# Patient Record
Sex: FEMALE | Race: WHITE | NOT HISPANIC OR LATINO | Employment: OTHER | URBAN - NONMETROPOLITAN AREA
[De-identification: names, ages, dates, MRNs, and addresses within clinical notes are randomized per-mention and may not be internally consistent; named-entity substitution may affect disease eponyms.]

---

## 2017-01-25 ENCOUNTER — APPOINTMENT (OUTPATIENT)
Dept: LAB | Facility: HOSPITAL | Age: 77
End: 2017-01-25

## 2017-01-25 ENCOUNTER — LAB (OUTPATIENT)
Dept: LAB | Facility: HOSPITAL | Age: 77
End: 2017-01-25

## 2017-01-25 ENCOUNTER — OFFICE VISIT (OUTPATIENT)
Dept: GASTROENTEROLOGY | Facility: CLINIC | Age: 77
End: 2017-01-25

## 2017-01-25 ENCOUNTER — PREP FOR SURGERY (OUTPATIENT)
Dept: GASTROENTEROLOGY | Facility: CLINIC | Age: 77
End: 2017-01-25

## 2017-01-25 VITALS
HEART RATE: 64 BPM | WEIGHT: 253 LBS | SYSTOLIC BLOOD PRESSURE: 158 MMHG | TEMPERATURE: 97.5 F | RESPIRATION RATE: 16 BRPM | DIASTOLIC BLOOD PRESSURE: 59 MMHG | HEIGHT: 63 IN | BODY MASS INDEX: 44.83 KG/M2

## 2017-01-25 DIAGNOSIS — K59.00 CONSTIPATION, UNSPECIFIED CONSTIPATION TYPE: Primary | ICD-10-CM

## 2017-01-25 DIAGNOSIS — Z12.11 COLON CANCER SCREENING: ICD-10-CM

## 2017-01-25 DIAGNOSIS — K59.00 CONSTIPATION, UNSPECIFIED CONSTIPATION TYPE: ICD-10-CM

## 2017-01-25 DIAGNOSIS — R10.32 LEFT LOWER QUADRANT PAIN: Primary | ICD-10-CM

## 2017-01-25 DIAGNOSIS — E11.8 DIABETIC COMPLICATION (HCC): ICD-10-CM

## 2017-01-25 DIAGNOSIS — R10.32 LEFT LOWER QUADRANT PAIN: ICD-10-CM

## 2017-01-25 DIAGNOSIS — Z00.00 ROUTINE CHECK-UP: Primary | ICD-10-CM

## 2017-01-25 LAB
ALBUMIN SERPL-MCNC: 3.8 G/DL (ref 3.5–5)
ALBUMIN/GLOB SERPL: 1 G/DL (ref 1–2)
ALP SERPL-CCNC: 140 U/L (ref 38–126)
ALT SERPL W P-5'-P-CCNC: 34 U/L (ref 13–69)
ANION GAP SERPL CALCULATED.3IONS-SCNC: 12.3 MMOL/L
AST SERPL-CCNC: 27 U/L (ref 15–46)
BASOPHILS # BLD AUTO: 0.06 10*3/MM3 (ref 0–0.2)
BASOPHILS NFR BLD AUTO: 0.8 % (ref 0–2.5)
BILIRUB SERPL-MCNC: 0.4 MG/DL (ref 0.2–1.3)
BUN BLD-MCNC: 27 MG/DL (ref 7–20)
BUN/CREAT SERPL: 24.5 (ref 7.1–23.5)
CALCIUM SPEC-SCNC: 9.4 MG/DL (ref 8.4–10.2)
CHLORIDE SERPL-SCNC: 104 MMOL/L (ref 98–107)
CHOLEST SERPL-MCNC: 199 MG/DL (ref 0–199)
CO2 SERPL-SCNC: 31 MMOL/L (ref 26–30)
CREAT BLD-MCNC: 1.1 MG/DL (ref 0.6–1.3)
DEPRECATED RDW RBC AUTO: 43.7 FL (ref 37–54)
EOSINOPHIL # BLD AUTO: 0.15 10*3/MM3 (ref 0–0.7)
EOSINOPHIL NFR BLD AUTO: 2.1 % (ref 0–7)
ERYTHROCYTE [DISTWIDTH] IN BLOOD BY AUTOMATED COUNT: 13.8 % (ref 11.5–14.5)
GFR SERPL CREATININE-BSD FRML MDRD: 48 ML/MIN/1.73
GLOBULIN UR ELPH-MCNC: 3.7 GM/DL
GLUCOSE BLD-MCNC: 161 MG/DL (ref 74–98)
HCT VFR BLD AUTO: 37.6 % (ref 37–47)
HGB BLD-MCNC: 11.4 G/DL (ref 12–16)
IMM GRANULOCYTES # BLD: 0.02 10*3/MM3 (ref 0–0.06)
IMM GRANULOCYTES NFR BLD: 0.3 % (ref 0–0.6)
LIPASE SERPL-CCNC: 130 U/L (ref 23–300)
LYMPHOCYTES # BLD AUTO: 1.57 10*3/MM3 (ref 0.6–3.4)
LYMPHOCYTES NFR BLD AUTO: 22 % (ref 10–50)
MCH RBC QN AUTO: 26 PG (ref 27–31)
MCHC RBC AUTO-ENTMCNC: 30.3 G/DL (ref 30–37)
MCV RBC AUTO: 85.8 FL (ref 81–99)
MONOCYTES # BLD AUTO: 0.47 10*3/MM3 (ref 0–0.9)
MONOCYTES NFR BLD AUTO: 6.6 % (ref 0–12)
NEUTROPHILS # BLD AUTO: 4.88 10*3/MM3 (ref 2–6.9)
NEUTROPHILS NFR BLD AUTO: 68.2 % (ref 37–80)
NRBC BLD MANUAL-RTO: 0 /100 WBC (ref 0–0)
PLATELET # BLD AUTO: 292 10*3/MM3 (ref 130–400)
PMV BLD AUTO: 10.9 FL (ref 6–12)
POTASSIUM BLD-SCNC: 4.3 MMOL/L (ref 3.5–5.1)
PROT SERPL-MCNC: 7.5 G/DL (ref 6.3–8.2)
RBC # BLD AUTO: 4.38 10*6/MM3 (ref 4.2–5.4)
SODIUM BLD-SCNC: 143 MMOL/L (ref 137–145)
TSH SERPL DL<=0.05 MIU/L-ACNC: 3.83 MIU/ML (ref 0.47–4.68)
WBC NRBC COR # BLD: 7.15 10*3/MM3 (ref 4.8–10.8)

## 2017-01-25 PROCEDURE — 36415 COLL VENOUS BLD VENIPUNCTURE: CPT

## 2017-01-25 PROCEDURE — 84443 ASSAY THYROID STIM HORMONE: CPT | Performed by: NURSE PRACTITIONER

## 2017-01-25 PROCEDURE — 99214 OFFICE O/P EST MOD 30 MIN: CPT | Performed by: NURSE PRACTITIONER

## 2017-01-25 PROCEDURE — 83690 ASSAY OF LIPASE: CPT | Performed by: NURSE PRACTITIONER

## 2017-01-25 PROCEDURE — 80053 COMPREHEN METABOLIC PANEL: CPT | Performed by: NURSE PRACTITIONER

## 2017-01-25 PROCEDURE — 85025 COMPLETE CBC W/AUTO DIFF WBC: CPT | Performed by: NURSE PRACTITIONER

## 2017-01-25 PROCEDURE — 82465 ASSAY BLD/SERUM CHOLESTEROL: CPT | Performed by: NURSE PRACTITIONER

## 2017-01-25 RX ORDER — SODIUM CHLORIDE 0.9 % (FLUSH) 0.9 %
1-10 SYRINGE (ML) INJECTION AS NEEDED
Status: CANCELLED | OUTPATIENT
Start: 2017-01-25

## 2017-01-25 RX ORDER — METRONIDAZOLE 250 MG/1
250 TABLET ORAL 4 TIMES DAILY
Qty: 28 TABLET | Refills: 0 | Status: SHIPPED | OUTPATIENT
Start: 2017-01-25 | End: 2017-02-01

## 2017-01-25 RX ORDER — CIPROFLOXACIN 500 MG/1
500 TABLET, FILM COATED ORAL 2 TIMES DAILY
Qty: 14 TABLET | Refills: 0 | Status: SHIPPED | OUTPATIENT
Start: 2017-01-25 | End: 2017-02-01

## 2017-01-25 RX ORDER — SODIUM CHLORIDE 9 MG/ML
70 INJECTION, SOLUTION INTRAVENOUS CONTINUOUS PRN
Status: CANCELLED | OUTPATIENT
Start: 2017-01-25

## 2017-01-25 NOTE — PATIENT INSTRUCTIONS
1.Treatment for diverticulitis:  A. Low-fat low fiber diet for 5 days thereafter low-fat high-fiber diet.   B. Cipro (ciprofloxacin) tablets 500 mg. Take 1 tablet by mouth twice a day for 7 days. Side effects were discussed.   C. Flagyl (metronidazole) tablets 250 mg. Take 1 tablet one by mouth 4 times a day for 7 days. Side effects were discussed.  D. Avoid laxatives, enemas for next 5 days. However, for constipation the patient may use stool softeners.  E. Colonoscopy: Description of the procedure, risks, benefits, alternatives and options, including nonoperative options, were discussed with the patient in detail. The patient understands and wishes to proceed, although it may be advisable to wait 3-4 weeks to schedule procedure.  2. Check CBC, CMP, Lipase, TSH  3. High fiber diet with liberal water intake.

## 2017-01-25 NOTE — LETTER
January 25, 2017     SANFORD Gill  [address]  [facility] [address]    Patient: Kathryn Davison   YOB: 1940   Date of Visit: 1/25/2017       Dear SANFORD Donnelly:    Kathryn Davison was in my office today. Below is a copy of my note.    If you have questions, please do not hesitate to call me. I look forward to following Kathryn along with you.         Sincerely,        NANNETTE Sarkar        CC: No Recipients    [address] [id number]    (H) [phone]  (W)     Chief Complaint   Patient presents with   • Follow-up   • Abdominal Pain     The patient is here for follow up. She states her left lower quadrant pain has improved, but she still has mild pain in that area. Occasionally it radiates up into the left upper quadrant that is worse when she bends over or leans on something that presses in to the left upper quadrant.. She finished the treatment for diverticulitis with Cipro and Flagyl and her symptoms started improving after that. She states her constipation is improving as well. She denies bright red blood per rectum or melena. There is no history of heartburn. The patient denies nausea or vomiting. Her last colonoscopy was in 2012 with polyps. There is no family history of colon cancer.    Abdominal Pain   This is a chronic problem. The current episode started more than 1 month ago (August 2016). The onset quality is sudden. The problem occurs intermittently. The problem has been gradually improving. The pain is located in the LUQ and LLQ. The pain is at a severity of 3/10. The pain is mild. The quality of the pain is a sensation of fullness and burning. The abdominal pain does not radiate. Associated symptoms include arthralgias and constipation. Pertinent negatives include no diarrhea, dysuria, fever, headaches, hematochezia, hematuria, melena, myalgias, nausea or vomiting. The pain is aggravated by certain positions (bending over). The pain is  relieved by nothing. She has tried antibiotics for the symptoms. The treatment provided moderate relief.   Constipation   This is a chronic problem. The current episode started more than 1 year ago (over 4 years). The problem has been gradually improving since onset. Her stool frequency is 4 to 5 times per week. The stool is described as loose, firm and formed. The patient is not on a high fiber diet. She does not exercise regularly. There has been adequate water intake. Associated symptoms include abdominal pain and back pain. Pertinent negatives include no diarrhea, fever, hematochezia, melena, nausea or vomiting. She has tried stool softeners (Miralax) for the symptoms. The treatment provided moderate relief.     Review of Systems   Constitutional: Negative for appetite change, chills, fatigue, fever and unexpected weight change.   HENT: Negative for mouth sores, nosebleeds and trouble swallowing.    Eyes: Negative for discharge and redness.   Respiratory: Positive for apnea and shortness of breath. Negative for cough.    Cardiovascular: Positive for leg swelling. Negative for chest pain and palpitations.   Gastrointestinal: Positive for abdominal pain and constipation. Negative for abdominal distention, anal bleeding, blood in stool, diarrhea, hematochezia, melena, nausea and vomiting.   Endocrine: Negative for cold intolerance, heat intolerance and polydipsia.   Genitourinary: Negative for dysuria, hematuria and urgency.   Musculoskeletal: Positive for arthralgias and back pain. Negative for joint swelling and myalgias.   Skin: Negative for rash.   Allergic/Immunologic: Negative for food allergies and immunocompromised state.   Neurological: Negative for dizziness, seizures, syncope and headaches.   Hematological: Negative for adenopathy. Does not bruise/bleed easily.   Psychiatric/Behavioral: Negative for dysphoric mood. The patient is not nervous/anxious and is not hyperactive.      Patient Active Problem  List   Diagnosis   • Constipation   • Left lower quadrant pain   • Colon cancer screening     Past Medical History   Diagnosis Date   • Back pain    • Colon polyp 12/10/2012   • Diabetes    • High cholesterol    • Hypertension    • Osteoarthritis    • Sleep apnea    • Vision problems      Past Surgical History   Procedure Laterality Date   • Cataract extraction       2014 and 2015   • Tubal abdominal ligation  1980?   • Colonoscopy  12/10/2012     Family History   Problem Relation Age of Onset   • Colon cancer Neg Hx    • Liver cancer Neg Hx    • Liver disease Neg Hx    • Stomach cancer Neg Hx    • Esophageal cancer Neg Hx      Social History   Substance Use Topics   • Smoking status: Never Smoker   • Smokeless tobacco: Not on file   • Alcohol use No       Current Outpatient Prescriptions:   •  alendronate (FOSAMAX) 10 MG tablet, Take 20 mg by mouth Every Morning Before Breakfast., Disp: , Rfl:   •  Calcium Citrate-Vitamin D (CALCIUM + D PO), Take  by mouth., Disp: , Rfl:   •  CloNIDine (CATAPRES) 0.1 MG tablet, Take 0.1 mg by mouth 2 (Two) Times a Day., Disp: , Rfl:   •  clopidogrel (PLAVIX) 75 MG tablet, Take 75 mg by mouth Daily., Disp: , Rfl:   •  diltiaZEM CD (DILTIAZEM CD) 240 MG 24 hr capsule, Take 240 mg by mouth Daily., Disp: , Rfl:   •  insulin glargine (LANTUS) 100 UNIT/ML injection, Inject  under the skin Daily., Disp: , Rfl:   •  insulin lispro (humaLOG) 100 UNIT/ML injection, Inject 10 Units under the skin 3 (Three) Times a Day Before Meals., Disp: , Rfl:   •  levothyroxine (SYNTHROID, LEVOTHROID) 100 MCG tablet, Take 100 mcg by mouth Daily., Disp: , Rfl:   •  montelukast (SINGULAIR) 10 MG tablet, Take 10 mg by mouth Every Night., Disp: , Rfl:   •  olmesartan-hydrochlorothiazide (BENICAR HCT) 20-12.5 MG per tablet, Take 1 tablet by mouth Daily., Disp: , Rfl:   •  Potassium Chloride (KLOR-CON 10 PO), Take  by mouth., Disp: , Rfl:   •  pravastatin (PRAVACHOL) 80 MG tablet, Take 80 mg by mouth Daily.,  "Disp: , Rfl:   •  SITagliptin (JANUVIA) 100 MG tablet, Take 100 mg by mouth Daily., Disp: , Rfl:   •  ciprofloxacin (CIPRO) 500 MG tablet, Take 1 tablet by mouth 2 (Two) Times a Day for 7 days. Take 1 tablet twice a day x 7 days, Disp: 14 tablet, Rfl: 0  •  metroNIDAZOLE (FLAGYL) 250 MG tablet, Take 1 tablet by mouth 4 (Four) Times a Day for 7 days. Take 1 tablet four times daily x 7 days, Disp: 28 tablet, Rfl: 0     No Known Allergies     Visit Vitals   • /59   • Pulse 64   • Temp 97.5 °F (36.4 °C)   • Resp 16   • Ht 63\" (160 cm)   • Wt 253 lb (115 kg)   • BMI 44.82 kg/m2     Physical Exam   Constitutional: She is oriented to person, place, and time. She appears well-developed and well-nourished. No distress.   HENT:   Head: Normocephalic and atraumatic.   Right Ear: Hearing and external ear normal.   Left Ear: Hearing and external ear normal.   Nose: Nose normal.   Mouth/Throat: Oropharynx is clear and moist and mucous membranes are normal. Mucous membranes are not pale, not dry and not cyanotic. No oral lesions. No oropharyngeal exudate.   Eyes: Conjunctivae and EOM are normal. Right eye exhibits no discharge. Left eye exhibits no discharge.   Neck: Trachea normal. Neck supple. No JVD present. No edema present. No thyroid mass and no thyromegaly present.   Cardiovascular: Normal rate, regular rhythm, S2 normal and normal heart sounds.  Exam reveals no gallop, no S3 and no friction rub.    No murmur heard.  Pulmonary/Chest: Effort normal and breath sounds normal. No respiratory distress. She exhibits no tenderness.   Abdominal: Normal appearance and bowel sounds are normal. She exhibits no distension, no ascites and no mass. There is no splenomegaly or hepatomegaly. There is tenderness (mild to moderate) in the left lower quadrant. There is no rigidity, no rebound and no guarding. No hernia.       Vascular Status -  Her exam exhibits no right foot edema. Her exam exhibits no left foot " edema.  Lymphadenopathy:     She has no cervical adenopathy.        Left: No supraclavicular adenopathy present.   Neurological: She is alert and oriented to person, place, and time. She has normal strength. No cranial nerve deficit or sensory deficit.   Skin: No rash noted. She is not diaphoretic. No cyanosis. No pallor. Nails show no clubbing.   Psychiatric: She has a normal mood and affect.   Nursing note and vitals reviewed.    Laboratory Tests:   Upon review of records:     Dated 8/24/2016 glucose 64 BUN 23 creatinine 1.1 sodium 142 potassium 4.1 chloride 105 CO2 33 calcium 9.4 albumin 3.9 ALT 24 AST 21 alkaline phosphatase 120 TSH 2.774 T3 uptake 39.0 free T4 1.19 T4 total 9.3 amylase 70 lipase 36     Abdominal Imaging:   Upon review of records:     Dated 9/19/2016 CT of abdomen with contrast reveals lung bases clear. No obvious cardiac abnormality. Liver, gallbladder, spleen, pancreas, adrenals and both kidneys are normal. Stomach, small bowel and colon are normal. No mesenteric lymphadenopathy or peritoneal free fluid. Aorta shows atherosclerotic calcifications with normal aortic caliber. IVC and branches are patent and normal. No retroperitoneal lymphadenopathy. Body while a musculoskeletal structure showed degenerative changes of the lumbar spine noted. Anterior abdominal wall is normal.     [facility]IDA scan dated 10/6/2016 reveals ejection fraction of 85.9%     Procedures:    Colonoscopy dated 12/10/2012 per Dr. Marcos Arredondo reveals colon polyps.  Pathology reveals tubular adenoma.    Kathryn was seen today for follow-up and abdominal pain.    Diagnoses and all orders for this visit:    Left lower quadrant pain  Comments:  Likely secondary to diverticulitis. Improving.  Orders:  -     ciprofloxacin (CIPRO) 500 MG tablet; Take 1 tablet by mouth 2 (Two) Times a Day for 7 days. Take 1 tablet twice a day x 7 days  -     metroNIDAZOLE (FLAGYL) 250 MG tablet; Take 1 tablet by mouth 4 (Four) Times a Day for 7  days. Take 1 tablet four times daily x 7 days  -     CBC & Differential  -     Comprehensive Metabolic Panel  -     Lipase  -     TSH    Constipation, unspecified constipation type  Comments:  Improving.  Orders:  -     CBC & Differential  -     Comprehensive Metabolic Panel  -     Lipase  -     TSH    Colon cancer screening  Comments:  Last colonoscopy was in 2012 with tubular adenoma. No family history of colon cancer.        Plan   Patient Instructions   1.Treatment for diverticulitis:  A. Low-fat low fiber diet for 5 days thereafter low-fat high-fiber diet.   B. Cipro (ciprofloxacin) tablets 500 mg. Take 1 tablet by mouth twice a day for 7 days. Side effects were discussed.   C. Flagyl (metronidazole) tablets 250 mg. Take 1 tablet one by mouth 4 times a day for 7 days. Side effects were discussed.  D. Avoid laxatives, enemas for next 5 days. However, for constipation the patient may use stool softeners.  E. Colonoscopy: Description of the procedure, risks, benefits, alternatives and options, including nonoperative options, were discussed with the patient in detail. The patient understands and wishes to proceed, although it may be advisable to wait 3-4 weeks to schedule procedure.  2. Check CBC, CMP, Lipase, TSH  3. High fiber diet with liberal water intake.    Patient Care Team:  SANFORD Gill as PCP - General    NANNETTE Sarkar

## 2017-01-25 NOTE — PROGRESS NOTES
[address] [facility] [address]    (H) [phone]  (W)     Chief Complaint   Patient presents with   • Follow-up   • Abdominal Pain     The patient is here for follow up. She states her left lower quadrant pain has improved, but she still has mild pain in that area. Occasionally it radiates up into the left upper quadrant that is worse when she bends over or leans on something that presses in to the left upper quadrant.. She finished the treatment for diverticulitis with Cipro and Flagyl and her symptoms started improving after that. She states her constipation is improving as well. She denies bright red blood per rectum or melena. There is no history of heartburn. The patient denies nausea or vomiting. Her last colonoscopy was in 2012 with polyps. There is no family history of colon cancer.    Abdominal Pain   This is a chronic problem. The current episode started more than 1 month ago (August 2016). The onset quality is sudden. The problem occurs intermittently. The problem has been gradually improving. The pain is located in the LUQ and LLQ. The pain is at a severity of 3/10. The pain is mild. The quality of the pain is a sensation of fullness and burning. The abdominal pain does not radiate. Associated symptoms include arthralgias and constipation. Pertinent negatives include no diarrhea, dysuria, fever, headaches, hematochezia, hematuria, melena, myalgias, nausea or vomiting. The pain is aggravated by certain positions (bending over). The pain is relieved by nothing. She has tried antibiotics for the symptoms. The treatment provided moderate relief.   Constipation   This is a chronic problem. The current episode started more than 1 year ago (over 4 years). The problem has been gradually improving since onset. Her stool frequency is 4 to 5 times per week. The stool is described as loose, firm and formed. The patient is not on a high fiber diet. She does not exercise regularly. There has been adequate  water intake. Associated symptoms include abdominal pain and back pain. Pertinent negatives include no diarrhea, fever, hematochezia, melena, nausea or vomiting. She has tried stool softeners (Miralax) for the symptoms. The treatment provided moderate relief.     Review of Systems   Constitutional: Negative for appetite change, chills, fatigue, fever and unexpected weight change.   HENT: Negative for mouth sores, nosebleeds and trouble swallowing.    Eyes: Negative for discharge and redness.   Respiratory: Positive for apnea and shortness of breath. Negative for cough.    Cardiovascular: Positive for leg swelling. Negative for chest pain and palpitations.   Gastrointestinal: Positive for abdominal pain and constipation. Negative for abdominal distention, anal bleeding, blood in stool, diarrhea, hematochezia, melena, nausea and vomiting.   Endocrine: Negative for cold intolerance, heat intolerance and polydipsia.   Genitourinary: Negative for dysuria, hematuria and urgency.   Musculoskeletal: Positive for arthralgias and back pain. Negative for joint swelling and myalgias.   Skin: Negative for rash.   Allergic/Immunologic: Negative for food allergies and immunocompromised state.   Neurological: Negative for dizziness, seizures, syncope and headaches.   Hematological: Negative for adenopathy. Does not bruise/bleed easily.   Psychiatric/Behavioral: Negative for dysphoric mood. The patient is not nervous/anxious and is not hyperactive.      Patient Active Problem List   Diagnosis   • Constipation   • Left lower quadrant pain   • Colon cancer screening     Past Medical History   Diagnosis Date   • Back pain    • Colon polyp 12/10/2012   • Diabetes    • High cholesterol    • Hypertension    • Osteoarthritis    • Sleep apnea    • Vision problems      Past Surgical History   Procedure Laterality Date   • Cataract extraction       2014 and 2015   • Tubal abdominal ligation  1980?   • Colonoscopy  12/10/2012     Family  History   Problem Relation Age of Onset   • Colon cancer Neg Hx    • Liver cancer Neg Hx    • Liver disease Neg Hx    • Stomach cancer Neg Hx    • Esophageal cancer Neg Hx      Social History   Substance Use Topics   • Smoking status: Never Smoker   • Smokeless tobacco: Not on file   • Alcohol use No       Current Outpatient Prescriptions:   •  alendronate (FOSAMAX) 10 MG tablet, Take 20 mg by mouth Every Morning Before Breakfast., Disp: , Rfl:   •  Calcium Citrate-Vitamin D (CALCIUM + D PO), Take  by mouth., Disp: , Rfl:   •  CloNIDine (CATAPRES) 0.1 MG tablet, Take 0.1 mg by mouth 2 (Two) Times a Day., Disp: , Rfl:   •  clopidogrel (PLAVIX) 75 MG tablet, Take 75 mg by mouth Daily., Disp: , Rfl:   •  diltiaZEM CD (DILTIAZEM CD) 240 MG 24 hr capsule, Take 240 mg by mouth Daily., Disp: , Rfl:   •  insulin glargine (LANTUS) 100 UNIT/ML injection, Inject  under the skin Daily., Disp: , Rfl:   •  insulin lispro (humaLOG) 100 UNIT/ML injection, Inject 10 Units under the skin 3 (Three) Times a Day Before Meals., Disp: , Rfl:   •  levothyroxine (SYNTHROID, LEVOTHROID) 100 MCG tablet, Take 100 mcg by mouth Daily., Disp: , Rfl:   •  montelukast (SINGULAIR) 10 MG tablet, Take 10 mg by mouth Every Night., Disp: , Rfl:   •  olmesartan-hydrochlorothiazide (BENICAR HCT) 20-12.5 MG per tablet, Take 1 tablet by mouth Daily., Disp: , Rfl:   •  Potassium Chloride (KLOR-CON 10 PO), Take  by mouth., Disp: , Rfl:   •  pravastatin (PRAVACHOL) 80 MG tablet, Take 80 mg by mouth Daily., Disp: , Rfl:   •  SITagliptin (JANUVIA) 100 MG tablet, Take 100 mg by mouth Daily., Disp: , Rfl:   •  ciprofloxacin (CIPRO) 500 MG tablet, Take 1 tablet by mouth 2 (Two) Times a Day for 7 days. Take 1 tablet twice a day x 7 days, Disp: 14 tablet, Rfl: 0  •  metroNIDAZOLE (FLAGYL) 250 MG tablet, Take 1 tablet by mouth 4 (Four) Times a Day for 7 days. Take 1 tablet four times daily x 7 days, Disp: 28 tablet, Rfl: 0     No Known Allergies     Visit Vitals   •  "/59   • Pulse 64   • Temp 97.5 °F (36.4 °C)   • Resp 16   • Ht 63\" (160 cm)   • Wt 253 lb (115 kg)   • BMI 44.82 kg/m2     Physical Exam   Constitutional: She is oriented to person, place, and time. She appears well-developed and well-nourished. No distress.   HENT:   Head: Normocephalic and atraumatic.   Right Ear: Hearing and external ear normal.   Left Ear: Hearing and external ear normal.   Nose: Nose normal.   Mouth/Throat: Oropharynx is clear and moist and mucous membranes are normal. Mucous membranes are not pale, not dry and not cyanotic. No oral lesions. No oropharyngeal exudate.   Eyes: Conjunctivae and EOM are normal. Right eye exhibits no discharge. Left eye exhibits no discharge.   Neck: Trachea normal. Neck supple. No JVD present. No edema present. No thyroid mass and no thyromegaly present.   Cardiovascular: Normal rate, regular rhythm, S2 normal and normal heart sounds.  Exam reveals no gallop, no S3 and no friction rub.    No murmur heard.  Pulmonary/Chest: Effort normal and breath sounds normal. No respiratory distress. She exhibits no tenderness.   Abdominal: Normal appearance and bowel sounds are normal. She exhibits no distension, no ascites and no mass. There is no splenomegaly or hepatomegaly. There is tenderness (mild to moderate) in the left lower quadrant. There is no rigidity, no rebound and no guarding. No hernia.       Vascular Status -  Her exam exhibits no right foot edema. Her exam exhibits no left foot edema.  Lymphadenopathy:     She has no cervical adenopathy.        Left: No supraclavicular adenopathy present.   Neurological: She is alert and oriented to person, place, and time. She has normal strength. No cranial nerve deficit or sensory deficit.   Skin: No rash noted. She is not diaphoretic. No cyanosis. No pallor. Nails show no clubbing.   Psychiatric: She has a normal mood and affect.   Nursing note and vitals reviewed.    Laboratory Tests:   Upon review of " records:     Dated 8/24/2016 glucose 64 BUN 23 creatinine 1.1 sodium 142 potassium 4.1 chloride 105 CO2 33 calcium 9.4 albumin 3.9 ALT 24 AST 21 alkaline phosphatase 120 TSH 2.774 T3 uptake 39.0 free T4 1.19 T4 total 9.3 amylase 70 lipase 36     Abdominal Imaging:   Upon review of records:     Dated 9/19/2016 CT of abdomen with contrast reveals lung bases clear. No obvious cardiac abnormality. Liver, gallbladder, spleen, pancreas, adrenals and both kidneys are normal. Stomach, small bowel and colon are normal. No mesenteric lymphadenopathy or peritoneal free fluid. Aorta shows atherosclerotic calcifications with normal aortic caliber. IVC and branches are patent and normal. No retroperitoneal lymphadenopathy. Body while a musculoskeletal structure showed degenerative changes of the lumbar spine noted. Anterior abdominal wall is normal.     [facility] scan dated 10/6/2016 reveals ejection fraction of 85.9%     Procedures:    Colonoscopy dated 12/10/2012 per Dr. Marcos Arredondo reveals colon polyps.  Pathology reveals tubular adenoma.    Kathryn was seen today for follow-up and abdominal pain.    Diagnoses and all orders for this visit:    Left lower quadrant pain  Comments:  Likely secondary to diverticulitis. Improving.  Orders:  -     ciprofloxacin (CIPRO) 500 MG tablet; Take 1 tablet by mouth 2 (Two) Times a Day for 7 days. Take 1 tablet twice a day x 7 days  -     metroNIDAZOLE (FLAGYL) 250 MG tablet; Take 1 tablet by mouth 4 (Four) Times a Day for 7 days. Take 1 tablet four times daily x 7 days  -     CBC & Differential  -     Comprehensive Metabolic Panel  -     Lipase  -     TSH    Constipation, unspecified constipation type  Comments:  Improving.  Orders:  -     CBC & Differential  -     Comprehensive Metabolic Panel  -     Lipase  -     TSH    Colon cancer screening  Comments:  Last colonoscopy was in 2012 with tubular adenoma. No family history of colon cancer.        Plan   Patient Instructions   1.Treatment  for diverticulitis:  A. Low-fat low fiber diet for 5 days thereafter low-fat high-fiber diet.   B. Cipro (ciprofloxacin) tablets 500 mg. Take 1 tablet by mouth twice a day for 7 days. Side effects were discussed.   C. Flagyl (metronidazole) tablets 250 mg. Take 1 tablet one by mouth 4 times a day for 7 days. Side effects were discussed.  D. Avoid laxatives, enemas for next 5 days. However, for constipation the patient may use stool softeners.  E. Colonoscopy: Description of the procedure, risks, benefits, alternatives and options, including nonoperative options, were discussed with the patient in detail. The patient understands and wishes to proceed, although it may be advisable to wait 3-4 weeks to schedule procedure.  2. Check CBC, CMP, Lipase, TSH  3. High fiber diet with liberal water intake.    Patient Care Team:  SANFORD Gill as PCP - General    NANNETTE Sarkar

## 2017-01-25 NOTE — MR AVS SNAPSHOT
Kathryn AVIS Davison   1/25/2017 10:00 AM   Office Visit    Dept Phone:  [phone]   Encounter #:  [id number]    Provider:  NANNETTE Torres   Department:  [facility] GASTROENTEROLOGY                Your Full Care Plan              Today's Medication Changes          These changes are accurate as of: 1/25/17 10:50 AM.  If you have any questions, ask your nurse or doctor.               New Medication(s)Ordered:     ciprofloxacin 500 MG tablet   Commonly known as:  CIPRO   Take 1 tablet by mouth 2 (Two) Times a Day for 7 days. Take 1 tablet twice a day x 7 days   Started by:  NANNETTE Torres       metroNIDAZOLE 250 MG tablet   Commonly known as:  FLAGYL   Take 1 tablet by mouth 4 (Four) Times a Day for 7 days. Take 1 tablet four times daily x 7 days   Started by:  NANNETTE Torres            Where to Get Your Medications      These medications were sent to [place] [facility] [id number] [place] [id number] [address] [address]     Phone:  [phone]     ciprofloxacin 500 MG tablet    metroNIDAZOLE 250 MG tablet                  Your Updated Medication List          This list is accurate as of: 1/25/17 10:50 AM.  Always use your most recent med list.                alendronate 10 MG tablet   Commonly known as:  FOSAMAX       CALCIUM + D PO       ciprofloxacin 500 MG tablet   Commonly known as:  CIPRO   Take 1 tablet by mouth 2 (Two) Times a Day for 7 days. Take 1 tablet twice a day x 7 days       CloNIDine 0.1 MG tablet   Commonly known as:  CATAPRES       clopidogrel 75 MG tablet   Commonly known as:  PLAVIX       DILTIAZEM  MG 24 hr capsule   Generic drug:  diltiaZEM CD       insulin glargine 100 UNIT/ML injection   Commonly known as:  LANTUS       insulin lispro 100 UNIT/ML injection   Commonly known as:  humaLOG       KLOR-CON 10 PO       levothyroxine 100 MCG tablet   Commonly known  as:  SYNTHROID, LEVOTHROID       metroNIDAZOLE 250 MG tablet   Commonly known as:  FLAGYL   Take 1 tablet by mouth 4 (Four) Times a Day for 7 days. Take 1 tablet four times daily x 7 days       montelukast 10 MG tablet   Commonly known as:  SINGULAIR       olmesartan-hydrochlorothiazide 20-12.5 MG per tablet   Commonly known as:  BENICAR HCT       pravastatin 80 MG tablet   Commonly known as:  PRAVACHOL       SITagliptin 100 MG tablet   Commonly known as:  JANUVIA               We Performed the Following     CBC & Differential     Comprehensive Metabolic Panel     Lipase     TSH       You Were Diagnosed With        Codes Comments    Left lower quadrant pain    -  Primary ICD-10-CM: R10.32  ICD-9-CM: 789.04 Likely secondary to diverticulitis. Improving.    Constipation, unspecified constipation type     ICD-10-CM: K59.00  ICD-9-CM: 564.00 Improving.      Instructions    1.Treatment for diverticulitis:  A. Low-fat low fiber diet for 5 days thereafter low-fat high-fiber diet.   B. Cipro (ciprofloxacin) tablets 500 mg. Take 1 tablet by mouth twice a day for 7 days. Side effects were discussed.   C. Flagyl (metronidazole) tablets 250 mg. Take 1 tablet one by mouth 4 times a day for 7 days. Side effects were discussed.  D. Avoid laxatives, enemas for next 5 days. However, for constipation the patient may use stool softeners.  E. Colonoscopy: Description of the procedure, risks, benefits, alternatives and options, including nonoperative options, were discussed with the patient in detail. The patient understands and wishes to proceed, although it may be advisable to wait 3-4 weeks to schedule procedure.  2. Check CBC, CMP, Lipase, TSH     Patient Instructions History      Upcoming Appointments     Visit Type Date Time Department    FOLLOW UP 1/25/2017 10:00 AM [facility] Signup     [facility] allows you to send messages to your doctor, view your test results, renew your prescriptions,  "schedule appointments, and more. To sign up, go to [email or web address] and click on the Sign Up Now link in the New User? box. Enter your [employer] Activation Code exactly as it appears below along with the last four digits of your Social Security Number and your Date of Birth () to complete the sign-up process. If you do not sign up before the expiration date, you must request a new code.    [employer] Activation Code: [email or web address]  Expires: 2017 10:50 AM    If you have questions, you can email [email or web address] or call [phone] to talk to our [employer] staff. Remember, [employer] is NOT to be used for urgent needs. For medical emergencies, dial 911.               Other Info from Your Visit           Allergies     No Known Allergies      Reason for Visit     Follow-up     Abdominal Pain           Vital Signs     Blood Pressure Pulse Temperature Respirations Height Weight    158/59 64 97.5 °F (36.4 °C) 16 63\" (160 cm) 253 lb (115 kg)    Body Mass Index Smoking Status                44.82 kg/m2 Never Smoker          Problems and Diagnoses Noted     Constipation    Abdominal pain, left lower quadrant        "

## 2017-02-22 ENCOUNTER — HOSPITAL ENCOUNTER (OUTPATIENT)
Facility: HOSPITAL | Age: 77
Setting detail: HOSPITAL OUTPATIENT SURGERY
Discharge: HOME | End: 2017-02-22
Attending: INTERNAL MEDICINE | Admitting: INTERNAL MEDICINE

## 2017-02-22 ENCOUNTER — ANESTHESIA EVENT (OUTPATIENT)
Dept: GASTROENTEROLOGY | Facility: HOSPITAL | Age: 77
End: 2017-02-22

## 2017-02-22 ENCOUNTER — ANESTHESIA (OUTPATIENT)
Dept: GASTROENTEROLOGY | Facility: HOSPITAL | Age: 77
End: 2017-02-22

## 2017-02-22 VITALS
TEMPERATURE: 97.5 F | HEIGHT: 63 IN | OXYGEN SATURATION: 97 % | HEART RATE: 67 BPM | RESPIRATION RATE: 18 BRPM | BODY MASS INDEX: 43.41 KG/M2 | SYSTOLIC BLOOD PRESSURE: 121 MMHG | DIASTOLIC BLOOD PRESSURE: 46 MMHG | WEIGHT: 245 LBS

## 2017-02-22 DIAGNOSIS — R10.32 LEFT LOWER QUADRANT PAIN: ICD-10-CM

## 2017-02-22 DIAGNOSIS — Z12.11 COLON CANCER SCREENING: ICD-10-CM

## 2017-02-22 DIAGNOSIS — K59.00 CONSTIPATION, UNSPECIFIED CONSTIPATION TYPE: ICD-10-CM

## 2017-02-22 LAB — GLUCOSE BLDC GLUCOMTR-MCNC: 114 MG/DL (ref 70–130)

## 2017-02-22 PROCEDURE — 45380 COLONOSCOPY AND BIOPSY: CPT | Performed by: INTERNAL MEDICINE

## 2017-02-22 PROCEDURE — 45385 COLONOSCOPY W/LESION REMOVAL: CPT | Performed by: INTERNAL MEDICINE

## 2017-02-22 PROCEDURE — 82962 GLUCOSE BLOOD TEST: CPT

## 2017-02-22 PROCEDURE — 25010000002 PROPOFOL 1000 MG/100ML EMULSION: Performed by: NURSE ANESTHETIST, CERTIFIED REGISTERED

## 2017-02-22 PROCEDURE — 25010000002 FENTANYL CITRATE (PF) 100 MCG/2ML SOLUTION: Performed by: NURSE ANESTHETIST, CERTIFIED REGISTERED

## 2017-02-22 DEVICE — DEV CLIP ENDO RESOLUTION360 CONTRL ROT 235CM: Type: IMPLANTABLE DEVICE | Status: FUNCTIONAL

## 2017-02-22 RX ORDER — FENTANYL CITRATE 50 UG/ML
INJECTION, SOLUTION INTRAMUSCULAR; INTRAVENOUS AS NEEDED
Status: DISCONTINUED | OUTPATIENT
Start: 2017-02-22 | End: 2017-02-22 | Stop reason: SURG

## 2017-02-22 RX ORDER — PROPOFOL 10 MG/ML
INJECTION, EMULSION INTRAVENOUS CONTINUOUS PRN
Status: DISCONTINUED | OUTPATIENT
Start: 2017-02-22 | End: 2017-02-22 | Stop reason: SURG

## 2017-02-22 RX ORDER — SODIUM CHLORIDE 9 MG/ML
70 INJECTION, SOLUTION INTRAVENOUS CONTINUOUS PRN
Status: DISCONTINUED | OUTPATIENT
Start: 2017-02-22 | End: 2017-02-22 | Stop reason: HOSPADM

## 2017-02-22 RX ORDER — PROPOFOL 10 MG/ML
INJECTION, EMULSION INTRAVENOUS AS NEEDED
Status: DISCONTINUED | OUTPATIENT
Start: 2017-02-22 | End: 2017-02-22 | Stop reason: SURG

## 2017-02-22 RX ORDER — SODIUM CHLORIDE 0.9 % (FLUSH) 0.9 %
1-10 SYRINGE (ML) INJECTION AS NEEDED
Status: DISCONTINUED | OUTPATIENT
Start: 2017-02-22 | End: 2017-02-22 | Stop reason: HOSPADM

## 2017-02-22 RX ADMIN — PROPOFOL 50 MG: 10 INJECTION, EMULSION INTRAVENOUS at 09:04

## 2017-02-22 RX ADMIN — FENTANYL CITRATE 100 MCG: 50 INJECTION, SOLUTION INTRAMUSCULAR; INTRAVENOUS at 08:30

## 2017-02-22 RX ADMIN — PROPOFOL 40 MG: 10 INJECTION, EMULSION INTRAVENOUS at 09:10

## 2017-02-22 RX ADMIN — PROPOFOL 60 MG: 10 INJECTION, EMULSION INTRAVENOUS at 08:35

## 2017-02-22 RX ADMIN — SODIUM CHLORIDE: 9 INJECTION, SOLUTION INTRAVENOUS at 08:28

## 2017-02-22 RX ADMIN — PROPOFOL 50 MCG/KG/MIN: 10 INJECTION, EMULSION INTRAVENOUS at 08:33

## 2017-02-22 RX ADMIN — SODIUM CHLORIDE 70 ML/HR: 9 INJECTION, SOLUTION INTRAVENOUS at 08:16

## 2017-02-22 NOTE — H&P (VIEW-ONLY)
[address] [facility] [address]    (H) [phone]  (W)     Chief Complaint   Patient presents with   • Follow-up   • Abdominal Pain     The patient is here for follow up. She states her left lower quadrant pain has improved, but she still has mild pain in that area. Occasionally it radiates up into the left upper quadrant that is worse when she bends over or leans on something that presses in to the left upper quadrant.. She finished the treatment for diverticulitis with Cipro and Flagyl and her symptoms started improving after that. She states her constipation is improving as well. She denies bright red blood per rectum or melena. There is no history of heartburn. The patient denies nausea or vomiting. Her last colonoscopy was in 2012 with polyps. There is no family history of colon cancer.    Abdominal Pain   This is a chronic problem. The current episode started more than 1 month ago (August 2016). The onset quality is sudden. The problem occurs intermittently. The problem has been gradually improving. The pain is located in the LUQ and LLQ. The pain is at a severity of 3/10. The pain is mild. The quality of the pain is a sensation of fullness and burning. The abdominal pain does not radiate. Associated symptoms include arthralgias and constipation. Pertinent negatives include no diarrhea, dysuria, fever, headaches, hematochezia, hematuria, melena, myalgias, nausea or vomiting. The pain is aggravated by certain positions (bending over). The pain is relieved by nothing. She has tried antibiotics for the symptoms. The treatment provided moderate relief.   Constipation   This is a chronic problem. The current episode started more than 1 year ago (over 4 years). The problem has been gradually improving since onset. Her stool frequency is 4 to 5 times per week. The stool is described as loose, firm and formed. The patient is not on a high fiber diet. She does not exercise regularly. There has been adequate  water intake. Associated symptoms include abdominal pain and back pain. Pertinent negatives include no diarrhea, fever, hematochezia, melena, nausea or vomiting. She has tried stool softeners (Miralax) for the symptoms. The treatment provided moderate relief.     Review of Systems   Constitutional: Negative for appetite change, chills, fatigue, fever and unexpected weight change.   HENT: Negative for mouth sores, nosebleeds and trouble swallowing.    Eyes: Negative for discharge and redness.   Respiratory: Positive for apnea and shortness of breath. Negative for cough.    Cardiovascular: Positive for leg swelling. Negative for chest pain and palpitations.   Gastrointestinal: Positive for abdominal pain and constipation. Negative for abdominal distention, anal bleeding, blood in stool, diarrhea, hematochezia, melena, nausea and vomiting.   Endocrine: Negative for cold intolerance, heat intolerance and polydipsia.   Genitourinary: Negative for dysuria, hematuria and urgency.   Musculoskeletal: Positive for arthralgias and back pain. Negative for joint swelling and myalgias.   Skin: Negative for rash.   Allergic/Immunologic: Negative for food allergies and immunocompromised state.   Neurological: Negative for dizziness, seizures, syncope and headaches.   Hematological: Negative for adenopathy. Does not bruise/bleed easily.   Psychiatric/Behavioral: Negative for dysphoric mood. The patient is not nervous/anxious and is not hyperactive.      Patient Active Problem List   Diagnosis   • Constipation   • Left lower quadrant pain   • Colon cancer screening     Past Medical History   Diagnosis Date   • Back pain    • Colon polyp 12/10/2012   • Diabetes    • High cholesterol    • Hypertension    • Osteoarthritis    • Sleep apnea    • Vision problems      Past Surgical History   Procedure Laterality Date   • Cataract extraction       2014 and 2015   • Tubal abdominal ligation  1980?   • Colonoscopy  12/10/2012     Family  History   Problem Relation Age of Onset   • Colon cancer Neg Hx    • Liver cancer Neg Hx    • Liver disease Neg Hx    • Stomach cancer Neg Hx    • Esophageal cancer Neg Hx      Social History   Substance Use Topics   • Smoking status: Never Smoker   • Smokeless tobacco: Not on file   • Alcohol use No       Current Outpatient Prescriptions:   •  alendronate (FOSAMAX) 10 MG tablet, Take 20 mg by mouth Every Morning Before Breakfast., Disp: , Rfl:   •  Calcium Citrate-Vitamin D (CALCIUM + D PO), Take  by mouth., Disp: , Rfl:   •  CloNIDine (CATAPRES) 0.1 MG tablet, Take 0.1 mg by mouth 2 (Two) Times a Day., Disp: , Rfl:   •  clopidogrel (PLAVIX) 75 MG tablet, Take 75 mg by mouth Daily., Disp: , Rfl:   •  diltiaZEM CD (DILTIAZEM CD) 240 MG 24 hr capsule, Take 240 mg by mouth Daily., Disp: , Rfl:   •  insulin glargine (LANTUS) 100 UNIT/ML injection, Inject  under the skin Daily., Disp: , Rfl:   •  insulin lispro (humaLOG) 100 UNIT/ML injection, Inject 10 Units under the skin 3 (Three) Times a Day Before Meals., Disp: , Rfl:   •  levothyroxine (SYNTHROID, LEVOTHROID) 100 MCG tablet, Take 100 mcg by mouth Daily., Disp: , Rfl:   •  montelukast (SINGULAIR) 10 MG tablet, Take 10 mg by mouth Every Night., Disp: , Rfl:   •  olmesartan-hydrochlorothiazide (BENICAR HCT) 20-12.5 MG per tablet, Take 1 tablet by mouth Daily., Disp: , Rfl:   •  Potassium Chloride (KLOR-CON 10 PO), Take  by mouth., Disp: , Rfl:   •  pravastatin (PRAVACHOL) 80 MG tablet, Take 80 mg by mouth Daily., Disp: , Rfl:   •  SITagliptin (JANUVIA) 100 MG tablet, Take 100 mg by mouth Daily., Disp: , Rfl:   •  ciprofloxacin (CIPRO) 500 MG tablet, Take 1 tablet by mouth 2 (Two) Times a Day for 7 days. Take 1 tablet twice a day x 7 days, Disp: 14 tablet, Rfl: 0  •  metroNIDAZOLE (FLAGYL) 250 MG tablet, Take 1 tablet by mouth 4 (Four) Times a Day for 7 days. Take 1 tablet four times daily x 7 days, Disp: 28 tablet, Rfl: 0     No Known Allergies     Visit Vitals   •  "/59   • Pulse 64   • Temp 97.5 °F (36.4 °C)   • Resp 16   • Ht 63\" (160 cm)   • Wt 253 lb (115 kg)   • BMI 44.82 kg/m2     Physical Exam   Constitutional: She is oriented to person, place, and time. She appears well-developed and well-nourished. No distress.   HENT:   Head: Normocephalic and atraumatic.   Right Ear: Hearing and external ear normal.   Left Ear: Hearing and external ear normal.   Nose: Nose normal.   Mouth/Throat: Oropharynx is clear and moist and mucous membranes are normal. Mucous membranes are not pale, not dry and not cyanotic. No oral lesions. No oropharyngeal exudate.   Eyes: Conjunctivae and EOM are normal. Right eye exhibits no discharge. Left eye exhibits no discharge.   Neck: Trachea normal. Neck supple. No JVD present. No edema present. No thyroid mass and no thyromegaly present.   Cardiovascular: Normal rate, regular rhythm, S2 normal and normal heart sounds.  Exam reveals no gallop, no S3 and no friction rub.    No murmur heard.  Pulmonary/Chest: Effort normal and breath sounds normal. No respiratory distress. She exhibits no tenderness.   Abdominal: Normal appearance and bowel sounds are normal. She exhibits no distension, no ascites and no mass. There is no splenomegaly or hepatomegaly. There is tenderness (mild to moderate) in the left lower quadrant. There is no rigidity, no rebound and no guarding. No hernia.       Vascular Status -  Her exam exhibits no right foot edema. Her exam exhibits no left foot edema.  Lymphadenopathy:     She has no cervical adenopathy.        Left: No supraclavicular adenopathy present.   Neurological: She is alert and oriented to person, place, and time. She has normal strength. No cranial nerve deficit or sensory deficit.   Skin: No rash noted. She is not diaphoretic. No cyanosis. No pallor. Nails show no clubbing.   Psychiatric: She has a normal mood and affect.   Nursing note and vitals reviewed.    Laboratory Tests:   Upon review of " records:     Dated 8/24/2016 glucose 64 BUN 23 creatinine 1.1 sodium 142 potassium 4.1 chloride 105 CO2 33 calcium 9.4 albumin 3.9 ALT 24 AST 21 alkaline phosphatase 120 TSH 2.774 T3 uptake 39.0 free T4 1.19 T4 total 9.3 amylase 70 lipase 36     Abdominal Imaging:   Upon review of records:     Dated 9/19/2016 CT of abdomen with contrast reveals lung bases clear. No obvious cardiac abnormality. Liver, gallbladder, spleen, pancreas, adrenals and both kidneys are normal. Stomach, small bowel and colon are normal. No mesenteric lymphadenopathy or peritoneal free fluid. Aorta shows atherosclerotic calcifications with normal aortic caliber. IVC and branches are patent and normal. No retroperitoneal lymphadenopathy. Body while a musculoskeletal structure showed degenerative changes of the lumbar spine noted. Anterior abdominal wall is normal.     [facility] HIDA scan dated 10/6/2016 reveals ejection fraction of 85.9%     Procedures:    Colonoscopy dated 12/10/2012 per Dr. Marcos Arredondo reveals colon polyps.  Pathology reveals tubular adenoma.    Kathryn was seen today for follow-up and abdominal pain.    Diagnoses and all orders for this visit:    Left lower quadrant pain  Comments:  Likely secondary to diverticulitis. Improving.  Orders:  -     ciprofloxacin (CIPRO) 500 MG tablet; Take 1 tablet by mouth 2 (Two) Times a Day for 7 days. Take 1 tablet twice a day x 7 days  -     metroNIDAZOLE (FLAGYL) 250 MG tablet; Take 1 tablet by mouth 4 (Four) Times a Day for 7 days. Take 1 tablet four times daily x 7 days  -     CBC & Differential  -     Comprehensive Metabolic Panel  -     Lipase  -     TSH    Constipation, unspecified constipation type  Comments:  Improving.  Orders:  -     CBC & Differential  -     Comprehensive Metabolic Panel  -     Lipase  -     TSH    Colon cancer screening  Comments:  Last colonoscopy was in 2012 with tubular adenoma. No family history of colon cancer.        Plan   Patient Instructions   1.Treatment  for diverticulitis:  A. Low-fat low fiber diet for 5 days thereafter low-fat high-fiber diet.   B. Cipro (ciprofloxacin) tablets 500 mg. Take 1 tablet by mouth twice a day for 7 days. Side effects were discussed.   C. Flagyl (metronidazole) tablets 250 mg. Take 1 tablet one by mouth 4 times a day for 7 days. Side effects were discussed.  D. Avoid laxatives, enemas for next 5 days. However, for constipation the patient may use stool softeners.  E. Colonoscopy: Description of the procedure, risks, benefits, alternatives and options, including nonoperative options, were discussed with the patient in detail. The patient understands and wishes to proceed, although it may be advisable to wait 3-4 weeks to schedule procedure.  2. Check CBC, CMP, Lipase, TSH  3. High fiber diet with liberal water intake.    Patient Care Team:  SANFORD Gill as PCP - General    NANNETTE Sarkar

## 2017-02-22 NOTE — OP NOTE
PROCEDURE:  Colonoscopy to the terminal ileum with  2 hot snare, 2 cold snare and one cold biopsy polypectomies as well as placement of 2 resolution clips to coapt the margins.    DATE OF PROCEDURE:  February 22, 2017.    REFERRING PROVIDER:  Jordan Headley     INSTRUMENT USED:  Olympus PCF H 190 videocolonoscope.     INDICATIONS OF THE PROCEDURE:   This is a 77-year-old white female for colon cancer screening.  There is history of constipation.  The patient has been treated for diverticulitis as well.     BIOPSIES:   Cecum: 1 cold snare polypectomy.  Ascending colon: 2 hot snare polypectomies, one cold snare and one cold biopsy polypectomies.        PREPARATION:   [place] prep score: 7 (2+3+2).     PHOTOGRAPHS:  Photographs were included in the medical records.     MEDICATIONS:  MAC.       CONSENT/PREPROCEDURE EVALUATION:  Risks, benefits, alternatives and options of the procedure including risks of sedation/anesthesia were discussed with the patient and informed consent was obtained prior to the procedure.  History and physical examination were performed and nothing precluded the test.      REPORT:  The patient was placed in left lateral decubitus position and a digital examination was performed.  Once under the influence of IV sedation, the instrument was inserted into the rectum and advanced under direct vision to cecum which was identified by the ileocecal valve, triradiate folds and appendiceal orifice. The scope was then maneuvered into the terminal ileum.        FINDINGS:      Digital rectal examination:  Good anal tone.  No perianal pathology.  No mass.  Small scarred external hemorrhoidal tissue.      Terminal ileum:  4-5 cm. Normal.        Cecum and ascending colon:  A 5 mm sessile polyp was noted in the cecum behind the ileocecal valve.  This was removed with cold snare.  2, ex-10 mm sessile polyps were noted in the ascending colon.  These were removed with hot snare and recovered.  Both site margins  were coapted using 1 resolution clip on each site.  A 5 mm sessile polyp in the ascending colon was removed with cold snare.  A 3 mm sessile polyp was removed with cold biopsy forceps and ascending colon.  Scant early diverticular change was noted in the ascending colon.  Vascular ectasias were seen within the cecum.       Hepatic flexure, transverse colon, splenic flexurescant diverticulosis was noted.    Descending colon, sigmoid colon and rectum:  scant diverticulosis was noted.   A retroflex examination within the rectum revealed internal hemorrhoids.        The scope was then straightened, the lower GI tract was decompressed, and the scope was pulled out of the patient.  The patient tolerated the procedure well.  There were no immediate complications and the patient was transferred in stable condition for post procedure observation.       TECHNICAL DATA:  [place] prep score: 7 (2+3+2).   Difficulty of examination:  Average.   Withdrawal time: 10 min.  Retroflex examination in right colon: No.  Second look Rectum to cecum with decompression: Yes.    DIAGNOSES:    1. Pandiverticulosis.  2. Colon polyps.  3. Vascular ectasia-nonbleeding.  4. Internal hemorrhoids and small scarred external hemorrhoidal tissue.        RECOMMENDATIONS:     1.  Follow biopsies.    2.  Follow-up:  3-4 weeks.    3.  Followup colonoscopy: Pending on the biopsy results.    4.  Dietary instructions.     Thank you very much for letting me participate in the care of this patient. Please do not hesitate to call me if you have any questions.

## 2017-02-22 NOTE — PLAN OF CARE
Problem: GI Endoscopy (Adult)  Goal: Signs and Symptoms of Listed Potential Problems Will be Absent or Manageable (GI Endoscopy)    02/22/17 0810   GI Endoscopy   Problems Present (GI Endoscopy) none

## 2017-02-22 NOTE — DISCHARGE INSTRUCTIONS
Diet:     Low Fat Diet.     Take fiber supplement.  · Fiber One Cereal-40% Nutty Clusters 1/4 cup daily   · Veronika's All Bran-Bran Buds 1/4 cup daily.  · Use skim, 1, 2 % or soy milk.     Drink 5-6 glasses of water daily.    Blood Thinner Directions:    ·  Avoid Aspirin & other NSAIDS for _7__ days.  Tylenol is okay.    · Hold Plavix for 7 days.  If no bleeding may resume Plavix on 3/1/2017.    Treatments:    · Marte magnesium caplet (Over-the-counter).  Take one orally at night.    Follow-up:  Dr. Awan in 4 weeks.  Follow-up colonoscopy: Depending on the biopsy results.    Call [facility] at [phone] or come to the Emergency   Department if you experience the following: Chest pain, abdominal pain, bleeding  (vomiting of blood or coffee colored material, black stools or paola blood in stools),   fever/chills, nausea and vomiting or dizziness.

## 2017-02-22 NOTE — ANESTHESIA PREPROCEDURE EVALUATION
Anesthesia Evaluation     Patient summary reviewed and Nursing notes reviewed   no history of anesthetic complications:  NPO Status: > 8 hours   Airway   Mallampati: II  TM distance: >3 FB  Neck ROM: full  no difficulty expected  Dental - normal exam         Pulmonary - normal exam   (+) sleep apnea,   Cardiovascular - normal exam    (+) hypertension, PVD,     ROS comment: Cardiac clearance    Neuro/Psych- negative ROS    ROS Comment: Carotid artery stenosis  GI/Hepatic/Renal/Endo    (+) morbid obesity, diabetes mellitus,     Musculoskeletal     (+) back pain,   Abdominal   (+) obese,    Substance History - negative use     OB/GYN negative ob/gyn ROS         Other   (+) arthritis                               Anesthesia Plan    ASA 3     MAC     intravenous induction   Anesthetic plan and risks discussed with patient.

## 2017-02-22 NOTE — ANESTHESIA POSTPROCEDURE EVALUATION
Patient: Kathryn Davison    Procedure Summary     Date Anesthesia Start Anesthesia Stop Room / Location    02/22/17 0828 0913 [facility] ENDOSCOPY 2 / [facility] ENDOSCOPY       Procedure Diagnosis Surgeon Provider    COLONOSCOPY with hot and cold snare polypectomies, resolution clip placement, cold biopsy polypectomy (N/A Anus) Left lower quadrant pain; Colon cancer screening; Constipation, unspecified constipation type  (Left lower quadrant pain [R10.32]; Colon cancer screening [Z12.11]; Constipation, unspecified constipation type [K59.00]) MD Tera Thurston CRNA          Anesthesia Type: MAC  Last vitals  BP      Temp      Pulse     Resp      SpO2        Post Anesthesia Care and Evaluation    Patient location during evaluation: bedside  Patient participation: complete - patient participated  Level of consciousness: awake and alert  Pain score: 0  Pain management: satisfactory to patient  Airway patency: patent  Anesthetic complications: No anesthetic complications  PONV Status: none  Cardiovascular status: acceptable and hemodynamically stable  Respiratory status: acceptable  Hydration status: acceptable

## 2017-02-28 LAB
LAB AP CASE REPORT: NORMAL
Lab: NORMAL
PATH REPORT.FINAL DX SPEC: NORMAL

## 2017-03-09 ENCOUNTER — LAB (OUTPATIENT)
Dept: LAB | Facility: HOSPITAL | Age: 77
End: 2017-03-09

## 2017-03-09 ENCOUNTER — TRANSCRIBE ORDERS (OUTPATIENT)
Dept: LAB | Facility: HOSPITAL | Age: 77
End: 2017-03-09

## 2017-03-09 DIAGNOSIS — E78.2 MIXED HYPERLIPIDEMIA: ICD-10-CM

## 2017-03-09 DIAGNOSIS — E11.9 DIABETES MELLITUS WITHOUT COMPLICATION (HCC): ICD-10-CM

## 2017-03-09 DIAGNOSIS — E11.9 DIABETES MELLITUS WITHOUT COMPLICATION (HCC): Primary | ICD-10-CM

## 2017-03-09 LAB
ALBUMIN SERPL-MCNC: 3.9 G/DL (ref 3.2–4.8)
ALBUMIN/GLOB SERPL: 1.3 G/DL (ref 1.5–2.5)
ALP SERPL-CCNC: 127 U/L (ref 25–100)
ALT SERPL W P-5'-P-CCNC: 28 U/L (ref 7–40)
ANION GAP SERPL CALCULATED.3IONS-SCNC: 8 MMOL/L (ref 3–11)
AST SERPL-CCNC: 24 U/L (ref 0–33)
BILIRUB SERPL-MCNC: 0.3 MG/DL (ref 0.3–1.2)
BUN BLD-MCNC: 27 MG/DL (ref 9–23)
BUN/CREAT SERPL: 24.5 (ref 7–25)
CALCIUM SPEC-SCNC: 9.7 MG/DL (ref 8.7–10.4)
CHLORIDE SERPL-SCNC: 104 MMOL/L (ref 99–109)
CO2 SERPL-SCNC: 28 MMOL/L (ref 20–31)
CREAT BLD-MCNC: 1.1 MG/DL (ref 0.6–1.3)
GFR SERPL CREATININE-BSD FRML MDRD: 48 ML/MIN/1.73
GLOBULIN UR ELPH-MCNC: 3 GM/DL
GLUCOSE BLD-MCNC: 131 MG/DL (ref 70–100)
HBA1C MFR BLD: 8.9 % (ref 4.8–5.6)
POTASSIUM BLD-SCNC: 4.2 MMOL/L (ref 3.5–5.5)
PROT SERPL-MCNC: 6.9 G/DL (ref 5.7–8.2)
SODIUM BLD-SCNC: 140 MMOL/L (ref 132–146)

## 2017-03-09 PROCEDURE — 36415 COLL VENOUS BLD VENIPUNCTURE: CPT

## 2017-03-09 PROCEDURE — 80053 COMPREHEN METABOLIC PANEL: CPT | Performed by: PHYSICIAN ASSISTANT

## 2017-03-09 PROCEDURE — 83036 HEMOGLOBIN GLYCOSYLATED A1C: CPT | Performed by: PHYSICIAN ASSISTANT

## 2017-03-23 ENCOUNTER — OFFICE VISIT (OUTPATIENT)
Dept: GASTROENTEROLOGY | Facility: CLINIC | Age: 77
End: 2017-03-23

## 2017-03-23 VITALS
DIASTOLIC BLOOD PRESSURE: 58 MMHG | HEART RATE: 70 BPM | RESPIRATION RATE: 16 BRPM | BODY MASS INDEX: 44.3 KG/M2 | WEIGHT: 250 LBS | HEIGHT: 63 IN | SYSTOLIC BLOOD PRESSURE: 158 MMHG | TEMPERATURE: 97.8 F

## 2017-03-23 DIAGNOSIS — K59.00 CONSTIPATION, UNSPECIFIED CONSTIPATION TYPE: Chronic | ICD-10-CM

## 2017-03-23 DIAGNOSIS — K55.20 VASCULAR ECTASIA OF COLON: Chronic | ICD-10-CM

## 2017-03-23 DIAGNOSIS — K64.4 EXTERNAL HEMORRHOID: Chronic | ICD-10-CM

## 2017-03-23 DIAGNOSIS — K57.30 DIVERTICULOSIS OF LARGE INTESTINE WITHOUT HEMORRHAGE: Chronic | ICD-10-CM

## 2017-03-23 DIAGNOSIS — K64.8 INTERNAL HEMORRHOIDS: Chronic | ICD-10-CM

## 2017-03-23 DIAGNOSIS — K63.5 COLON POLYPS: ICD-10-CM

## 2017-03-23 DIAGNOSIS — R10.12 LEFT UPPER QUADRANT PAIN: Primary | Chronic | ICD-10-CM

## 2017-03-23 PROCEDURE — 99214 OFFICE O/P EST MOD 30 MIN: CPT | Performed by: NURSE PRACTITIONER

## 2017-03-23 NOTE — PATIENT INSTRUCTIONS
1. High fiber diet with liberal water intake.  2. Patient may need further work up of left upper quadrant tenderness as this may have an element of musculoskeletal tenderness.  3. Follow up colonoscopy in 5 years.  4. Follow up: 3 months

## 2017-03-23 NOTE — PROGRESS NOTES
[address] [facility] [address]    (H) [phone]  (W)     Chief Complaint   Patient presents with   • Follow-up   • Abdominal Pain     The patient is here for follow up. She states she has continued to have pain in the left upper quadrant. She has the pain on a daily basis. Sometimes the pain occurs after meals, other times she has been sitting on the couch and the pain will occur.  The pain can worsen with movement.  She has not been taking anything for pain. The patient denies nausea or vomiting. There is no history of heartburn.     There is a long-standing history of constipation. The patient states this is improving. She has a bowel movement every other day or so. She denies bright red blood per rectum or melena.  There is no history of diarrhea.      Abdominal Pain   This is a chronic problem. The current episode started more than 1 month ago (August 2016). The onset quality is sudden. The problem occurs intermittently. The problem has been unchanged. The pain is located in the LUQ. The pain is at a severity of 3/10. The pain is mild. The quality of the pain is a sensation of fullness and burning. The abdominal pain does not radiate. Associated symptoms include arthralgias. Pertinent negatives include no constipation, diarrhea, dysuria, fever, headaches, hematochezia, hematuria, melena, myalgias, nausea or vomiting. The pain is aggravated by certain positions (bending over). The pain is relieved by nothing. She has tried nothing for the symptoms. Prior diagnostic workup includes lower endoscopy.   Constipation   This is a chronic problem. The current episode started more than 1 year ago (over 4 years). The problem has been gradually improving since onset. Her stool frequency is 4 to 5 times per week. The stool is described as loose, firm and formed. The patient is not on a high fiber diet. She does not exercise regularly. There has been adequate water intake. Associated symptoms include abdominal pain  and back pain. Pertinent negatives include no diarrhea, fever, hematochezia, melena, nausea or vomiting. She has tried stool softeners (Miralax) for the symptoms. The treatment provided significant relief.     Review of Systems   Constitutional: Negative for appetite change, chills, fatigue, fever and unexpected weight change.   HENT: Negative for mouth sores, nosebleeds and trouble swallowing.    Eyes: Negative for discharge and redness.   Respiratory: Positive for apnea. Negative for cough and shortness of breath.    Cardiovascular: Positive for leg swelling. Negative for chest pain and palpitations.   Gastrointestinal: Positive for abdominal pain. Negative for abdominal distention, anal bleeding, blood in stool, constipation, diarrhea, hematochezia, melena, nausea and vomiting.   Endocrine: Negative for cold intolerance, heat intolerance and polydipsia.   Genitourinary: Negative for dysuria, hematuria and urgency.   Musculoskeletal: Positive for arthralgias and back pain. Negative for joint swelling and myalgias.   Skin: Negative for rash.   Allergic/Immunologic: Negative for food allergies and immunocompromised state.   Neurological: Negative for dizziness, seizures, syncope and headaches.   Hematological: Negative for adenopathy. Does not bruise/bleed easily.   Psychiatric/Behavioral: Negative for dysphoric mood. The patient is not nervous/anxious and is not hyperactive.      Patient Active Problem List   Diagnosis   • Constipation   • Left lower quadrant pain   • Colon cancer screening   • Left upper quadrant pain   • Diverticulosis of large intestine without hemorrhage   • Colon polyps   • Vascular ectasia of colon   • Internal hemorrhoids   • External hemorrhoid     Past Medical History:   Diagnosis Date   • Back pain    • Carotid artery stenosis     BILATERAL   • Colon polyp 12/10/2012   • Diabetes    • High cholesterol    • Hypertension    • Osteoarthritis    • Sleep apnea    • Vision problems      Past  Surgical History:   Procedure Laterality Date   • ANKLE ARTHROSCOPY Right    • BREAST SURGERY      CYST REMOVED   • CATARACT EXTRACTION      2014 and 2015   • COLONOSCOPY  12/10/2012   • COLONOSCOPY N/A 2/22/2017    Procedure: COLONOSCOPY with hot and cold snare polypectomies, resolution clip placement, cold biopsy polypectomy;  Surgeon: Mg Awan MD;  Location: [facility] ENDOSCOPY;  Service:    • TUBAL ABDOMINAL LIGATION  1980?     Family History   Problem Relation Age of Onset   • Colon cancer Neg Hx    • Liver cancer Neg Hx    • Liver disease Neg Hx    • Stomach cancer Neg Hx    • Esophageal cancer Neg Hx      Social History   Substance Use Topics   • Smoking status: Never Smoker   • Smokeless tobacco: Never Used   • Alcohol use No       Current Outpatient Prescriptions:   •  alendronate (FOSAMAX) 10 MG tablet, Take 20 mg by mouth Every Morning Before Breakfast., Disp: , Rfl:   •  Calcium Citrate-Vitamin D (CALCIUM + D PO), Take  by mouth., Disp: , Rfl:   •  CloNIDine (CATAPRES) 0.1 MG tablet, Take 0.1 mg by mouth 2 (Two) Times a Day., Disp: , Rfl:   •  diltiaZEM CD (DILTIAZEM CD) 240 MG 24 hr capsule, Take 240 mg by mouth Daily., Disp: , Rfl:   •  insulin glargine (LANTUS) 100 UNIT/ML injection, Inject  under the skin Daily., Disp: , Rfl:   •  insulin lispro (humaLOG) 100 UNIT/ML injection, Inject 10 Units under the skin 3 (Three) Times a Day Before Meals., Disp: , Rfl:   •  levothyroxine (SYNTHROID, LEVOTHROID) 100 MCG tablet, Take 100 mcg by mouth Daily., Disp: , Rfl:   •  montelukast (SINGULAIR) 10 MG tablet, Take 10 mg by mouth Every Night., Disp: , Rfl:   •  olmesartan-hydrochlorothiazide (BENICAR HCT) 20-12.5 MG per tablet, Take 1 tablet by mouth Daily., Disp: , Rfl:   •  Potassium Chloride (KLOR-CON 10 PO), Take  by mouth., Disp: , Rfl:   •  pravastatin (PRAVACHOL) 80 MG tablet, Take 80 mg by mouth Daily., Disp: , Rfl:   •  SITagliptin (JANUVIA) 100 MG tablet, Take 100 mg by mouth Daily., Disp: , Rfl:   "    No Known Allergies     /58  Pulse 70  Temp 97.8 °F (36.6 °C)  Resp 16  Ht 63\" (160 cm)  Wt 250 lb (113 kg)  BMI 44.29 kg/m2     Physical Exam   Constitutional: She is oriented to person, place, and time. She appears well-developed and well-nourished. No distress.   HENT:   Head: Normocephalic and atraumatic.   Right Ear: Hearing and external ear normal.   Left Ear: Hearing and external ear normal.   Nose: Nose normal.   Mouth/Throat: Oropharynx is clear and moist and mucous membranes are normal. Mucous membranes are not pale, not dry and not cyanotic. No oral lesions. No oropharyngeal exudate.   Eyes: Conjunctivae and EOM are normal. Right eye exhibits no discharge. Left eye exhibits no discharge.   Neck: Trachea normal. Neck supple. No JVD present. No edema present. No thyroid mass and no thyromegaly present.   Cardiovascular: Normal rate, regular rhythm, S2 normal and normal heart sounds.  Exam reveals no gallop, no S3 and no friction rub.    No murmur heard.  Pulmonary/Chest: Effort normal and breath sounds normal. No respiratory distress. She exhibits no tenderness.   Abdominal: Normal appearance and bowel sounds are normal. She exhibits no distension, no ascites and no mass. There is no splenomegaly or hepatomegaly. There is tenderness (Area of tenderness is located over the bottom rib on the left.) in the left upper quadrant. There is no rigidity, no rebound and no guarding. No hernia.           Vascular Status -  Her exam exhibits no right foot edema. Her exam exhibits no left foot edema.  Lymphadenopathy:     She has no cervical adenopathy.        Left: No supraclavicular adenopathy present.   Neurological: She is alert and oriented to person, place, and time. She has normal strength. No cranial nerve deficit or sensory deficit.   Skin: No rash noted. She is not diaphoretic. No cyanosis. No pallor. Nails show no clubbing.   Psychiatric: She has a normal mood and affect.   Nursing note and " vitals reviewed.    Laboratory Tests:    Upon review of records:      Dated 8/24/2016 glucose 64 BUN 23 creatinine 1.1 sodium 142 potassium 4.1 chloride 105 CO2 33 calcium 9.4 albumin 3.9 ALT 24 AST 21 alkaline phosphatase 120 TSH 2.774 T3 uptake 39.0 free T4 1.19 T4 total 9.3 amylase 70 lipase 36    1/25/2017 glucose 161 BUN 27 creatinine 1.1 sodium 143 potassium 4.3 chloride 104 CO2 31 calcium 9.4 albumin 3.8 ALT 34 AST 27 alkaline phosphatase 140 total bilirubin 0.4 WBC 7.15 hemoglobin 11.4 hematocrit 37.6 platelet count 292 MCV 85.8    Dated 3/9/2017 glucose 131 BUN 27 creatinine 1.1 sodium 140 potassium 4.2 chloride 104 CO2 28 calcium 9.7 albumin 3.9 ALT 28 AST 24 alkaline phosphatase 127 total bilirubin 0.3 hemoglobin A1c 8.9    Abdominal Imaging:    Upon review of records:      Dated 9/19/2016 CT of abdomen with contrast reveals lung bases clear. No obvious cardiac abnormality. Liver, gallbladder, spleen, pancreas, adrenals and both kidneys are normal. Stomach, small bowel and colon are normal. No mesenteric lymphadenopathy or peritoneal free fluid. Aorta shows atherosclerotic calcifications with normal aortic caliber. IVC and branches are patent and normal. No retroperitoneal lymphadenopathy. Body while a musculoskeletal structure showed degenerative changes of the lumbar spine noted. Anterior abdominal wall is normal.      [facility] scan dated 10/6/2016 reveals ejection fraction of 85.9%     Procedures:    Colonoscopy dated 2/22/2017 reveals pandiverticulosis.  Colon polyps.  Vascular ectasia, nonbleeding.  Internal hemorrhoids and small scar external hemorrhoidal tissue.  A descending colon polyp biopsy reveals adenomatous polyps.  Negative for high-grade dysplasia.  Cecum polyp biopsy reveals adenomatous polyp.  Negative for high-grade dysplasia.    Kathryn was seen today for follow-up and abdominal pain.    Diagnoses and all orders for this visit:    Left upper quadrant pain  Comments:  Palpation reveals  tenderness over lower ribs on left side.  Source of pain pain may be musculoskeletal.    Constipation, unspecified constipation type  Comments:  Improving.    Diverticulosis of large intestine without hemorrhage  Comments:  Pandiverticulosis.  Stable.    Colon polyps  Comments:  Tubular adenoma.    Vascular ectasia of colon  Comments:  Vascular ectasia of colon, nonbleeding.    Internal hemorrhoids  Comments:  Stable.  Uncomplicated.    External hemorrhoid  Comments:  Stable.  Uncomplicated.        Plan   Patient Instructions   1. High fiber diet with liberal water intake.  2. Patient may need further work up of left upper quadrant tenderness as this may have an element of musculoskeletal tenderness.  3. Follow up colonoscopy in 5 years.  4. Follow up: 3 months     Patient Care Team:  SANFORD Gill as PCP - General    NANNETTE Sarkar

## 2017-06-13 ENCOUNTER — LAB (OUTPATIENT)
Dept: LAB | Facility: HOSPITAL | Age: 77
End: 2017-06-13
Attending: INTERNAL MEDICINE

## 2017-06-13 DIAGNOSIS — E11.8 DIABETIC COMPLICATION (HCC): Primary | ICD-10-CM

## 2017-06-13 LAB
ALBUMIN SERPL-MCNC: 4 G/DL (ref 3.2–4.8)
ALBUMIN/GLOB SERPL: 1.4 G/DL (ref 1.5–2.5)
ALP SERPL-CCNC: 120 U/L (ref 25–100)
ALT SERPL W P-5'-P-CCNC: 20 U/L (ref 7–40)
ANION GAP SERPL CALCULATED.3IONS-SCNC: 4 MMOL/L (ref 3–11)
AST SERPL-CCNC: 18 U/L (ref 0–33)
BILIRUB SERPL-MCNC: 0.3 MG/DL (ref 0.3–1.2)
BUN BLD-MCNC: 26 MG/DL (ref 9–23)
BUN/CREAT SERPL: 23.6 (ref 7–25)
CALCIUM SPEC-SCNC: 9.7 MG/DL (ref 8.7–10.4)
CHLORIDE SERPL-SCNC: 106 MMOL/L (ref 99–109)
CO2 SERPL-SCNC: 32 MMOL/L (ref 20–31)
CREAT BLD-MCNC: 1.1 MG/DL (ref 0.6–1.3)
GFR SERPL CREATININE-BSD FRML MDRD: 48 ML/MIN/1.73
GLOBULIN UR ELPH-MCNC: 2.8 GM/DL
GLUCOSE BLD-MCNC: 172 MG/DL (ref 70–100)
HBA1C MFR BLD: 9.5 % (ref 4.8–5.6)
POTASSIUM BLD-SCNC: 4.6 MMOL/L (ref 3.5–5.5)
PROT SERPL-MCNC: 6.8 G/DL (ref 5.7–8.2)
SODIUM BLD-SCNC: 142 MMOL/L (ref 132–146)

## 2017-06-13 PROCEDURE — 80053 COMPREHEN METABOLIC PANEL: CPT | Performed by: INTERNAL MEDICINE

## 2017-06-13 PROCEDURE — 83036 HEMOGLOBIN GLYCOSYLATED A1C: CPT | Performed by: INTERNAL MEDICINE

## 2017-06-13 PROCEDURE — 36415 COLL VENOUS BLD VENIPUNCTURE: CPT

## 2017-06-15 ENCOUNTER — TRANSCRIBE ORDERS (OUTPATIENT)
Dept: ULTRASOUND IMAGING | Facility: HOSPITAL | Age: 77
End: 2017-06-15

## 2017-06-15 DIAGNOSIS — R16.0 HEPATOMEGALY: Primary | ICD-10-CM

## 2017-06-19 ENCOUNTER — TRANSCRIBE ORDERS (OUTPATIENT)
Dept: ULTRASOUND IMAGING | Facility: HOSPITAL | Age: 77
End: 2017-06-19

## 2017-06-26 ENCOUNTER — HOSPITAL ENCOUNTER (OUTPATIENT)
Dept: ULTRASOUND IMAGING | Facility: HOSPITAL | Age: 77
Discharge: HOME | End: 2017-06-26
Admitting: PHYSICIAN ASSISTANT

## 2017-06-26 ENCOUNTER — PREP FOR SURGERY (OUTPATIENT)
Dept: OTHER | Facility: HOSPITAL | Age: 77
End: 2017-06-26

## 2017-06-26 ENCOUNTER — OFFICE VISIT (OUTPATIENT)
Dept: GASTROENTEROLOGY | Facility: CLINIC | Age: 77
End: 2017-06-26

## 2017-06-26 VITALS
RESPIRATION RATE: 16 BRPM | DIASTOLIC BLOOD PRESSURE: 64 MMHG | SYSTOLIC BLOOD PRESSURE: 158 MMHG | WEIGHT: 254 LBS | TEMPERATURE: 97.9 F | HEIGHT: 63 IN | HEART RATE: 70 BPM | BODY MASS INDEX: 45 KG/M2

## 2017-06-26 DIAGNOSIS — R16.0 HEPATOMEGALY: ICD-10-CM

## 2017-06-26 DIAGNOSIS — R10.12 LEFT UPPER QUADRANT PAIN: Primary | ICD-10-CM

## 2017-06-26 DIAGNOSIS — R10.12 LEFT UPPER QUADRANT PAIN: Primary | Chronic | ICD-10-CM

## 2017-06-26 DIAGNOSIS — K59.00 CONSTIPATION, UNSPECIFIED CONSTIPATION TYPE: Chronic | ICD-10-CM

## 2017-06-26 DIAGNOSIS — R79.89 ELEVATED LIVER FUNCTION TESTS: Chronic | ICD-10-CM

## 2017-06-26 PROCEDURE — 99214 OFFICE O/P EST MOD 30 MIN: CPT | Performed by: NURSE PRACTITIONER

## 2017-06-26 PROCEDURE — 76705 ECHO EXAM OF ABDOMEN: CPT

## 2017-06-26 RX ORDER — CLOPIDOGREL BISULFATE 75 MG/1
75 TABLET ORAL DAILY
COMMUNITY

## 2017-06-26 RX ORDER — SODIUM CHLORIDE 0.9 % (FLUSH) 0.9 %
1-10 SYRINGE (ML) INJECTION AS NEEDED
Status: CANCELLED | OUTPATIENT
Start: 2017-06-26

## 2017-06-26 RX ORDER — SODIUM CHLORIDE 9 MG/ML
70 INJECTION, SOLUTION INTRAVENOUS CONTINUOUS PRN
Status: CANCELLED | OUTPATIENT
Start: 2017-06-26

## 2017-06-26 NOTE — PROGRESS NOTES
[address] [facility] [address]    Chief Complaint   Patient presents with   • Abdominal Pain   • Follow-up     The patient is here for follow up. She states she has been continuing to have pain in her left upper quadrant. She states the pain is worse with eating on occasion. She states the pain is also caused when she bends over. She states it is tender to touch. She has not taken anything for the pain.    The patient has a long-standing history of constipation. This is improving. The patient is taking Miralax and stool softeners daily. She has 1 bowel movement every other day or so. She has not tried to take the Magnesium Caplets yet. The patient denies bright red blood per rectum or melena.    There is no history of nausea or vomiting.  The patient denies heartburn.  There is no difficulty swallowing.  The patient denies diarrhea.  There is no history of bright red blood per rectum or melena.  The patient's last colonoscopy was February 2017 with polyps.  There is no family history of colon cancer.    Abdominal Pain   This is a chronic problem. The current episode started more than 1 month ago (August 2016). The onset quality is sudden. The problem occurs intermittently. The problem has been unchanged. The pain is located in the LUQ. The pain is at a severity of 3/10. The pain is mild. The quality of the pain is a sensation of fullness and burning. The abdominal pain does not radiate. Associated symptoms include arthralgias. Pertinent negatives include no constipation, diarrhea, dysuria, fever, headaches, hematochezia, hematuria, melena, myalgias, nausea or vomiting. The pain is aggravated by certain positions and eating (bending over). The pain is relieved by nothing. She has tried nothing for the symptoms. Prior diagnostic workup includes lower endoscopy.   Constipation   This is a chronic problem. The current episode started more than 1 year ago (over 4 years). The problem has been gradually improving  since onset. Her stool frequency is 4 to 5 times per week. The stool is described as loose, firm and formed. The patient is not on a high fiber diet. She does not exercise regularly. There has been adequate water intake. Associated symptoms include abdominal pain and back pain. Pertinent negatives include no diarrhea, fever, hematochezia, melena, nausea or vomiting. She has tried stool softeners (Miralax) for the symptoms. The treatment provided significant relief.     Review of Systems   Constitutional: Positive for fatigue. Negative for appetite change, chills, fever and unexpected weight change.   HENT: Negative for mouth sores, nosebleeds and trouble swallowing.    Eyes: Negative for discharge and redness.   Respiratory: Positive for apnea. Negative for cough and shortness of breath.    Cardiovascular: Positive for leg swelling. Negative for chest pain and palpitations.   Gastrointestinal: Positive for abdominal pain. Negative for abdominal distention, anal bleeding, blood in stool, constipation, diarrhea, hematochezia, melena, nausea and vomiting.   Endocrine: Negative for cold intolerance, heat intolerance and polydipsia.   Genitourinary: Negative for dysuria, hematuria and urgency.   Musculoskeletal: Positive for arthralgias and back pain. Negative for joint swelling and myalgias.   Skin: Negative for rash.   Allergic/Immunologic: Negative for food allergies and immunocompromised state.   Neurological: Negative for dizziness, seizures, syncope and headaches.   Hematological: Negative for adenopathy. Does not bruise/bleed easily.   Psychiatric/Behavioral: Negative for dysphoric mood. The patient is not nervous/anxious and is not hyperactive.      Patient Active Problem List   Diagnosis   • Constipation   • Left lower quadrant pain   • Colon cancer screening   • Left upper quadrant pain   • Diverticulosis of large intestine without hemorrhage   • Colon polyps   • Vascular ectasia of colon   • Internal  hemorrhoids   • External hemorrhoid   • Elevated liver function tests     Past Medical History:   Diagnosis Date   • Back pain    • Carotid artery stenosis     BILATERAL   • Colon polyp 12/10/2012   • Diabetes    • High cholesterol    • Hypertension    • Osteoarthritis    • Sleep apnea    • Vision problems      Past Surgical History:   Procedure Laterality Date   • ANKLE ARTHROSCOPY Right    • BREAST SURGERY      CYST REMOVED   • CATARACT EXTRACTION      2014 and 2015   • COLONOSCOPY  12/10/2012   • COLONOSCOPY N/A 2/22/2017    Procedure: COLONOSCOPY with hot and cold snare polypectomies, resolution clip placement, cold biopsy polypectomy;  Surgeon: Mg Awan MD;  Location: [facility] ENDOSCOPY;  Service:    • TUBAL ABDOMINAL LIGATION  1980?     Family History   Problem Relation Age of Onset   • Colon cancer Neg Hx    • Liver cancer Neg Hx    • Liver disease Neg Hx    • Stomach cancer Neg Hx    • Esophageal cancer Neg Hx      Social History   Substance Use Topics   • Smoking status: Never Smoker   • Smokeless tobacco: Never Used   • Alcohol use No       Current Outpatient Prescriptions:   •  alendronate (FOSAMAX) 10 MG tablet, Take 20 mg by mouth Every Morning Before Breakfast., Disp: , Rfl:   •  Calcium Citrate-Vitamin D (CALCIUM + D PO), Take  by mouth., Disp: , Rfl:   •  CloNIDine (CATAPRES) 0.1 MG tablet, Take 0.1 mg by mouth 2 (Two) Times a Day., Disp: , Rfl:   •  clopidogrel (PLAVIX) 75 MG tablet, Take 75 mg by mouth Daily., Disp: , Rfl:   •  diltiaZEM CD (DILTIAZEM CD) 240 MG 24 hr capsule, Take 120 mg by mouth Daily., Disp: , Rfl:   •  Furosemide (LASIX PO), Take  by mouth Daily., Disp: , Rfl:   •  insulin glargine (LANTUS) 100 UNIT/ML injection, Inject  under the skin Daily., Disp: , Rfl:   •  insulin lispro (humaLOG) 100 UNIT/ML injection, Inject 10 Units under the skin 3 (Three) Times a Day Before Meals., Disp: , Rfl:   •  levothyroxine (SYNTHROID, LEVOTHROID) 100 MCG tablet, Take 100 mcg by mouth  "Daily., Disp: , Rfl:   •  montelukast (SINGULAIR) 10 MG tablet, Take 10 mg by mouth Every Night., Disp: , Rfl:   •  olmesartan-hydrochlorothiazide (BENICAR HCT) 20-12.5 MG per tablet, Take 1 tablet by mouth Daily., Disp: , Rfl:   •  Potassium Chloride (KLOR-CON 10 PO), Take  by mouth., Disp: , Rfl:   •  pravastatin (PRAVACHOL) 80 MG tablet, Take 80 mg by mouth Daily., Disp: , Rfl:   •  SITagliptin (JANUVIA) 100 MG tablet, Take 100 mg by mouth Daily., Disp: , Rfl:     No Known Allergies    /64  Pulse 70  Temp 97.9 °F (36.6 °C)  Resp 16  Ht 63\" (160 cm)  Wt 254 lb (115 kg)  BMI 44.99 kg/m2    Physical Exam   Constitutional: She is oriented to person, place, and time. She appears well-developed and well-nourished. No distress.   HENT:   Head: Normocephalic and atraumatic.   Right Ear: Hearing and external ear normal.   Left Ear: Hearing and external ear normal.   Nose: Nose normal.   Mouth/Throat: Oropharynx is clear and moist and mucous membranes are normal. Mucous membranes are not pale, not dry and not cyanotic. No oral lesions. No oropharyngeal exudate.   Eyes: Conjunctivae and EOM are normal. Right eye exhibits no discharge. Left eye exhibits no discharge.   Neck: Trachea normal. Neck supple. No JVD present. No edema present. No thyroid mass and no thyromegaly present.   Cardiovascular: Normal rate, regular rhythm, S2 normal and normal heart sounds.  Exam reveals no gallop, no S3 and no friction rub.    No murmur heard.  Pulmonary/Chest: Effort normal and breath sounds normal. No respiratory distress. She exhibits no tenderness.   Abdominal: Normal appearance and bowel sounds are normal. She exhibits no distension, no ascites and no mass. There is no splenomegaly or hepatomegaly. There is tenderness (moderate) in the left upper quadrant. There is no rigidity, no rebound and no guarding. No hernia.           Vascular Status -  Her exam exhibits right foot edema. Her exam exhibits left foot " edema.  Lymphadenopathy:     She has no cervical adenopathy.        Left: No supraclavicular adenopathy present.   Neurological: She is alert and oriented to person, place, and time. She has normal strength. No cranial nerve deficit or sensory deficit.   Skin: No rash noted. She is not diaphoretic. No cyanosis. No pallor. Nails show no clubbing.   Psychiatric: She has a normal mood and affect.   Nursing note and vitals reviewed.  Stigmata of chronic liver disease:  None.  Asterixis:  None.    Laboratory Tests:   Upon review of records:      Dated 8/24/2016 glucose 64 BUN 23 creatinine 1.1 sodium 142 potassium 4.1 chloride 105 CO2 33 calcium 9.4 albumin 3.9 ALT 24 AST 21 alkaline phosphatase 120 TSH 2.774 T3 uptake 39.0 free T4 1.19 T4 total 9.3 amylase 70 lipase 36     1/25/2017 glucose 161 BUN 27 creatinine 1.1 sodium 143 potassium 4.3 chloride 104 CO2 31 calcium 9.4 albumin 3.8 ALT 34 AST 27 alkaline phosphatase 140 total bilirubin 0.4 WBC 7.15 hemoglobin 11.4 hematocrit 37.6 platelet count 292 MCV 85.8     Dated 3/9/2017 glucose 131 BUN 27 creatinine 1.1 sodium 140 potassium 4.2 chloride 104 CO2 28 calcium 9.7 albumin 3.9 ALT 28 AST 24 alkaline phosphatase 127 total bilirubin 0.3 hemoglobin A1c 8.9    Dated 6/13/2017 glucose 172 BUN 26 creatinine 1.1 sodium 142 potassium 4.6 chloride 106 CO2 32 calcium 9.7 albumin 4.0 ALT 20 AST 18 alkaline phosphatase 120 total bilirubin 0.3 hemoglobin A1c 9.50     Abdominal Imaging:   Upon review of records:      Dated 9/19/2016 CT of abdomen with contrast reveals lung bases clear. No obvious cardiac abnormality. Liver, gallbladder, spleen, pancreas, adrenals and both kidneys are normal. Stomach, small bowel and colon are normal. No mesenteric lymphadenopathy or peritoneal free fluid. Aorta shows atherosclerotic calcifications with normal aortic caliber. IVC and branches are patent and normal. No retroperitoneal lymphadenopathy. Body while a musculoskeletal structure showed  degenerative changes of the lumbar spine noted. Anterior abdominal wall is normal.      [facility] scan dated 10/6/2016 reveals ejection fraction of 85.9%    Abdominal ultrasound dated 6/26/2017 reveals Limited images of the pancreas are unremarkable.  The liver parenchyma is echogenic consistent with fatty infiltration.  The gallbladder is normal with no shadowing stones or wall thickening.  There is no pericholecystic fluid.  The common duct measures 3.6 mm.  Limited images the right kidney are unremarkable.     Procedures:    Colonoscopy dated 2/22/2017 reveals pandiverticulosis. Colon polyps. Vascular ectasia, nonbleeding. Internal hemorrhoids and small scar external hemorrhoidal tissue. A descending colon polyp biopsy reveals adenomatous polyps. Negative for high-grade dysplasia. Cecum polyp biopsy reveals adenomatous polyp. Negative for high-grade dysplasia.    Assessment and Plan:      Kathryn was seen today for abdominal pain and follow-up.    Diagnoses and all orders for this visit:    Left upper quadrant pain  Comments:  Etiology unclear. Differentials include peptic ulcer disease, pancreatobiliary disease.    Constipation, unspecified constipation type  Comments:  Improved.    Elevated liver function tests  Comments:  History of elevated alkaline phosphotase. Differentials include MANCINI, although this is a diagnosis of exclusion. Of interest, ultrasound shows fatty liver.        Plan  and Patient Instructions:  Patient Instructions   1. Low fat, high fiber diet with liberal water intake. Discussed in detail.  2. Exercise with weight loss.  3. Possible non-invasive liver work up in the future.  4. CMP, GGT (Patient states she just had labs drawn and will have them sent over from [facility])  5. Upper endoscopy-EGD: Description of the procedure, risks, benefits, alternatives and options, including nonoperative options, were discussed with the patient in detail. The patient understands and wishes to  proceed.    Patient Care Team:  SANFORD Gill as PCP - General    NANNETTE Sarkar

## 2017-06-26 NOTE — PATIENT INSTRUCTIONS
1. Low fat, high fiber diet with liberal water intake. Discussed in detail.  2. Exercise with weight loss.  3. Possible non-invasive liver work up in the future.  4. CMP, GGT (Patient states she just had labs drawn and will have them sent over from [facility])  5. Upper endoscopy-EGD: Description of the procedure, risks, benefits, alternatives and options, including nonoperative options, were discussed with the patient in detail. The patient understands and wishes to proceed.

## 2017-07-20 ENCOUNTER — ANESTHESIA EVENT (OUTPATIENT)
Dept: GASTROENTEROLOGY | Facility: HOSPITAL | Age: 77
End: 2017-07-20

## 2017-07-20 ENCOUNTER — HOSPITAL ENCOUNTER (OUTPATIENT)
Facility: HOSPITAL | Age: 77
Setting detail: HOSPITAL OUTPATIENT SURGERY
Discharge: HOME | End: 2017-07-20
Attending: INTERNAL MEDICINE | Admitting: INTERNAL MEDICINE

## 2017-07-20 ENCOUNTER — ANESTHESIA (OUTPATIENT)
Dept: GASTROENTEROLOGY | Facility: HOSPITAL | Age: 77
End: 2017-07-20

## 2017-07-20 VITALS
DIASTOLIC BLOOD PRESSURE: 70 MMHG | SYSTOLIC BLOOD PRESSURE: 153 MMHG | HEIGHT: 63 IN | HEART RATE: 66 BPM | RESPIRATION RATE: 16 BRPM | TEMPERATURE: 97 F | WEIGHT: 250 LBS | BODY MASS INDEX: 44.3 KG/M2 | OXYGEN SATURATION: 98 %

## 2017-07-20 DIAGNOSIS — R10.12 LEFT UPPER QUADRANT PAIN: ICD-10-CM

## 2017-07-20 LAB — GLUCOSE BLDC GLUCOMTR-MCNC: 113 MG/DL (ref 70–130)

## 2017-07-20 PROCEDURE — 82962 GLUCOSE BLOOD TEST: CPT

## 2017-07-20 PROCEDURE — 25010000002 PROPOFOL 1000 MG/100ML EMULSION: Performed by: NURSE ANESTHETIST, CERTIFIED REGISTERED

## 2017-07-20 PROCEDURE — 25010000002 MIDAZOLAM PER 1 MG: Performed by: NURSE ANESTHETIST, CERTIFIED REGISTERED

## 2017-07-20 PROCEDURE — 43239 EGD BIOPSY SINGLE/MULTIPLE: CPT | Performed by: INTERNAL MEDICINE

## 2017-07-20 RX ORDER — SODIUM CHLORIDE 9 MG/ML
70 INJECTION, SOLUTION INTRAVENOUS CONTINUOUS PRN
Status: DISCONTINUED | OUTPATIENT
Start: 2017-07-20 | End: 2017-07-20 | Stop reason: HOSPADM

## 2017-07-20 RX ORDER — RANITIDINE 150 MG/1
150 TABLET ORAL 2 TIMES DAILY
Qty: 60 TABLET | Refills: 1 | Status: SHIPPED | OUTPATIENT
Start: 2017-07-20 | End: 2017-10-26 | Stop reason: SDUPTHER

## 2017-07-20 RX ORDER — SODIUM CHLORIDE 0.9 % (FLUSH) 0.9 %
1-10 SYRINGE (ML) INJECTION AS NEEDED
Status: DISCONTINUED | OUTPATIENT
Start: 2017-07-20 | End: 2017-07-20 | Stop reason: HOSPADM

## 2017-07-20 RX ORDER — PROPOFOL 10 MG/ML
INJECTION, EMULSION INTRAVENOUS AS NEEDED
Status: DISCONTINUED | OUTPATIENT
Start: 2017-07-20 | End: 2017-07-20 | Stop reason: SURG

## 2017-07-20 RX ORDER — MIDAZOLAM HYDROCHLORIDE 1 MG/ML
INJECTION INTRAMUSCULAR; INTRAVENOUS AS NEEDED
Status: DISCONTINUED | OUTPATIENT
Start: 2017-07-20 | End: 2017-07-20 | Stop reason: SURG

## 2017-07-20 RX ADMIN — Medication 20 MG: at 08:44

## 2017-07-20 RX ADMIN — LIDOCAINE HYDROCHLORIDE 40 MG: 20 INJECTION, SOLUTION INTRAVENOUS at 08:44

## 2017-07-20 RX ADMIN — PROPOFOL 10 MG: 10 INJECTION, EMULSION INTRAVENOUS at 08:44

## 2017-07-20 RX ADMIN — MIDAZOLAM HYDROCHLORIDE 2 MG: 1 INJECTION, SOLUTION INTRAMUSCULAR; INTRAVENOUS at 08:40

## 2017-07-20 RX ADMIN — SODIUM CHLORIDE 70 ML/HR: 9 INJECTION, SOLUTION INTRAVENOUS at 07:52

## 2017-07-20 NOTE — PLAN OF CARE
Problem: GI Endoscopy (Adult)  Goal: Signs and Symptoms of Listed Potential Problems Will be Absent or Manageable (GI Endoscopy)  Outcome: Ongoing (interventions implemented as appropriate)    07/20/17 9958   GI Endoscopy   Problems Assessed (GI Endoscopy) all   Problems Present (GI Endoscopy) none

## 2017-07-20 NOTE — ANESTHESIA POSTPROCEDURE EVALUATION
Patient: Kathryn Davison    Procedure Summary     Date Anesthesia Start Anesthesia Stop Room / Location    07/20/17 0837  [facility] ENDOSCOPY [facility]       Procedure Diagnosis Surgeon Provider    ESOPHAGOGASTRODUODENOSCOPY with biopsies and cold biopsy polypectomy (N/A Esophagus) Hiatal hernia; Gastritis; Gastric polyp  (Left upper quadrant pain [R10.12]) MD Norris Thurston CRNA          Anesthesia Type: MAC  Last vitals  BP        Temp        Pulse       Resp        SpO2          Post Anesthesia Care and Evaluation    Patient location during evaluation: PHASE II  Patient participation: complete - patient participated  Level of consciousness: awake  Pain score: 0  Pain management: adequate  Airway patency: patent  Anesthetic complications: No anesthetic complications  PONV Status: none  Cardiovascular status: acceptable  Respiratory status: acceptable and nasal cannula  Hydration status: acceptable    Comments: vsss resp spont, reflexes intact, responsive, report given to pacu nurse

## 2017-07-20 NOTE — OP NOTE
PROCEDURE:  Upper Endoscopy with biopsies and a cold biopsy polypectomy.    DATE OF PROCEDURE: July 20, 2017.    REFERRING PROVIDER:  SANFORD Gill.     INSTRUMENT:  Olympus GIF H 190 video endoscope     INDICATIONS OF THE PROCEDURE:   This is a 77-year-old white female with history of recurrent left upper quadrant and adjacent upper abdominal-epigastric area abdominal pain.  Currently undergoing upper endoscopy for further evaluation.      BIOPSIES: Second portion of duodenum.  Gastric antrum, angularis and body of the stomach.       MEDICATIONS:  MAC.     PHOTOGRAPHS:  Photographs were included in the medical records.     CONSENT/PREPROCEDURE EVALUATION:  Risks, benefits, alternatives and options of the procedure including risks of anesthesia/sedation were discussed and informed consent was obtained prior to the procedure. History and physical examination were performed and nothing precluded the test.     REPORT:  The patient was placed in left lateral decubitus position. Once under the influence of IV sedation, the instrument was inserted into the mouth and esophagus was intubated under direct vision without difficulty.     Esophagus:  Z line was noted to be around 33  cm.  A small sliding hiatal hernia less than 3 cm was noted.  No Diaz's esophagus was seen.  A 2-3 mm sessile polyp was noted at cardia.  This was removed with cold biopsy forceps.      Stomach:  Antrum:  Erythematous gastritis.  Angulus, lesser and greater curves: Normal.  Retroflex examination: Sliding hiatal hernia.  Cardia and fundus:  Normal.     Body of the stomach: Erythematous-erosive  gastritis.  Good distensibility of the stomach was achieved no giant folds were noted.   Biopsies were obtained from the gastric antrum, angularis and body of the stomach.    Pylorus and pyloric channel:  normal.     Duodenum:  Bulb: Normal.  Second portion: normal.  No scalloping was seen in the second portion of duodenum.  Biopsies were  obtained from the second portion of duodenum.    Intervention:  None.       The upper GI tract was decompressed and the scope was pulled out of the patient. The patient tolerated the procedure well.     DIAGNOSES:     1. Small sliding hiatal hernia less than 3 cm.  2. Small cardia polyp.  3. Erythematous gastritis with occasional erosions.  This test does not explain the cause of left upper quadrant and adjacent upper abdominal pain.    RECOMMENDATIONS:  1.  Dietary instructions.  2.  Zantac 150 mg 1 by mouth twice a day first 4-6 weeks.  3.  Follow biopsies.  4.  Follow up in office.     Thank you very much for letting me participate in the care of this patient. Please do not hesitate to call me if you have any questions.

## 2017-07-20 NOTE — ANESTHESIA PREPROCEDURE EVALUATION
Anesthesia Evaluation     Patient summary reviewed and Nursing notes reviewed   history of anesthetic complications:  NPO Solid Status: > 8 hours  NPO Liquid Status: > 8 hours     Airway   Mallampati: III  TM distance: >3 FB  Neck ROM: full  possible difficult intubation  Dental    (+) poor dentation    Pulmonary - normal exam    breath sounds clear to auscultation  (+) asthma, sleep apnea,   (-) not a smoker  Cardiovascular - normal exam    Rhythm: regular  Rate: normal    (+) hypertension, CAD ( inc risk), PVD, hyperlipidemia      Neuro/Psych  GI/Hepatic/Renal/Endo    (+) obesity, morbid obesity, liver disease ( inc liver enzymes), diabetes mellitus type 2 using insulin, hypothyroidism,     Musculoskeletal     (+) back pain,   Abdominal    Substance History      OB/GYN          Other   (+) arthritis     ROS/Med Hx Other: fbs 113                              Anesthesia Plan    ASA 3     MAC   (Risks and benefits discussed including risk of aspiration, recall and dental damage. All patient questions answered. Will continue with POC.Discussed risk with pt., pt extremely high intraop and postop risk for cv, resp, and neuro events,)  intravenous induction   Anesthetic plan and risks discussed with patient.

## 2017-07-20 NOTE — H&P (VIEW-ONLY)
[address] [facility] [address]    Chief Complaint   Patient presents with   • Abdominal Pain   • Follow-up     The patient is here for follow up. She states she has been continuing to have pain in her left upper quadrant. She states the pain is worse with eating on occasion. She states the pain is also caused when she bends over. She states it is tender to touch. She has not taken anything for the pain.    The patient has a long-standing history of constipation. This is improving. The patient is taking Miralax and stool softeners daily. She has 1 bowel movement every other day or so. She has not tried to take the Magnesium Caplets yet. The patient denies bright red blood per rectum or melena.    There is no history of nausea or vomiting.  The patient denies heartburn.  There is no difficulty swallowing.  The patient denies diarrhea.  There is no history of bright red blood per rectum or melena.  The patient's last colonoscopy was February 2017 with polyps.  There is no family history of colon cancer.    Abdominal Pain   This is a chronic problem. The current episode started more than 1 month ago (August 2016). The onset quality is sudden. The problem occurs intermittently. The problem has been unchanged. The pain is located in the LUQ. The pain is at a severity of 3/10. The pain is mild. The quality of the pain is a sensation of fullness and burning. The abdominal pain does not radiate. Associated symptoms include arthralgias. Pertinent negatives include no constipation, diarrhea, dysuria, fever, headaches, hematochezia, hematuria, melena, myalgias, nausea or vomiting. The pain is aggravated by certain positions and eating (bending over). The pain is relieved by nothing. She has tried nothing for the symptoms. Prior diagnostic workup includes lower endoscopy.   Constipation   This is a chronic problem. The current episode started more than 1 year ago (over 4 years). The problem has been gradually improving  since onset. Her stool frequency is 4 to 5 times per week. The stool is described as loose, firm and formed. The patient is not on a high fiber diet. She does not exercise regularly. There has been adequate water intake. Associated symptoms include abdominal pain and back pain. Pertinent negatives include no diarrhea, fever, hematochezia, melena, nausea or vomiting. She has tried stool softeners (Miralax) for the symptoms. The treatment provided significant relief.     Review of Systems   Constitutional: Positive for fatigue. Negative for appetite change, chills, fever and unexpected weight change.   HENT: Negative for mouth sores, nosebleeds and trouble swallowing.    Eyes: Negative for discharge and redness.   Respiratory: Positive for apnea. Negative for cough and shortness of breath.    Cardiovascular: Positive for leg swelling. Negative for chest pain and palpitations.   Gastrointestinal: Positive for abdominal pain. Negative for abdominal distention, anal bleeding, blood in stool, constipation, diarrhea, hematochezia, melena, nausea and vomiting.   Endocrine: Negative for cold intolerance, heat intolerance and polydipsia.   Genitourinary: Negative for dysuria, hematuria and urgency.   Musculoskeletal: Positive for arthralgias and back pain. Negative for joint swelling and myalgias.   Skin: Negative for rash.   Allergic/Immunologic: Negative for food allergies and immunocompromised state.   Neurological: Negative for dizziness, seizures, syncope and headaches.   Hematological: Negative for adenopathy. Does not bruise/bleed easily.   Psychiatric/Behavioral: Negative for dysphoric mood. The patient is not nervous/anxious and is not hyperactive.      Patient Active Problem List   Diagnosis   • Constipation   • Left lower quadrant pain   • Colon cancer screening   • Left upper quadrant pain   • Diverticulosis of large intestine without hemorrhage   • Colon polyps   • Vascular ectasia of colon   • Internal  hemorrhoids   • External hemorrhoid   • Elevated liver function tests     Past Medical History:   Diagnosis Date   • Back pain    • Carotid artery stenosis     BILATERAL   • Colon polyp 12/10/2012   • Diabetes    • High cholesterol    • Hypertension    • Osteoarthritis    • Sleep apnea    • Vision problems      Past Surgical History:   Procedure Laterality Date   • ANKLE ARTHROSCOPY Right    • BREAST SURGERY      CYST REMOVED   • CATARACT EXTRACTION      2014 and 2015   • COLONOSCOPY  12/10/2012   • COLONOSCOPY N/A 2/22/2017    Procedure: COLONOSCOPY with hot and cold snare polypectomies, resolution clip placement, cold biopsy polypectomy;  Surgeon: Mg Awan MD;  Location: [facility] ENDOSCOPY;  Service:    • TUBAL ABDOMINAL LIGATION  1980?     Family History   Problem Relation Age of Onset   • Colon cancer Neg Hx    • Liver cancer Neg Hx    • Liver disease Neg Hx    • Stomach cancer Neg Hx    • Esophageal cancer Neg Hx      Social History   Substance Use Topics   • Smoking status: Never Smoker   • Smokeless tobacco: Never Used   • Alcohol use No       Current Outpatient Prescriptions:   •  alendronate (FOSAMAX) 10 MG tablet, Take 20 mg by mouth Every Morning Before Breakfast., Disp: , Rfl:   •  Calcium Citrate-Vitamin D (CALCIUM + D PO), Take  by mouth., Disp: , Rfl:   •  CloNIDine (CATAPRES) 0.1 MG tablet, Take 0.1 mg by mouth 2 (Two) Times a Day., Disp: , Rfl:   •  clopidogrel (PLAVIX) 75 MG tablet, Take 75 mg by mouth Daily., Disp: , Rfl:   •  diltiaZEM CD (DILTIAZEM CD) 240 MG 24 hr capsule, Take 120 mg by mouth Daily., Disp: , Rfl:   •  Furosemide (LASIX PO), Take  by mouth Daily., Disp: , Rfl:   •  insulin glargine (LANTUS) 100 UNIT/ML injection, Inject  under the skin Daily., Disp: , Rfl:   •  insulin lispro (humaLOG) 100 UNIT/ML injection, Inject 10 Units under the skin 3 (Three) Times a Day Before Meals., Disp: , Rfl:   •  levothyroxine (SYNTHROID, LEVOTHROID) 100 MCG tablet, Take 100 mcg by mouth  "Daily., Disp: , Rfl:   •  montelukast (SINGULAIR) 10 MG tablet, Take 10 mg by mouth Every Night., Disp: , Rfl:   •  olmesartan-hydrochlorothiazide (BENICAR HCT) 20-12.5 MG per tablet, Take 1 tablet by mouth Daily., Disp: , Rfl:   •  Potassium Chloride (KLOR-CON 10 PO), Take  by mouth., Disp: , Rfl:   •  pravastatin (PRAVACHOL) 80 MG tablet, Take 80 mg by mouth Daily., Disp: , Rfl:   •  SITagliptin (JANUVIA) 100 MG tablet, Take 100 mg by mouth Daily., Disp: , Rfl:     No Known Allergies    /64  Pulse 70  Temp 97.9 °F (36.6 °C)  Resp 16  Ht 63\" (160 cm)  Wt 254 lb (115 kg)  BMI 44.99 kg/m2    Physical Exam   Constitutional: She is oriented to person, place, and time. She appears well-developed and well-nourished. No distress.   HENT:   Head: Normocephalic and atraumatic.   Right Ear: Hearing and external ear normal.   Left Ear: Hearing and external ear normal.   Nose: Nose normal.   Mouth/Throat: Oropharynx is clear and moist and mucous membranes are normal. Mucous membranes are not pale, not dry and not cyanotic. No oral lesions. No oropharyngeal exudate.   Eyes: Conjunctivae and EOM are normal. Right eye exhibits no discharge. Left eye exhibits no discharge.   Neck: Trachea normal. Neck supple. No JVD present. No edema present. No thyroid mass and no thyromegaly present.   Cardiovascular: Normal rate, regular rhythm, S2 normal and normal heart sounds.  Exam reveals no gallop, no S3 and no friction rub.    No murmur heard.  Pulmonary/Chest: Effort normal and breath sounds normal. No respiratory distress. She exhibits no tenderness.   Abdominal: Normal appearance and bowel sounds are normal. She exhibits no distension, no ascites and no mass. There is no splenomegaly or hepatomegaly. There is tenderness (moderate) in the left upper quadrant. There is no rigidity, no rebound and no guarding. No hernia.           Vascular Status -  Her exam exhibits right foot edema. Her exam exhibits left foot " edema.  Lymphadenopathy:     She has no cervical adenopathy.        Left: No supraclavicular adenopathy present.   Neurological: She is alert and oriented to person, place, and time. She has normal strength. No cranial nerve deficit or sensory deficit.   Skin: No rash noted. She is not diaphoretic. No cyanosis. No pallor. Nails show no clubbing.   Psychiatric: She has a normal mood and affect.   Nursing note and vitals reviewed.  Stigmata of chronic liver disease:  None.  Asterixis:  None.    Laboratory Tests:   Upon review of records:      Dated 8/24/2016 glucose 64 BUN 23 creatinine 1.1 sodium 142 potassium 4.1 chloride 105 CO2 33 calcium 9.4 albumin 3.9 ALT 24 AST 21 alkaline phosphatase 120 TSH 2.774 T3 uptake 39.0 free T4 1.19 T4 total 9.3 amylase 70 lipase 36     1/25/2017 glucose 161 BUN 27 creatinine 1.1 sodium 143 potassium 4.3 chloride 104 CO2 31 calcium 9.4 albumin 3.8 ALT 34 AST 27 alkaline phosphatase 140 total bilirubin 0.4 WBC 7.15 hemoglobin 11.4 hematocrit 37.6 platelet count 292 MCV 85.8     Dated 3/9/2017 glucose 131 BUN 27 creatinine 1.1 sodium 140 potassium 4.2 chloride 104 CO2 28 calcium 9.7 albumin 3.9 ALT 28 AST 24 alkaline phosphatase 127 total bilirubin 0.3 hemoglobin A1c 8.9    Dated 6/13/2017 glucose 172 BUN 26 creatinine 1.1 sodium 142 potassium 4.6 chloride 106 CO2 32 calcium 9.7 albumin 4.0 ALT 20 AST 18 alkaline phosphatase 120 total bilirubin 0.3 hemoglobin A1c 9.50     Abdominal Imaging:   Upon review of records:      Dated 9/19/2016 CT of abdomen with contrast reveals lung bases clear. No obvious cardiac abnormality. Liver, gallbladder, spleen, pancreas, adrenals and both kidneys are normal. Stomach, small bowel and colon are normal. No mesenteric lymphadenopathy or peritoneal free fluid. Aorta shows atherosclerotic calcifications with normal aortic caliber. IVC and branches are patent and normal. No retroperitoneal lymphadenopathy. Body while a musculoskeletal structure showed  degenerative changes of the lumbar spine noted. Anterior abdominal wall is normal.      [facility] H[facility] scan dated 10/6/2016 reveals ejection fraction of 85.9%    Abdominal ultrasound dated 6/26/2017 reveals Limited images of the pancreas are unremarkable.  The liver parenchyma is echogenic consistent with fatty infiltration.  The gallbladder is normal with no shadowing stones or wall thickening.  There is no pericholecystic fluid.  The common duct measures 3.6 mm.  Limited images the right kidney are unremarkable.     Procedures:    Colonoscopy dated 2/22/2017 reveals pandiverticulosis. Colon polyps. Vascular ectasia, nonbleeding. Internal hemorrhoids and small scar external hemorrhoidal tissue. A descending colon polyp biopsy reveals adenomatous polyps. Negative for high-grade dysplasia. Cecum polyp biopsy reveals adenomatous polyp. Negative for high-grade dysplasia.    Assessment and Plan:      Kathryn was seen today for abdominal pain and follow-up.    Diagnoses and all orders for this visit:    Left upper quadrant pain  Comments:  Etiology unclear. Differentials include peptic ulcer disease, pancreatobiliary disease.    Constipation, unspecified constipation type  Comments:  Improved.    Elevated liver function tests  Comments:  History of elevated alkaline phosphotase. Differentials include MANCINI, although this is a diagnosis of exclusion. Of interest, ultrasound shows fatty liver.        Plan  and Patient Instructions:  Patient Instructions   1. Low fat, high fiber diet with liberal water intake. Discussed in detail.  2. Exercise with weight loss.  3. Possible non-invasive liver work up in the future.  4. CMP, GGT (Patient states she just had labs drawn and will have them sent over from [facility])  5. Upper endoscopy-EGD: Description of the procedure, risks, benefits, alternatives and options, including nonoperative options, were discussed with the patient in detail. The patient understands and wishes to  proceed.    Patient Care Team:  SANFORD Gill as PCP - General    NANNETTE Sarkar

## 2017-07-20 NOTE — DISCHARGE INSTRUCTIONS
To assist you in voiding:  Drink plenty of fluids  Listen to running water while attempting to void.    If you are unable to urinate and you have an uncomfortable urge to void or it has been   6 hours since you were discharged, return to the Emergency Room    Diet:   Nothing by mouth until fully alert.  Then if no nausea vomiting or abdominal pain may have  Clear liquids diet (No Sodas) for 30 minutes.  May advance to soft diet in 30 minutes if no chest pains, Fever or chills, nausea vomiting or bleeding.      Blood Thinner Directions:    Avoid Plavix (clopidogrel) for 4 days if no bleeding may resume Plavix on 7/24/2017.  Follow-up instructions regarding Plavix for colon exam.    Treatments:    Other Instructions:    Call [facility] at [phone] or come to the Emergency Department if you experience the following: Chest pain, abdominal pain, bleeding (vomiting of blood or coffee colored material, black stools or paola blood in stools), fever/chills, nausea and vomiting or dizziness.    Follow-up:     Call Office phone # [phone]-QHGVIE KHAN, MD in 4 weeks.

## 2017-07-24 LAB
LAB AP CASE REPORT: NORMAL
Lab: NORMAL
PATH REPORT.FINAL DX SPEC: NORMAL

## 2017-08-17 ENCOUNTER — APPOINTMENT (OUTPATIENT)
Dept: LAB | Facility: HOSPITAL | Age: 77
End: 2017-08-17

## 2017-08-17 ENCOUNTER — OFFICE VISIT (OUTPATIENT)
Dept: GASTROENTEROLOGY | Facility: CLINIC | Age: 77
End: 2017-08-17

## 2017-08-17 VITALS
HEART RATE: 56 BPM | TEMPERATURE: 98.7 F | BODY MASS INDEX: 42.88 KG/M2 | WEIGHT: 242 LBS | DIASTOLIC BLOOD PRESSURE: 67 MMHG | HEIGHT: 63 IN | RESPIRATION RATE: 18 BRPM | SYSTOLIC BLOOD PRESSURE: 156 MMHG

## 2017-08-17 DIAGNOSIS — K29.70 GASTRITIS WITHOUT BLEEDING, UNSPECIFIED CHRONICITY, UNSPECIFIED GASTRITIS TYPE: ICD-10-CM

## 2017-08-17 DIAGNOSIS — A04.8 HELICOBACTER PYLORI INFECTION: ICD-10-CM

## 2017-08-17 DIAGNOSIS — R10.12 LEFT UPPER QUADRANT PAIN: Primary | Chronic | ICD-10-CM

## 2017-08-17 DIAGNOSIS — R79.89 ELEVATED LIVER FUNCTION TESTS: ICD-10-CM

## 2017-08-17 DIAGNOSIS — K31.7 GASTRIC POLYP: ICD-10-CM

## 2017-08-17 DIAGNOSIS — R74.8 ELEVATED ALKALINE PHOSPHATASE LEVEL: Chronic | ICD-10-CM

## 2017-08-17 DIAGNOSIS — K59.00 CONSTIPATION, UNSPECIFIED CONSTIPATION TYPE: Chronic | ICD-10-CM

## 2017-08-17 DIAGNOSIS — R93.2 ABNORMAL ULTRASOUND OF LIVER: Chronic | ICD-10-CM

## 2017-08-17 LAB
ALBUMIN SERPL-MCNC: 4 G/DL (ref 3.5–5)
ALBUMIN/GLOB SERPL: 1.2 G/DL (ref 1–2)
ALP SERPL-CCNC: 119 U/L (ref 38–126)
ALT SERPL W P-5'-P-CCNC: 39 U/L (ref 13–69)
ANION GAP SERPL CALCULATED.3IONS-SCNC: 13.1 MMOL/L
AST SERPL-CCNC: 20 U/L (ref 15–46)
BILIRUB SERPL-MCNC: 0.5 MG/DL (ref 0.2–1.3)
BUN BLD-MCNC: 29 MG/DL (ref 7–20)
BUN/CREAT SERPL: 26.4 (ref 7.1–23.5)
CALCIUM SPEC-SCNC: 9.3 MG/DL (ref 8.4–10.2)
CHLORIDE SERPL-SCNC: 103 MMOL/L (ref 98–107)
CO2 SERPL-SCNC: 28 MMOL/L (ref 26–30)
CREAT BLD-MCNC: 1.1 MG/DL (ref 0.6–1.3)
GFR SERPL CREATININE-BSD FRML MDRD: 48 ML/MIN/1.73
GGT SERPL-CCNC: 25 U/L (ref 12–58)
GLOBULIN UR ELPH-MCNC: 3.3 GM/DL
GLUCOSE BLD-MCNC: 143 MG/DL (ref 74–98)
LIPASE SERPL-CCNC: 109 U/L (ref 23–300)
POTASSIUM BLD-SCNC: 4.1 MMOL/L (ref 3.5–5.1)
PROT SERPL-MCNC: 7.3 G/DL (ref 6.3–8.2)
SODIUM BLD-SCNC: 140 MMOL/L (ref 137–145)
TSH SERPL DL<=0.05 MIU/L-ACNC: 0.77 MIU/ML (ref 0.47–4.68)

## 2017-08-17 PROCEDURE — 82977 ASSAY OF GGT: CPT | Performed by: NURSE PRACTITIONER

## 2017-08-17 PROCEDURE — 99214 OFFICE O/P EST MOD 30 MIN: CPT | Performed by: NURSE PRACTITIONER

## 2017-08-17 PROCEDURE — 36415 COLL VENOUS BLD VENIPUNCTURE: CPT | Performed by: NURSE PRACTITIONER

## 2017-08-17 PROCEDURE — 84443 ASSAY THYROID STIM HORMONE: CPT | Performed by: NURSE PRACTITIONER

## 2017-08-17 PROCEDURE — 83690 ASSAY OF LIPASE: CPT | Performed by: NURSE PRACTITIONER

## 2017-08-17 PROCEDURE — 80053 COMPREHEN METABOLIC PANEL: CPT | Performed by: NURSE PRACTITIONER

## 2017-08-17 NOTE — PATIENT INSTRUCTIONS
1. Treatment for H. Pylori:       A. Amoxicillin 500 mg 2 tablets by mouth twice a day x 14 days.       B. Clarithromycin 500 mg 1 tablet by mouth twice a day x 14 days.       C. Pantoprazole 40 mg 1 tablet by mouth twice a day x 14 days.  2. Patient may take probiotics while taking antibiotics  3. Antireflux measures: Avoid fried, fatty foods, alcohol, chocolate, coffee, tea,  soft drinks, peppermint and spearmint, spicy foods, tomatoes and tomato based foods, onion based foods, and smoking. Other antireflux measures include weight reduction if overweight, avoiding tight clothing around the abdomen, elevating the head of the bed 6 inches with blocks under the head board, and don't drink or eat before going to bed and avoid lying down immediately after meals.  4. Hold Zantac while taking treatment for H. Pylori with Pantoprazole. Restart Zantac 150 mg 1 tablet by mouth twice a day after completing treatment for H. Pylori.  5. Check CMP, TSH, GGT  6. Low fat diet with exercise and weight loss.  7. Patient may call in 1 week for results.  8. Patient may need non-invasive liver work up in the future.  9. Follow up: 6 weeks

## 2017-08-18 ENCOUNTER — TELEPHONE (OUTPATIENT)
Dept: GASTROENTEROLOGY | Facility: CLINIC | Age: 77
End: 2017-08-18

## 2017-08-23 ENCOUNTER — TELEPHONE (OUTPATIENT)
Dept: GASTROENTEROLOGY | Facility: CLINIC | Age: 77
End: 2017-08-23

## 2017-10-18 ENCOUNTER — TRANSCRIBE ORDERS (OUTPATIENT)
Dept: LAB | Facility: HOSPITAL | Age: 77
End: 2017-10-18

## 2017-10-18 ENCOUNTER — LAB (OUTPATIENT)
Dept: LAB | Facility: HOSPITAL | Age: 77
End: 2017-10-18

## 2017-10-18 DIAGNOSIS — E03.9 MYXEDEMA HEART DISEASE: ICD-10-CM

## 2017-10-18 DIAGNOSIS — E11.8 DIABETIC COMPLICATION (HCC): Primary | ICD-10-CM

## 2017-10-18 DIAGNOSIS — I51.9 MYXEDEMA HEART DISEASE: ICD-10-CM

## 2017-10-18 DIAGNOSIS — E78.2 MIXED HYPERLIPIDEMIA: ICD-10-CM

## 2017-10-18 DIAGNOSIS — E11.8 DIABETIC COMPLICATION (HCC): ICD-10-CM

## 2017-10-18 LAB
ALBUMIN SERPL-MCNC: 3.9 G/DL (ref 3.2–4.8)
ALBUMIN/GLOB SERPL: 1.4 G/DL (ref 1.5–2.5)
ALP SERPL-CCNC: 124 U/L (ref 25–100)
ALT SERPL W P-5'-P-CCNC: 23 U/L (ref 7–40)
ANION GAP SERPL CALCULATED.3IONS-SCNC: 8 MMOL/L (ref 3–11)
ARTICHOKE IGE QN: 116 MG/DL (ref 0–130)
AST SERPL-CCNC: 16 U/L (ref 0–33)
BILIRUB SERPL-MCNC: 0.4 MG/DL (ref 0.3–1.2)
BUN BLD-MCNC: 28 MG/DL (ref 9–23)
BUN/CREAT SERPL: 25.5 (ref 7–25)
CALCIUM SPEC-SCNC: 9.4 MG/DL (ref 8.7–10.4)
CHLORIDE SERPL-SCNC: 98 MMOL/L (ref 99–109)
CHOLEST SERPL-MCNC: 212 MG/DL (ref 0–200)
CO2 SERPL-SCNC: 33 MMOL/L (ref 20–31)
CREAT BLD-MCNC: 1.1 MG/DL (ref 0.6–1.3)
GFR SERPL CREATININE-BSD FRML MDRD: 48 ML/MIN/1.73
GLOBULIN UR ELPH-MCNC: 2.7 GM/DL
GLUCOSE BLD-MCNC: 183 MG/DL (ref 70–100)
HBA1C MFR BLD: 9.3 % (ref 4.8–5.6)
HDLC SERPL-MCNC: 67 MG/DL (ref 40–60)
POTASSIUM BLD-SCNC: 4.7 MMOL/L (ref 3.5–5.5)
PROT SERPL-MCNC: 6.6 G/DL (ref 5.7–8.2)
SODIUM BLD-SCNC: 139 MMOL/L (ref 132–146)
T3RU NFR SERPL: 34.1 % (ref 23–37)
T4 FREE SERPL-MCNC: 1.63 NG/DL (ref 0.89–1.76)
T4 SERPL-MCNC: 9.3 MCG/DL (ref 4.7–11.4)
TRIGL SERPL-MCNC: 140 MG/DL (ref 0–150)
TSH SERPL DL<=0.05 MIU/L-ACNC: 1.18 MIU/ML (ref 0.35–5.35)

## 2017-10-18 PROCEDURE — 84439 ASSAY OF FREE THYROXINE: CPT | Performed by: INTERNAL MEDICINE

## 2017-10-18 PROCEDURE — 80061 LIPID PANEL: CPT | Performed by: INTERNAL MEDICINE

## 2017-10-18 PROCEDURE — 80053 COMPREHEN METABOLIC PANEL: CPT | Performed by: INTERNAL MEDICINE

## 2017-10-18 PROCEDURE — 36415 COLL VENOUS BLD VENIPUNCTURE: CPT | Performed by: INTERNAL MEDICINE

## 2017-10-18 PROCEDURE — 84443 ASSAY THYROID STIM HORMONE: CPT | Performed by: INTERNAL MEDICINE

## 2017-10-18 PROCEDURE — 84479 ASSAY OF THYROID (T3 OR T4): CPT | Performed by: INTERNAL MEDICINE

## 2017-10-18 PROCEDURE — 83036 HEMOGLOBIN GLYCOSYLATED A1C: CPT | Performed by: INTERNAL MEDICINE

## 2017-10-26 DIAGNOSIS — R12 HEARTBURN: Primary | ICD-10-CM

## 2017-10-26 RX ORDER — RANITIDINE 150 MG/1
TABLET ORAL
Qty: 60 TABLET | Refills: 5 | Status: SHIPPED | OUTPATIENT
Start: 2017-10-26 | End: 2018-07-14 | Stop reason: SDUPTHER

## 2017-10-30 ENCOUNTER — OFFICE VISIT (OUTPATIENT)
Dept: GASTROENTEROLOGY | Facility: CLINIC | Age: 77
End: 2017-10-30

## 2017-10-30 VITALS
WEIGHT: 245 LBS | RESPIRATION RATE: 16 BRPM | SYSTOLIC BLOOD PRESSURE: 141 MMHG | TEMPERATURE: 98.5 F | BODY MASS INDEX: 43.41 KG/M2 | DIASTOLIC BLOOD PRESSURE: 48 MMHG | HEART RATE: 64 BPM | HEIGHT: 63 IN

## 2017-10-30 DIAGNOSIS — K59.00 CONSTIPATION, UNSPECIFIED CONSTIPATION TYPE: Chronic | ICD-10-CM

## 2017-10-30 DIAGNOSIS — R74.8 ELEVATED ALKALINE PHOSPHATASE LEVEL: Chronic | ICD-10-CM

## 2017-10-30 DIAGNOSIS — R10.12 LEFT UPPER QUADRANT PAIN: Primary | Chronic | ICD-10-CM

## 2017-10-30 DIAGNOSIS — R93.2 ABNORMAL ULTRASOUND OF LIVER: Chronic | ICD-10-CM

## 2017-10-30 PROCEDURE — 99214 OFFICE O/P EST MOD 30 MIN: CPT | Performed by: NURSE PRACTITIONER

## 2017-10-30 NOTE — PATIENT INSTRUCTIONS
1. Antireflux measures: Avoid fried, fatty foods, alcohol, chocolate, coffee, tea,  soft drinks, peppermint and spearmint, spicy foods, tomatoes and tomato based foods, onion based foods, and smoking. Other antireflux measures include weight reduction if overweight, avoiding tight clothing around the abdomen, elevating the head of the bed 6 inches with blocks under the head board, and don't drink or eat before going to bed and avoid lying down immediately after meals.  2. Zantac 150 mg 1 po twice a day.  3. Check stool for H. Pylori.  4. High fiber, low fat diet with liberal water intake.   5. Exercise, weight loss.  6. Possible CT of abdomen/pelvis in the future.  7. Follow up: 3 months

## 2017-10-30 NOTE — PROGRESS NOTES
Chief Complaint   Patient presents with   • Follow-up   • Abdominal Pain     HPI     The patient has a history of  pain in her left upper quadrant. The pain has been improving. She is not having pain today. She had slowly started improving after her last office visit, then she had a little pain in the left upper quadrant a few days ago. She states the pain is also caused when she bends over. She states it is tender to touch at times, but it is not bothering her today. She has not taken anything for the pain. The pain is described as burning. It does not radiate.      The patient has a long-standing history of constipation. This is improving. The patient is taking Miralax and stool softeners daily. She has 1 bowel movement almost every day. The patient denies bright red blood per rectum or melena. The patient is taking Miralax with improvement.    The patient has completed treatment for H. Pylori. She has not had testing to ensure resolution of H. Pylori. She would like to make sure this is resolved.      There is no history of nausea or vomiting.  The patient denies heartburn. There is no difficulty swallowing.  The patient denies diarrhea.  There is no history of bright red blood per rectum or melena.  The patient's last colonoscopy was February 2017 with polyps.  There is no family history of colon cancer.     Abdominal Pain   This is a chronic problem. The current episode started more than 1 year ago (August 2016). The onset quality is sudden. The problem occurs intermittently. The problem has been improving. The pain is located in the LUQ. The pain is at a severity of 3/10. The pain is mild. The quality of the pain is burning. The abdominal pain does not radiate. Associated symptoms include arthralgias. Pertinent negatives include no constipation, diarrhea, dysuria, fever, headaches, hematochezia, hematuria, melena, myalgias, nausea or vomiting. The pain is aggravated by certain positions and eating (bending  over). The pain is relieved by nothing. She has tried H2 blockers for the symptoms. The treatment provided no relief. Prior diagnostic workup includes lower endoscopy, upper endoscopy, ultrasound and CT scan.   Constipation   This is a chronic problem. The current episode started more than 1 year ago (over 4 years). The problem has been rapidly improving since onset. Her stool frequency is 1 time per day. The stool is described as formed. The patient is not on a high fiber diet. She does not exercise regularly. There has been adequate water intake. Associated symptoms include abdominal pain and back pain. Pertinent negatives include no diarrhea, fever, hematochezia, melena, nausea or vomiting. She has tried stool softeners and fiber (Miralax) for the symptoms. The treatment provided significant relief.     Review of Systems   Constitutional: Negative for appetite change, chills, fatigue, fever and unexpected weight change.   HENT: Negative for mouth sores, nosebleeds and trouble swallowing.    Eyes: Negative for discharge and redness.   Respiratory: Positive for apnea. Negative for cough and shortness of breath.    Cardiovascular: Negative for chest pain, palpitations and leg swelling.   Gastrointestinal: Positive for abdominal pain. Negative for abdominal distention, anal bleeding, blood in stool, constipation, diarrhea, hematochezia, melena, nausea and vomiting.   Endocrine: Negative for cold intolerance, heat intolerance and polydipsia.   Genitourinary: Negative for dysuria, hematuria and urgency.   Musculoskeletal: Positive for arthralgias and back pain. Negative for joint swelling and myalgias.   Skin: Negative for rash.   Allergic/Immunologic: Negative for food allergies and immunocompromised state.   Neurological: Negative for dizziness, seizures, syncope and headaches.   Hematological: Negative for adenopathy. Does not bruise/bleed easily.   Psychiatric/Behavioral: Negative for dysphoric mood. The patient is  not nervous/anxious and is not hyperactive.      Patient Active Problem List   Diagnosis   • Constipation   • Left lower quadrant pain   • Colon cancer screening   • Left upper quadrant pain   • Diverticulosis of large intestine without hemorrhage   • Colon polyps   • Vascular ectasia of colon   • Internal hemorrhoids   • External hemorrhoid   • Elevated liver function tests   • Elevated alkaline phosphatase level   • Gastritis without bleeding   • Helicobacter pylori infection   • Abnormal ultrasound of liver   • Gastric polyp     Past Medical History:   Diagnosis Date   • Back pain    • Carotid artery stenosis     BILATERAL   • Colon polyp 12/10/2012   • Diabetes    • Fatty liver 06/26/2017   • Fracture     RIGHT ANKLE AND LEFT ARM    • High cholesterol    • Hypertension    • Osteoarthritis    • Sleep apnea     CPAP HS - TO BRING IN DOS   • Vision problems    • Wears glasses     FOR DISTANCE     Past Surgical History:   Procedure Laterality Date   • ANKLE ARTHROSCOPY Right    • BREAST SURGERY      CYST REMOVED   • CATARACT EXTRACTION      2014 and 2015   • COLONOSCOPY  12/10/2012   • COLONOSCOPY N/A 2/22/2017    Procedure: COLONOSCOPY with hot and cold snare polypectomies, resolution clip placement, cold biopsy polypectomy;  Surgeon: Mg Awan MD;  Location: [facility] ENDOSCOPY;  Service:    • ENDOSCOPY N/A 7/20/2017    Procedure: ESOPHAGOGASTRODUODENOSCOPY with biopsies and cold biopsy polypectomy;  Surgeon: Mg Awan MD;  Location: [facility] ENDOSCOPY;  Service:    • FRACTURE SURGERY Right     ANKLE   • TUBAL ABDOMINAL LIGATION  1980?   • UPPER GASTROINTESTINAL ENDOSCOPY  07/20/2017     Family History   Problem Relation Age of Onset   • Colon cancer Neg Hx    • Liver cancer Neg Hx    • Liver disease Neg Hx    • Stomach cancer Neg Hx    • Esophageal cancer Neg Hx      Social History   Substance Use Topics   • Smoking status: Never Smoker   • Smokeless tobacco: Never Used   • Alcohol use No       Current  "Outpatient Prescriptions:   •  Calcium Citrate-Vitamin D (CALCIUM + D PO), Take 1 tablet by mouth Daily., Disp: , Rfl:   •  CloNIDine (CATAPRES) 0.1 MG tablet, Take 0.1 mg by mouth 2 (Two) Times a Day As Needed for High Blood Pressure., Disp: , Rfl:   •  clopidogrel (PLAVIX) 75 MG tablet, Take 75 mg by mouth Daily., Disp: , Rfl:   •  diltiaZEM CD (DILTIAZEM CD) 240 MG 24 hr capsule, Take 120 mg by mouth Daily., Disp: , Rfl:   •  Furosemide (LASIX PO), Take 20 mg by mouth Daily As Needed., Disp: , Rfl:   •  insulin glargine (LANTUS) 100 UNIT/ML injection, Inject 27-30 Units under the skin Daily., Disp: , Rfl:   •  insulin lispro (humaLOG) 100 UNIT/ML injection, Inject 10 Units under the skin 3 (Three) Times a Day Before Meals., Disp: , Rfl:   •  levothyroxine (SYNTHROID, LEVOTHROID) 100 MCG tablet, Take 100 mcg by mouth Daily., Disp: , Rfl:   •  montelukast (SINGULAIR) 10 MG tablet, Take 10 mg by mouth Every Night., Disp: , Rfl:   •  olmesartan-hydrochlorothiazide (BENICAR HCT) 20-12.5 MG per tablet, Take 1 tablet by mouth Daily., Disp: , Rfl:   •  Potassium Chloride (KLOR-CON 10 PO), Take 10 mEq by mouth 2 (Two) Times a Day As Needed (TAKES WHEN LASIX IS TAKEN)., Disp: , Rfl:   •  pravastatin (PRAVACHOL) 80 MG tablet, Take 80 mg by mouth Daily., Disp: , Rfl:   •  raNITIdine (ZANTAC) 150 MG tablet, TAKE ONE TABLET BY MOUTH TWICE DAILY, Disp: 60 tablet, Rfl: 5  •  SITagliptin (JANUVIA) 100 MG tablet, Take 100 mg by mouth Daily., Disp: , Rfl:     No Known Allergies    /48  Pulse 64  Temp 98.5 °F (36.9 °C)  Resp 16  Ht 63\" (160 cm)  Wt 245 lb (111 kg)  BMI 43.4 kg/m2    Physical Exam   Constitutional: She is oriented to person, place, and time. She appears well-developed and well-nourished. No distress.   HENT:   Head: Normocephalic and atraumatic.   Right Ear: Hearing and external ear normal.   Left Ear: Hearing and external ear normal.   Nose: Nose normal.   Mouth/Throat: Oropharynx is clear and moist and " mucous membranes are normal. Mucous membranes are not pale, not dry and not cyanotic. No oral lesions. No oropharyngeal exudate.   Eyes: Conjunctivae and EOM are normal. Right eye exhibits no discharge. Left eye exhibits no discharge.   Neck: Trachea normal. Neck supple. No JVD present. No edema present. No thyroid mass and no thyromegaly present.   Cardiovascular: Normal rate, regular rhythm, S2 normal and normal heart sounds.  Exam reveals no gallop, no S3 and no friction rub.    No murmur heard.  Pulmonary/Chest: Effort normal and breath sounds normal. No respiratory distress. She exhibits no tenderness.   Abdominal: Normal appearance and bowel sounds are normal. She exhibits no distension, no ascites and no mass. There is no splenomegaly or hepatomegaly. There is no tenderness. There is no rigidity, no rebound and no guarding. No hernia.       Vascular Status -  Her exam exhibits no right foot edema. Her exam exhibits no left foot edema.  Lymphadenopathy:     She has no cervical adenopathy.        Left: No supraclavicular adenopathy present.   Neurological: She is alert and oriented to person, place, and time. She has normal strength. No cranial nerve deficit or sensory deficit.   Skin: No rash noted. She is not diaphoretic. No cyanosis. No pallor. Nails show no clubbing.   Psychiatric: She has a normal mood and affect.   Vitals reviewed.  Stigmata of chronic liver disease:  None.  Asterixis:  None.    Laboratory Tests:   Upon review of records:     Dated 8/24/2016 glucose 64 BUN 23 creatinine 1.1 sodium 142 potassium 4.1 chloride 105 CO2 33 calcium 9.4 albumin 3.9 ALT 24 AST 21 alkaline phosphatase 120 TSH 2.774 T3 uptake 39.0 free T4 1.19 T4 total 9.3 amylase 70 lipase 36     1/25/2017 glucose 161 BUN 27 creatinine 1.1 sodium 143 potassium 4.3 chloride 104 CO2 31 calcium 9.4 albumin 3.8 ALT 34 AST 27 alkaline phosphatase 140 total bilirubin 0.4 WBC 7.15 hemoglobin 11.4 hematocrit 37.6 platelet count 292 MCV  85.8     Dated 3/9/2017 glucose 131 BUN 27 creatinine 1.1 sodium 140 potassium 4.2 chloride 104 CO2 28 calcium 9.7 albumin 3.9 ALT 28 AST 24 alkaline phosphatase 127 total bilirubin 0.3 hemoglobin A1c 8.9     Dated 6/13/2017 glucose 172 BUN 26 creatinine 1.1 sodium 142 potassium 4.6 chloride 106 CO2 32 calcium 9.7 albumin 4.0 ALT 20 AST 18 alkaline phosphatase 120 total bilirubin 0.3 hemoglobin A1c 9.50    Dated 8/17/2017 glucose 143 BUN 29 creatinine 1.1 sodium 140 potassium 4.1 chloride 103 CO2 28 calcium 9.3 albumin 4.0 ALT 39 AST 20 upon phosphatase 119 total bilirubin 0.5 GGT 25 lipase 109 TSH 0.769    Dated 10/18/2017 glucose 183 BUN 28 creatinine 1.1 sodium 139 potassium 4.7 chloride 98 CO2 33 calcium 9.4 albumin 3.9 ALT 23 AST 16 alkaline phosphatase 124 total bilirubin 0.4 TSH 1.176   total T4 9.3  T3 uptake 34.1  free T4 1.63     Abdominal Imaging:   Upon review of records:     Dated 9/19/2016 CT of abdomen with contrast reveals lung bases clear. No obvious cardiac abnormality. Liver, gallbladder, spleen, pancreas, adrenals and both kidneys are normal. Stomach, small bowel and colon are normal. No mesenteric lymphadenopathy or peritoneal free fluid. Aorta shows atherosclerotic calcifications with normal aortic caliber. IVC and branches are patent and normal. No retroperitoneal lymphadenopathy. Body while a musculoskeletal structure showed degenerative changes of the lumbar spine noted. Anterior abdominal wall is normal.     [facility] HIDA scan dated 10/6/2016 reveals ejection fraction of 85.9%     Abdominal ultrasound dated 6/26/2017 reveals Limited images of the pancreas are unremarkable.  The liver parenchyma is echogenic consistent with fatty infiltration.  The gallbladder is normal with no shadowing stones or wall thickening.  There is no pericholecystic fluid.  The common duct measures 3.6 mm.  Limited images the right kidney are unremarkable.     Procedures:    Upon review of medical  records:    Colonoscopy dated 2/22/2017 reveals pandiverticulosis. Colon polyps. Vascular ectasia, nonbleeding. Internal hemorrhoids and small scar external hemorrhoidal tissue. A descending colon polyp biopsy reveals adenomatous polyps. Negative for high-grade dysplasia. Cecum polyp biopsy reveals adenomatous polyp. Negative for high-grade dysplasia.     EGD dated 7/20/2017 reveals small sliding hiatal hernia less than 3 cm.  Small cardia polyp.  Erythematous gastritis with occasional erosions.  This test does not explain the cause of left upper quadrant and adjacent upper abdominal pain.  Second portion of duodenum biopsies reveal no pathologic abnormalities.  Stomach cardia biopsy polyp reveals chronic active gastritis.  Positive for H. pylori.  Antrum body angulus biopsy reveals chronic active gastritis with focal atrophy.  Positive for H. pylori.    Assessment and Plan:    Kathryn was seen today for follow-up and abdominal pain.    Diagnoses and all orders for this visit:    Left upper quadrant pain  Comments:  Improving. Etiology unclear.  Orders:  -     H. Pylori Antigen, Stool - Stool, Per Rectum; Future    Constipation, unspecified constipation type  Comments:  Improving.    Elevated alkaline phosphatase level  Comments:  GGT normal. Of interest, the patient has history of osteoporosis and is not on treatment at this time.    Abnormal ultrasound of liver  Comments:  Fatty infiltration of liver per ultrasound.        Plan  and Patient Instructions:  Patient Instructions   1. Antireflux measures: Avoid fried, fatty foods, alcohol, chocolate, coffee, tea,  soft drinks, peppermint and spearmint, spicy foods, tomatoes and tomato based foods, onion based foods, and smoking. Other antireflux measures include weight reduction if overweight, avoiding tight clothing around the abdomen, elevating the head of the bed 6 inches with blocks under the head board, and don't drink or eat before going to bed and avoid lying  down immediately after meals.  2. Zantac 150 mg 1 po twice a day.  3. Check stool for H. Pylori.  4. High fiber, low fat diet with liberal water intake.   5. Exercise, weight loss.  6. Possible CT of abdomen/pelvis in the future.  7. Follow up: 3 months    Gavin Vázquez, APRN       negative

## 2017-10-31 ENCOUNTER — LAB REQUISITION (OUTPATIENT)
Dept: LAB | Facility: HOSPITAL | Age: 77
End: 2017-10-31

## 2017-10-31 DIAGNOSIS — R19.5 OTHER FECAL ABNORMALITIES: ICD-10-CM

## 2017-10-31 PROCEDURE — 87338 HPYLORI STOOL AG IA: CPT | Performed by: PHYSICIAN ASSISTANT

## 2017-11-02 LAB — H PYLORI AG STL QL IA: NEGATIVE

## 2018-01-31 ENCOUNTER — HOSPITAL ENCOUNTER (EMERGENCY)
Facility: HOSPITAL | Age: 78
Discharge: HOME | End: 2018-01-31
Attending: EMERGENCY MEDICINE | Admitting: EMERGENCY MEDICINE

## 2018-01-31 ENCOUNTER — APPOINTMENT (OUTPATIENT)
Dept: CT IMAGING | Facility: HOSPITAL | Age: 78
End: 2018-01-31

## 2018-01-31 ENCOUNTER — APPOINTMENT (OUTPATIENT)
Dept: GENERAL RADIOLOGY | Facility: HOSPITAL | Age: 78
End: 2018-01-31

## 2018-01-31 VITALS
WEIGHT: 245 LBS | BODY MASS INDEX: 41.83 KG/M2 | DIASTOLIC BLOOD PRESSURE: 84 MMHG | HEIGHT: 64 IN | RESPIRATION RATE: 16 BRPM | HEART RATE: 89 BPM | OXYGEN SATURATION: 94 % | SYSTOLIC BLOOD PRESSURE: 172 MMHG | TEMPERATURE: 98 F

## 2018-01-31 DIAGNOSIS — S42.92XA SHOULDER FRACTURE, LEFT, CLOSED, INITIAL ENCOUNTER: ICD-10-CM

## 2018-01-31 DIAGNOSIS — S62.101A CLOSED FRACTURE OF RIGHT WRIST, INITIAL ENCOUNTER: Primary | ICD-10-CM

## 2018-01-31 LAB — GLUCOSE BLDC GLUCOMTR-MCNC: 392 MG/DL (ref 70–130)

## 2018-01-31 PROCEDURE — 73562 X-RAY EXAM OF KNEE 3: CPT

## 2018-01-31 PROCEDURE — 73030 X-RAY EXAM OF SHOULDER: CPT

## 2018-01-31 PROCEDURE — 73110 X-RAY EXAM OF WRIST: CPT

## 2018-01-31 PROCEDURE — 82962 GLUCOSE BLOOD TEST: CPT

## 2018-01-31 PROCEDURE — 99284 EMERGENCY DEPT VISIT MOD MDM: CPT

## 2018-01-31 PROCEDURE — 73200 CT UPPER EXTREMITY W/O DYE: CPT

## 2018-01-31 PROCEDURE — 73060 X-RAY EXAM OF HUMERUS: CPT

## 2018-01-31 RX ORDER — OXYCODONE HYDROCHLORIDE AND ACETAMINOPHEN 5; 325 MG/1; MG/1
1 TABLET ORAL EVERY 6 HOURS PRN
Qty: 12 TABLET | Refills: 0 | Status: SHIPPED | OUTPATIENT
Start: 2018-01-31 | End: 2018-02-06 | Stop reason: HOSPADM

## 2018-01-31 RX ORDER — OXYCODONE HYDROCHLORIDE AND ACETAMINOPHEN 5; 325 MG/1; MG/1
1 TABLET ORAL ONCE
Status: COMPLETED | OUTPATIENT
Start: 2018-01-31 | End: 2018-01-31

## 2018-01-31 RX ADMIN — OXYCODONE AND ACETAMINOPHEN 1 TABLET: 5; 325 TABLET ORAL at 20:20

## 2018-01-31 RX ADMIN — OXYCODONE AND ACETAMINOPHEN 1 TABLET: 5; 325 TABLET ORAL at 23:54

## 2018-02-01 ENCOUNTER — HOSPITAL ENCOUNTER (OUTPATIENT)
Dept: GENERAL RADIOLOGY | Facility: HOSPITAL | Age: 78
Discharge: HOME | End: 2018-02-01
Admitting: PHYSICIAN ASSISTANT

## 2018-02-01 ENCOUNTER — OFFICE VISIT (OUTPATIENT)
Dept: ORTHOPEDIC SURGERY | Facility: CLINIC | Age: 78
End: 2018-02-01

## 2018-02-01 ENCOUNTER — APPOINTMENT (OUTPATIENT)
Dept: PREADMISSION TESTING | Facility: HOSPITAL | Age: 78
End: 2018-02-01

## 2018-02-01 VITALS
BODY MASS INDEX: 41.83 KG/M2 | SYSTOLIC BLOOD PRESSURE: 120 MMHG | HEIGHT: 64 IN | WEIGHT: 245 LBS | DIASTOLIC BLOOD PRESSURE: 52 MMHG

## 2018-02-01 VITALS — HEIGHT: 60 IN | WEIGHT: 246 LBS | RESPIRATION RATE: 18 BRPM | BODY MASS INDEX: 48.29 KG/M2

## 2018-02-01 DIAGNOSIS — Z01.818 PRE-OP EXAM: Primary | ICD-10-CM

## 2018-02-01 DIAGNOSIS — S52.501A CLOSED FRACTURE OF DISTAL END OF RIGHT RADIUS, UNSPECIFIED FRACTURE MORPHOLOGY, INITIAL ENCOUNTER: ICD-10-CM

## 2018-02-01 DIAGNOSIS — Z01.818 PRE-OP EXAM: ICD-10-CM

## 2018-02-01 DIAGNOSIS — S42.202A CLOSED FRACTURE OF PROXIMAL END OF LEFT HUMERUS, UNSPECIFIED FRACTURE MORPHOLOGY, INITIAL ENCOUNTER: ICD-10-CM

## 2018-02-01 LAB
ALBUMIN SERPL-MCNC: 3.7 G/DL (ref 3.5–5)
ALBUMIN/GLOB SERPL: 1.1 G/DL (ref 1–2)
ALP SERPL-CCNC: 149 U/L (ref 38–126)
ALT SERPL W P-5'-P-CCNC: 31 U/L (ref 13–69)
ANION GAP SERPL CALCULATED.3IONS-SCNC: 16 MMOL/L
AST SERPL-CCNC: 20 U/L (ref 15–46)
BACTERIA UR QL AUTO: ABNORMAL /HPF
BASOPHILS # BLD AUTO: 0.08 10*3/MM3 (ref 0–0.2)
BASOPHILS NFR BLD AUTO: 0.7 % (ref 0–2.5)
BILIRUB SERPL-MCNC: 0.3 MG/DL (ref 0.2–1.3)
BILIRUB UR QL STRIP: ABNORMAL
BUN BLD-MCNC: 23 MG/DL (ref 7–20)
BUN/CREAT SERPL: 16.4 (ref 7.1–23.5)
CALCIUM SPEC-SCNC: 9.7 MG/DL (ref 8.4–10.2)
CHLORIDE SERPL-SCNC: 100 MMOL/L (ref 98–107)
CLARITY UR: ABNORMAL
CO2 SERPL-SCNC: 27 MMOL/L (ref 26–30)
COLOR UR: ABNORMAL
CREAT BLD-MCNC: 1.4 MG/DL (ref 0.6–1.3)
DEPRECATED RDW RBC AUTO: 43.2 FL (ref 37–54)
EOSINOPHIL # BLD AUTO: 0.1 10*3/MM3 (ref 0–0.7)
EOSINOPHIL NFR BLD AUTO: 0.8 % (ref 0–7)
ERYTHROCYTE [DISTWIDTH] IN BLOOD BY AUTOMATED COUNT: 13.6 % (ref 11.5–14.5)
GFR SERPL CREATININE-BSD FRML MDRD: 36 ML/MIN/1.73
GLOBULIN UR ELPH-MCNC: 3.3 GM/DL
GLUCOSE BLD-MCNC: 301 MG/DL (ref 74–98)
GLUCOSE UR STRIP-MCNC: ABNORMAL MG/DL
HCT VFR BLD AUTO: 34.7 % (ref 37–47)
HGB BLD-MCNC: 10.7 G/DL (ref 12–16)
HGB UR QL STRIP.AUTO: NEGATIVE
HYALINE CASTS UR QL AUTO: ABNORMAL /LPF
IMM GRANULOCYTES # BLD: 0.09 10*3/MM3 (ref 0–0.06)
IMM GRANULOCYTES NFR BLD: 0.8 % (ref 0–0.6)
KETONES UR QL STRIP: ABNORMAL
LEUKOCYTE ESTERASE UR QL STRIP.AUTO: NEGATIVE
LYMPHOCYTES # BLD AUTO: 1.63 10*3/MM3 (ref 0.6–3.4)
LYMPHOCYTES NFR BLD AUTO: 13.7 % (ref 10–50)
MCH RBC QN AUTO: 26.8 PG (ref 27–31)
MCHC RBC AUTO-ENTMCNC: 30.8 G/DL (ref 30–37)
MCV RBC AUTO: 86.8 FL (ref 81–99)
MONOCYTES # BLD AUTO: 0.81 10*3/MM3 (ref 0–0.9)
MONOCYTES NFR BLD AUTO: 6.8 % (ref 0–12)
NEUTROPHILS # BLD AUTO: 9.21 10*3/MM3 (ref 2–6.9)
NEUTROPHILS NFR BLD AUTO: 77.2 % (ref 37–80)
NITRITE UR QL STRIP: NEGATIVE
NRBC BLD MANUAL-RTO: 0 /100 WBC (ref 0–0)
PH UR STRIP.AUTO: <=5 [PH] (ref 5–8)
PLATELET # BLD AUTO: 379 10*3/MM3 (ref 130–400)
PMV BLD AUTO: 10.1 FL (ref 6–12)
POTASSIUM BLD-SCNC: 4 MMOL/L (ref 3.5–5.1)
PROT SERPL-MCNC: 7 G/DL (ref 6.3–8.2)
PROT UR QL STRIP: ABNORMAL
RBC # BLD AUTO: 4 10*6/MM3 (ref 4.2–5.4)
RBC # UR: ABNORMAL /HPF
REF LAB TEST METHOD: ABNORMAL
SODIUM BLD-SCNC: 139 MMOL/L (ref 137–145)
SP GR UR STRIP: >=1.03 (ref 1–1.03)
SQUAMOUS #/AREA URNS HPF: ABNORMAL /HPF
UROBILINOGEN UR QL STRIP: ABNORMAL
WBC NRBC COR # BLD: 11.92 10*3/MM3 (ref 4.8–10.8)
WBC UR QL AUTO: ABNORMAL /HPF

## 2018-02-01 PROCEDURE — 36415 COLL VENOUS BLD VENIPUNCTURE: CPT | Performed by: PHYSICIAN ASSISTANT

## 2018-02-01 PROCEDURE — 85025 COMPLETE CBC W/AUTO DIFF WBC: CPT | Performed by: PHYSICIAN ASSISTANT

## 2018-02-01 PROCEDURE — 71046 X-RAY EXAM CHEST 2 VIEWS: CPT

## 2018-02-01 PROCEDURE — 81001 URINALYSIS AUTO W/SCOPE: CPT | Performed by: PHYSICIAN ASSISTANT

## 2018-02-01 PROCEDURE — 80053 COMPREHEN METABOLIC PANEL: CPT | Performed by: PHYSICIAN ASSISTANT

## 2018-02-01 PROCEDURE — 87086 URINE CULTURE/COLONY COUNT: CPT | Performed by: PHYSICIAN ASSISTANT

## 2018-02-01 PROCEDURE — 99214 OFFICE O/P EST MOD 30 MIN: CPT | Performed by: PHYSICIAN ASSISTANT

## 2018-02-01 RX ORDER — SODIUM CHLORIDE 0.9 % (FLUSH) 0.9 %
1-10 SYRINGE (ML) INJECTION AS NEEDED
Status: CANCELLED | OUTPATIENT
Start: 2018-02-01

## 2018-02-01 NOTE — ED PROVIDER NOTES
Subjective   History of Present Illness   77-year-old female presenting with right wrist, left shoulder and knee pain after she fell.  States that she was walking out of her house and tripped over a bush and fell landing on her left side and it catches over the right hand.  She did not hit her head.  She did not lose consciousness.  She is not taking anything for pain yet.  Denies any other complaints.    Review of Systems   Musculoskeletal: Positive for arthralgias.   All other systems reviewed and are negative.      Past Medical History:   Diagnosis Date   • Back pain    • Carotid artery stenosis     BILATERAL   • Colon polyp 12/10/2012   • Diabetes    • Fatty liver 06/26/2017   • Fracture     RIGHT ANKLE AND LEFT ARM    • High cholesterol    • Hypertension    • Osteoarthritis    • Sleep apnea     CPAP HS - TO BRING IN DOS   • Vision problems    • Wears glasses     FOR DISTANCE       No Known Allergies    Past Surgical History:   Procedure Laterality Date   • ANKLE ARTHROSCOPY Right    • BREAST SURGERY      CYST REMOVED   • CATARACT EXTRACTION      2014 and 2015   • COLONOSCOPY  12/10/2012   • COLONOSCOPY N/A 2/22/2017    Procedure: COLONOSCOPY with hot and cold snare polypectomies, resolution clip placement, cold biopsy polypectomy;  Surgeon: Mg Awan MD;  Location: [facility] ENDOSCOPY;  Service:    • ENDOSCOPY N/A 7/20/2017    Procedure: ESOPHAGOGASTRODUODENOSCOPY with biopsies and cold biopsy polypectomy;  Surgeon: Mg Awan MD;  Location: [facility] ENDOSCOPY;  Service:    • FRACTURE SURGERY Right     ANKLE   • TUBAL ABDOMINAL LIGATION  1980?   • UPPER GASTROINTESTINAL ENDOSCOPY  07/20/2017       Family History   Problem Relation Age of Onset   • Colon cancer Neg Hx    • Liver cancer Neg Hx    • Liver disease Neg Hx    • Stomach cancer Neg Hx    • Esophageal cancer Neg Hx        Social History     Social History   • Marital status: Single     Spouse name: N/A   • Number of children: N/A   • Years of  education: N/A     Social History Main Topics   • Smoking status: Never Smoker   • Smokeless tobacco: Never Used   • Alcohol use No   • Drug use: No   • Sexual activity: Defer     Other Topics Concern   • None     Social History Narrative           Objective   Physical Exam   Constitutional: She is oriented to person, place, and time. She appears well-developed and well-nourished. No distress.   Morbidly obese   HENT:   Head: Normocephalic and atraumatic.   Mouth/Throat: Oropharynx is clear and moist. No oropharyngeal exudate.   No hemotympanum, hurtado's sign nor raccoon eyes.      Eyes: No scleral icterus.   Neck: Neck supple. No tracheal deviation present.   No tenderness palpation of the spinous processes.   Cardiovascular: Normal rate, regular rhythm, normal heart sounds and intact distal pulses.  Exam reveals no gallop and no friction rub.    No murmur heard.  Pulmonary/Chest: Effort normal and breath sounds normal. No stridor. No respiratory distress. She has no wheezes. She has no rales. She exhibits no tenderness.   Abdominal: Soft. She exhibits no distension and no mass. There is no tenderness. There is no rebound and no guarding. No hernia.   Musculoskeletal: She exhibits tenderness (Tender to palpation over the left anterior shoulder and proximal humerus.  Tender to palpation over the left knee at the medial joint line.  Tender palpation over the right wrist over the snuffbox.  Otherwise, no tenderness palpation throughout BUE / BLE). She exhibits no edema or deformity.   Neurological: She is alert and oriented to person, place, and time.   Skin: Skin is warm and dry. She is not diaphoretic. No erythema. No pallor.   Psychiatric: She has a normal mood and affect. Her behavior is normal.   Nursing note and vitals reviewed.      Splint - Cast - Strapping  Date/Time: 1/31/2018 9:26 PM  Performed by: ZEFERINO DE SOUZA  Authorized by: ZEFERINO DE SOUZA     Consent:     Consent obtained:  Verbal    Consent  given by:  Patient    Alternatives discussed:  No treatment  Pre-procedure details:     Sensation:  Normal  Procedure details:     Laterality:  Right    Location:  Wrist    Splint type:  Radial gutter    Supplies:  Elastic bandage, Ortho-Glass and cotton padding  Post-procedure details:     Pain:  Unchanged    Sensation:  Normal    Patient tolerance of procedure:  Tolerated well, no immediate complications             ED Course  ED Course                  MDM   77-year-old female here with left shoulder, left knee, right wrist pain after fall.  Neurovascularly intact.  We'll get x-rays of the affected areas, treat pain, and reassess.    9:29 PM Patient has fractures of the right wrist and left proximal humerus.  Discussed with the orthopedic surgeon who recommended splinting the right wrist and getting a CT of the shoulder.  This is being ordered, splint in place, sling as being placed and will discharge after the CT. Will give a short course of percocet and information to f/u w/ orthopedics. Discussed return to care precautions.     Final diagnoses:   Closed fracture of right wrist, initial encounter   Shoulder fracture, left, closed, initial encounter            Td Hyatt MD  01/31/18 [id number]

## 2018-02-01 NOTE — PROGRESS NOTES
"Subjective   Patient ID: Kathryn Davison is a 77 y.o. right hand dominant female is here today for a treatment planning discussion. Fracture and Pain of the Right Wrist and Fracture and Pain of the Left Shoulder         History of Present Illness    Patient presents as a new patient with complaints of right wrist and left shoulder pain.  She states on 1/31/2018 she tripped over a shrubs and fell on the sidewalk.  She went to the emergency room immediately and after a thorough workup was diagnosed with a wrist fracture and left shoulder fracture.  The emergency room provider called the on-call orthopedic surgeon who advised her to follow up with our office with the possibility of right wrist and left shoulder surgery.      Pain Score: worst possible pain  Pain Location: Wrist (& Left Shoulder)  Pain Orientation: Right     Pain Descriptors: Throbbing  Pain Frequency: Constant/continuous     Date Pain First Started: 01/31/18              Pain Intervention(s): Rest, Medication (See MAR)  Result of Injury: Yes (Patient states she tripped and fell onto a sidewalk. )       Past Medical History:   Diagnosis Date   • Back pain    • Carotid artery stenosis     BILATERAL-FOLLOWING WITH DR. WINCHESTER.  SEES EVERY 6 MONTHS.     • Cataract, bilateral    • Colon polyp 12/10/2012   • Diabetes    • Disease of thyroid gland    • Diverticulosis    • Fatty liver 06/26/2017   • Fracture     RIGHT ANKLE AND LEFT ARM    • High cholesterol    • History of nuclear stress test 2016    \"IT WAS OK\"   • Hypertension    • Lumbosacral disc disease    • Osteoarthritis    • Osteoporosis    • Sleep apnea     CPAP HS - TO BRING IN DOS   • Wears glasses     FOR DISTANCE        Past Surgical History:   Procedure Laterality Date   • ANKLE ARTHROSCOPY Right    • BREAST SURGERY Left     CYST REMOVED   • CARDIAC CATHETERIZATION      NO STENT   • CATARACT EXTRACTION Bilateral     2014 and 2015   • COLONOSCOPY  12/10/2012   • COLONOSCOPY N/A 2/22/2017    " "Procedure: COLONOSCOPY with hot and cold snare polypectomies, resolution clip placement, cold biopsy polypectomy;  Surgeon: Mg Awan MD;  Location: [facility] ENDOSCOPY;  Service:    • DILATION AND CURETTAGE, DIAGNOSTIC / THERAPEUTIC      X4   • ENDOSCOPY N/A 7/20/2017    Procedure: ESOPHAGOGASTRODUODENOSCOPY with biopsies and cold biopsy polypectomy;  Surgeon: Mg Awan MD;  Location: [facility] ENDOSCOPY;  Service:    • FRACTURE SURGERY Right     ANKLE   • TUBAL ABDOMINAL LIGATION  1980?   • UPPER GASTROINTESTINAL ENDOSCOPY  07/20/2017       Family History   Problem Relation Age of Onset   • Heart disease Other    • Cancer Other    • Colon cancer Neg Hx    • Liver cancer Neg Hx    • Liver disease Neg Hx    • Stomach cancer Neg Hx    • Esophageal cancer Neg Hx        Social History     Social History   • Marital status:      Spouse name: N/A   • Number of children: N/A   • Years of education: N/A     Occupational History   • Not on file.     Social History Main Topics   • Smoking status: Never Smoker   • Smokeless tobacco: Never Used   • Alcohol use No   • Drug use: No   • Sexual activity: Defer     Other Topics Concern   • Not on file     Social History Narrative       No Known Allergies    Review of Systems   Constitutional: Negative for fever.   HENT: Negative for voice change.    Eyes: Negative for visual disturbance.   Respiratory: Negative for shortness of breath.    Cardiovascular: Negative for chest pain.   Gastrointestinal: Negative for abdominal distention and abdominal pain.   Genitourinary: Negative for dysuria.   Musculoskeletal: Positive for arthralgias. Negative for gait problem and joint swelling.   Skin: Negative for rash.   Neurological: Negative for speech difficulty.   Hematological: Does not bruise/bleed easily.   Psychiatric/Behavioral: Negative for confusion.   All other systems reviewed and are negative.      Objective   Resp 18  Ht 152.4 cm (60\")  Wt 112 kg (246 lb)  BMI 48.04 " kg/m2   Physical Exam   Constitutional: She is oriented to person, place, and time. She appears well-nourished.   Eyes: Conjunctivae are normal.   Neck: No tracheal deviation present.   Cardiovascular: Intact distal pulses.    Pulmonary/Chest: Effort normal. No respiratory distress.   Abdominal: She exhibits no distension.   Musculoskeletal:        Left shoulder: She exhibits decreased range of motion, tenderness, swelling and pain. She exhibits no deformity.        Left elbow: No tenderness found.        Right wrist: She exhibits decreased range of motion (due to pain) and tenderness. She exhibits no deformity.        Left wrist: She exhibits no tenderness and no bony tenderness.        Right hand: She exhibits normal capillary refill and no deformity. Normal sensation noted. Normal strength noted.        Left hand: She exhibits no tenderness. Normal sensation noted.   Neurological: She is alert and oriented to person, place, and time.   Psychiatric: She has a normal mood and affect. Her behavior is normal.   Vitals reviewed.    Ortho Exam     Neurologic Exam     Mental Status   Oriented to person, place, and time.      Ortho Exam         Assessment/Plan   Independent Review of Radiographic Studies:    No imaging was performed in the office.  I did review x-ray imaging of the left shoulder and right wrist from [facility].  There is a comminuted 4 part left proximal humerus fracture.  There is a comminuted Colles' right distal radius fracture.      Procedures       Kathryn was seen today for fracture, pain, fracture and pain.    Diagnoses and all orders for this visit:    Pre-op exam  -     Case Request; Standing  -     CBC and Differential  -     Comprehensive metabolic panel  -     Urinalysis With / Culture If Indicated - Urine, Clean Catch  -     ECG 12 Lead  -     XR chest 2 vw; Future  -     sodium chloride 0.9 % flush 1-10 mL; Infuse 1-10 mL into a venous catheter As Needed for Line Care.  -      ceFAZolin (ANCEF) 2 g in sodium chloride 0.9 % 100 mL IVPB; Infuse 2 g into a venous catheter On Call to the Operating Room.  -     famotidine (PEPCID) injection 20 mg; Infuse 2 mL into a venous catheter 60 Minutes Prior to Surgery.  -     Case Request    Closed fracture of distal end of right radius, unspecified fracture morphology, initial encounter    Closed fracture of proximal end of left humerus, unspecified fracture morphology, initial encounter    Other orders  -     Follow anesthesia standing orders.  -     Provide instructions to patient regarding NPO status  -     Obtain informed consent  -     Clorhexidine skin prep  -     Follow Anesthesia Guidelines / Standing Orders; Standing  -     Notify physician (specify); Standing  -     Insert Peripheral IV; Standing  -     Saline Lock & Maintain IV Access; Standing  -     Orthopedic Discharge Planning for PT & Case Management - Inpatient With Next Day Discharge  -     Initiate Observation Status; Standing       Orthopedic activities reviewed and patient expressed appreciation  Discussion of orthopedic goals  Risk, benefits, and merits of treatment alternatives reviewed with the patient and questions answered  The nature of the proposed surgery reviewed with the patient including risks, benefits, rehabilitation, recovery timeframe, and outcome expectations    Recommendations/Plan:  Exercise, medications, injections, other patient advice, and return appointment as noted.  Patient is encouraged to call or return for any issues or concerns.  Patient was placed back into her volar Orthoplast splint from the emergency room.  She was also kept in her shoulder sling.  Dr. Meneses reviewed the imaging as well and concurs with plan of care.    PLAN: Right volar wrist plate.      Left Reverse total shoulder replacement with tuberosity reconstruction.    Patient agreeable to call or return sooner for any concerns.

## 2018-02-01 NOTE — DISCHARGE INSTRUCTIONS
You may take Tylenol 650mg every 6-8 hours as well as ibuprofen 600mg every 6-8 hours as needed for pain or fever.    Please take the prescribed oxycodone only when your pain is not otherwise controlled with Tylenol or ibuprofen and when you do not have any dangerous activities to perform such as driving, as this can be dangerous.

## 2018-02-02 ENCOUNTER — APPOINTMENT (OUTPATIENT)
Dept: GENERAL RADIOLOGY | Facility: HOSPITAL | Age: 78
End: 2018-02-02
Attending: ORTHOPAEDIC SURGERY

## 2018-02-02 ENCOUNTER — APPOINTMENT (OUTPATIENT)
Dept: GENERAL RADIOLOGY | Facility: HOSPITAL | Age: 78
End: 2018-02-02

## 2018-02-02 ENCOUNTER — ANESTHESIA EVENT (OUTPATIENT)
Dept: PERIOP | Facility: HOSPITAL | Age: 78
End: 2018-02-02

## 2018-02-02 ENCOUNTER — HOSPITAL ENCOUNTER (INPATIENT)
Facility: HOSPITAL | Age: 78
LOS: 3 days | Discharge: NURSING, REHAB OR LONG-TERM CARE | End: 2018-02-06
Attending: ORTHOPAEDIC SURGERY | Admitting: ORTHOPAEDIC SURGERY

## 2018-02-02 ENCOUNTER — ANESTHESIA (OUTPATIENT)
Dept: PERIOP | Facility: HOSPITAL | Age: 78
End: 2018-02-02

## 2018-02-02 DIAGNOSIS — Z78.9 IMPAIRED MOBILITY AND ADLS: ICD-10-CM

## 2018-02-02 DIAGNOSIS — Z74.09 IMPAIRED MOBILITY AND ADLS: ICD-10-CM

## 2018-02-02 DIAGNOSIS — Z74.09 IMPAIRED FUNCTIONAL MOBILITY, BALANCE, GAIT, AND ENDURANCE: Primary | ICD-10-CM

## 2018-02-02 DIAGNOSIS — Z01.818 PRE-OP EXAM: ICD-10-CM

## 2018-02-02 PROBLEM — E66.01 MORBID OBESITY WITH BMI OF 40.0-44.9, ADULT (HCC): Status: ACTIVE | Noted: 2018-02-02

## 2018-02-02 PROBLEM — E03.9 ACQUIRED HYPOTHYROIDISM: Status: ACTIVE | Noted: 2018-02-02

## 2018-02-02 PROBLEM — S42.92XA CLOSED FRACTURE OF LEFT SHOULDER: Status: ACTIVE | Noted: 2018-02-02

## 2018-02-02 PROBLEM — S62.101A RIGHT WRIST FRACTURE: Status: ACTIVE | Noted: 2018-02-02

## 2018-02-02 PROBLEM — I10 ESSENTIAL HYPERTENSION: Status: ACTIVE | Noted: 2018-02-02

## 2018-02-02 PROBLEM — E66.9 DIABETES MELLITUS TYPE 2 IN OBESE: Status: ACTIVE | Noted: 2018-02-02

## 2018-02-02 PROBLEM — E11.69 DIABETES MELLITUS TYPE 2 IN OBESE: Status: ACTIVE | Noted: 2018-02-02

## 2018-02-02 LAB
GLUCOSE BLDC GLUCOMTR-MCNC: 231 MG/DL (ref 70–130)
GLUCOSE BLDC GLUCOMTR-MCNC: 245 MG/DL (ref 70–130)
GLUCOSE BLDC GLUCOMTR-MCNC: 357 MG/DL (ref 70–130)
GLUCOSE BLDC GLUCOMTR-MCNC: 378 MG/DL (ref 70–130)

## 2018-02-02 PROCEDURE — C1713 ANCHOR/SCREW BN/BN,TIS/BN: HCPCS | Performed by: ORTHOPAEDIC SURGERY

## 2018-02-02 PROCEDURE — G0378 HOSPITAL OBSERVATION PER HR: HCPCS

## 2018-02-02 PROCEDURE — 88311 DECALCIFY TISSUE: CPT | Performed by: ORTHOPAEDIC SURGERY

## 2018-02-02 PROCEDURE — 25010000002 SUCCINYLCHOLINE PER 20 MG: Performed by: NURSE ANESTHETIST, CERTIFIED REGISTERED

## 2018-02-02 PROCEDURE — 99224 PR SBSQ OBSERVATION CARE/DAY 15 MINUTES: CPT | Performed by: INTERNAL MEDICINE

## 2018-02-02 PROCEDURE — 73110 X-RAY EXAM OF WRIST: CPT

## 2018-02-02 PROCEDURE — 25010000003 CEFAZOLIN PER 500 MG: Performed by: PHYSICIAN ASSISTANT

## 2018-02-02 PROCEDURE — 63710000001 INSULIN ASPART PER 5 UNITS: Performed by: INTERNAL MEDICINE

## 2018-02-02 PROCEDURE — 25010000002 FENTANYL CITRATE (PF) 100 MCG/2ML SOLUTION: Performed by: NURSE ANESTHETIST, CERTIFIED REGISTERED

## 2018-02-02 PROCEDURE — 25010000002 DEXAMETHASONE PER 1 MG: Performed by: NURSE ANESTHETIST, CERTIFIED REGISTERED

## 2018-02-02 PROCEDURE — 25010000002 MIDAZOLAM PER 1 MG: Performed by: NURSE ANESTHETIST, CERTIFIED REGISTERED

## 2018-02-02 PROCEDURE — C1776 JOINT DEVICE (IMPLANTABLE): HCPCS | Performed by: ORTHOPAEDIC SURGERY

## 2018-02-02 PROCEDURE — 23472 RECONSTRUCT SHOULDER JOINT: CPT | Performed by: ORTHOPAEDIC SURGERY

## 2018-02-02 PROCEDURE — 73030 X-RAY EXAM OF SHOULDER: CPT

## 2018-02-02 PROCEDURE — 0PSH04Z REPOSITION RIGHT RADIUS WITH INTERNAL FIXATION DEVICE, OPEN APPROACH: ICD-10-PCS | Performed by: ORTHOPAEDIC SURGERY

## 2018-02-02 PROCEDURE — 63710000001 INSULIN ASPART PER 5 UNITS: Performed by: ORTHOPAEDIC SURGERY

## 2018-02-02 PROCEDURE — 82962 GLUCOSE BLOOD TEST: CPT

## 2018-02-02 PROCEDURE — 25609 OPTX DST RD XART FX/EP SEP3+: CPT | Performed by: ORTHOPAEDIC SURGERY

## 2018-02-02 PROCEDURE — 25010000003 CEFAZOLIN PER 500 MG: Performed by: ORTHOPAEDIC SURGERY

## 2018-02-02 PROCEDURE — 0RRK00Z REPLACEMENT OF LEFT SHOULDER JOINT WITH REVERSE BALL AND SOCKET SYNTHETIC SUBSTITUTE, OPEN APPROACH: ICD-10-PCS | Performed by: ORTHOPAEDIC SURGERY

## 2018-02-02 PROCEDURE — 88305 TISSUE EXAM BY PATHOLOGIST: CPT | Performed by: ORTHOPAEDIC SURGERY

## 2018-02-02 PROCEDURE — 25010000002 PROPOFOL 200 MG/20ML EMULSION: Performed by: NURSE ANESTHETIST, CERTIFIED REGISTERED

## 2018-02-02 PROCEDURE — 76000 FLUOROSCOPY <1 HR PHYS/QHP: CPT

## 2018-02-02 DEVICE — SCRW FIX LK HEX 4.75X15MM: Type: IMPLANTABLE DEVICE | Site: SHOULDER | Status: FUNCTIONAL

## 2018-02-02 DEVICE — SCRW DVR LK 2.7X22MM: Type: IMPLANTABLE DEVICE | Site: WRIST | Status: FUNCTIONAL

## 2018-02-02 DEVICE — SCRW DVR LK 2.7X20MM: Type: IMPLANTABLE DEVICE | Site: WRIST | Status: FUNCTIONAL

## 2018-02-02 DEVICE — BEAR HUM COMPRNSV ARCOMXL STD 44 36: Type: IMPLANTABLE DEVICE | Site: SHOULDER | Status: FUNCTIONAL

## 2018-02-02 DEVICE — STEM HUM/SHLDR COMPREHENSIVE MINI 11X83MM: Type: IMPLANTABLE DEVICE | Site: SHOULDER | Status: FUNCTIONAL

## 2018-02-02 DEVICE — SCRW DVR NL LP 2.7X18MM: Type: IMPLANTABLE DEVICE | Site: WRIST | Status: FUNCTIONAL

## 2018-02-02 DEVICE — SCRW COMPRNSV CNTRL 6.5X20MM REUS: Type: IMPLANTABLE DEVICE | Site: SHOULDER | Status: FUNCTIONAL

## 2018-02-02 DEVICE — GLENOSPHERE VERSA DIAL FXD OFFST36 PLS3: Type: IMPLANTABLE DEVICE | Site: SHOULDER | Status: FUNCTIONAL

## 2018-02-02 DEVICE — SCRW DVR NL 2.7X13MM: Type: IMPLANTABLE DEVICE | Site: WRIST | Status: FUNCTIONAL

## 2018-02-02 DEVICE — SCRW DVR NL LP 2.7X16MM: Type: IMPLANTABLE DEVICE | Site: WRIST | Status: FUNCTIONAL

## 2018-02-02 DEVICE — SCRW FIX LK HEX 4.75X20MM: Type: IMPLANTABLE DEVICE | Site: SHOULDER | Status: FUNCTIONAL

## 2018-02-02 DEVICE — PLT DVR CROSSLK NRW 22X51MM RT: Type: IMPLANTABLE DEVICE | Site: WRIST | Status: FUNCTIONAL

## 2018-02-02 DEVICE — SCRW DVR LK 2.7X16MM: Type: IMPLANTABLE DEVICE | Site: WRIST | Status: FUNCTIONAL

## 2018-02-02 DEVICE — TRY HUM STD COBLT 44MM: Type: IMPLANTABLE DEVICE | Site: SHOULDER | Status: FUNCTIONAL

## 2018-02-02 DEVICE — IMPLANTABLE DEVICE: Type: IMPLANTABLE DEVICE | Site: WRIST | Status: FUNCTIONAL

## 2018-02-02 DEVICE — BASEPLT GLEN COMPR MINI W TPR ADAPTR 25: Type: IMPLANTABLE DEVICE | Site: SHOULDER | Status: FUNCTIONAL

## 2018-02-02 RX ORDER — PROPOFOL 10 MG/ML
INJECTION, EMULSION INTRAVENOUS AS NEEDED
Status: DISCONTINUED | OUTPATIENT
Start: 2018-02-02 | End: 2018-02-02 | Stop reason: SURG

## 2018-02-02 RX ORDER — FUROSEMIDE 20 MG/1
20 TABLET ORAL DAILY
Status: DISCONTINUED | OUTPATIENT
Start: 2018-02-03 | End: 2018-02-06 | Stop reason: HOSPADM

## 2018-02-02 RX ORDER — CEFAZOLIN SODIUM 1 G/3ML
INJECTION, POWDER, FOR SOLUTION INTRAMUSCULAR; INTRAVENOUS
Status: DISPENSED
Start: 2018-02-02 | End: 2018-02-02

## 2018-02-02 RX ORDER — OXYCODONE AND ACETAMINOPHEN 10; 325 MG/1; MG/1
TABLET ORAL
Status: DISPENSED
Start: 2018-02-02 | End: 2018-02-02

## 2018-02-02 RX ORDER — DILTIAZEM HYDROCHLORIDE 120 MG/1
120 CAPSULE, COATED, EXTENDED RELEASE ORAL EVERY 24 HOURS
Status: DISCONTINUED | OUTPATIENT
Start: 2018-02-02 | End: 2018-02-05

## 2018-02-02 RX ORDER — PROMETHAZINE HYDROCHLORIDE 25 MG/1
25 TABLET ORAL ONCE AS NEEDED
Status: DISCONTINUED | OUTPATIENT
Start: 2018-02-02 | End: 2018-02-02 | Stop reason: HOSPADM

## 2018-02-02 RX ORDER — ATORVASTATIN CALCIUM 20 MG/1
20 TABLET, FILM COATED ORAL NIGHTLY
Status: DISCONTINUED | OUTPATIENT
Start: 2018-02-02 | End: 2018-02-04 | Stop reason: SDUPTHER

## 2018-02-02 RX ORDER — MONTELUKAST SODIUM 10 MG/1
10 TABLET ORAL NIGHTLY
Status: DISCONTINUED | OUTPATIENT
Start: 2018-02-02 | End: 2018-02-06 | Stop reason: HOSPADM

## 2018-02-02 RX ORDER — CELECOXIB 100 MG/1
200 CAPSULE ORAL ONCE
Status: DISCONTINUED | OUTPATIENT
Start: 2018-02-02 | End: 2018-02-02 | Stop reason: HOSPADM

## 2018-02-02 RX ORDER — LOSARTAN POTASSIUM AND HYDROCHLOROTHIAZIDE 12.5; 5 MG/1; MG/1
1 TABLET ORAL
Status: DISCONTINUED | OUTPATIENT
Start: 2018-02-03 | End: 2018-02-06 | Stop reason: HOSPADM

## 2018-02-02 RX ORDER — PROMETHAZINE HYDROCHLORIDE 25 MG/ML
6.25 INJECTION, SOLUTION INTRAMUSCULAR; INTRAVENOUS ONCE AS NEEDED
Status: DISCONTINUED | OUTPATIENT
Start: 2018-02-02 | End: 2018-02-02 | Stop reason: HOSPADM

## 2018-02-02 RX ORDER — SODIUM CHLORIDE 0.9 % (FLUSH) 0.9 %
3 SYRINGE (ML) INJECTION AS NEEDED
Status: DISCONTINUED | OUTPATIENT
Start: 2018-02-02 | End: 2018-02-02 | Stop reason: HOSPADM

## 2018-02-02 RX ORDER — ACETAMINOPHEN 325 MG/1
650 TABLET ORAL ONCE
Status: DISCONTINUED | OUTPATIENT
Start: 2018-02-02 | End: 2018-02-02 | Stop reason: HOSPADM

## 2018-02-02 RX ORDER — NALOXONE HCL 0.4 MG/ML
0.4 VIAL (ML) INJECTION
Status: DISCONTINUED | OUTPATIENT
Start: 2018-02-02 | End: 2018-02-06 | Stop reason: HOSPADM

## 2018-02-02 RX ORDER — CLOPIDOGREL BISULFATE 75 MG/1
75 TABLET ORAL DAILY
Status: DISCONTINUED | OUTPATIENT
Start: 2018-02-03 | End: 2018-02-06 | Stop reason: HOSPADM

## 2018-02-02 RX ORDER — DEXAMETHASONE SODIUM PHOSPHATE 4 MG/ML
INJECTION, SOLUTION INTRA-ARTICULAR; INTRALESIONAL; INTRAMUSCULAR; INTRAVENOUS; SOFT TISSUE AS NEEDED
Status: DISCONTINUED | OUTPATIENT
Start: 2018-02-02 | End: 2018-02-02 | Stop reason: SURG

## 2018-02-02 RX ORDER — PROMETHAZINE HYDROCHLORIDE 25 MG/1
25 SUPPOSITORY RECTAL ONCE AS NEEDED
Status: DISCONTINUED | OUTPATIENT
Start: 2018-02-02 | End: 2018-02-02 | Stop reason: HOSPADM

## 2018-02-02 RX ORDER — DEXTROSE MONOHYDRATE 25 G/50ML
25 INJECTION, SOLUTION INTRAVENOUS
Status: DISCONTINUED | OUTPATIENT
Start: 2018-02-02 | End: 2018-02-06 | Stop reason: HOSPADM

## 2018-02-02 RX ORDER — SODIUM CHLORIDE, SODIUM LACTATE, POTASSIUM CHLORIDE, CALCIUM CHLORIDE 600; 310; 30; 20 MG/100ML; MG/100ML; MG/100ML; MG/100ML
100 INJECTION, SOLUTION INTRAVENOUS CONTINUOUS
Status: DISCONTINUED | OUTPATIENT
Start: 2018-02-02 | End: 2018-02-03

## 2018-02-02 RX ORDER — MIDAZOLAM HYDROCHLORIDE 1 MG/ML
INJECTION INTRAMUSCULAR; INTRAVENOUS AS NEEDED
Status: DISCONTINUED | OUTPATIENT
Start: 2018-02-02 | End: 2018-02-02 | Stop reason: SURG

## 2018-02-02 RX ORDER — SODIUM CHLORIDE, SODIUM LACTATE, POTASSIUM CHLORIDE, CALCIUM CHLORIDE 600; 310; 30; 20 MG/100ML; MG/100ML; MG/100ML; MG/100ML
1000 INJECTION, SOLUTION INTRAVENOUS CONTINUOUS PRN
Status: DISCONTINUED | OUTPATIENT
Start: 2018-02-02 | End: 2018-02-02 | Stop reason: HOSPADM

## 2018-02-02 RX ORDER — SODIUM CHLORIDE 0.9 % (FLUSH) 0.9 %
1-10 SYRINGE (ML) INJECTION AS NEEDED
Status: DISCONTINUED | OUTPATIENT
Start: 2018-02-02 | End: 2018-02-06 | Stop reason: HOSPADM

## 2018-02-02 RX ORDER — ACETAMINOPHEN 325 MG/1
TABLET ORAL
Status: DISPENSED
Start: 2018-02-02 | End: 2018-02-02

## 2018-02-02 RX ORDER — LEVOTHYROXINE SODIUM 0.1 MG/1
100 TABLET ORAL EVERY MORNING
Status: DISCONTINUED | OUTPATIENT
Start: 2018-02-03 | End: 2018-02-06 | Stop reason: HOSPADM

## 2018-02-02 RX ORDER — DOCUSATE SODIUM 100 MG/1
100 CAPSULE, LIQUID FILLED ORAL 2 TIMES DAILY PRN
Status: DISCONTINUED | OUTPATIENT
Start: 2018-02-02 | End: 2018-02-06 | Stop reason: HOSPADM

## 2018-02-02 RX ORDER — BUPIVACAINE HYDROCHLORIDE 5 MG/ML
INJECTION, SOLUTION EPIDURAL; INTRACAUDAL AS NEEDED
Status: DISCONTINUED | OUTPATIENT
Start: 2018-02-02 | End: 2018-02-02

## 2018-02-02 RX ORDER — SODIUM CHLORIDE 0.9 % (FLUSH) 0.9 %
1-10 SYRINGE (ML) INJECTION AS NEEDED
Status: DISCONTINUED | OUTPATIENT
Start: 2018-02-02 | End: 2018-02-02 | Stop reason: HOSPADM

## 2018-02-02 RX ORDER — OXYCODONE HYDROCHLORIDE AND ACETAMINOPHEN 5; 325 MG/1; MG/1
1 TABLET ORAL EVERY 6 HOURS PRN
Status: DISCONTINUED | OUTPATIENT
Start: 2018-02-02 | End: 2018-02-06 | Stop reason: HOSPADM

## 2018-02-02 RX ORDER — CELECOXIB 100 MG/1
CAPSULE ORAL
Status: DISPENSED
Start: 2018-02-02 | End: 2018-02-02

## 2018-02-02 RX ORDER — BUPIVACAINE HYDROCHLORIDE 5 MG/ML
INJECTION, SOLUTION EPIDURAL; INTRACAUDAL AS NEEDED
Status: DISCONTINUED | OUTPATIENT
Start: 2018-02-02 | End: 2018-02-02 | Stop reason: SURG

## 2018-02-02 RX ORDER — ONDANSETRON 2 MG/ML
4 INJECTION INTRAMUSCULAR; INTRAVENOUS ONCE AS NEEDED
Status: DISCONTINUED | OUTPATIENT
Start: 2018-02-02 | End: 2018-02-02 | Stop reason: HOSPADM

## 2018-02-02 RX ORDER — FAMOTIDINE 20 MG/1
20 TABLET, FILM COATED ORAL 2 TIMES DAILY
Status: DISCONTINUED | OUTPATIENT
Start: 2018-02-02 | End: 2018-02-06 | Stop reason: HOSPADM

## 2018-02-02 RX ORDER — OXYCODONE AND ACETAMINOPHEN 7.5; 325 MG/1; MG/1
1 TABLET ORAL EVERY 4 HOURS PRN
Status: DISCONTINUED | OUTPATIENT
Start: 2018-02-02 | End: 2018-02-06 | Stop reason: HOSPADM

## 2018-02-02 RX ORDER — BISACODYL 10 MG
10 SUPPOSITORY, RECTAL RECTAL DAILY PRN
Status: DISCONTINUED | OUTPATIENT
Start: 2018-02-02 | End: 2018-02-06 | Stop reason: HOSPADM

## 2018-02-02 RX ORDER — MORPHINE SULFATE 4 MG/ML
4 INJECTION, SOLUTION INTRAMUSCULAR; INTRAVENOUS
Status: DISCONTINUED | OUTPATIENT
Start: 2018-02-02 | End: 2018-02-06 | Stop reason: HOSPADM

## 2018-02-02 RX ORDER — LIDOCAINE HYDROCHLORIDE 20 MG/ML
INJECTION, SOLUTION INFILTRATION; PERINEURAL AS NEEDED
Status: DISCONTINUED | OUTPATIENT
Start: 2018-02-02 | End: 2018-02-02 | Stop reason: SURG

## 2018-02-02 RX ORDER — POTASSIUM CHLORIDE 750 MG/1
10 CAPSULE, EXTENDED RELEASE ORAL DAILY
Status: DISCONTINUED | OUTPATIENT
Start: 2018-02-03 | End: 2018-02-06 | Stop reason: HOSPADM

## 2018-02-02 RX ORDER — CEFAZOLIN SODIUM 1 G/3ML
INJECTION, POWDER, FOR SOLUTION INTRAMUSCULAR; INTRAVENOUS
Status: DISCONTINUED
Start: 2018-02-02 | End: 2018-02-02 | Stop reason: WASHOUT

## 2018-02-02 RX ORDER — FAMOTIDINE 10 MG/ML
INJECTION, SOLUTION INTRAVENOUS
Status: COMPLETED
Start: 2018-02-02 | End: 2018-02-02

## 2018-02-02 RX ORDER — OXYCODONE AND ACETAMINOPHEN 10; 325 MG/1; MG/1
1 TABLET ORAL ONCE
Status: DISCONTINUED | OUTPATIENT
Start: 2018-02-02 | End: 2018-02-02 | Stop reason: HOSPADM

## 2018-02-02 RX ORDER — CLONIDINE HYDROCHLORIDE 0.1 MG/1
0.1 TABLET ORAL 2 TIMES DAILY PRN
Status: DISCONTINUED | OUTPATIENT
Start: 2018-02-02 | End: 2018-02-06 | Stop reason: HOSPADM

## 2018-02-02 RX ORDER — IPRATROPIUM BROMIDE AND ALBUTEROL SULFATE 2.5; .5 MG/3ML; MG/3ML
3 SOLUTION RESPIRATORY (INHALATION) EVERY 6 HOURS PRN
Status: DISCONTINUED | OUTPATIENT
Start: 2018-02-02 | End: 2018-02-06 | Stop reason: HOSPADM

## 2018-02-02 RX ORDER — NICOTINE POLACRILEX 4 MG
1 LOZENGE BUCCAL
Status: DISCONTINUED | OUTPATIENT
Start: 2018-02-02 | End: 2018-02-06 | Stop reason: HOSPADM

## 2018-02-02 RX ORDER — MEPERIDINE HYDROCHLORIDE 50 MG/ML
12.5 INJECTION INTRAMUSCULAR; INTRAVENOUS; SUBCUTANEOUS
Status: DISCONTINUED | OUTPATIENT
Start: 2018-02-02 | End: 2018-02-02 | Stop reason: HOSPADM

## 2018-02-02 RX ORDER — BUPIVACAINE HYDROCHLORIDE 5 MG/ML
INJECTION, SOLUTION EPIDURAL; INTRACAUDAL
Status: COMPLETED
Start: 2018-02-02 | End: 2018-02-02

## 2018-02-02 RX ORDER — FENTANYL CITRATE 50 UG/ML
INJECTION, SOLUTION INTRAMUSCULAR; INTRAVENOUS AS NEEDED
Status: DISCONTINUED | OUTPATIENT
Start: 2018-02-02 | End: 2018-02-02 | Stop reason: SURG

## 2018-02-02 RX ORDER — SUCCINYLCHOLINE CHLORIDE 20 MG/ML
INJECTION INTRAMUSCULAR; INTRAVENOUS AS NEEDED
Status: DISCONTINUED | OUTPATIENT
Start: 2018-02-02 | End: 2018-02-02 | Stop reason: SURG

## 2018-02-02 RX ORDER — ROCURONIUM BROMIDE 10 MG/ML
INJECTION, SOLUTION INTRAVENOUS AS NEEDED
Status: DISCONTINUED | OUTPATIENT
Start: 2018-02-02 | End: 2018-02-02 | Stop reason: SURG

## 2018-02-02 RX ADMIN — DEXAMETHASONE SODIUM PHOSPHATE 8 MG: 4 INJECTION, SOLUTION INTRAMUSCULAR; INTRAVENOUS at 09:28

## 2018-02-02 RX ADMIN — FAMOTIDINE 20 MG: 10 INJECTION, SOLUTION INTRAVENOUS at 07:49

## 2018-02-02 RX ADMIN — CEFAZOLIN SODIUM 2 G: 2 SOLUTION INTRAVENOUS at 13:05

## 2018-02-02 RX ADMIN — CEFAZOLIN SODIUM 1 G: 1 SOLUTION INTRAVENOUS at 18:34

## 2018-02-02 RX ADMIN — Medication 100 MCG: at 09:05

## 2018-02-02 RX ADMIN — FENTANYL CITRATE 50 MCG: 50 INJECTION, SOLUTION INTRAMUSCULAR; INTRAVENOUS at 08:37

## 2018-02-02 RX ADMIN — CEFAZOLIN SODIUM 2 G: 2 SOLUTION INTRAVENOUS at 08:05

## 2018-02-02 RX ADMIN — SODIUM CHLORIDE, POTASSIUM CHLORIDE, SODIUM LACTATE AND CALCIUM CHLORIDE 100 ML/HR: 600; 310; 30; 20 INJECTION, SOLUTION INTRAVENOUS at 18:34

## 2018-02-02 RX ADMIN — ATORVASTATIN CALCIUM 20 MG: 20 TABLET, FILM COATED ORAL at 21:12

## 2018-02-02 RX ADMIN — ROCURONIUM BROMIDE 40 MG: 10 INJECTION INTRAVENOUS at 09:05

## 2018-02-02 RX ADMIN — OXYCODONE AND ACETAMINOPHEN 1 TABLET: 5; 325 TABLET ORAL at 21:13

## 2018-02-02 RX ADMIN — Medication 6 ML/HR: at 15:01

## 2018-02-02 RX ADMIN — FENTANYL CITRATE 50 MCG: 50 INJECTION, SOLUTION INTRAMUSCULAR; INTRAVENOUS at 08:12

## 2018-02-02 RX ADMIN — PROPOFOL 150 MG: 10 INJECTION, EMULSION INTRAVENOUS at 08:37

## 2018-02-02 RX ADMIN — Medication 100 MCG: at 08:45

## 2018-02-02 RX ADMIN — EPHEDRINE SULFATE 10 MG: 50 INJECTION INTRAMUSCULAR; INTRAVENOUS; SUBCUTANEOUS at 09:24

## 2018-02-02 RX ADMIN — FAMOTIDINE 20 MG: 20 TABLET, FILM COATED ORAL at 21:08

## 2018-02-02 RX ADMIN — CEFAZOLIN 1 G: 1 INJECTION, POWDER, FOR SOLUTION INTRAMUSCULAR; INTRAVENOUS; PARENTERAL at 09:31

## 2018-02-02 RX ADMIN — SODIUM CHLORIDE, POTASSIUM CHLORIDE, SODIUM LACTATE AND CALCIUM CHLORIDE: 600; 310; 30; 20 INJECTION, SOLUTION INTRAVENOUS at 09:25

## 2018-02-02 RX ADMIN — SODIUM CHLORIDE, POTASSIUM CHLORIDE, SODIUM LACTATE AND CALCIUM CHLORIDE 1000 ML: 600; 310; 30; 20 INJECTION, SOLUTION INTRAVENOUS at 07:26

## 2018-02-02 RX ADMIN — ROCURONIUM BROMIDE 10 MG: 10 INJECTION INTRAVENOUS at 08:37

## 2018-02-02 RX ADMIN — BUPIVACAINE HYDROCHLORIDE 25 MG: 5 INJECTION, SOLUTION EPIDURAL; INTRACAUDAL; PERINEURAL at 08:28

## 2018-02-02 RX ADMIN — DILTIAZEM HYDROCHLORIDE 120 MG: 120 CAPSULE, COATED, EXTENDED RELEASE ORAL at 21:08

## 2018-02-02 RX ADMIN — BUPIVACAINE HYDROCHLORIDE 15 MG: 5 INJECTION, SOLUTION EPIDURAL; INTRACAUDAL; PERINEURAL at 15:02

## 2018-02-02 RX ADMIN — SUCCINYLCHOLINE CHLORIDE 140 MG: 20 INJECTION, SOLUTION INTRAMUSCULAR; INTRAVENOUS at 08:37

## 2018-02-02 RX ADMIN — INSULIN ASPART 6 UNITS: 100 INJECTION, SOLUTION INTRAVENOUS; SUBCUTANEOUS at 21:08

## 2018-02-02 RX ADMIN — MONTELUKAST SODIUM 10 MG: 10 TABLET, FILM COATED ORAL at 21:08

## 2018-02-02 RX ADMIN — MIDAZOLAM HYDROCHLORIDE 1 MG: 1 INJECTION, SOLUTION INTRAMUSCULAR; INTRAVENOUS at 08:12

## 2018-02-02 RX ADMIN — SODIUM CHLORIDE, POTASSIUM CHLORIDE, SODIUM LACTATE AND CALCIUM CHLORIDE: 600; 310; 30; 20 INJECTION, SOLUTION INTRAVENOUS at 11:38

## 2018-02-02 RX ADMIN — Medication 100 MCG: at 09:10

## 2018-02-02 RX ADMIN — LIDOCAINE HYDROCHLORIDE 15 ML: 20 INJECTION, SOLUTION INFILTRATION; PERINEURAL at 09:16

## 2018-02-02 RX ADMIN — INSULIN ASPART 10 UNITS: 100 INJECTION, SOLUTION INTRAVENOUS; SUBCUTANEOUS at 18:34

## 2018-02-02 RX ADMIN — DEXAMETHASONE SODIUM PHOSPHATE 8 MG: 4 INJECTION, SOLUTION INTRAMUSCULAR; INTRAVENOUS at 08:28

## 2018-02-02 NOTE — PLAN OF CARE
Problem: Perioperative Period (Adult)  Goal: Signs and Symptoms of Listed Potential Problems Will be Absent or Manageable (Perioperative Period)  Outcome: Ongoing (interventions implemented as appropriate)   02/02/18 0777   Perioperative Period   Problems Assessed (Perioperative Period) all   Problems Present (Perioperative Period) none

## 2018-02-02 NOTE — INTERVAL H&P NOTE
"H&P reviewed. The patient was examined and there are no changes to the H&P.     /55 (BP Location: Right arm, Patient Position: Lying)  Pulse 79  Temp 98.4 °F (36.9 °C) (Temporal Artery )   Resp 18  Ht 160 cm (63\")  Wt 111 kg (245 lb)  LMP Comment:   SpO2 94%  BMI 43.4 kg/m2      Chest:  Clear to auscultation in all fields, no wheezing.  Cardiac:  Regular rate and rhythm.  Abdomen:  Soft, obese, non-distended, non-tender.  Left wrist and hand:  Neurovascular stable.  Right wrist and hand:  No focal deficit though interossei weakness,  weakness and thumb and wrist extension weakness with distal radius fracture.    Impression:   1. Closed severely comminuted markedly displaced unstable four part left proximal humerus fracture    2. Closed comminuted, volar angulated and displaced intraarticular right distal radius fracture.    Plan:  1. Left shoulder reverse total shoulder arthroplasty with tuberosity reconstruction for proximal humerus fracture.    2. Right wrist open reduction and internal fixation with volar plate and screws for distal radius fracture.      Phu Meneses MD  2/2/2018  8:00 AM    "

## 2018-02-02 NOTE — PLAN OF CARE
Problem: Perioperative Period (Adult)  Intervention: Promote Pulmonary Hygiene and Secretion Clearance   02/02/18 1435   Promote Aggressive Pulmonary Hygiene/Secretion Management   Cough And Deep Breathing done with encouragement   Activity   Activity Type bedrest   Positioning   Head Of Bed (HOB) Position HOB at 30 degrees     Intervention: Promote Effective Elimination   02/02/18 1435   Genitourinary () Interventions   Urinary Elimination Promotion catheter patency maintained     Intervention: Monitor/Manage Pain   02/02/18 1439   Safety Interventions   Medication Review/Management medications reviewed   Manage Acute Burn Pain   Pain Management Interventions cold applied;relaxation techniques promoted;pillow support;breathing exercises     Intervention: Monitor/Manage Postoperative Bleeding   02/02/18 1435   Safety Interventions   Bleeding Management dressing monitored     Intervention: Promote Normothermia   02/02/18 1435   Cardiac Interventions   Warming Thermoregulation Maintenance warm blankets applied;skin exposure time minimized

## 2018-02-02 NOTE — H&P (VIEW-ONLY)
"Subjective   Patient ID: Kathryn Davison is a 77 y.o. right hand dominant female is here today for a treatment planning discussion. Fracture and Pain of the Right Wrist and Fracture and Pain of the Left Shoulder         History of Present Illness    Patient presents as a new patient with complaints of right wrist and left shoulder pain.  She states on 1/31/2018 she tripped over a shrubs and fell on the sidewalk.  She went to the emergency room immediately and after a thorough workup was diagnosed with a wrist fracture and left shoulder fracture.  The emergency room provider called the on-call orthopedic surgeon who advised her to follow up with our office with the possibility of right wrist and left shoulder surgery.      Pain Score: worst possible pain  Pain Location: Wrist (& Left Shoulder)  Pain Orientation: Right     Pain Descriptors: Throbbing  Pain Frequency: Constant/continuous     Date Pain First Started: 01/31/18              Pain Intervention(s): Rest, Medication (See MAR)  Result of Injury: Yes (Patient states she tripped and fell onto a sidewalk. )       Past Medical History:   Diagnosis Date   • Back pain    • Carotid artery stenosis     BILATERAL-FOLLOWING WITH DR. WINCHESTER.  SEES EVERY 6 MONTHS.     • Cataract, bilateral    • Colon polyp 12/10/2012   • Diabetes    • Disease of thyroid gland    • Diverticulosis    • Fatty liver 06/26/2017   • Fracture     RIGHT ANKLE AND LEFT ARM    • High cholesterol    • History of nuclear stress test 2016    \"IT WAS OK\"   • Hypertension    • Lumbosacral disc disease    • Osteoarthritis    • Osteoporosis    • Sleep apnea     CPAP HS - TO BRING IN DOS   • Wears glasses     FOR DISTANCE        Past Surgical History:   Procedure Laterality Date   • ANKLE ARTHROSCOPY Right    • BREAST SURGERY Left     CYST REMOVED   • CARDIAC CATHETERIZATION      NO STENT   • CATARACT EXTRACTION Bilateral     2014 and 2015   • COLONOSCOPY  12/10/2012   • COLONOSCOPY N/A 2/22/2017    " "Procedure: COLONOSCOPY with hot and cold snare polypectomies, resolution clip placement, cold biopsy polypectomy;  Surgeon: Mg Awan MD;  Location: [facility] ENDOSCOPY;  Service:    • DILATION AND CURETTAGE, DIAGNOSTIC / THERAPEUTIC      X4   • ENDOSCOPY N/A 7/20/2017    Procedure: ESOPHAGOGASTRODUODENOSCOPY with biopsies and cold biopsy polypectomy;  Surgeon: Mg Awan MD;  Location: [facility] ENDOSCOPY;  Service:    • FRACTURE SURGERY Right     ANKLE   • TUBAL ABDOMINAL LIGATION  1980?   • UPPER GASTROINTESTINAL ENDOSCOPY  07/20/2017       Family History   Problem Relation Age of Onset   • Heart disease Other    • Cancer Other    • Colon cancer Neg Hx    • Liver cancer Neg Hx    • Liver disease Neg Hx    • Stomach cancer Neg Hx    • Esophageal cancer Neg Hx        Social History     Social History   • Marital status:      Spouse name: N/A   • Number of children: N/A   • Years of education: N/A     Occupational History   • Not on file.     Social History Main Topics   • Smoking status: Never Smoker   • Smokeless tobacco: Never Used   • Alcohol use No   • Drug use: No   • Sexual activity: Defer     Other Topics Concern   • Not on file     Social History Narrative       No Known Allergies    Review of Systems   Constitutional: Negative for fever.   HENT: Negative for voice change.    Eyes: Negative for visual disturbance.   Respiratory: Negative for shortness of breath.    Cardiovascular: Negative for chest pain.   Gastrointestinal: Negative for abdominal distention and abdominal pain.   Genitourinary: Negative for dysuria.   Musculoskeletal: Positive for arthralgias. Negative for gait problem and joint swelling.   Skin: Negative for rash.   Neurological: Negative for speech difficulty.   Hematological: Does not bruise/bleed easily.   Psychiatric/Behavioral: Negative for confusion.   All other systems reviewed and are negative.      Objective   Resp 18  Ht 152.4 cm (60\")  Wt 112 kg (246 lb)  BMI 48.04 " kg/m2   Physical Exam   Constitutional: She is oriented to person, place, and time. She appears well-nourished.   Eyes: Conjunctivae are normal.   Neck: No tracheal deviation present.   Cardiovascular: Intact distal pulses.    Pulmonary/Chest: Effort normal. No respiratory distress.   Abdominal: She exhibits no distension.   Musculoskeletal:        Left shoulder: She exhibits decreased range of motion, tenderness, swelling and pain. She exhibits no deformity.        Left elbow: No tenderness found.        Right wrist: She exhibits decreased range of motion (due to pain) and tenderness. She exhibits no deformity.        Left wrist: She exhibits no tenderness and no bony tenderness.        Right hand: She exhibits normal capillary refill and no deformity. Normal sensation noted. Normal strength noted.        Left hand: She exhibits no tenderness. Normal sensation noted.   Neurological: She is alert and oriented to person, place, and time.   Psychiatric: She has a normal mood and affect. Her behavior is normal.   Vitals reviewed.    Ortho Exam     Neurologic Exam     Mental Status   Oriented to person, place, and time.      Ortho Exam         Assessment/Plan   Independent Review of Radiographic Studies:    No imaging was performed in the office.  I did review x-ray imaging of the left shoulder and right wrist from [facility].  There is a comminuted 4 part left proximal humerus fracture.  There is a comminuted Colles' right distal radius fracture.      Procedures       Kathryn was seen today for fracture, pain, fracture and pain.    Diagnoses and all orders for this visit:    Pre-op exam  -     Case Request; Standing  -     CBC and Differential  -     Comprehensive metabolic panel  -     Urinalysis With / Culture If Indicated - Urine, Clean Catch  -     ECG 12 Lead  -     XR chest 2 vw; Future  -     sodium chloride 0.9 % flush 1-10 mL; Infuse 1-10 mL into a venous catheter As Needed for Line Care.  -      ceFAZolin (ANCEF) 2 g in sodium chloride 0.9 % 100 mL IVPB; Infuse 2 g into a venous catheter On Call to the Operating Room.  -     famotidine (PEPCID) injection 20 mg; Infuse 2 mL into a venous catheter 60 Minutes Prior to Surgery.  -     Case Request    Closed fracture of distal end of right radius, unspecified fracture morphology, initial encounter    Closed fracture of proximal end of left humerus, unspecified fracture morphology, initial encounter    Other orders  -     Follow anesthesia standing orders.  -     Provide instructions to patient regarding NPO status  -     Obtain informed consent  -     Clorhexidine skin prep  -     Follow Anesthesia Guidelines / Standing Orders; Standing  -     Notify physician (specify); Standing  -     Insert Peripheral IV; Standing  -     Saline Lock & Maintain IV Access; Standing  -     Orthopedic Discharge Planning for PT & Case Management - Inpatient With Next Day Discharge  -     Initiate Observation Status; Standing       Orthopedic activities reviewed and patient expressed appreciation  Discussion of orthopedic goals  Risk, benefits, and merits of treatment alternatives reviewed with the patient and questions answered  The nature of the proposed surgery reviewed with the patient including risks, benefits, rehabilitation, recovery timeframe, and outcome expectations    Recommendations/Plan:  Exercise, medications, injections, other patient advice, and return appointment as noted.  Patient is encouraged to call or return for any issues or concerns.  Patient was placed back into her volar Orthoplast splint from the emergency room.  She was also kept in her shoulder sling.  Dr. Meneses reviewed the imaging as well and concurs with plan of care.    PLAN: Right volar wrist plate.      Left Reverse total shoulder replacement with tuberosity reconstruction.    Patient agreeable to call or return sooner for any concerns.

## 2018-02-02 NOTE — PLAN OF CARE
Problem: Patient Care Overview (Adult)  Goal: Plan of Care Review  Outcome: Ongoing (interventions implemented as appropriate)   02/02/18 0114   Outcome Evaluation   Outcome Summary/Follow up Plan pacu discharge criteria met

## 2018-02-02 NOTE — CONSULTS
[facility]      Name:  Kathryn Davison   Age:  77 y.o.  Sex:  female  :  1940  MRN:  [id number]   Visit Number:  [id number]  Admission Date:  2018  Date Of Service:  18  Primary Care Physician:  SANFORD Gill    Consulting Physician:    Douglas Dick MD    Referring Physician:    Phu Meneses MD    Reason For Consult:    Diabetic management.    Chief Complaint:     Left shoulder and right wrist pain.    History Of Presenting Illness:      This is a 77-year-old pleasant female with history of hypertension, diabetes on home insulin, chronic back pain, osteoarthritis and morbid obesity was admitted by Dr. Meneses for traumatic right wrist and left shoulder fractures.  Patient is status post surgical intervention to these fractures done today.  I am seeing the patient postoperatively for medical management and diabetic control.  The history is obtained from the patient as well as the medical chart.    Patient states that she was in her usual state of health until on 2018 when she was walking on the sidewalk when she tripped and fell over a shrub.  She landed with her right wrist and then her left shoulder.  She was subsequently brought to the emergency room and was noted to have comminuted fractures of the left shoulder and right wrist.  Splints were placed and she was later to see Dr. Meneses which she did yesterday.  She was subsequently scheduled for surgical intervention today.  She is currently lying down on the bed and is comfortable.  She is slightly drowsy but is able to answer all questions appropriately.  Denies any chest pain or shortness of breath.  No history of loss of consciousness.  She does take short-acting as well as basal doses of insulin at home.    Review Of Systems:     General ROS: Patient denies any fevers, chills or loss of consciousness. Complains of generalized weakness.  Psychological ROS: Denies any  "hallucinations and delusions.  Ophthalmic ROS: Denies any diplopia or transient loss of vision.  ENT ROS: Denies sore throat, nasal congestion or ear pain.   Allergy and Immunology ROS: Denies rash or itching.  Hematological and Lymphatic ROS: Denies neck swelling or easy bleeding.  Endocrine ROS: Denies any recent unintentional weight gain or loss.  Respiratory ROS: Denies cough or shortness of breath.   Cardiovascular ROS: Denies chest pain or palpitations. No history of exertional chest pain.  Gastrointestinal ROS: Denies nausea and vomiting. Denies any abdominal pain. No diarrhea.  Genito-Urinary ROS: Denies dysuria or hematuria.  Musculoskeletal ROS: Complains of chronic back pain.  Complains of left shoulder and right wrist pains. No calf pain.  Neurological ROS: Denies any focal weakness. No loss of consciousness. Denies any numbness.   Dermatological ROS: Denies any redness or pruritis.     Past Medical History:    Past Medical History:   Diagnosis Date   • Back pain    • Body piercing     BOTH EARS   • Carotid artery stenosis     BILATERAL-FOLLOWING WITH DR. WINCHESTER.  SEES EVERY 6 MONTHS.  STATES ABOUT 70% OCCLUSIION   • Cataract, bilateral    • Colon polyp 12/10/2012   • Diabetes    • Disease of thyroid gland    • Diverticulosis    • Fatty liver 06/26/2017   • Fracture     RIGHT ANKLE AND LEFT ARM    • High cholesterol    • History of nuclear stress test 2016    \"IT WAS OK\"   • Hypertension    • Lumbosacral disc disease    • Osteoarthritis    • Osteoporosis    • Sleep apnea     CPAP HS - TO BRING IN DOS   • Wears glasses     FOR DISTANCE       Past Surgical history:    Past Surgical History:   Procedure Laterality Date   • ANKLE ARTHROSCOPY Right    • BREAST SURGERY Left     CYST REMOVED   • CARDIAC CATHETERIZATION      NO STENT   • CATARACT EXTRACTION Bilateral     2014 and 2015   • COLONOSCOPY  12/10/2012   • COLONOSCOPY N/A 2/22/2017    Procedure: COLONOSCOPY with hot and cold snare polypectomies, " resolution clip placement, cold biopsy polypectomy;  Surgeon: Mg Awan MD;  Location: [facility] ENDOSCOPY;  Service:    • DILATION AND CURETTAGE, DIAGNOSTIC / THERAPEUTIC      X4   • ENDOSCOPY N/A 7/20/2017    Procedure: ESOPHAGOGASTRODUODENOSCOPY with biopsies and cold biopsy polypectomy;  Surgeon: Mg Awan MD;  Location: [facility] ENDOSCOPY;  Service:    • FRACTURE SURGERY Right     ANKLE   • TUBAL ABDOMINAL LIGATION  1980?   • UPPER GASTROINTESTINAL ENDOSCOPY  07/20/2017       Social History:    Social History     Social History   • Marital status:      Spouse name: N/A   • Number of children: N/A   • Years of education: N/A     Occupational History   • Not on file.     Social History Main Topics   • Smoking status: Never Smoker   • Smokeless tobacco: Never Used   • Alcohol use No   • Drug use: No   • Sexual activity: Defer     Other Topics Concern   • Not on file     Social History Narrative       Family History:    Family History   Problem Relation Age of Onset   • Heart disease Other    • Cancer Other    • Colon cancer Neg Hx    • Liver cancer Neg Hx    • Liver disease Neg Hx    • Stomach cancer Neg Hx    • Esophageal cancer Neg Hx        Allergies:      Review of patient's allergies indicates no known allergies.    Home Medications:    Prior to Admission Medications     Prescriptions Last Dose Informant Patient Reported? Taking?    Calcium Citrate-Vitamin D (CALCIUM + D PO) Past Month Self Yes Yes    Take 1 tablet by mouth Daily.    CloNIDine (CATAPRES) 0.1 MG tablet Past Week Self Yes Yes    Take 0.1 mg by mouth 2 (Two) Times a Day As Needed for High Blood Pressure.    clopidogrel (PLAVIX) 75 MG tablet Past Week Self Yes Yes    Take 75 mg by mouth Daily.    diltiaZEM CD (DILTIAZEM CD) 240 MG 24 hr capsule 2/1/2018 Self Yes Yes    Take 120 mg by mouth Daily.    Furosemide (LASIX PO) Past Week Self Yes Yes    Take 20 mg by mouth Daily As Needed.    insulin glargine (LANTUS) 100 UNIT/ML injection  2/1/2018 Self Yes Yes    Inject 33 Units under the skin Every Night.    insulin lispro (humaLOG) 100 UNIT/ML injection 2/1/2018 Self Yes Yes    Inject 12-14 Units under the skin 3 (Three) Times a Day Before Meals.    levothyroxine (SYNTHROID, LEVOTHROID) 100 MCG tablet 2/2/2018 Self Yes Yes    Take 100 mcg by mouth Daily.    montelukast (SINGULAIR) 10 MG tablet 2/1/2018 Self Yes Yes    Take 10 mg by mouth Every Night.    olmesartan-hydrochlorothiazide (BENICAR HCT) 20-12.5 MG per tablet 2/2/2018 Self Yes Yes    Take 1 tablet by mouth Daily.    oxyCODONE-acetaminophen (PERCOCET) 5-325 MG per tablet 2/2/2018  No Yes    Take 1 tablet by mouth Every 6 (Six) Hours As Needed for Moderate Pain .    Potassium Chloride (KLOR-CON 10 PO) Past Month Self Yes Yes    Take 10 mEq by mouth 2 (Two) Times a Day As Needed (TAKES WHEN LASIX IS TAKEN).    pravastatin (PRAVACHOL) 80 MG tablet 2/1/2018 Self Yes Yes    Take 80 mg by mouth Daily.    raNITIdine (ZANTAC) 150 MG tablet 2/1/2018  No Yes    TAKE ONE TABLET BY MOUTH TWICE DAILY    SITagliptin (JANUVIA) 100 MG tablet 2/2/2018 Self Yes Yes    Take 100 mg by mouth Daily.    ipratropium-albuterol (COMBIVENT RESPIMAT)  MCG/ACT inhaler More than a month  Yes No    Inhale 1 puff 4 (Four) Times a Day As Needed for Wheezing.             Hospital Scheduled Meds:      acetaminophen      atorvastatin 20 mg Oral Nightly   [START ON 2/3/2018] calcium-vitamin D 1 tablet Oral Daily   ceFAZolin      ceFAZolin      ceFAZolin 1 g Intravenous Q8H   celecoxib      [START ON 2/3/2018] clopidogrel 75 mg Oral Daily   diltiaZEM  mg Oral Q24H   [START ON 2/3/2018] enoxaparin 30 mg Subcutaneous Daily   famotidine 20 mg Oral BID   [START ON 2/3/2018] furosemide 20 mg Oral Daily   insulin aspart 10 Units Subcutaneous TID With Meals   [START ON 2/3/2018] insulin detemir 30 Units Subcutaneous QAM   [START ON 2/3/2018] levothyroxine 100 mcg Oral QAM   [START ON 2/3/2018] linagliptin 5 mg Oral Daily    [START ON 2/3/2018] losartan-hydrochlorothiazide 1 tablet Oral Q24H   montelukast 10 mg Oral Nightly   oxyCODONE-acetaminophen      [START ON 2/3/2018] potassium chloride 10 mEq Oral Daily         Bupivacaine HCl-NaCl (PF) 6 mL/hr Last Rate: 6 mL/hr (02/02/18 1501)   lactated ringers 100 mL/hr         Vital Signs:    Temp:  [97.7 °F (36.5 °C)-98.6 °F (37 °C)] 97.7 °F (36.5 °C)  Heart Rate:  [74-95] 77  Resp:  [13-19] 13  BP: (101-165)/(44-59) 144/59    Last 3 weights    02/02/18  0703   Weight: 111 kg (245 lb)       Body mass index is 43.4 kg/(m^2).    Physical Exam:    General Appearance:  Alert and cooperative, not in any acute distress.   Head:  Atraumatic and normocephalic, without obvious abnormality.   Eyes:          PERRLA, conjunctivae and sclerae normal, no Icterus. No pallor. Extra-occular movements are within normal limits.   Ears:  Ears appear intact with no abnormalities noted.   Throat: No oral lesions, no thrush, oral mucosa moist.   Neck: Supple, trachea midline, no thyromegaly, no carotid bruit.   Back:   No kyphoscoliosis present. No tenderness to palpation,   range of motion normal.   Lungs:   Chest shape is normal. Breath sounds heard bilaterally equally.  No crackles or wheezing. No Pleural rub or bronchial breathing.   Heart:  Normal S1 and S2, no murmur, no gallop, no rub. No JVD.   Abdomen:   Normal bowel sounds, no masses, no organomegaly. Soft, non-tender, obese, no guarding, no rebound tenderness.  Taylor catheter is in place.     Extremities: Moves both lower extremities, no edema, no cyanosis, no clubbing.  Surgical bandage is noted on the left shoulder.  Right wrist and forearm bandage with Ace wrap noted.  Subcutaneous local anesthetic infusion pump noted on the left shoulder.   Skin: No bleeding, bruising or rash.   Neurologic: Alert and oriented x 3. Moves all four limbs equally. No tremors. No facial asymetry.     Laboratory data:      Results from last 7 days  Lab Units  02/01/18  1301   SODIUM mmol/L 139   POTASSIUM mmol/L 4.0   CHLORIDE mmol/L 100   CO2 mmol/L 27.0   BUN mg/dL 23*   CREATININE mg/dL 1.40*   CALCIUM mg/dL 9.7   BILIRUBIN mg/dL 0.3   ALK PHOS U/L 149*   ALT (SGPT) U/L 31   AST (SGOT) U/L 20   GLUCOSE mg/dL 301*       Results from last 7 days  Lab Units 02/01/18  1308   WBC 10*3/mm3 11.92*   HEMOGLOBIN g/dL 10.7*   HEMATOCRIT % 34.7*   PLATELETS 10*3/mm3 379         Results from last 7 days  Lab Units 02/01/18  1301   COLOR UA  Dark Yellow*   GLUCOSE UA  500 mg/dL (2+)*   KETONES UA  15 mg/dL (1+)*   LEUKOCYTES UA  Negative   PH, URINE  <=5.0   BILIRUBIN UA  Small (1+)*   UROBILINOGEN UA  0.2 E.U./dL     Radiology:    Imaging Results (last 72 hours)     Procedure Component Value Units Date/Time    FL C Arm During Surgery [[id number]] Resulted:  02/02/18 1414     Updated:  02/02/18 1414    Narrative:       This procedure was auto-finalized with no dictation required.    XR Shoulder 2+ View Left [[id number]] Collected:  02/02/18 1534     Updated:  02/02/18 1537    Narrative:       Left shoulder     HISTORY: Fracture     COMPARISON: 1/31/2018     FINDINGS: Single view left shoulder shows interval arthroplasty of  shoulder joint. Hardware is unremarkable. A small area of cortical  irregularity is seen along the proximal aspect of the lateral humeral  cortex which may be remnant fracture line from original fracture. AC  joint is intact.       Impression:       Post arthroplasty changes.     This report was finalized on 2/2/2018 3:35 PM by Jan Hinojosa MD.    XR Wrist 3+ View Right [[id number]] Collected:  02/02/18 1602     Updated:  02/02/18 1608    Narrative:       RIGHT WRIST     HISTORY: Fracture.     COMPARISON: 1/31/2018.     FINDINGS: 6 digital spot intraoperative radiographs were obtained. The  examination shows placement of a malleable cortical plate along the  volar aspect of the distal radius. Near anatomic reduction is noted.  Ulnar styloid process fracture is  noted.       Impression:       Status post ORIF of distal radial fracture.     This report was finalized on 2/2/2018 4:06 PM by Jan Hinojosa MD.        Assessment:     1.  Diabetes mellitus type 2 with insulin use.  2.  Essential hypertension.  3.  Traumatic right wrist and left shoulder fracture status post ORIF.  4.  Chronic back pain.  5.  Osteoarthritis.  6.  Acquired hypothyroidism.  7.  Obstructive sleep apnea on home CPAP therapy.  8.  Morbid obesity with a BMI of 43.    Recommendations/Plan:     Patient is currently admitted to the regular medical floor.  She is on local anesthetic subcutaneous pump with bupivacaine.  She is on morphine and Percocet as needed for pain control.  She will be continued on oxygen and her home medications will be continued.  She will be placed on subcutaneous insulin protocol for coverage.  She is on Lovenox for DVT prophylaxis.  I have strongly advised her to use the incentive spirometry.  I have discussed the patient's condition with her 2 daughters who are at the bedside.  She will be continued on CPAP at night.  At this time I would not make any changes to her home medication regimen.  We will continue to follow Ms. Pittman during her hospital stay.    Douglas Dick MD  02/02/18  4:57 PM    Dictated utilizing Dragon dictation.

## 2018-02-02 NOTE — PROGRESS NOTES
Discharge Planning Assessment  Psychiatric     Patient Name: Kathryn Davison  MRN: [id number]  Today's Date: 2/2/2018    Admit Date: 2/2/2018          Discharge Needs Assessment     None            Discharge Plan       02/02/18 1504    Case Management/Social Work Plan    Plan Asked by Dr Meneses to meet with family regarding discharge plans in surgery waiting room.  Spoke to daughter Ayala Crenshaw and son in law.  They would like patient to go to rehab.  Explained that patient does not meet inpatient criteria at this time and that private pay would be the only option.  Updated on [place] facilities not taking patients currently and that as of today [facility] do not have bed availability and that [facility] was an option.  They prefer facilities [place].  They also asked about per day cost and this CM explained that this would need to be discussed with the facility.  Advised that this CM will call and try to obtain infomation on bed availability with [facility].  Advised that notes will be left for the weekend  and for them to follow up on Saturday.  Family is in agreement.  Call to Elena Ramsey [phone] with [facility] and left message to return call.  No DME or HH and prefer [facility] if disharged home instead of rehab.  No POA.  Contacts, PCp and demos confirmed as correct. CM will follow up.    Patient/Family In Agreement With Plan yes        Discharge Placement     No information found                Demographic Summary     None            Functional Status     None            Psychosocial     None            Abuse/Neglect     None            Legal     None            Substance Abuse     None            Patient Forms     None          Mary Pan

## 2018-02-03 PROBLEM — Z96.612 STATUS POST TOTAL SHOULDER REPLACEMENT, LEFT: Status: ACTIVE | Noted: 2018-02-03

## 2018-02-03 LAB
ANION GAP SERPL CALCULATED.3IONS-SCNC: 14.6 MMOL/L
BACTERIA SPEC AEROBE CULT: NORMAL
BASOPHILS # BLD AUTO: 0.01 10*3/MM3 (ref 0–0.2)
BASOPHILS NFR BLD AUTO: 0.1 % (ref 0–2.5)
BUN BLD-MCNC: 29 MG/DL (ref 7–20)
BUN/CREAT SERPL: 24.2 (ref 7.1–23.5)
CALCIUM SPEC-SCNC: 9.1 MG/DL (ref 8.4–10.2)
CHLORIDE SERPL-SCNC: 102 MMOL/L (ref 98–107)
CO2 SERPL-SCNC: 28 MMOL/L (ref 26–30)
CREAT BLD-MCNC: 1.2 MG/DL (ref 0.6–1.3)
DEPRECATED RDW RBC AUTO: 42.4 FL (ref 37–54)
EOSINOPHIL # BLD AUTO: 0 10*3/MM3 (ref 0–0.7)
EOSINOPHIL NFR BLD AUTO: 0 % (ref 0–7)
ERYTHROCYTE [DISTWIDTH] IN BLOOD BY AUTOMATED COUNT: 13.5 % (ref 11.5–14.5)
GFR SERPL CREATININE-BSD FRML MDRD: 44 ML/MIN/1.73
GLUCOSE BLD-MCNC: 305 MG/DL (ref 74–98)
GLUCOSE BLDC GLUCOMTR-MCNC: 283 MG/DL (ref 70–130)
GLUCOSE BLDC GLUCOMTR-MCNC: 332 MG/DL (ref 70–130)
GLUCOSE BLDC GLUCOMTR-MCNC: 346 MG/DL (ref 70–130)
GLUCOSE BLDC GLUCOMTR-MCNC: 384 MG/DL (ref 70–130)
GLUCOSE BLDC GLUCOMTR-MCNC: 447 MG/DL (ref 70–130)
HCT VFR BLD AUTO: 28.2 % (ref 37–47)
HGB BLD-MCNC: 9 G/DL (ref 12–16)
IMM GRANULOCYTES # BLD: 0.09 10*3/MM3 (ref 0–0.06)
IMM GRANULOCYTES NFR BLD: 0.6 % (ref 0–0.6)
LYMPHOCYTES # BLD AUTO: 0.74 10*3/MM3 (ref 0.6–3.4)
LYMPHOCYTES NFR BLD AUTO: 4.9 % (ref 10–50)
MCH RBC QN AUTO: 27.2 PG (ref 27–31)
MCHC RBC AUTO-ENTMCNC: 31.9 G/DL (ref 30–37)
MCV RBC AUTO: 85.2 FL (ref 81–99)
MONOCYTES # BLD AUTO: 0.72 10*3/MM3 (ref 0–0.9)
MONOCYTES NFR BLD AUTO: 4.8 % (ref 0–12)
NEUTROPHILS # BLD AUTO: 13.4 10*3/MM3 (ref 2–6.9)
NEUTROPHILS NFR BLD AUTO: 89.6 % (ref 37–80)
NRBC BLD MANUAL-RTO: 0 /100 WBC (ref 0–0)
PLATELET # BLD AUTO: 305 10*3/MM3 (ref 130–400)
PMV BLD AUTO: 10 FL (ref 6–12)
POTASSIUM BLD-SCNC: 4.6 MMOL/L (ref 3.5–5.1)
RBC # BLD AUTO: 3.31 10*6/MM3 (ref 4.2–5.4)
SODIUM BLD-SCNC: 140 MMOL/L (ref 137–145)
WBC NRBC COR # BLD: 14.96 10*3/MM3 (ref 4.8–10.8)

## 2018-02-03 PROCEDURE — 25010000003 CEFAZOLIN PER 500 MG: Performed by: ORTHOPAEDIC SURGERY

## 2018-02-03 PROCEDURE — 82962 GLUCOSE BLOOD TEST: CPT

## 2018-02-03 PROCEDURE — 63710000001 INSULIN DETEMIR PER 5 UNITS: Performed by: ORTHOPAEDIC SURGERY

## 2018-02-03 PROCEDURE — 94799 UNLISTED PULMONARY SVC/PX: CPT

## 2018-02-03 PROCEDURE — 83036 HEMOGLOBIN GLYCOSYLATED A1C: CPT | Performed by: INTERNAL MEDICINE

## 2018-02-03 PROCEDURE — 63710000001 INSULIN ASPART PER 5 UNITS: Performed by: INTERNAL MEDICINE

## 2018-02-03 PROCEDURE — 25010000002 MORPHINE PER 10 MG: Performed by: ORTHOPAEDIC SURGERY

## 2018-02-03 PROCEDURE — 99232 SBSQ HOSP IP/OBS MODERATE 35: CPT | Performed by: INTERNAL MEDICINE

## 2018-02-03 PROCEDURE — 25010000002 ENOXAPARIN PER 10 MG: Performed by: ORTHOPAEDIC SURGERY

## 2018-02-03 PROCEDURE — 63710000001 INSULIN ASPART PER 5 UNITS: Performed by: ORTHOPAEDIC SURGERY

## 2018-02-03 PROCEDURE — 63710000001 INSULIN DETEMIR PER 5 UNITS: Performed by: INTERNAL MEDICINE

## 2018-02-03 PROCEDURE — 97162 PT EVAL MOD COMPLEX 30 MIN: CPT

## 2018-02-03 PROCEDURE — 80048 BASIC METABOLIC PNL TOTAL CA: CPT | Performed by: ORTHOPAEDIC SURGERY

## 2018-02-03 PROCEDURE — 85025 COMPLETE CBC W/AUTO DIFF WBC: CPT | Performed by: ORTHOPAEDIC SURGERY

## 2018-02-03 PROCEDURE — 94640 AIRWAY INHALATION TREATMENT: CPT

## 2018-02-03 RX ORDER — LORAZEPAM 0.5 MG/1
1 TABLET ORAL EVERY 6 HOURS PRN
Status: DISCONTINUED | OUTPATIENT
Start: 2018-02-03 | End: 2018-02-06 | Stop reason: HOSPADM

## 2018-02-03 RX ADMIN — ENOXAPARIN SODIUM 30 MG: 30 INJECTION SUBCUTANEOUS at 08:15

## 2018-02-03 RX ADMIN — LEVOTHYROXINE SODIUM 100 MCG: 100 TABLET ORAL at 06:58

## 2018-02-03 RX ADMIN — MORPHINE SULFATE 4 MG: 4 INJECTION, SOLUTION INTRAMUSCULAR; INTRAVENOUS at 04:08

## 2018-02-03 RX ADMIN — OXYCODONE AND ACETAMINOPHEN 1 TABLET: 5; 325 TABLET ORAL at 13:49

## 2018-02-03 RX ADMIN — INSULIN ASPART 6 UNITS: 100 INJECTION, SOLUTION INTRAVENOUS; SUBCUTANEOUS at 21:36

## 2018-02-03 RX ADMIN — FUROSEMIDE 20 MG: 20 TABLET ORAL at 08:15

## 2018-02-03 RX ADMIN — MONTELUKAST SODIUM 10 MG: 10 TABLET, FILM COATED ORAL at 20:43

## 2018-02-03 RX ADMIN — INSULIN ASPART 20 UNITS: 100 INJECTION, SOLUTION INTRAVENOUS; SUBCUTANEOUS at 17:01

## 2018-02-03 RX ADMIN — INSULIN ASPART 10 UNITS: 100 INJECTION, SOLUTION INTRAVENOUS; SUBCUTANEOUS at 11:50

## 2018-02-03 RX ADMIN — ATORVASTATIN CALCIUM 20 MG: 20 TABLET, FILM COATED ORAL at 20:43

## 2018-02-03 RX ADMIN — LINAGLIPTIN 5 MG: 5 TABLET, FILM COATED ORAL at 08:15

## 2018-02-03 RX ADMIN — CLOPIDOGREL BISULFATE 75 MG: 75 TABLET ORAL at 20:43

## 2018-02-03 RX ADMIN — LOSARTAN POTASSIUM AND HYDROCHLOROTHIAZIDE 1 TABLET: 50; 12.5 TABLET, FILM COATED ORAL at 08:15

## 2018-02-03 RX ADMIN — DILTIAZEM HYDROCHLORIDE 120 MG: 120 CAPSULE, COATED, EXTENDED RELEASE ORAL at 20:43

## 2018-02-03 RX ADMIN — INSULIN DETEMIR 40 UNITS: 100 INJECTION, SOLUTION SUBCUTANEOUS at 11:51

## 2018-02-03 RX ADMIN — POTASSIUM CHLORIDE 10 MEQ: 750 CAPSULE, EXTENDED RELEASE ORAL at 08:15

## 2018-02-03 RX ADMIN — INSULIN ASPART 4 UNITS: 100 INJECTION, SOLUTION INTRAVENOUS; SUBCUTANEOUS at 06:59

## 2018-02-03 RX ADMIN — CEFAZOLIN SODIUM 1 G: 1 SOLUTION INTRAVENOUS at 01:25

## 2018-02-03 RX ADMIN — INSULIN DETEMIR 20 UNITS: 100 INJECTION, SOLUTION SUBCUTANEOUS at 17:02

## 2018-02-03 RX ADMIN — FAMOTIDINE 20 MG: 20 TABLET, FILM COATED ORAL at 20:42

## 2018-02-03 RX ADMIN — INSULIN ASPART 5 UNITS: 100 INJECTION, SOLUTION INTRAVENOUS; SUBCUTANEOUS at 11:50

## 2018-02-03 RX ADMIN — IPRATROPIUM BROMIDE AND ALBUTEROL SULFATE 3 ML: .5; 3 SOLUTION RESPIRATORY (INHALATION) at 12:41

## 2018-02-03 RX ADMIN — INSULIN ASPART 10 UNITS: 100 INJECTION, SOLUTION INTRAVENOUS; SUBCUTANEOUS at 07:51

## 2018-02-03 RX ADMIN — FAMOTIDINE 20 MG: 20 TABLET, FILM COATED ORAL at 08:15

## 2018-02-03 RX ADMIN — OYSTER SHELL CALCIUM WITH VITAMIN D 1 TABLET: 500; 200 TABLET, FILM COATED ORAL at 08:15

## 2018-02-03 RX ADMIN — SODIUM CHLORIDE, POTASSIUM CHLORIDE, SODIUM LACTATE AND CALCIUM CHLORIDE 100 ML/HR: 600; 310; 30; 20 INJECTION, SOLUTION INTRAVENOUS at 06:59

## 2018-02-03 RX ADMIN — CLONIDINE HYDROCHLORIDE 0.1 MG: 0.1 TABLET ORAL at 20:48

## 2018-02-03 NOTE — PLAN OF CARE
Problem: Fall Risk (Adult)  Goal: Absence of Falls   02/03/18 0154   Fall Risk (Adult)   Absence of Falls making progress toward outcome

## 2018-02-03 NOTE — PLAN OF CARE
Problem: Patient Care Overview (Adult)  Goal: Plan of Care Review  Outcome: Ongoing (interventions implemented as appropriate)   02/02/18 1630 02/02/18 1830   Outcome Evaluation   Outcome Summary/Follow up Plan --  new admission from PACU, pt resting comfortably since arriving to flow. Call Blow light used considering pt cannot manipulate call light at this time. Dressings remained CDI, peripheral checks WDL.    Patient Care Overview   Progress --  no change   Coping/Psychosocial Response Interventions   Plan Of Care Reviewed With patient;daughter --      Goal: Adult Individualization and Mutuality  Outcome: Ongoing (interventions implemented as appropriate)    Goal: Discharge Needs Assessment  Outcome: Ongoing (interventions implemented as appropriate)      Problem: Perioperative Period (Adult)  Goal: Signs and Symptoms of Listed Potential Problems Will be Absent or Manageable (Perioperative Period)  Outcome: Ongoing (interventions implemented as appropriate)      Problem: Fall Risk (Adult)  Goal: Identify Related Risk Factors and Signs and Symptoms  Outcome: Ongoing (interventions implemented as appropriate)    Goal: Absence of Falls  Outcome: Ongoing (interventions implemented as appropriate)

## 2018-02-03 NOTE — DISCHARGE PLACEMENT REQUEST
"Short term rehab    Kathryn Davison (77 y.o. Female)     Date of Birth Social Security Number Address Home Phone MRN    1940  [address] [place] [id number] [phone] [id number]    [employer] Marital Status          Non-Worship        Admission Date Admission Type Admitting Provider Attending Provider Department, Room/Bed    2/2/18 Elective Phu Meneses MD Cervoni, Thomas D., MD [facility] MED SURG  4, 402/1    Discharge Date Discharge Disposition Discharge Destination                      Attending Provider: Phu Meneses MD     Allergies:  No Known Allergies    Isolation:  None   Infection:  None   Code Status:  FULL    Ht:  160 cm (63\")   Wt:  112 kg (246 lb 4.8 oz)    Admission Cmt:  None   Principal Problem:  Closed fracture of left shoulder [S42.92XA]                 Active Insurance as of 2/2/2018     Primary Coverage     Payor Plan Insurance Group Employer/Plan Group    MEDICARE MEDICARE A & B      Payor Plan Address Payor Plan Phone Number Effective From Effective To    [address] [phone] 2/1/2005     [place] [address]       Subscriber Name Subscriber Birth Date Member ID       DAVISON,KATHRYN ALBARRAN 1940 [id number]           Secondary Coverage     Payor Plan Insurance Group Employer/Plan Group    [facility] SUPP [id number]     Payor Plan Address Payor Plan Phone Number Effective From Effective To    [address]  2/1/2005     [place] [address]       Subscriber Name Subscriber Birth Date Member ID       KATHRYN DAVISON 1940 [id number]           Tertiary Coverage     Payor Plan Insurance Group Employer/Plan Group    [place]      Payor Plan Address Payor Plan Phone Number Effective From Effective To    [address] [phone] 7/19/2017     [place] [id number]       Subscriber Name Subscriber Birth Date Member ID       KATHRYN DAVISON 1940 [id number]           " "      Emergency Contacts      (Rel.) Home Phone Work Phone Mobile Phone    Ayala Crenshaw (Daughter) [phone] -- --               History & Physical      Enzo Nguyen PA-C at 2/1/2018 10:50 AM          Subjective   Patient ID: Kathryn Davison is a 77 y.o. right hand dominant female is here today for a treatment planning discussion. Fracture and Pain of the Right Wrist and Fracture and Pain of the Left Shoulder         History of Present Illness    Patient presents as a new patient with complaints of right wrist and left shoulder pain.  She states on 1/31/2018 she tripped over a shrubs and fell on the sidewalk.  She went to the emergency room immediately and after a thorough workup was diagnosed with a wrist fracture and left shoulder fracture.  The emergency room provider called the on-call orthopedic surgeon who advised her to follow up with our office with the possibility of right wrist and left shoulder surgery.      Pain Score: worst possible pain  Pain Location: Wrist (& Left Shoulder)  Pain Orientation: Right     Pain Descriptors: Throbbing  Pain Frequency: Constant/continuous     Date Pain First Started: 01/31/18              Pain Intervention(s): Rest, Medication (See MAR)  Result of Injury: Yes (Patient states she tripped and fell onto a sidewalk. )       Past Medical History:   Diagnosis Date   • Back pain    • Carotid artery stenosis     BILATERAL-FOLLOWING WITH DR. WINHCESTER.  SEES EVERY 6 MONTHS.     • Cataract, bilateral    • Colon polyp 12/10/2012   • Diabetes    • Disease of thyroid gland    • Diverticulosis    • Fatty liver 06/26/2017   • Fracture     RIGHT ANKLE AND LEFT ARM    • High cholesterol    • History of nuclear stress test 2016    \"IT WAS OK\"   • Hypertension    • Lumbosacral disc disease    • Osteoarthritis    • Osteoporosis    • Sleep apnea     CPAP HS - TO BRING IN DOS   • Wears glasses     FOR DISTANCE        Past Surgical History:   Procedure Laterality Date "   • ANKLE ARTHROSCOPY Right    • BREAST SURGERY Left     CYST REMOVED   • CARDIAC CATHETERIZATION      NO STENT   • CATARACT EXTRACTION Bilateral     2014 and 2015   • COLONOSCOPY  12/10/2012   • COLONOSCOPY N/A 2/22/2017    Procedure: COLONOSCOPY with hot and cold snare polypectomies, resolution clip placement, cold biopsy polypectomy;  Surgeon: Mg Awan MD;  Location: [facility] ENDOSCOPY;  Service:    • DILATION AND CURETTAGE, DIAGNOSTIC / THERAPEUTIC      X4   • ENDOSCOPY N/A 7/20/2017    Procedure: ESOPHAGOGASTRODUODENOSCOPY with biopsies and cold biopsy polypectomy;  Surgeon: Mg Awan MD;  Location: [facility] ENDOSCOPY;  Service:    • FRACTURE SURGERY Right     ANKLE   • TUBAL ABDOMINAL LIGATION  1980?   • UPPER GASTROINTESTINAL ENDOSCOPY  07/20/2017       Family History   Problem Relation Age of Onset   • Heart disease Other    • Cancer Other    • Colon cancer Neg Hx    • Liver cancer Neg Hx    • Liver disease Neg Hx    • Stomach cancer Neg Hx    • Esophageal cancer Neg Hx        Social History     Social History   • Marital status:      Spouse name: N/A   • Number of children: N/A   • Years of education: N/A     Occupational History   • Not on file.     Social History Main Topics   • Smoking status: Never Smoker   • Smokeless tobacco: Never Used   • Alcohol use No   • Drug use: No   • Sexual activity: Defer     Other Topics Concern   • Not on file     Social History Narrative       No Known Allergies    Review of Systems   Constitutional: Negative for fever.   HENT: Negative for voice change.    Eyes: Negative for visual disturbance.   Respiratory: Negative for shortness of breath.    Cardiovascular: Negative for chest pain.   Gastrointestinal: Negative for abdominal distention and abdominal pain.   Genitourinary: Negative for dysuria.   Musculoskeletal: Positive for arthralgias. Negative for gait problem and joint swelling.   Skin: Negative for rash.   Neurological: Negative for speech  "difficulty.   Hematological: Does not bruise/bleed easily.   Psychiatric/Behavioral: Negative for confusion.   All other systems reviewed and are negative.      Objective   Resp 18  Ht 152.4 cm (60\")  Wt 112 kg (246 lb)  BMI 48.04 kg/m2   Physical Exam   Constitutional: She is oriented to person, place, and time. She appears well-nourished.   Eyes: Conjunctivae are normal.   Neck: No tracheal deviation present.   Cardiovascular: Intact distal pulses.    Pulmonary/Chest: Effort normal. No respiratory distress.   Abdominal: She exhibits no distension.   Musculoskeletal:        Left shoulder: She exhibits decreased range of motion, tenderness, swelling and pain. She exhibits no deformity.        Left elbow: No tenderness found.        Right wrist: She exhibits decreased range of motion (due to pain) and tenderness. She exhibits no deformity.        Left wrist: She exhibits no tenderness and no bony tenderness.        Right hand: She exhibits normal capillary refill and no deformity. Normal sensation noted. Normal strength noted.        Left hand: She exhibits no tenderness. Normal sensation noted.   Neurological: She is alert and oriented to person, place, and time.   Psychiatric: She has a normal mood and affect. Her behavior is normal.   Vitals reviewed.    Ortho Exam     Neurologic Exam     Mental Status   Oriented to person, place, and time.      Ortho Exam         Assessment/Plan   Independent Review of Radiographic Studies:    No imaging was performed in the office.  I did review x-ray imaging of the left shoulder and right wrist from [facility].  There is a comminuted 4 part left proximal humerus fracture.  There is a comminuted Colles' right distal radius fracture.      Procedures       Kathryn was seen today for fracture, pain, fracture and pain.    Diagnoses and all orders for this visit:    Pre-op exam  -     Case Request; Standing  -     CBC and Differential  -     Comprehensive metabolic " panel  -     Urinalysis With / Culture If Indicated - Urine, Clean Catch  -     ECG 12 Lead  -     XR chest 2 vw; Future  -     sodium chloride 0.9 % flush 1-10 mL; Infuse 1-10 mL into a venous catheter As Needed for Line Care.  -     ceFAZolin (ANCEF) 2 g in sodium chloride 0.9 % 100 mL IVPB; Infuse 2 g into a venous catheter On Call to the Operating Room.  -     famotidine (PEPCID) injection 20 mg; Infuse 2 mL into a venous catheter 60 Minutes Prior to Surgery.  -     Case Request    Closed fracture of distal end of right radius, unspecified fracture morphology, initial encounter    Closed fracture of proximal end of left humerus, unspecified fracture morphology, initial encounter    Other orders  -     Follow anesthesia standing orders.  -     Provide instructions to patient regarding NPO status  -     Obtain informed consent  -     Clorhexidine skin prep  -     Follow Anesthesia Guidelines / Standing Orders; Standing  -     Notify physician (specify); Standing  -     Insert Peripheral IV; Standing  -     Saline Lock & Maintain IV Access; Standing  -     Orthopedic Discharge Planning for PT & Case Management - Inpatient With Next Day Discharge  -     Initiate Observation Status; Standing       Orthopedic activities reviewed and patient expressed appreciation  Discussion of orthopedic goals  Risk, benefits, and merits of treatment alternatives reviewed with the patient and questions answered  The nature of the proposed surgery reviewed with the patient including risks, benefits, rehabilitation, recovery timeframe, and outcome expectations    Recommendations/Plan:  Exercise, medications, injections, other patient advice, and return appointment as noted.  Patient is encouraged to call or return for any issues or concerns.  Patient was placed back into her volar Orthoplast splint from the emergency room.  She was also kept in her shoulder sling.  Dr. Meneses reviewed the imaging as well and concurs with plan of  "care.    PLAN: Right volar wrist plate.      Left Reverse total shoulder replacement with tuberosity reconstruction.    Patient agreeable to call or return sooner for any concerns.          Electronically signed by Enzo Nguyen PA-C at 2/1/2018 12:55 PM      Phu Meneses MD at 2/2/2018  7:58 AM          H&P reviewed. The patient was examined and there are no changes to the H&P.     /55 (BP Location: Right arm, Patient Position: Lying)  Pulse 79  Temp 98.4 °F (36.9 °C) (Temporal Artery )   Resp 18  Ht 160 cm (63\")  Wt 111 kg (245 lb)  LMP Comment:   SpO2 94%  BMI 43.4 kg/m2      Chest:  Clear to auscultation in all fields, no wheezing.  Cardiac:  Regular rate and rhythm.  Abdomen:  Soft, obese, non-distended, non-tender.  Left wrist and hand:  Neurovascular stable.  Right wrist and hand:  No focal deficit though interossei weakness,  weakness and thumb and wrist extension weakness with distal radius fracture.    Impression:   1. Closed severely comminuted markedly displaced unstable four part left proximal humerus fracture    2. Closed comminuted, volar angulated and displaced intraarticular right distal radius fracture.    Plan:  1. Left shoulder reverse total shoulder arthroplasty with tuberosity reconstruction for proximal humerus fracture.    2. Right wrist open reduction and internal fixation with volar plate and screws for distal radius fracture.      Phu Meneses MD  2/2/2018  8:00 AM       Electronically signed by Phu Meneses MD at 2/2/2018  8:03 AM    Source Note             Subjective   Patient ID: Kathryn Davison is a 77 y.o. right hand dominant female is here today for a treatment planning discussion. Fracture and Pain of the Right Wrist and Fracture and Pain of the Left Shoulder         History of Present Illness    Patient presents as a new patient with complaints of right wrist and left shoulder pain.  She states on 1/31/2018 she tripped over a " "shrubs and fell on the sidewalk.  She went to the emergency room immediately and after a thorough workup was diagnosed with a wrist fracture and left shoulder fracture.  The emergency room provider called the on-call orthopedic surgeon who advised her to follow up with our office with the possibility of right wrist and left shoulder surgery.      Pain Score: worst possible pain  Pain Location: Wrist (& Left Shoulder)  Pain Orientation: Right     Pain Descriptors: Throbbing  Pain Frequency: Constant/continuous     Date Pain First Started: 01/31/18              Pain Intervention(s): Rest, Medication (See MAR)  Result of Injury: Yes (Patient states she tripped and fell onto a sidewalk. )       Past Medical History:   Diagnosis Date   • Back pain    • Carotid artery stenosis     BILATERAL-FOLLOWING WITH DR. WINCHESTER.  SEES EVERY 6 MONTHS.     • Cataract, bilateral    • Colon polyp 12/10/2012   • Diabetes    • Disease of thyroid gland    • Diverticulosis    • Fatty liver 06/26/2017   • Fracture     RIGHT ANKLE AND LEFT ARM    • High cholesterol    • History of nuclear stress test 2016    \"IT WAS OK\"   • Hypertension    • Lumbosacral disc disease    • Osteoarthritis    • Osteoporosis    • Sleep apnea     CPAP HS - TO BRING IN DOS   • Wears glasses     FOR DISTANCE        Past Surgical History:   Procedure Laterality Date   • ANKLE ARTHROSCOPY Right    • BREAST SURGERY Left     CYST REMOVED   • CARDIAC CATHETERIZATION      NO STENT   • CATARACT EXTRACTION Bilateral     2014 and 2015   • COLONOSCOPY  12/10/2012   • COLONOSCOPY N/A 2/22/2017    Procedure: COLONOSCOPY with hot and cold snare polypectomies, resolution clip placement, cold biopsy polypectomy;  Surgeon: Mg Awan MD;  Location: [facility] ENDOSCOPY;  Service:    • DILATION AND CURETTAGE, DIAGNOSTIC / THERAPEUTIC      X4   • ENDOSCOPY N/A 7/20/2017    Procedure: ESOPHAGOGASTRODUODENOSCOPY with biopsies and cold biopsy polypectomy;  Surgeon: Mg Awan MD;  " "Location: [facility] ENDOSCOPY;  Service:    • FRACTURE SURGERY Right     ANKLE   • TUBAL ABDOMINAL LIGATION  1980?   • UPPER GASTROINTESTINAL ENDOSCOPY  07/20/2017       Family History   Problem Relation Age of Onset   • Heart disease Other    • Cancer Other    • Colon cancer Neg Hx    • Liver cancer Neg Hx    • Liver disease Neg Hx    • Stomach cancer Neg Hx    • Esophageal cancer Neg Hx        Social History     Social History   • Marital status:      Spouse name: N/A   • Number of children: N/A   • Years of education: N/A     Occupational History   • Not on file.     Social History Main Topics   • Smoking status: Never Smoker   • Smokeless tobacco: Never Used   • Alcohol use No   • Drug use: No   • Sexual activity: Defer     Other Topics Concern   • Not on file     Social History Narrative       No Known Allergies    Review of Systems   Constitutional: Negative for fever.   HENT: Negative for voice change.    Eyes: Negative for visual disturbance.   Respiratory: Negative for shortness of breath.    Cardiovascular: Negative for chest pain.   Gastrointestinal: Negative for abdominal distention and abdominal pain.   Genitourinary: Negative for dysuria.   Musculoskeletal: Positive for arthralgias. Negative for gait problem and joint swelling.   Skin: Negative for rash.   Neurological: Negative for speech difficulty.   Hematological: Does not bruise/bleed easily.   Psychiatric/Behavioral: Negative for confusion.   All other systems reviewed and are negative.      Objective   Resp 18  Ht 152.4 cm (60\")  Wt 112 kg (246 lb)  BMI 48.04 kg/m2   Physical Exam   Constitutional: She is oriented to person, place, and time. She appears well-nourished.   Eyes: Conjunctivae are normal.   Neck: No tracheal deviation present.   Cardiovascular: Intact distal pulses.    Pulmonary/Chest: Effort normal. No respiratory distress.   Abdominal: She exhibits no distension.   Musculoskeletal:        Left shoulder: She exhibits " decreased range of motion, tenderness, swelling and pain. She exhibits no deformity.        Left elbow: No tenderness found.        Right wrist: She exhibits decreased range of motion (due to pain) and tenderness. She exhibits no deformity.        Left wrist: She exhibits no tenderness and no bony tenderness.        Right hand: She exhibits normal capillary refill and no deformity. Normal sensation noted. Normal strength noted.        Left hand: She exhibits no tenderness. Normal sensation noted.   Neurological: She is alert and oriented to person, place, and time.   Psychiatric: She has a normal mood and affect. Her behavior is normal.   Vitals reviewed.    Ortho Exam     Neurologic Exam     Mental Status   Oriented to person, place, and time.      Ortho Exam         Assessment/Plan   Independent Review of Radiographic Studies:    No imaging was performed in the office.  I did review x-ray imaging of the left shoulder and right wrist from [facility].  There is a comminuted 4 part left proximal humerus fracture.  There is a comminuted Colles' right distal radius fracture.      Procedures       Kathryn was seen today for fracture, pain, fracture and pain.    Diagnoses and all orders for this visit:    Pre-op exam  -     Case Request; Standing  -     CBC and Differential  -     Comprehensive metabolic panel  -     Urinalysis With / Culture If Indicated - Urine, Clean Catch  -     ECG 12 Lead  -     XR chest 2 vw; Future  -     sodium chloride 0.9 % flush 1-10 mL; Infuse 1-10 mL into a venous catheter As Needed for Line Care.  -     ceFAZolin (ANCEF) 2 g in sodium chloride 0.9 % 100 mL IVPB; Infuse 2 g into a venous catheter On Call to the Operating Room.  -     famotidine (PEPCID) injection 20 mg; Infuse 2 mL into a venous catheter 60 Minutes Prior to Surgery.  -     Case Request    Closed fracture of distal end of right radius, unspecified fracture morphology, initial encounter    Closed fracture of  proximal end of left humerus, unspecified fracture morphology, initial encounter    Other orders  -     Follow anesthesia standing orders.  -     Provide instructions to patient regarding NPO status  -     Obtain informed consent  -     Clorhexidine skin prep  -     Follow Anesthesia Guidelines / Standing Orders; Standing  -     Notify physician (specify); Standing  -     Insert Peripheral IV; Standing  -     Saline Lock & Maintain IV Access; Standing  -     Orthopedic Discharge Planning for PT & Case Management - Inpatient With Next Day Discharge  -     Initiate Observation Status; Standing       Orthopedic activities reviewed and patient expressed appreciation  Discussion of orthopedic goals  Risk, benefits, and merits of treatment alternatives reviewed with the patient and questions answered  The nature of the proposed surgery reviewed with the patient including risks, benefits, rehabilitation, recovery timeframe, and outcome expectations    Recommendations/Plan:  Exercise, medications, injections, other patient advice, and return appointment as noted.  Patient is encouraged to call or return for any issues or concerns.  Patient was placed back into her volar Orthoplast splint from the emergency room.  She was also kept in her shoulder sling.  Dr. Meneses reviewed the imaging as well and concurs with plan of care.    PLAN: Right volar wrist plate.      Left Reverse total shoulder replacement with tuberosity reconstruction.    Patient agreeable to call or return sooner for any concerns.           Electronically signed by Enzo Nguyen PA-C at 2/1/2018 12:55 PM              Hospital Medications (active)       Dose Frequency Start End    acetaminophen (TYLENOL) 325 MG tablet  - ADS Override Pull   2/2/2018 2/2/2018    Notes to Pharmacy: Yaya Foster: cabinet override    atorvastatin (LIPITOR) tablet 20 mg 20 mg Nightly 2/2/2018     Sig - Route: Take 1 tablet by mouth Every Night. - Oral    bisacodyl  (DULCOLAX) suppository 10 mg 10 mg Daily PRN 2/2/2018     Sig - Route: Insert 1 suppository into the rectum Daily As Needed for Constipation. - Rectal    bupivacaine (PF) (MARCAINE) 0.5 % injection  - ADS Override Pull   2/2/2018 2/2/2018    Notes to Pharmacy: Tera Al: cabinet override    bupivacaine 0.125% in 0.9% sodium chloride 550 mL ON-Q PUMP 6 mL/hr Continuous 2/2/2018     Sig - Route: 6 mL/hr by Peripheral Nerve route Continuous. - Peripheral Nerve    calcium-vitamin D 500-200 MG-UNIT per tablet 1 tablet 1 tablet Daily 2/3/2018     Sig - Route: Take 1 tablet by mouth Daily. - Oral    ceFAZolin (ANCEF) 1 g injection  - ADS Override Pull   2/2/2018 2/2/2018    Notes to Pharmacy: Created by cabinet override    ceFAZolin (ANCEF) 1 g injection  - ADS Override Pull   2/2/2018 2/2/2018    Notes to Pharmacy: Created by cabinet override    ceFAZolin (ANCEF) 2-3 GM-% IVPB (duplex)  - ADS Override Pull   2/2/2018 2/2/2018    Notes to Pharmacy: Created by cabinet override    ceFAZolin (ANCEF) IVPB (duplex) 1 g 1 g Every 8 Hours 2/2/2018 2/3/2018    Sig - Route: Infuse 1,000 mg into a venous catheter Every 8 (Eight) Hours. - Intravenous    ceFAZolin (ANCEF) IVPB (duplex) 2 g 2 g On Call to O.R. 2/2/2018 2/2/2018    Sig - Route: Infuse 2,000 mg into a venous catheter On Call to the Operating Room. - Intravenous    celecoxib (CeleBREX) 100 MG capsule  - ADS Override Pull   2/2/2018 2/2/2018    Notes to Pharmacy: Yaya Foster: cabinet override    CloNIDine (CATAPRES) tablet 0.1 mg 0.1 mg 2 Times Daily PRN 2/2/2018     Sig - Route: Take 1 tablet by mouth 2 (Two) Times a Day As Needed for High Blood Pressure. - Oral    clopidogrel (PLAVIX) tablet 75 mg 75 mg Daily 2/3/2018     Sig - Route: Take 1 tablet by mouth Daily. - Oral    dextrose (D50W) solution 25 g 25 g Every 15 Minutes PRN 2/2/2018     Sig - Route: Infuse 50 mL into a venous catheter Every 15 (Fifteen) Minutes As Needed for Low Blood Sugar (Blood Sugar Less  Than 70, Patient Has IV Access - Unresponsive, NPO or Unable To Safely Swallow). - Intravenous    dextrose (GLUTOSE) oral gel 1 tube 1 tube Every 15 Minutes PRN 2/2/2018     Sig - Route: Take 1 tube by mouth Every 15 (Fifteen) Minutes As Needed for Low Blood Sugar (Blood Sugar Less Than 70, Patient Alert, Is Not NPO & Can Safely Swallow). - Oral    diltiaZEM CD (CARDIZEM CD) 24 hr capsule 120 mg 120 mg Every 24 Hours 2/2/2018     Sig - Route: Take 1 capsule by mouth Daily. - Oral    docusate sodium (COLACE) capsule 100 mg 100 mg 2 Times Daily PRN 2/2/2018     Sig - Route: Take 1 capsule by mouth 2 (Two) Times a Day As Needed for Constipation. - Oral    enoxaparin (LOVENOX) syringe 40 mg 40 mg Daily 2/4/2018     Sig - Route: Inject 0.4 mL under the skin Daily. - Subcutaneous    famotidine (PEPCID) tablet 20 mg 20 mg 2 Times Daily 2/2/2018     Sig - Route: Take 1 tablet by mouth 2 (Two) Times a Day. - Oral    furosemide (LASIX) tablet 20 mg 20 mg Daily 2/3/2018     Sig - Route: Take 1 tablet by mouth Daily. - Oral    glucagon (human recombinant) (GLUCAGEN DIAGNOSTIC) injection 1 mg 1 mg As Needed 2/2/2018     Sig - Route: Inject 1 mg under the skin As Needed (Blood Glucose Less Than 70 - Patient Without IV Access - Unresponsive, NPO or Unable To Safely Swallow). - Subcutaneous    insulin aspart (novoLOG) injection 0-7 Units 0-7 Units 4 Times Daily Before Meals & Nightly 2/2/2018     Sig - Route: Inject 0-7 Units under the skin 4 (Four) Times a Day Before Meals & at Bedtime. - Subcutaneous    insulin aspart (novoLOG) injection 10 Units 10 Units 3 Times Daily With Meals 2/2/2018     Sig - Route: Inject 10 Units under the skin 3 (Three) Times a Day With Meals. - Subcutaneous    insulin detemir (LEVEMIR) injection 40 Units 40 Units Nightly 2/3/2018     Sig - Route: Inject 40 Units under the skin Every Night. - Subcutaneous    ipratropium-albuterol (DUO-NEB) nebulizer solution 3 mL 3 mL Every 6 Hours PRN 2/2/2018     Sig -  "Route: Take 3 mL by nebulization Every 6 (Six) Hours As Needed for Wheezing or Shortness of Air. - Nebulization    levothyroxine (SYNTHROID, LEVOTHROID) tablet 100 mcg 100 mcg Every Morning 2/3/2018     Sig - Route: Take 1 tablet by mouth Every Morning. - Oral    linagliptin (TRADJENTA) tablet 5 mg 5 mg Daily 2/3/2018     Sig - Route: Take 1 tablet by mouth Daily. - Oral    losartan-hydrochlorothiazide (HYZAAR) 50-12.5 MG per tablet 1 tablet 1 tablet Every 24 Hours Scheduled 2/3/2018     Sig - Route: Take 1 tablet by mouth Daily. - Oral    montelukast (SINGULAIR) tablet 10 mg 10 mg Nightly 2/2/2018     Sig - Route: Take 1 tablet by mouth Every Night. - Oral    Morphine sulfate (PF) injection 4 mg 4 mg Every 2 Hours PRN 2/2/2018 2/12/2018    Sig - Route: Infuse 1 mL into a venous catheter Every 2 (Two) Hours As Needed for Moderate Pain . - Intravenous    Linked Group 1:  \"And\" Linked Group Details        naloxone (NARCAN) injection 0.4 mg 0.4 mg Every 5 Minutes PRN 2/2/2018     Sig - Route: Infuse 1 mL into a venous catheter Every 5 (Five) Minutes As Needed for Respiratory Depression. - Intravenous    Linked Group 1:  \"And\" Linked Group Details        oxyCODONE-acetaminophen (PERCOCET)  MG per tablet  - ADS Override Pull   2/2/2018 2/2/2018    Notes to Pharmacy: Yaya Foster: cabinet override    oxyCODONE-acetaminophen (PERCOCET) 5-325 MG per tablet 1 tablet 1 tablet Every 6 Hours PRN 2/2/2018     Sig - Route: Take 1 tablet by mouth Every 6 (Six) Hours As Needed for Moderate Pain . - Oral    oxyCODONE-acetaminophen (PERCOCET) 7.5-325 MG per tablet 1 tablet 1 tablet Every 4 Hours PRN 2/2/2018 2/12/2018    Sig - Route: Take 1 tablet by mouth Every 4 (Four) Hours As Needed for Moderate Pain . - Oral    potassium chloride (MICRO-K) CR capsule 10 mEq 10 mEq Daily 2/3/2018     Sig - Route: Take 1 capsule by mouth Daily. - Oral    sodium chloride 0.9 % flush 1-10 mL 1-10 mL As Needed 2/2/2018     Sig - Route: " Infuse 1-10 mL into a venous catheter As Needed for Line Care. - Intravenous    bupivacaine (PF) (MARCAINE) 0.5 % injection (Discontinued)  As Needed 2/2/2018 2/2/2018    Sig: As Needed.    Reason for Discontinue: Anesthesia Stop    ceFAZolin (ANCEF) 1 g/100 mL 0.9% NS (MBP) (Discontinued)  As Needed 2/2/2018 2/2/2018    Sig: As Needed.    Reason for Discontinue: Anesthesia Stop    dexamethasone (DECADRON) injection (Discontinued)  As Needed 2/2/2018 2/2/2018    Sig: As Needed.    Reason for Discontinue: Anesthesia Stop    enoxaparin (LOVENOX) syringe 30 mg (Discontinued) 30 mg Daily 2/3/2018 2/3/2018    Sig - Route: Inject 0.3 mL under the skin Daily. - Subcutaneous    ePHEDrine injection (Discontinued)  As Needed 2/2/2018 2/2/2018    Sig: As Needed.    Reason for Discontinue: Anesthesia Stop    FentaNYL Citrate (PF) (SUBLIMAZE) injection (Discontinued)  As Needed 2/2/2018 2/2/2018    Sig - Route: Infuse  into a venous catheter As Needed for Severe Pain . - Intravenous    Reason for Discontinue: Anesthesia Stop    HYDROmorphone (DILAUDID) injection 0.5 mg (Discontinued) 0.5 mg Every 10 Minutes PRN 2/2/2018 2/2/2018    Sig - Route: Infuse 0.5 mL into a venous catheter Every 10 (Ten) Minutes As Needed for Severe Pain . - Intravenous    Reason for Discontinue: Patient Transfer    insulin detemir (LEVEMIR) injection 30 Units (Discontinued) 30 Units Every Morning 2/3/2018 2/3/2018    Sig - Route: Inject 30 Units under the skin Every Morning. - Subcutaneous    insulin detemir (LEVEMIR) injection 40 Units (Discontinued) 40 Units Every Morning 2/4/2018 2/3/2018    Sig - Route: Inject 40 Units under the skin Every Morning. - Subcutaneous    lactated ringers infusion (Discontinued) 100 mL/hr Continuous 2/2/2018 2/3/2018    Sig - Route: Infuse 100 mL/hr into a venous catheter Continuous. - Intravenous    lidocaine (XYLOCAINE) 2% injection (Discontinued)  As Needed 2/2/2018 2/2/2018    Sig: As Needed.    Reason for  "Discontinue: Anesthesia Stop    meperidine (DEMEROL) injection 12.5 mg (Discontinued) 12.5 mg Every 5 Minutes PRN 2/2/2018 2/2/2018    Sig - Route: Infuse 0.25 mL into a venous catheter Every 5 (Five) Minutes As Needed for Shivering (May repeat x 1). - Intravenous    Reason for Discontinue: Patient Transfer    midazolam (VERSED) injection (Discontinued)  As Needed 2/2/2018 2/2/2018    Sig - Route: Infuse  into a venous catheter As Needed. - Intravenous    Reason for Discontinue: Anesthesia Stop    ondansetron (ZOFRAN) injection 4 mg (Discontinued) 4 mg Once As Needed 2/2/2018 2/2/2018    Sig - Route: Infuse 2 mL into a venous catheter 1 (One) Time As Needed for Nausea or Vomiting. - Intravenous    Reason for Discontinue: Patient Transfer    Phenylephrine HCl-NaCl (PF) syringe (Discontinued)  As Needed 2/2/2018 2/2/2018    Sig: As Needed.    Reason for Discontinue: Anesthesia Stop    promethazine (PHENERGAN) injection 6.25 mg (Discontinued) 6.25 mg Once As Needed 2/2/2018 2/2/2018    Sig - Route: Infuse 0.25 mL into a venous catheter 1 (One) Time As Needed for Nausea or Vomiting. - Intravenous    Reason for Discontinue: Patient Transfer    Linked Group 2:  \"Or\" Linked Group Details        promethazine (PHENERGAN) injection 6.25 mg (Discontinued) 6.25 mg Once As Needed 2/2/2018 2/2/2018    Sig - Route: Inject 0.25 mL into the shoulder, thigh, or buttocks 1 (One) Time As Needed for Nausea or Vomiting. - Intramuscular    Reason for Discontinue: Patient Transfer    Linked Group 2:  \"Or\" Linked Group Details        promethazine (PHENERGAN) suppository 25 mg (Discontinued) 25 mg Once As Needed 2/2/2018 2/2/2018    Sig - Route: Insert 1 suppository into the rectum 1 (One) Time As Needed for Nausea or Vomiting. - Rectal    Reason for Discontinue: Patient Transfer    Linked Group 2:  \"Or\" Linked Group Details        promethazine (PHENERGAN) tablet 25 mg (Discontinued) 25 mg Once As Needed 2/2/2018 2/2/2018    Sig - Route: " "Take 1 tablet by mouth 1 (One) Time As Needed for Nausea or Vomiting. - Oral    Reason for Discontinue: Patient Transfer    Linked Group 2:  \"Or\" Linked Group Details        Propofol (DIPRIVAN) injection (Discontinued)  As Needed 2018    Sig - Route: Infuse  into a venous catheter As Needed. - Intravenous    Reason for Discontinue: Anesthesia Stop    rocuronium (ZEMURON) injection (Discontinued)  As Needed 2018    Sig - Route: Infuse  into a venous catheter As Needed. - Intravenous    Reason for Discontinue: Anesthesia Stop    sodium chloride 3,000 mL with ceFAZolin 1 g irrigation (Discontinued)  As Needed 2018    Sig: As Needed.    Reason for Discontinue: Patient Discharge    sodium chloride 500 mL with ceFAZolin 1 g irrigation (Discontinued)  As Needed 2018    Sig: As Needed.    Reason for Discontinue: Patient Discharge    succinylcholine (ANECTINE) injection (Discontinued)  As Needed 2018    Sig - Route: Infuse  into a venous catheter As Needed. - Intravenous    Reason for Discontinue: Anesthesia Stop          Operative/Procedure Notes (last 24 hours) (Notes from 2018 11:39 AM through 2/3/2018 11:39 AM)     No notes of this type exist for this encounter.        Physician Progress Notes (last 24 hours) (Notes from 2018 11:40 AM through 2/3/2018 11:40 AM)     No notes of this type exist for this encounter.           Consult Notes (last 24 hours) (Notes from 2018 11:40 AM through 2/3/2018 11:40 AM)      Douglas Dick MD at 2018  4:57 PM  Version 1 of 1     Consult Orders:    1. Inpatient Consult to Hospitalist [[id number]] ordered by Phu Meneses MD at [facility]                    [facility]      Name:  Kathryn Davison   Age:  77 y.o.  Sex:  female  :  1940  MRN:  [id number]   Visit Number:  [id number]  Admission Date:  2018  Date Of Service:  18  Primary Care " Physician:  SANFORD Gill    Consulting Physician:    Douglas Dick MD    Referring Physician:    Phu Meneses MD    Reason For Consult:    Diabetic management.    Chief Complaint:     Left shoulder and right wrist pain.    History Of Presenting Illness:      This is a 77-year-old pleasant female with history of hypertension, diabetes on home insulin, chronic back pain, osteoarthritis and morbid obesity was admitted by Dr. Meneses for traumatic right wrist and left shoulder fractures.  Patient is status post surgical intervention to these fractures done today.  I am seeing the patient postoperatively for medical management and diabetic control.  The history is obtained from the patient as well as the medical chart.    Patient states that she was in her usual state of health until on 1/31/2018 when she was walking on the sidewalk when she tripped and fell over a shrub.  She landed with her right wrist and then her left shoulder.  She was subsequently brought to the emergency room and was noted to have comminuted fractures of the left shoulder and right wrist.  Splints were placed and she was later to see Dr. Meneses which she did yesterday.  She was subsequently scheduled for surgical intervention today.  She is currently lying down on the bed and is comfortable.  She is slightly drowsy but is able to answer all questions appropriately.  Denies any chest pain or shortness of breath.  No history of loss of consciousness.  She does take short-acting as well as basal doses of insulin at home.    Review Of Systems:     General ROS: Patient denies any fevers, chills or loss of consciousness. Complains of generalized weakness.  Psychological ROS: Denies any hallucinations and delusions.  Ophthalmic ROS: Denies any diplopia or transient loss of vision.  ENT ROS: Denies sore throat, nasal congestion or ear pain.   Allergy and Immunology ROS: Denies rash or itching.  Hematological and Lymphatic ROS: Denies  "neck swelling or easy bleeding.  Endocrine ROS: Denies any recent unintentional weight gain or loss.  Respiratory ROS: Denies cough or shortness of breath.   Cardiovascular ROS: Denies chest pain or palpitations. No history of exertional chest pain.  Gastrointestinal ROS: Denies nausea and vomiting. Denies any abdominal pain. No diarrhea.  Genito-Urinary ROS: Denies dysuria or hematuria.  Musculoskeletal ROS: Complains of chronic back pain.  Complains of left shoulder and right wrist pains. No calf pain.  Neurological ROS: Denies any focal weakness. No loss of consciousness. Denies any numbness.   Dermatological ROS: Denies any redness or pruritis.     Past Medical History:    Past Medical History:   Diagnosis Date   • Back pain    • Body piercing     BOTH EARS   • Carotid artery stenosis     BILATERAL-FOLLOWING WITH DR. WINCHESTER.  SEES EVERY 6 MONTHS.  STATES ABOUT 70% OCCLUSIION   • Cataract, bilateral    • Colon polyp 12/10/2012   • Diabetes    • Disease of thyroid gland    • Diverticulosis    • Fatty liver 06/26/2017   • Fracture     RIGHT ANKLE AND LEFT ARM    • High cholesterol    • History of nuclear stress test 2016    \"IT WAS OK\"   • Hypertension    • Lumbosacral disc disease    • Osteoarthritis    • Osteoporosis    • Sleep apnea     CPAP HS - TO BRING IN DOS   • Wears glasses     FOR DISTANCE       Past Surgical history:    Past Surgical History:   Procedure Laterality Date   • ANKLE ARTHROSCOPY Right    • BREAST SURGERY Left     CYST REMOVED   • CARDIAC CATHETERIZATION      NO STENT   • CATARACT EXTRACTION Bilateral     2014 and 2015   • COLONOSCOPY  12/10/2012   • COLONOSCOPY N/A 2/22/2017    Procedure: COLONOSCOPY with hot and cold snare polypectomies, resolution clip placement, cold biopsy polypectomy;  Surgeon: Mg Awan MD;  Location: [facility] ENDOSCOPY;  Service:    • DILATION AND CURETTAGE, DIAGNOSTIC / THERAPEUTIC      X4   • ENDOSCOPY N/A 7/20/2017    Procedure: ESOPHAGOGASTRODUODENOSCOPY with " biopsies and cold biopsy polypectomy;  Surgeon: Mg Awan MD;  Location: [facility] ENDOSCOPY;  Service:    • FRACTURE SURGERY Right     ANKLE   • TUBAL ABDOMINAL LIGATION  1980?   • UPPER GASTROINTESTINAL ENDOSCOPY  07/20/2017       Social History:    Social History     Social History   • Marital status:      Spouse name: N/A   • Number of children: N/A   • Years of education: N/A     Occupational History   • Not on file.     Social History Main Topics   • Smoking status: Never Smoker   • Smokeless tobacco: Never Used   • Alcohol use No   • Drug use: No   • Sexual activity: Defer     Other Topics Concern   • Not on file     Social History Narrative       Family History:    Family History   Problem Relation Age of Onset   • Heart disease Other    • Cancer Other    • Colon cancer Neg Hx    • Liver cancer Neg Hx    • Liver disease Neg Hx    • Stomach cancer Neg Hx    • Esophageal cancer Neg Hx        Allergies:      Review of patient's allergies indicates no known allergies.    Home Medications:    Prior to Admission Medications     Prescriptions Last Dose Informant Patient Reported? Taking?    Calcium Citrate-Vitamin D (CALCIUM + D PO) Past Month Self Yes Yes    Take 1 tablet by mouth Daily.    CloNIDine (CATAPRES) 0.1 MG tablet Past Week Self Yes Yes    Take 0.1 mg by mouth 2 (Two) Times a Day As Needed for High Blood Pressure.    clopidogrel (PLAVIX) 75 MG tablet Past Week Self Yes Yes    Take 75 mg by mouth Daily.    diltiaZEM CD (DILTIAZEM CD) 240 MG 24 hr capsule 2/1/2018 Self Yes Yes    Take 120 mg by mouth Daily.    Furosemide (LASIX PO) Past Week Self Yes Yes    Take 20 mg by mouth Daily As Needed.    insulin glargine (LANTUS) 100 UNIT/ML injection 2/1/2018 Self Yes Yes    Inject 33 Units under the skin Every Night.    insulin lispro (humaLOG) 100 UNIT/ML injection 2/1/2018 Self Yes Yes    Inject 12-14 Units under the skin 3 (Three) Times a Day Before Meals.    levothyroxine (SYNTHROID, LEVOTHROID)  100 MCG tablet 2/2/2018 Self Yes Yes    Take 100 mcg by mouth Daily.    montelukast (SINGULAIR) 10 MG tablet 2/1/2018 Self Yes Yes    Take 10 mg by mouth Every Night.    olmesartan-hydrochlorothiazide (BENICAR HCT) 20-12.5 MG per tablet 2/2/2018 Self Yes Yes    Take 1 tablet by mouth Daily.    oxyCODONE-acetaminophen (PERCOCET) 5-325 MG per tablet 2/2/2018  No Yes    Take 1 tablet by mouth Every 6 (Six) Hours As Needed for Moderate Pain .    Potassium Chloride (KLOR-CON 10 PO) Past Month Self Yes Yes    Take 10 mEq by mouth 2 (Two) Times a Day As Needed (TAKES WHEN LASIX IS TAKEN).    pravastatin (PRAVACHOL) 80 MG tablet 2/1/2018 Self Yes Yes    Take 80 mg by mouth Daily.    raNITIdine (ZANTAC) 150 MG tablet 2/1/2018  No Yes    TAKE ONE TABLET BY MOUTH TWICE DAILY    SITagliptin (JANUVIA) 100 MG tablet 2/2/2018 Self Yes Yes    Take 100 mg by mouth Daily.    ipratropium-albuterol (COMBIVENT RESPIMAT)  MCG/ACT inhaler More than a month  Yes No    Inhale 1 puff 4 (Four) Times a Day As Needed for Wheezing.             Hospital Scheduled Meds:      acetaminophen      atorvastatin 20 mg Oral Nightly   [START ON 2/3/2018] calcium-vitamin D 1 tablet Oral Daily   ceFAZolin      ceFAZolin      ceFAZolin 1 g Intravenous Q8H   celecoxib      [START ON 2/3/2018] clopidogrel 75 mg Oral Daily   diltiaZEM  mg Oral Q24H   [START ON 2/3/2018] enoxaparin 30 mg Subcutaneous Daily   famotidine 20 mg Oral BID   [START ON 2/3/2018] furosemide 20 mg Oral Daily   insulin aspart 10 Units Subcutaneous TID With Meals   [START ON 2/3/2018] insulin detemir 30 Units Subcutaneous QAM   [START ON 2/3/2018] levothyroxine 100 mcg Oral QAM   [START ON 2/3/2018] linagliptin 5 mg Oral Daily   [START ON 2/3/2018] losartan-hydrochlorothiazide 1 tablet Oral Q24H   montelukast 10 mg Oral Nightly   oxyCODONE-acetaminophen      [START ON 2/3/2018] potassium chloride 10 mEq Oral Daily         Bupivacaine HCl-NaCl (PF) 6 mL/hr Last Rate: 6 mL/hr  (02/02/18 1501)   lactated ringers 100 mL/hr         Vital Signs:    Temp:  [97.7 °F (36.5 °C)-98.6 °F (37 °C)] 97.7 °F (36.5 °C)  Heart Rate:  [74-95] 77  Resp:  [13-19] 13  BP: (101-165)/(44-59) 144/59    Last 3 weights    02/02/18  0703   Weight: 111 kg (245 lb)       Body mass index is 43.4 kg/(m^2).    Physical Exam:    General Appearance:  Alert and cooperative, not in any acute distress.   Head:  Atraumatic and normocephalic, without obvious abnormality.   Eyes:          PERRLA, conjunctivae and sclerae normal, no Icterus. No pallor. Extra-occular movements are within normal limits.   Ears:  Ears appear intact with no abnormalities noted.   Throat: No oral lesions, no thrush, oral mucosa moist.   Neck: Supple, trachea midline, no thyromegaly, no carotid bruit.   Back:   No kyphoscoliosis present. No tenderness to palpation,   range of motion normal.   Lungs:   Chest shape is normal. Breath sounds heard bilaterally equally.  No crackles or wheezing. No Pleural rub or bronchial breathing.   Heart:  Normal S1 and S2, no murmur, no gallop, no rub. No JVD.   Abdomen:   Normal bowel sounds, no masses, no organomegaly. Soft, non-tender, obese, no guarding, no rebound tenderness.  Taylor catheter is in place.     Extremities: Moves both lower extremities, no edema, no cyanosis, no clubbing.  Surgical bandage is noted on the left shoulder.  Right wrist and forearm bandage with Ace wrap noted.  Subcutaneous local anesthetic infusion pump noted on the left shoulder.   Skin: No bleeding, bruising or rash.   Neurologic: Alert and oriented x 3. Moves all four limbs equally. No tremors. No facial asymetry.     Laboratory data:      Results from last 7 days  Lab Units 02/01/18  1301   SODIUM mmol/L 139   POTASSIUM mmol/L 4.0   CHLORIDE mmol/L 100   CO2 mmol/L 27.0   BUN mg/dL 23*   CREATININE mg/dL 1.40*   CALCIUM mg/dL 9.7   BILIRUBIN mg/dL 0.3   ALK PHOS U/L 149*   ALT (SGPT) U/L 31   AST (SGOT) U/L 20   GLUCOSE mg/dL 301*        Results from last 7 days  Lab Units 02/01/18  1308   WBC 10*3/mm3 11.92*   HEMOGLOBIN g/dL 10.7*   HEMATOCRIT % 34.7*   PLATELETS 10*3/mm3 379         Results from last 7 days  Lab Units 02/01/18  1301   COLOR UA  Dark Yellow*   GLUCOSE UA  500 mg/dL (2+)*   KETONES UA  15 mg/dL (1+)*   LEUKOCYTES UA  Negative   PH, URINE  <=5.0   BILIRUBIN UA  Small (1+)*   UROBILINOGEN UA  0.2 E.U./dL     Radiology:    Imaging Results (last 72 hours)     Procedure Component Value Units Date/Time    FL C Arm During Surgery [[id number]] Resulted:  02/02/18 1414     Updated:  02/02/18 1414    Narrative:       This procedure was auto-finalized with no dictation required.    XR Shoulder 2+ View Left [[id number]] Collected:  02/02/18 1534     Updated:  02/02/18 1537    Narrative:       Left shoulder     HISTORY: Fracture     COMPARISON: 1/31/2018     FINDINGS: Single view left shoulder shows interval arthroplasty of  shoulder joint. Hardware is unremarkable. A small area of cortical  irregularity is seen along the proximal aspect of the lateral humeral  cortex which may be remnant fracture line from original fracture. AC  joint is intact.       Impression:       Post arthroplasty changes.     This report was finalized on 2/2/2018 3:35 PM by Jan Hinojosa MD.    XR Wrist 3+ View Right [[id number]] Collected:  02/02/18 1602     Updated:  02/02/18 1608    Narrative:       RIGHT WRIST     HISTORY: Fracture.     COMPARISON: 1/31/2018.     FINDINGS: 6 digital spot intraoperative radiographs were obtained. The  examination shows placement of a malleable cortical plate along the  volar aspect of the distal radius. Near anatomic reduction is noted.  Ulnar styloid process fracture is noted.       Impression:       Status post ORIF of distal radial fracture.     This report was finalized on 2/2/2018 4:06 PM by Jan Hinojosa MD.        Assessment:     1.  Diabetes mellitus type 2 with insulin use.  2.  Essential hypertension.  3.  Traumatic  right wrist and left shoulder fracture status post ORIF.  4.  Chronic back pain.  5.  Osteoarthritis.  6.  Acquired hypothyroidism.  7.  Obstructive sleep apnea on home CPAP therapy.  8.  Morbid obesity with a BMI of 43.    Recommendations/Plan:     Patient is currently admitted to the regular medical floor.  She is on local anesthetic subcutaneous pump with bupivacaine.  She is on morphine and Percocet as needed for pain control.  She will be continued on oxygen and her home medications will be continued.  She will be placed on subcutaneous insulin protocol for coverage.  She is on Lovenox for DVT prophylaxis.  I have strongly advised her to use the incentive spirometry.  I have discussed the patient's condition with her 2 daughters who are at the bedside.  She will be continued on CPAP at night.  At this time I would not make any changes to her home medication regimen.  We will continue to follow Ms. Pittman during her hospital stay.    Douglas Dick MD  02/02/18  4:57 PM    Dictated utilizing Dragon dictation.     Electronically signed by Douglas Dick MD at 2/2/2018  5:24 PM        Physical Therapy Notes (last 24 hours) (Notes from 2/2/2018 11:40 AM through 2/3/2018 11:40 AM)     No notes of this type exist for this encounter.        Occupational Therapy Notes (last 24 hours) (Notes from 2/2/2018 11:40 AM through 2/3/2018 11:40 AM)     No notes of this type exist for this encounter.

## 2018-02-03 NOTE — PROGRESS NOTES
[facility]    Name:  Kathryn Davison   Age:  77 y.o.  Sex:  female  :  1940  MRN:  [id number]   Visit Number:  [id number]  Admission Date:  2018  Date Of Service:  18  Primary Care Physician:  SANFORD Gill     LOS: 0 days :  Patient Care Team:  SANFORD Gill as PCP - General:    Chief Complaint:      Generalized weakness.    Subjective / Interval History:     Patient is currently lying down in the bed and is comfortable at rest.  She is slightly anxious.  Denies any chest pain or shortness of breath.  She complains of pain in the left shoulder and right wrist.  No significant overnight events.  She did get out of bed today to go to the bathroom but according to the rounds did have some anxiety.  No fevers overnight.  Blood sugars have been uncontrolled in the 300s.  She is getting her Levemir injection this morning.    Review of Systems:     General ROS: Patient denies any fevers, chills or loss of consciousness.  Respiratory ROS: Denies cough or shortness of breath.  Cardiovascular ROS: Denies chest pain or palpitations. No history of exertional chest pain.  Gastrointestinal ROS: Denies nausea and vomiting. Denies any abdominal pain. No diarrhea.  Neurological ROS: Denies any focal weakness. No loss of consciousness. Denies any numbness.  Dermatological ROS: Denies any redness or pruritis.    Vital Signs:    Temp:  [97.4 °F (36.3 °C)-98.6 °F (37 °C)] 98 °F (36.7 °C)  Heart Rate:  [] 105  Resp:  [13-20] 20  BP: (101-165)/(44-69) 154/56    Intake and output:    I/O last 3 completed shifts:  In: 4388 [P.O.:350; I.V.:4038]  Out: 2700 [Urine:2375; Blood:325]  I/O this shift:  In: 120 [P.O.:120]  Out: 250 [Urine:250]    Physical Examination:    General Appearance:  Alert and cooperative, not in any acute distress.   Head:  Atraumatic and normocephalic, without obvious abnormality.   Eyes:          PERRLA,  conjunctivae and sclerae normal, no Icterus. No pallor. Extra-occular movements are within normal limits.   Neck: Supple, trachea midline, no thyromegaly, no carotid bruit.   Lungs:   Chest shape is normal. Breath sounds heard bilaterally equally.  No crackles or wheezing. No Pleural rub or bronchial breathing.   Heart:  Normal S1 and S2, no murmur, no gallop, no rub. No JVD   Abdomen:   Normal bowel sounds, no masses, no organomegaly. Soft, non-tender, obese, no guarding, no rebound tenderness.  Taylor catheter has been discontinued.   Extremities: Moves both lower extremities, no edema, no cyanosis, no clubbing.  Surgical bandage is noted on the left shoulder. Right wrist and forearm bandage with Ace wrap noted. Subcutaneous local anesthetic infusion pump noted on the left shoulder.   Skin: No bleeding, bruising or rash.   Neurologic: Awake, alert and oriented times 3. Moves all 4 extremities equally.   Laboratory results:      Results from last 7 days  Lab Units 02/03/18  0521 02/01/18  1301   SODIUM mmol/L 140 139   POTASSIUM mmol/L 4.6 4.0   CHLORIDE mmol/L 102 100   CO2 mmol/L 28.0 27.0   BUN mg/dL 29* 23*   CREATININE mg/dL 1.20 1.40*   CALCIUM mg/dL 9.1 9.7   BILIRUBIN mg/dL  --  0.3   ALK PHOS U/L  --  149*   ALT (SGPT) U/L  --  31   AST (SGOT) U/L  --  20   GLUCOSE mg/dL 305* 301*       Results from last 7 days  Lab Units 02/03/18  0521 02/01/18  1308   WBC 10*3/mm3 14.96* 11.92*   HEMOGLOBIN g/dL 9.0* 10.7*   HEMATOCRIT % 28.2* 34.7*   PLATELETS 10*3/mm3 305 379               Results from last 7 days  Lab Units 02/01/18  1301   URINECX  Mixed Natalia Isolated     I have reviewed the patient's laboratory results.    Radiology results:    Imaging Results (last 24 hours)     Procedure Component Value Units Date/Time    FL C Arm During Surgery [[id number]] Resulted:  02/02/18 1414     Updated:  02/02/18 1414    Narrative:       This procedure was auto-finalized with no dictation required.    XR Shoulder 2+ View  Left [[id number]] Collected:  02/02/18 1534     Updated:  02/02/18 1537    Narrative:       Left shoulder     HISTORY: Fracture     COMPARISON: 1/31/2018     FINDINGS: Single view left shoulder shows interval arthroplasty of  shoulder joint. Hardware is unremarkable. A small area of cortical  irregularity is seen along the proximal aspect of the lateral humeral  cortex which may be remnant fracture line from original fracture. AC  joint is intact.       Impression:       Post arthroplasty changes.     This report was finalized on 2/2/2018 3:35 PM by Jan Hinojosa MD.    XR Wrist 3+ View Right [[id number]] Collected:  02/02/18 1602     Updated:  02/02/18 1608    Narrative:       RIGHT WRIST     HISTORY: Fracture.     COMPARISON: 1/31/2018.     FINDINGS: 6 digital spot intraoperative radiographs were obtained. The  examination shows placement of a malleable cortical plate along the  volar aspect of the distal radius. Near anatomic reduction is noted.  Ulnar styloid process fracture is noted.       Impression:       Status post ORIF of distal radial fracture.     This report was finalized on 2/2/2018 4:06 PM by Jan Hinojosa MD.        I have reviewed the patient's radiology reports.    Medication Review:     I have reviewed the patients active and prn medications.     Principal Problem:    Closed fracture of left shoulder  Active Problems:    Right wrist fracture    Diabetes mellitus type 2 in obese    Essential hypertension    Morbid obesity with BMI of 40.0-44.9, adult    Acquired hypothyroidism    Status post total shoulder replacement, left    Assessment:    1.  Diabetes mellitus type 2 with insulin use.  2.  Acute kidney injury secondary to dehydration, improved.  3.  Essential hypertension.  4.  Traumatic right wrist and left shoulder fracture status post ORIF.  5.  Chronic back pain.  6.  Osteoarthritis.  7.  Acquired hypothyroidism.  8.  Obstructive sleep apnea on home CPAP therapy.  9.  Morbid obesity with a  BMI of 43.    Plan:    Patient will be continued on subcutaneous insulin protocol for her blood sugar coverage.  I will increase her Levemir to 40 units which will be given this morning.  Patient states that she takes the Levemir at night at home.  Unfortunately her blood sugars are more than 300 at this time and we will give that in the morning today.  She is on Lovenox for DVT prophylaxis.  Physical and occupational therapy consults have been ordered.  Daughter is requesting if something can be given for her for anxiety.  I will place her on Ativan as needed while she is in the hospital.  She has been advised to use the incentive spirometry.  Her home medications will be continued.  She is on Lovenox for DVT prophylaxis.  Her creatinine has improved from 1.4  to 1.2.  Further recommendations per orthopedic services.  Patient is interested in going to short-term rehabilitation.    Douglas Dick MD  02/03/18  1:55 PM    Dictated utilizing Dragon dictation.

## 2018-02-03 NOTE — PROGRESS NOTES
Discharge Planning Assessment  [facility]     Patient Name: Kathryn Davison  MRN: [id number]  Today's Date: 2/3/2018    Admit Date: 2/2/2018          Discharge Needs Assessment       02/03/18 1133    Living Environment    Lives With alone    Living Arrangements mobile home    Home Accessibility bed and bath on same level    Stair Railings at Home none    Living Environment    Quality Of Family Relationships supportive    Living Arrangement Comments lives alone    Discharge Needs Assessment    Concerns To Be Addressed basic needs concerns    Readmission Within The Last 30 Days no previous admission in last 30 days    Anticipated Changes Related to Illness none    Equipment Currently Used at Home bipap/ cpap;glucometer            Discharge Plan       02/03/18 1135    Case Management/Social Work Plan    Plan rehab    Additional Comments lives alone one daughter lives in [place]  and helps her some  in the afternoons,  other daughter lives in [place],  Pt normally is indpendent of ADl's  uses a walker as needed,  daughters states she needs short term rehab,  patient is  agreeable,    requesting   [facility]   1st choice,  [facility] 2nd choice,  [facility]  [facility] [place] [facility],  family requested information for Living will and POA  inromation booklet given as well as copy of advance directives giiven to patient        Discharge Placement     No information found        Expected Discharge Date and Time     Expected Discharge Date Expected Discharge Time    Feb 4, 2018               Demographic Summary       02/03/18 1132    Referral Information    Admission Type inpatient    Reason For Consult discharge planning    Primary Care Physician Information    Name Yara Headley            Functional Status       02/03/18 1132    Functional Status Prior    Ambulation 1-->assistive equipment    Transferring 1-->assistive equipment    Toileting 1-->assistive equipment    Bathing 0-->independent     Dressing 0-->independent    Communication 0-->understands/communicates without difficulty    Swallowing 0-->swallows foods/liquids without difficulty    IADL    Medications independent    Meal Preparation independent    Housekeeping assistive person    Laundry assistive person    Shopping assistive person    Oral Care independent    Employment/Financial    Who Manages Finances If Patient Unable Pt own NORMA Cai primary contgaxctg  [phone]            Psychosocial     None            Abuse/Neglect     None            Legal     None            Substance Abuse     None            Patient Forms     None          Adalgisa Santiago RN

## 2018-02-03 NOTE — PROGRESS NOTES
RUDY Garcia    Nerve Cath Post Op Call    Patient Name: Kathryn Davison  :  1940  MRN:  [id number]  Date of Discharge:     Nerve Cath Post Op Call:    Analgesia:Good  Pain Score:410  Side Effects:None  Catheter Site:clean  Patient Controlled ON Q pump infusion rate: 6ml/hr      C/o right shoulder pain small area anterior at incision site.   Plan- bolus 14 ml for one hour and return onq to 8 ml/hr  Left hand has motor function but at present she is not having pain.                    No anesthesia related problems

## 2018-02-04 LAB
ANION GAP SERPL CALCULATED.3IONS-SCNC: 13.3 MMOL/L
BASOPHILS # BLD AUTO: 0.04 10*3/MM3 (ref 0–0.2)
BASOPHILS NFR BLD AUTO: 0.3 % (ref 0–2.5)
BUN BLD-MCNC: 32 MG/DL (ref 7–20)
BUN/CREAT SERPL: 32 (ref 7.1–23.5)
CALCIUM SPEC-SCNC: 9.3 MG/DL (ref 8.4–10.2)
CHLORIDE SERPL-SCNC: 103 MMOL/L (ref 98–107)
CO2 SERPL-SCNC: 29 MMOL/L (ref 26–30)
CREAT BLD-MCNC: 1 MG/DL (ref 0.6–1.3)
DEPRECATED RDW RBC AUTO: 42.8 FL (ref 37–54)
EOSINOPHIL # BLD AUTO: 0.02 10*3/MM3 (ref 0–0.7)
EOSINOPHIL NFR BLD AUTO: 0.1 % (ref 0–7)
ERYTHROCYTE [DISTWIDTH] IN BLOOD BY AUTOMATED COUNT: 13.8 % (ref 11.5–14.5)
GFR SERPL CREATININE-BSD FRML MDRD: 54 ML/MIN/1.73
GLUCOSE BLD-MCNC: 211 MG/DL (ref 74–98)
GLUCOSE BLDC GLUCOMTR-MCNC: 209 MG/DL (ref 70–130)
GLUCOSE BLDC GLUCOMTR-MCNC: 228 MG/DL (ref 70–130)
GLUCOSE BLDC GLUCOMTR-MCNC: 258 MG/DL (ref 70–130)
GLUCOSE BLDC GLUCOMTR-MCNC: 282 MG/DL (ref 70–130)
HCT VFR BLD AUTO: 26.6 % (ref 37–47)
HGB BLD-MCNC: 8.5 G/DL (ref 12–16)
IMM GRANULOCYTES # BLD: 0.15 10*3/MM3 (ref 0–0.06)
IMM GRANULOCYTES NFR BLD: 1.1 % (ref 0–0.6)
LYMPHOCYTES # BLD AUTO: 1.41 10*3/MM3 (ref 0.6–3.4)
LYMPHOCYTES NFR BLD AUTO: 10.5 % (ref 10–50)
MCH RBC QN AUTO: 27.2 PG (ref 27–31)
MCHC RBC AUTO-ENTMCNC: 32 G/DL (ref 30–37)
MCV RBC AUTO: 85.3 FL (ref 81–99)
MONOCYTES # BLD AUTO: 1.16 10*3/MM3 (ref 0–0.9)
MONOCYTES NFR BLD AUTO: 8.6 % (ref 0–12)
NEUTROPHILS # BLD AUTO: 10.67 10*3/MM3 (ref 2–6.9)
NEUTROPHILS NFR BLD AUTO: 79.4 % (ref 37–80)
NRBC BLD MANUAL-RTO: 0 /100 WBC (ref 0–0)
PLATELET # BLD AUTO: 300 10*3/MM3 (ref 130–400)
PMV BLD AUTO: 10 FL (ref 6–12)
POTASSIUM BLD-SCNC: 4.3 MMOL/L (ref 3.5–5.1)
RBC # BLD AUTO: 3.12 10*6/MM3 (ref 4.2–5.4)
SODIUM BLD-SCNC: 141 MMOL/L (ref 137–145)
WBC NRBC COR # BLD: 13.45 10*3/MM3 (ref 4.8–10.8)

## 2018-02-04 PROCEDURE — 97110 THERAPEUTIC EXERCISES: CPT

## 2018-02-04 PROCEDURE — 99024 POSTOP FOLLOW-UP VISIT: CPT | Performed by: ORTHOPAEDIC SURGERY

## 2018-02-04 PROCEDURE — 97116 GAIT TRAINING THERAPY: CPT

## 2018-02-04 PROCEDURE — 63710000001 INSULIN ASPART PER 5 UNITS: Performed by: ORTHOPAEDIC SURGERY

## 2018-02-04 PROCEDURE — 25010000002 ENOXAPARIN PER 10 MG: Performed by: ORTHOPAEDIC SURGERY

## 2018-02-04 PROCEDURE — 63710000001 INSULIN ASPART PER 5 UNITS: Performed by: INTERNAL MEDICINE

## 2018-02-04 PROCEDURE — 80048 BASIC METABOLIC PNL TOTAL CA: CPT | Performed by: INTERNAL MEDICINE

## 2018-02-04 PROCEDURE — 82962 GLUCOSE BLOOD TEST: CPT

## 2018-02-04 PROCEDURE — 85025 COMPLETE CBC W/AUTO DIFF WBC: CPT | Performed by: INTERNAL MEDICINE

## 2018-02-04 PROCEDURE — 97530 THERAPEUTIC ACTIVITIES: CPT

## 2018-02-04 PROCEDURE — 99232 SBSQ HOSP IP/OBS MODERATE 35: CPT | Performed by: INTERNAL MEDICINE

## 2018-02-04 PROCEDURE — 63710000001 INSULIN DETEMIR PER 5 UNITS: Performed by: INTERNAL MEDICINE

## 2018-02-04 PROCEDURE — 94799 UNLISTED PULMONARY SVC/PX: CPT

## 2018-02-04 RX ORDER — PRAVASTATIN SODIUM 20 MG
80 TABLET ORAL NIGHTLY
Status: DISCONTINUED | OUTPATIENT
Start: 2018-02-04 | End: 2018-02-05

## 2018-02-04 RX ORDER — CARVEDILOL 6.25 MG/1
6.25 TABLET ORAL 2 TIMES DAILY WITH MEALS
Status: DISCONTINUED | OUTPATIENT
Start: 2018-02-04 | End: 2018-02-05

## 2018-02-04 RX ADMIN — INSULIN ASPART 10 UNITS: 100 INJECTION, SOLUTION INTRAVENOUS; SUBCUTANEOUS at 17:37

## 2018-02-04 RX ADMIN — CARVEDILOL 6.25 MG: 6.25 TABLET, FILM COATED ORAL at 17:37

## 2018-02-04 RX ADMIN — INSULIN ASPART 3 UNITS: 100 INJECTION, SOLUTION INTRAVENOUS; SUBCUTANEOUS at 11:55

## 2018-02-04 RX ADMIN — INSULIN ASPART 10 UNITS: 100 INJECTION, SOLUTION INTRAVENOUS; SUBCUTANEOUS at 08:39

## 2018-02-04 RX ADMIN — MONTELUKAST SODIUM 10 MG: 10 TABLET, FILM COATED ORAL at 21:06

## 2018-02-04 RX ADMIN — LEVOTHYROXINE SODIUM 100 MCG: 100 TABLET ORAL at 06:43

## 2018-02-04 RX ADMIN — DILTIAZEM HYDROCHLORIDE 120 MG: 120 CAPSULE, COATED, EXTENDED RELEASE ORAL at 21:00

## 2018-02-04 RX ADMIN — INSULIN ASPART 10 UNITS: 100 INJECTION, SOLUTION INTRAVENOUS; SUBCUTANEOUS at 11:55

## 2018-02-04 RX ADMIN — FAMOTIDINE 20 MG: 20 TABLET, FILM COATED ORAL at 21:06

## 2018-02-04 RX ADMIN — LINAGLIPTIN 5 MG: 5 TABLET, FILM COATED ORAL at 08:40

## 2018-02-04 RX ADMIN — CLOPIDOGREL BISULFATE 75 MG: 75 TABLET ORAL at 21:06

## 2018-02-04 RX ADMIN — FAMOTIDINE 20 MG: 20 TABLET, FILM COATED ORAL at 08:39

## 2018-02-04 RX ADMIN — Medication 80 MG: at 21:06

## 2018-02-04 RX ADMIN — CARVEDILOL 6.25 MG: 6.25 TABLET, FILM COATED ORAL at 09:40

## 2018-02-04 RX ADMIN — ENOXAPARIN SODIUM 40 MG: 40 INJECTION SUBCUTANEOUS at 08:40

## 2018-02-04 RX ADMIN — INSULIN ASPART 3 UNITS: 100 INJECTION, SOLUTION INTRAVENOUS; SUBCUTANEOUS at 06:43

## 2018-02-04 RX ADMIN — INSULIN ASPART 4 UNITS: 100 INJECTION, SOLUTION INTRAVENOUS; SUBCUTANEOUS at 21:07

## 2018-02-04 RX ADMIN — LOSARTAN POTASSIUM AND HYDROCHLOROTHIAZIDE 1 TABLET: 50; 12.5 TABLET, FILM COATED ORAL at 08:39

## 2018-02-04 RX ADMIN — OYSTER SHELL CALCIUM WITH VITAMIN D 1 TABLET: 500; 200 TABLET, FILM COATED ORAL at 08:39

## 2018-02-04 RX ADMIN — FUROSEMIDE 20 MG: 20 TABLET ORAL at 08:40

## 2018-02-04 RX ADMIN — INSULIN DETEMIR 40 UNITS: 100 INJECTION, SOLUTION SUBCUTANEOUS at 11:56

## 2018-02-04 RX ADMIN — INSULIN ASPART 4 UNITS: 100 INJECTION, SOLUTION INTRAVENOUS; SUBCUTANEOUS at 17:36

## 2018-02-04 NOTE — PLAN OF CARE
Problem: Patient Care Overview (Adult)  Goal: Plan of Care Review  Outcome: Ongoing (interventions implemented as appropriate)    Goal: Adult Individualization and Mutuality  Outcome: Ongoing (interventions implemented as appropriate)      Problem: Fall Risk (Adult)  Goal: Identify Related Risk Factors and Signs and Symptoms  Outcome: Ongoing (interventions implemented as appropriate)    Goal: Absence of Falls  Outcome: Ongoing (interventions implemented as appropriate)      Problem: Diabetes, Type 2 (Adult)  Goal: Signs and Symptoms of Listed Potential Problems Will be Absent or Manageable (Diabetes, Type 2)  Outcome: Ongoing (interventions implemented as appropriate)      Problem: Orthopaedic Fracture (Adult)  Goal: Signs and Symptoms of Listed Potential Problems Will be Absent or Manageable (Orthopaedic Fracture)  Outcome: Ongoing (interventions implemented as appropriate)

## 2018-02-04 NOTE — PLAN OF CARE
Problem: Patient Care Overview (Adult)  Goal: Plan of Care Review  Outcome: Ongoing (interventions implemented as appropriate)   02/03/18 1211   Outcome Evaluation   Outcome Summary/Follow up Plan Patient participates in skillled PT evaluation and demonstrates decreased strength, activity tolerance and balance. She is expected to make good progress with additional PT services.    Coping/Psychosocial Response Interventions   Plan Of Care Reviewed With patient;family       Problem: Inpatient Physical Therapy  Goal: Bed Mobility Goal LTG- PT  Outcome: Ongoing (interventions implemented as appropriate)   02/03/18 1211   Bed Mobility PT LTG   Bed Mobility PT LTG, Date Established 02/04/18   Bed Mobility PT LTG, Time to Achieve 2 wks   Bed Mobility PT LTG, Activity Type all bed mobility   Bed Mobility PT LTG, [place] Level contact guard assist     Goal: Transfer Training Goal 1 LTG- PT  Outcome: Ongoing (interventions implemented as appropriate)   02/03/18 1211   Transfer Training PT LTG   Transfer Training PT LTG, Date Established 02/04/18   Transfer Training PT LTG, Time to Achieve 2 wks   Transfer Training PT LTG, Activity Type sit to stand/stand to sit;bed to chair /chair to bed   Transfer Training PT LTG, [place] Level contact guard assist     Goal: Gait Training Goal LTG- PT  Outcome: Ongoing (interventions implemented as appropriate)   02/03/18 1211   Gait Training PT LTG   Gait Training Goal PT LTG, Date Established 02/04/18   Gait Training Goal PT LTG, Time to Achieve 2 wks   Gait Training Goal PT LTG, [place] Level contact guard assist   Gait Training Goal PT LTG, Distance to Achieve 125   Gait Training Goal PT LTG, Additional Goal with limited SOA

## 2018-02-04 NOTE — PROGRESS NOTES
[facility]    Acute pain service Inpatient Progress Note    Patient Name: Kathryn Davison  :  1940  MRN:  [id number]        Acute Pain  Service Inpatient Progress Note:    Analgesia:Good  Pain Score:3/10  LOC: alert and awake  Cardiac: VS stable  Side Effects:None  Catheter Site:clean  Cath type: peripheral nerve cath with ON Q  Infusion rate: 8ml/hr  Catheter Plan:Catheter to remain Insitu and Continue catheter infusion rate unchanged

## 2018-02-04 NOTE — PROGRESS NOTES
[facility]    Name:  Kathryn Davison   Age:  77 y.o.  Sex:  female  :  1940  MRN:  [id number]   Visit Number:  [id number]  Admission Date:  2018  Date Of Service:  18  Primary Care Physician:  SANFORD Gill     LOS: 1 day :  Patient Care Team:  SANFORD Gill as PCP - General:    Chief Complaint:      Generalized weakness.    Subjective / Interval History:     Mr. Pittman is lying down in the bed and is comfortable at rest.  She states that she did walk a few steps with physical therapy yesterday.  She complains of increasing anxiety and feels that her blood pressures are going up because of that.  She also had uncontrolled blood sugars yesterday requiring extra dose of Levemir in the evening.  Her blood sugars are better controlled today.  She denies any chest pain or shortness of breath.  No fevers overnight.  She did use her home CPAP last night.  She does complain of pain in the left shoulder and right wrist but it is better.    Review of Systems:     General ROS: Patient denies any fevers, chills or loss of consciousness.  Respiratory ROS: Denies cough or shortness of breath.  Cardiovascular ROS: Denies chest pain or palpitations. No history of exertional chest pain.  Gastrointestinal ROS: Denies nausea and vomiting. Denies any abdominal pain. No diarrhea.  Neurological ROS: Denies any focal weakness. No loss of consciousness. Denies any numbness.  Dermatological ROS: Denies any redness or pruritis.    Vital Signs:    Temp:  [98 °F (36.7 °C)-98.9 °F (37.2 °C)] 98 °F (36.7 °C)  Heart Rate:  [] 90  Resp:  [14-20] 16  BP: (154-198)/(56-80) 179/69    Intake and output:    I/O last 3 completed shifts:  In: 1590 [P.O.:470; I.V.:1120]  Out: 4400 [Urine:4400]  I/O this shift:  In: -   Out: 300 [Urine:300]    Physical Examination:    General Appearance:  Alert and cooperative, not in any acute distress.   Head:   Atraumatic and normocephalic, without obvious abnormality.   Eyes:          PERRLA, conjunctivae and sclerae normal, no Icterus. No pallor. Extra-occular movements are within normal limits.   Neck: Supple, trachea midline, no thyromegaly, no carotid bruit.   Lungs:   Chest shape is normal. Breath sounds heard bilaterally equally.  No crackles or wheezing. No Pleural rub or bronchial breathing.   Heart:  Normal S1 and S2, no murmur, no gallop, no rub. No JVD   Abdomen:   Normal bowel sounds, no masses, no organomegaly. Soft, non-tender, obese, no guarding, no rebound tenderness.  Taylor catheter has been discontinued.   Extremities: Moves both lower extremities, 1+ edema, no cyanosis, no clubbing.  Surgical bandage is noted on the left shoulder. Right wrist and forearm bandage with Ace wrap noted. Subcutaneous local anesthetic infusion pump noted on the left shoulder.  Skin abrasions noted on the left knee.   Skin: No bleeding, bruising or rash.   Neurologic: Awake, alert and oriented times 3. Moves all 4 extremities equally.   Laboratory results:      Results from last 7 days  Lab Units 02/04/18  0549 02/03/18  0521 02/01/18  1301   SODIUM mmol/L 141 140 139   POTASSIUM mmol/L 4.3 4.6 4.0   CHLORIDE mmol/L 103 102 100   CO2 mmol/L 29.0 28.0 27.0   BUN mg/dL 32* 29* 23*   CREATININE mg/dL 1.00 1.20 1.40*   CALCIUM mg/dL 9.3 9.1 9.7   BILIRUBIN mg/dL  --   --  0.3   ALK PHOS U/L  --   --  149*   ALT (SGPT) U/L  --   --  31   AST (SGOT) U/L  --   --  20   GLUCOSE mg/dL 211* 305* 301*       Results from last 7 days  Lab Units 02/04/18  0549 02/03/18  0521 02/01/18  1308   WBC 10*3/mm3 13.45* 14.96* 11.92*   HEMOGLOBIN g/dL 8.5* 9.0* 10.7*   HEMATOCRIT % 26.6* 28.2* 34.7*   PLATELETS 10*3/mm3 300 305 379               Results from last 7 days  Lab Units 02/01/18  1301   URINECX  Mixed Natalia Isolated     I have reviewed the patient's laboratory results.    Radiology results:    Imaging Results (last 24 hours)     ** No  results found for the last 24 hours. **        Medication Review:     I have reviewed the patients active and prn medications.     Principal Problem:    Closed fracture of left shoulder  Active Problems:    Right wrist fracture    Diabetes mellitus type 2 in obese    Essential hypertension    Morbid obesity with BMI of 40.0-44.9, adult    Acquired hypothyroidism    Status post total shoulder replacement, left    Assessment:    1.  Diabetes mellitus type 2 with insulin use.  2.  Acute kidney injury secondary to dehydration, improved.  3.  Essential hypertension.  4.  Traumatic right wrist and left shoulder fracture status post ORIF.  5.  Chronic back pain.  6.  Osteoarthritis.  7.  Acquired hypothyroidism.  8.  Obstructive sleep apnea on home CPAP therapy.  9.  Morbid obesity with a BMI of 43.  10.  Generalized anxiety disorder.    Plan:    Patient is hemodynamically stable but did have elevated blood pressures yesterday.  She is currently on her home medications.  Due to her elevated blood pressure I will start her on carvedilol today.  She will be continued on Levemir 40 units daily as well as bolus and coverage insulin.  She was placed on Ativan as needed for anxiety.  She has been strongly advised to use the incentive spirometry.  Her creatinine is down to 1 from 1.4 on admission day.  She did drop her hemoglobin in the last couple of days from 11.4 to 8.5 today.  We will continue to monitor that at this time.    She will be continued on physical and occupational therapy.  She is on Lovenox for DVT prophylaxis.  She does live alone and may benefit from going to short-term rehabilitation.  She does state that she gets short of breath while walking with physical therapy.  She is saturating at 95% on room air.  This shortness of breath is likely related to deconditioning.  I do not suspect any pneumonia or pulmonary embolism at this time.  Further recommendations depend upon her clinical course.    Douglas Dick,  MD  02/04/18  10:27 AM    Dictated utilizing Dragon dictation.

## 2018-02-04 NOTE — THERAPY EVALUATION
"Acute Care - Physical Therapy Initial Evaluation   Jose     Patient Name: Kathryn Davison  : 1940  MRN: [id number]  Today's Date: 2018   Onset of Illness/Injury or Date of Surgery Date: 18  Date of Referral to PT: 18  Referring Physician: Phu Meneses MD      Admit Date: 2018     Visit Dx:    ICD-10-CM ICD-9-CM   1. Impaired functional mobility, balance, gait, and endurance Z74.09 V49.89   2. Pre-op exam Z01.818 V72.84     Patient Active Problem List   Diagnosis   • Constipation   • Left lower quadrant pain   • Colon cancer screening   • Left upper quadrant pain   • Diverticulosis of large intestine without hemorrhage   • Colon polyps   • Vascular ectasia of colon   • Internal hemorrhoids   • External hemorrhoid   • Elevated liver function tests   • Elevated alkaline phosphatase level   • Gastritis without bleeding   • Helicobacter pylori infection   • Abnormal ultrasound of liver   • Gastric polyp   • Pre-op exam   • Right wrist fracture   • Diabetes mellitus type 2 in obese   • Essential hypertension   • Acquired hypothyroidism   • Morbid obesity with BMI of 40.0-44.9, adult   • Closed fracture of left shoulder   • Status post total shoulder replacement, left     Past Medical History:   Diagnosis Date   • Back pain    • Body piercing     BOTH EARS   • Carotid artery stenosis     BILATERAL-FOLLOWING WITH DR. WINCHESTER.  SEES EVERY 6 MONTHS.  STATES ABOUT 70% OCCLUSIION   • Cataract, bilateral    • Colon polyp 12/10/2012   • Diabetes    • Disease of thyroid gland    • Diverticulosis    • Fatty liver 2017   • Fracture     RIGHT ANKLE AND LEFT ARM    • High cholesterol    • History of nuclear stress test 2016    \"IT WAS OK\"   • Hypertension    • Lumbosacral disc disease    • Osteoarthritis    • Osteoporosis    • Sleep apnea     CPAP HS - TO BRING IN DOS   • Wears glasses     FOR DISTANCE     Past Surgical History:   Procedure Laterality Date   • ANKLE ARTHROSCOPY Right    • " BREAST SURGERY Left     CYST REMOVED   • CARDIAC CATHETERIZATION      NO STENT   • CATARACT EXTRACTION Bilateral     2014 and 2015   • COLONOSCOPY  12/10/2012   • COLONOSCOPY N/A 2/22/2017    Procedure: COLONOSCOPY with hot and cold snare polypectomies, resolution clip placement, cold biopsy polypectomy;  Surgeon: Mg Awan MD;  Location: [facility] ENDOSCOPY;  Service:    • DILATION AND CURETTAGE, DIAGNOSTIC / THERAPEUTIC      X4   • ENDOSCOPY N/A 7/20/2017    Procedure: ESOPHAGOGASTRODUODENOSCOPY with biopsies and cold biopsy polypectomy;  Surgeon: Mg Awan MD;  Location: [facility] ENDOSCOPY;  Service:    • FRACTURE SURGERY Right     ANKLE   • TUBAL ABDOMINAL LIGATION  1980?   • UPPER GASTROINTESTINAL ENDOSCOPY  07/20/2017          PT ASSESSMENT (last 72 hours)      PT Evaluation       02/03/18 1211 02/03/18 1133    Rehab Evaluation    Document Type evaluation  -JR     Subjective Information agree to therapy;complains of;weakness  -JR     Patient Effort, Rehab Treatment good  -JR     Symptoms Noted During/After Treatment increased pain  -JR     General Information    Patient Profile Review yes  -JR     Onset of Illness/Injury or Date of Surgery Date 02/02/18  -JR     Referring Physician Phu Meneses MD  -JR     General Observations Supine with LUE in sling and swathe, Rt hand wrapped in elastic bandage with family at bedside  -JR     Pertinent History Of Current Problem Fall with resulting Left shoulder fx and Right wrist fx s/p ORIF in Rt wrist, and Lt shoulder reverse replacement  -JR     Precautions/Limitations fall precautions  -JR     Prior Level of Function independent:;community mobility  -JR     Equipment Currently Used at Home bipap/ cpap;glucometer  -JR bipap/ cpap;glucometer  -LB    Plans/Goals Discussed With patient and family;agreed upon  -JR     Risks Reviewed patient and family:;increased discomfort  -JR     Benefits Reviewed patient and family:;increase balance;increase strength;increase  independence;improve function  -JR     Barriers to Rehab none identified  -JR     Living Environment    Lives With alone  -JR alone  -LB    Living Arrangements mobile home  -JR mobile home  -LB    Home Accessibility bed and bath on same level;stairs within home  -JR bed and bath on same level  -LB    Number of Stairs Within Home 2  -JR     Stair Railings at Home  none  -LB    Clinical Impression    Date of Referral to PT 02/02/18  -JR     PT Diagnosis Left total shoulder replacement, Right wrist ORIF, weakness, poor balance, gait abn  -JR     Prognosis Good  -JR     Patient/Family Goals Statement Patient wants to get well and return to normal activity level  -JR     Criteria for Skilled Therapeutic Interventions Met yes;treatment indicated  -JR     Pathology/Pathophysiology Noted (Describe Specifically for Each System) musculoskeletal;neuromuscular;pulmonary  -JR     Impairments Found (describe specific impairments) joint integrity and mobility;gait, locomotion, and balance;aerobic capacity/endurance  -JR     Rehab Potential good, to achieve stated therapy goals  -JR     Pain Assessment    Pain Assessment 0-10  -JR     Pain Score 7  -JR     Post Pain Score 6  -JR     Pain Location Shoulder  -JR     Pain Orientation Left  -JR     Pain Descriptors Throbbing  -JR     Pain Frequency Constant/continuous  -JR     Pain Intervention(s) Repositioned;Ambulation/increased activity  -JR     Response to Interventions decreased pain after treatment  -JR     Cognitive Assessment/Intervention    Current Cognitive/Communication Assessment functional  -JR     Orientation Status oriented x 4  -JR     Follows Commands/Answers Questions able to follow multi-step instructions  -JR     Personal Safety decreased awareness, need for safety;decreased awareness, need for assist  -JR     Personal Safety Interventions fall prevention program maintained;gait belt;nonskid shoes/slippers when out of bed  -JR     ROM (Range of Motion)    General  ROM Detail BLE are WFL & Left shoulder NT  -     MMT (Manual Muscle Testing)    General MMT Assessment Detail Left UE NT due to injury  -JR     Bed Mobility, Assessment/Treatment    Bed Mobility, Assistive Device head of bed elevated;bed rails  -JR     Bed Mob, Supine to Sit, [place] minimum assist (75% patient effort);moderate assist (50% patient effort)  -JR     Bed Mob, Sit to Supine, [place] minimum assist (75% patient effort);moderate assist (50% patient effort)  -     Bed Mobility, Impairments impaired balance;strength decreased;postural control impaired  -JR     Transfer Assessment/Treatment    Transfers, Sit-Stand [place] minimum assist (75% patient effort)  -JR     Transfers, Stand-Sit [place] minimum assist (75% patient effort)  -     Transfer, Impairments strength decreased;impaired balance;motor control impaired  -     Gait Assessment/Treatment    Gait, [place] Level minimum assist (75% patient effort);contact guard assist  -     Gait, Distance (Feet) 98  -     Gait, Gait Pattern Analysis swing-through gait  -     Gait, Gait Deviations toe-to-floor clearance decreased;stride width increased;stride length decreased;decreased heel strike  -     Gait, Safety Issues sequencing ability decreased;balance decreased during turns;step length decreased  -     Gait, Impairments impaired balance;strength decreased;motor control impaired  -     Positioning and Restraints    Pre-Treatment Position in bed  -JR     Post Treatment Position chair  -JR     In Chair sitting;call light within reach;encouraged to call for assist;with family/caregiver  -       02/02/18 0707 02/01/18 1863    General Information    Equipment Currently Used at Home glucometer;bipap/ cpap  -PAUL bipap/ cpap;glucometer  -BB    Living Environment    Lives With alone  -PAUL     Living Arrangements mobile home  -PAUL     Home Accessibility bed and bath are not on the first floor  -PAUL     Stair Railings at Home  none  -PAUL     Type of Financial/Environmental Concern none  -PAUL     Transportation Available car  -PAUL       User Key  (r) = Recorded By, (t) = Taken By, (c) = Cosigned By    Initials Name Provider Type    JR Addie Taylor, PT Physical Therapist    PAUL Quinones, RN Registered Nurse    KIM Santiago, RN Registered Nurse    BB Cass Cruz, RN Registered Nurse          Physical Therapy Education     Title: PT OT SLP Therapies (Active)     Topic: Physical Therapy (Active)     Point: Mobility training (Done)    Learning Progress Summary    Learner Readiness Method Response Comment Documented by Status   Patient Acceptance E VU Importance of ROM of elbow, wrists and hands and left shoulder pendulum  02/04/18 1105 Done                      User Key     Initials Effective Dates Name Provider Type Discipline     10/26/16 -  Addie Taylor PT Physical Therapist PT                PT Recommendation and Plan  Anticipated Discharge Disposition: inpatient rehabilitation facility  Planned Therapy Interventions: balance training, strengthening, bed mobility training, ROM (Range of Motion), transfer training, gait training, patient/family education  PT Frequency: 2 times/day  Plan of Care Review  Plan Of Care Reviewed With: patient, family  Outcome Summary/Follow up Plan: Patient participates in skillled PT evaluation and demonstrates decreased strength, activity tolerance and balance.  She is expected to make good progress with additional PT services.           IP PT Goals       02/03/18 1211          Bed Mobility PT LTG    Bed Mobility PT LTG, Date Established 02/04/18  -      Bed Mobility PT LTG, Time to Achieve 2 wks  -JR      Bed Mobility PT LTG, Activity Type all bed mobility  -JR      Bed Mobility PT LTG, [place] Level contact guard assist  -JR      Transfer Training PT LTG    Transfer Training PT LTG, Date Established 02/04/18  -      Transfer Training PT LTG, Time to Achieve 2 wks  -JR      Transfer  Training PT LTG, Activity Type sit to stand/stand to sit;bed to chair /chair to bed  -JR      Transfer Training PT LTG, [place] Level contact guard assist  -JR      Gait Training PT LTG    Gait Training Goal PT LTG, Date Established 02/04/18  -JR      Gait Training Goal PT LTG, Time to Achieve 2 wks  -JR      Gait Training Goal PT LTG, [place] Level contact guard assist  -JR      Gait Training Goal PT LTG, Distance to Achieve 125  -JR      Gait Training Goal PT LTG, Additional Goal with limited SOA  -JR        User Key  (r) = Recorded By, (t) = Taken By, (c) = Cosigned By    Initials Name Provider Type    JR Addie Taylor PT Physical Therapist                Outcome Measures       02/03/18 1211          How much help from another person do you currently need...    Turning from your back to your side while in flat bed without using bedrails? 2  -JR      Moving from lying on back to sitting on the side of a flat bed without bedrails? 2  -JR      Moving to and from a bed to a chair (including a wheelchair)? 2  -JR      Standing up from a chair using your arms (e.g., wheelchair, bedside chair)? 2  -JR      Climbing 3-5 steps with a railing? 1  -JR      To walk in hospital room? 2  -JR      AM-PAC 6 Clicks Score 11  -JR      Functional Assessment    Outcome Measure Options AM-PAC 6 Clicks Basic Mobility (PT)  -JR        User Key  (r) = Recorded By, (t) = Taken By, (c) = Cosigned By    Initials Name Provider Type    JR Addie Taylor PT Physical Therapist           Time Calculation:       Therapy Charges for Today     Code Description Service Date Service Provider Modifiers Qty    94815549361 HC PT EVAL MOD COMPLEXITY 4 2/3/2018 Addie Taylor, PT GP 1          PT G-Codes  Outcome Measure Options: AM-PAC 6 Clicks Basic Mobility (PT)      Addie Taylor PT  2/4/2018

## 2018-02-04 NOTE — PROGRESS NOTES
[facility]  PROGRESS NOTE    Name:  Kathryn Davison   Age:  77 y.o.  Sex:  female  :  1940  MRN:  [id number]   Visit Number:  [id number]  Admission Date:   18  Date Of Service:  18  Primary Care Physician:  SANFORD Gill     LOS: 1 day :  Patient Care Team:  SANFORD Gill as PCP - General:    Chief Complaint:      Patient reports doing better today, left shoulder muscle spasms subsided.  No acute new complaint.  POD #2 after left reverse total shoulder arthroplasty with tuberosity reconstruction for severe comminuted displaced unstable four part proximal humerus fracture, and ORIF with a volar plate for comminuted unstable intraarticular distal radius fracture.    Subjective / Interval History:     Pain controlled.  No respiratory issues, no chest pain, tolerating diet with no abdominal symptoms.  Positive flatus, no bowel movement yet.    Review of Systems:     General ROS: No fever spike, no chills or loss of consciousness.  Ophthalmic ROS: no acute visual disturbance.  ENT ROS: No sinus congestion or sore throat.   Respiratory ROS: No shortness of breath.   Cardiovascular ROS: No chest pain or palpitations.  Gastrointestinal ROS: No acute abdominal change. Non-distended.  Genito-Urinary ROS: No reported dysuria or hematuria.  Musculoskeletal ROS: No deep calf pain. No acute focal motor deficit  Neurological ROS: No cyanosis, no new numbness or tingling.  Dermatological ROS: No erythema.  No rash or pruritis.    Vital Signs:    Temp:  [98 °F (36.7 °C)-98.9 °F (37.2 °C)] 98.1 °F (36.7 °C)  Heart Rate:  [] 87  Resp:  [14-18] 18  BP: (158-198)/(68-80) 158/75    Intake and output:    I/O last 3 completed shifts:  In: 1590 [P.O.:470; I.V.:1120]  Out: 4400 [Urine:4400]  I/O this shift:  In: -   Out: 300 [Urine:300]    Physical Examination:    General Appearance:    Alert and cooperative, no acute distress.   Head:    Atraumatic and  normocephalic, without obvious abnormality.   Eyes:    PERRLA.  Extraocular movements are within normal limits.   ENT:   No acute change.   Neck:   Supple, trachea midline.   Lungs:     Normal respirations, unlabored.    Heart:    Regular rate.   Abdomen:     Soft non-tender, non-distended.   Extremities:   No new motor deficit, no calf pain, Gilberto sign negative.   Skin:     No rash.  No cyanosis.  No erythema.  No active drainage.  With contact precautions, sterile dressing change done.   Neurologic:   Sensation intact, pulses intact both wrists and hands.  Neurovascular exam shows good function of radial,PIN, median, AIN and ulna motor and sensory both hands.     Laboratory results:    Lab Results (last 24 hours)     Procedure Component Value Units Date/Time    POC Glucose Once [[id number]]  (Abnormal) Collected:  02/03/18 1627    Specimen:  Blood Updated:  02/03/18 1730     Glucose 447 (H) mg/dL       Serial Number: [id number]:  [id number]       POC Glucose Once [[id number]]  (Abnormal) Collected:  02/03/18 2015    Specimen:  Blood Updated:  02/03/18 2130     Glucose 384 (H) mg/dL       Serial Number: [id number]:  [id number]       POC Glucose Once [[id number]]  (Abnormal) Collected:  02/04/18 0619    Specimen:  Blood Updated:  02/04/18 0635     Glucose 209 (H) mg/dL       Serial Number: [id number]:  [id number]       CBC & Differential [[id number]] Collected:  02/04/18 0549    Specimen:  Blood Updated:  02/04/18 0638    Narrative:       The following orders were created for panel order CBC & Differential.  Procedure                               Abnormality         Status                     ---------                               -----------         ------                     CBC Auto Differential[[id number]]        Abnormal            Final result                 Please view results for these tests on the individual orders.    CBC Auto Differential [[id number]]  (Abnormal) Collected:  02/04/18 0549     Specimen:  Blood Updated:  02/04/18 0638     WBC 13.45 (H) 10*3/mm3      RBC 3.12 (L) 10*6/mm3      Hemoglobin 8.5 (L) g/dL      Hematocrit 26.6 (L) %      MCV 85.3 fL      MCH 27.2 pg      MCHC 32.0 g/dL      RDW 13.8 %      RDW-SD 42.8 fl      MPV 10.0 fL      Platelets 300 10*3/mm3      Neutrophil % 79.4 %      Lymphocyte % 10.5 %      Monocyte % 8.6 %      Eosinophil % 0.1 %      Basophil % 0.3 %      Immature Grans % 1.1 (H) %      Neutrophils, Absolute 10.67 (H) 10*3/mm3      Lymphocytes, Absolute 1.41 10*3/mm3      Monocytes, Absolute 1.16 (H) 10*3/mm3      Eosinophils, Absolute 0.02 10*3/mm3      Basophils, Absolute 0.04 10*3/mm3      Immature Grans, Absolute 0.15 (H) 10*3/mm3      nRBC 0.0 /100 WBC     Basic Metabolic Panel [[id number]]  (Abnormal) Collected:  02/04/18 0549    Specimen:  Blood Updated:  02/04/18 0649     Glucose 211 (H) mg/dL      BUN 32 (H) mg/dL      Creatinine 1.00 mg/dL      Sodium 141 mmol/L      Potassium 4.3 mmol/L      Chloride 103 mmol/L      CO2 29.0 mmol/L      Calcium 9.3 mg/dL      eGFR Non African Amer 54 (L) mL/min/1.73      BUN/Creatinine Ratio 32.0 (H)     Anion Gap 13.3 mmol/L     Narrative:       The MDRD GFR formula is only valid for adults with stable renal function between ages 18 and 70.    POC Glucose Once [[id number]]  (Abnormal) Collected:  02/04/18 1101    Specimen:  Blood Updated:  02/04/18 1156     Glucose 228 (H) mg/dL       Serial Number: [id number]:  [id number]             I have reviewed the patient's laboratory results.    Radiology results:    Imaging Results (last 24 hours)     ** No results found for the last 24 hours. **          I have reviewed the patient's radiology reports.    C-arm AP, lateral and obliques taken intraoperative show good right distal radius reduction and hardware position.  AP left shoulder taken in recovery room shows no acute concern.  Good position and alignment of prosthesis and/or hardware.          Assessment/Plan    Principal Problem:    Closed fracture of left shoulder  Active Problems:    Right wrist fracture    Diabetes mellitus type 2 in obese    Essential hypertension    Acquired hypothyroidism    Morbid obesity with BMI of 40.0-44.9, adult    Status post total shoulder replacement, left      POD #2 stable orthopaedic exam.  Did better with therapy today, with ambulation and Pendulum swings left shoulder, and doing very well with mobility and function of right arm, wrist and hand.    Continue current management per the detailed orders and instructions, including skin, safety and fall precautions, respiratory therapy with incentive spirometer, advance diet as tolerated, bowel regimen, multi-modal analgesia, multi-modal DVT prophylaxis, Hospitalist consult, PT/OT following post-surgical rehab protocol, and Case Management for discharge plans.    Phu Meneses MD  02/04/18  3:33 PM

## 2018-02-05 ENCOUNTER — APPOINTMENT (OUTPATIENT)
Dept: CARDIOLOGY | Facility: HOSPITAL | Age: 78
End: 2018-02-05

## 2018-02-05 LAB
ANION GAP SERPL CALCULATED.3IONS-SCNC: 12 MMOL/L
BASOPHILS # BLD AUTO: 0.09 10*3/MM3 (ref 0–0.2)
BASOPHILS NFR BLD AUTO: 0.7 % (ref 0–2.5)
BUN BLD-MCNC: 29 MG/DL (ref 7–20)
BUN/CREAT SERPL: 29 (ref 7.1–23.5)
CALCIUM SPEC-SCNC: 9.1 MG/DL (ref 8.4–10.2)
CHLORIDE SERPL-SCNC: 100 MMOL/L (ref 98–107)
CHOLEST SERPL-MCNC: 153 MG/DL (ref 0–199)
CK SERPL-CCNC: 106 U/L (ref 30–170)
CO2 SERPL-SCNC: 33 MMOL/L (ref 26–30)
CREAT BLD-MCNC: 1 MG/DL (ref 0.6–1.3)
DEPRECATED RDW RBC AUTO: 42.7 FL (ref 37–54)
EOSINOPHIL # BLD AUTO: 0.2 10*3/MM3 (ref 0–0.7)
EOSINOPHIL NFR BLD AUTO: 1.6 % (ref 0–7)
ERYTHROCYTE [DISTWIDTH] IN BLOOD BY AUTOMATED COUNT: 13.7 % (ref 11.5–14.5)
GFR SERPL CREATININE-BSD FRML MDRD: 54 ML/MIN/1.73
GLUCOSE BLD-MCNC: 178 MG/DL (ref 74–98)
GLUCOSE BLDC GLUCOMTR-MCNC: 154 MG/DL (ref 70–130)
GLUCOSE BLDC GLUCOMTR-MCNC: 190 MG/DL (ref 70–130)
GLUCOSE BLDC GLUCOMTR-MCNC: 235 MG/DL (ref 70–130)
GLUCOSE BLDC GLUCOMTR-MCNC: 258 MG/DL (ref 70–130)
HBA1C MFR BLD: 9.2 % (ref 3–6)
HCT VFR BLD AUTO: 28.7 % (ref 37–47)
HDLC SERPL-MCNC: 51 MG/DL (ref 40–60)
HGB BLD-MCNC: 9.1 G/DL (ref 12–16)
IMM GRANULOCYTES # BLD: 0.15 10*3/MM3 (ref 0–0.06)
IMM GRANULOCYTES NFR BLD: 1.2 % (ref 0–0.6)
LDLC SERPL CALC-MCNC: 80 MG/DL (ref 0–99)
LDLC/HDLC SERPL: 1.57 {RATIO}
LYMPHOCYTES # BLD AUTO: 2.64 10*3/MM3 (ref 0.6–3.4)
LYMPHOCYTES NFR BLD AUTO: 21.3 % (ref 10–50)
MAXIMAL PREDICTED HEART RATE: 143 BPM
MCH RBC QN AUTO: 26.9 PG (ref 27–31)
MCHC RBC AUTO-ENTMCNC: 31.7 G/DL (ref 30–37)
MCV RBC AUTO: 84.9 FL (ref 81–99)
MONOCYTES # BLD AUTO: 1.05 10*3/MM3 (ref 0–0.9)
MONOCYTES NFR BLD AUTO: 8.5 % (ref 0–12)
NEUTROPHILS # BLD AUTO: 8.27 10*3/MM3 (ref 2–6.9)
NEUTROPHILS NFR BLD AUTO: 66.7 % (ref 37–80)
NRBC BLD MANUAL-RTO: 0 /100 WBC (ref 0–0)
PLATELET # BLD AUTO: 338 10*3/MM3 (ref 130–400)
PMV BLD AUTO: 9.6 FL (ref 6–12)
POTASSIUM BLD-SCNC: 4 MMOL/L (ref 3.5–5.1)
RBC # BLD AUTO: 3.38 10*6/MM3 (ref 4.2–5.4)
SODIUM BLD-SCNC: 141 MMOL/L (ref 137–145)
STRESS TARGET HR: 122 BPM
TRIGL SERPL-MCNC: 109 MG/DL
TROPONIN I SERPL-MCNC: 0.48 NG/ML (ref 0–0.03)
TROPONIN I SERPL-MCNC: 0.56 NG/ML (ref 0–0.03)
TROPONIN I SERPL-MCNC: 0.64 NG/ML (ref 0–0.03)
TSH SERPL DL<=0.05 MIU/L-ACNC: 3.83 MIU/ML (ref 0.47–4.68)
VLDLC SERPL-MCNC: 21.8 MG/DL
WBC NRBC COR # BLD: 12.4 10*3/MM3 (ref 4.8–10.8)

## 2018-02-05 PROCEDURE — 97165 OT EVAL LOW COMPLEX 30 MIN: CPT

## 2018-02-05 PROCEDURE — 82962 GLUCOSE BLOOD TEST: CPT

## 2018-02-05 PROCEDURE — 84484 ASSAY OF TROPONIN QUANT: CPT | Performed by: NURSE PRACTITIONER

## 2018-02-05 PROCEDURE — 84484 ASSAY OF TROPONIN QUANT: CPT | Performed by: ORTHOPAEDIC SURGERY

## 2018-02-05 PROCEDURE — 97166 OT EVAL MOD COMPLEX 45 MIN: CPT

## 2018-02-05 PROCEDURE — 82550 ASSAY OF CK (CPK): CPT | Performed by: NURSE PRACTITIONER

## 2018-02-05 PROCEDURE — 63710000001 INSULIN DETEMIR PER 5 UNITS: Performed by: INTERNAL MEDICINE

## 2018-02-05 PROCEDURE — 85025 COMPLETE CBC W/AUTO DIFF WBC: CPT | Performed by: INTERNAL MEDICINE

## 2018-02-05 PROCEDURE — 99024 POSTOP FOLLOW-UP VISIT: CPT | Performed by: PHYSICIAN ASSISTANT

## 2018-02-05 PROCEDURE — 93005 ELECTROCARDIOGRAM TRACING: CPT | Performed by: NURSE PRACTITIONER

## 2018-02-05 PROCEDURE — 63710000001 INSULIN ASPART PER 5 UNITS: Performed by: ORTHOPAEDIC SURGERY

## 2018-02-05 PROCEDURE — 25010000002 ENOXAPARIN PER 10 MG: Performed by: ORTHOPAEDIC SURGERY

## 2018-02-05 PROCEDURE — 84484 ASSAY OF TROPONIN QUANT: CPT | Performed by: INTERNAL MEDICINE

## 2018-02-05 PROCEDURE — 84443 ASSAY THYROID STIM HORMONE: CPT | Performed by: ORTHOPAEDIC SURGERY

## 2018-02-05 PROCEDURE — 80061 LIPID PANEL: CPT | Performed by: INTERNAL MEDICINE

## 2018-02-05 PROCEDURE — 80048 BASIC METABOLIC PNL TOTAL CA: CPT | Performed by: INTERNAL MEDICINE

## 2018-02-05 PROCEDURE — 93306 TTE W/DOPPLER COMPLETE: CPT

## 2018-02-05 PROCEDURE — 94799 UNLISTED PULMONARY SVC/PX: CPT

## 2018-02-05 PROCEDURE — 63710000001 INSULIN ASPART PER 5 UNITS: Performed by: INTERNAL MEDICINE

## 2018-02-05 RX ORDER — ALBUTEROL SULFATE 90 UG/1
2 AEROSOL, METERED RESPIRATORY (INHALATION) EVERY 4 HOURS PRN
COMMUNITY

## 2018-02-05 RX ORDER — AMLODIPINE BESYLATE 5 MG/1
5 TABLET ORAL
Status: DISCONTINUED | OUTPATIENT
Start: 2018-02-05 | End: 2018-02-06 | Stop reason: HOSPADM

## 2018-02-05 RX ORDER — ATORVASTATIN CALCIUM 80 MG/1
80 TABLET, FILM COATED ORAL NIGHTLY
Status: DISCONTINUED | OUTPATIENT
Start: 2018-02-05 | End: 2018-02-06 | Stop reason: HOSPADM

## 2018-02-05 RX ORDER — DILTIAZEM HYDROCHLORIDE 180 MG/1
180 CAPSULE, COATED, EXTENDED RELEASE ORAL DAILY
COMMUNITY
End: 2018-02-06 | Stop reason: HOSPADM

## 2018-02-05 RX ORDER — LISINOPRIL 20 MG/1
20 TABLET ORAL
Status: DISCONTINUED | OUTPATIENT
Start: 2018-02-05 | End: 2018-02-06 | Stop reason: HOSPADM

## 2018-02-05 RX ORDER — OLMESARTAN MEDOXOMIL AND HYDROCHLOROTHIAZIDE 40/25 40; 25 MG/1; MG/1
1 TABLET ORAL DAILY
COMMUNITY
End: 2018-02-06 | Stop reason: HOSPADM

## 2018-02-05 RX ORDER — CARVEDILOL 12.5 MG/1
12.5 TABLET ORAL 2 TIMES DAILY WITH MEALS
Status: DISCONTINUED | OUTPATIENT
Start: 2018-02-05 | End: 2018-02-06 | Stop reason: HOSPADM

## 2018-02-05 RX ADMIN — LISINOPRIL 20 MG: 20 TABLET ORAL at 10:08

## 2018-02-05 RX ADMIN — FUROSEMIDE 20 MG: 20 TABLET ORAL at 08:24

## 2018-02-05 RX ADMIN — LINAGLIPTIN 5 MG: 5 TABLET, FILM COATED ORAL at 08:26

## 2018-02-05 RX ADMIN — INSULIN ASPART 10 UNITS: 100 INJECTION, SOLUTION INTRAVENOUS; SUBCUTANEOUS at 08:23

## 2018-02-05 RX ADMIN — DOCUSATE SODIUM 100 MG: 100 CAPSULE, LIQUID FILLED ORAL at 06:24

## 2018-02-05 RX ADMIN — OYSTER SHELL CALCIUM WITH VITAMIN D 1 TABLET: 500; 200 TABLET, FILM COATED ORAL at 08:24

## 2018-02-05 RX ADMIN — INSULIN ASPART 2 UNITS: 100 INJECTION, SOLUTION INTRAVENOUS; SUBCUTANEOUS at 06:30

## 2018-02-05 RX ADMIN — INSULIN ASPART 4 UNITS: 100 INJECTION, SOLUTION INTRAVENOUS; SUBCUTANEOUS at 21:09

## 2018-02-05 RX ADMIN — OXYCODONE AND ACETAMINOPHEN 1 TABLET: 5; 325 TABLET ORAL at 21:08

## 2018-02-05 RX ADMIN — CARVEDILOL 6.25 MG: 6.25 TABLET, FILM COATED ORAL at 08:25

## 2018-02-05 RX ADMIN — INSULIN ASPART 10 UNITS: 100 INJECTION, SOLUTION INTRAVENOUS; SUBCUTANEOUS at 17:22

## 2018-02-05 RX ADMIN — LEVOTHYROXINE SODIUM 100 MCG: 100 TABLET ORAL at 06:24

## 2018-02-05 RX ADMIN — CARVEDILOL 12.5 MG: 12.5 TABLET, FILM COATED ORAL at 17:22

## 2018-02-05 RX ADMIN — FAMOTIDINE 20 MG: 20 TABLET, FILM COATED ORAL at 21:08

## 2018-02-05 RX ADMIN — MONTELUKAST SODIUM 10 MG: 10 TABLET, FILM COATED ORAL at 21:08

## 2018-02-05 RX ADMIN — ENOXAPARIN SODIUM 40 MG: 40 INJECTION SUBCUTANEOUS at 08:23

## 2018-02-05 RX ADMIN — OXYCODONE HYDROCHLORIDE AND ACETAMINOPHEN 1 TABLET: 7.5; 325 TABLET ORAL at 08:35

## 2018-02-05 RX ADMIN — POTASSIUM CHLORIDE 10 MEQ: 750 CAPSULE, EXTENDED RELEASE ORAL at 08:25

## 2018-02-05 RX ADMIN — AMLODIPINE BESYLATE 5 MG: 5 TABLET ORAL at 10:08

## 2018-02-05 RX ADMIN — FAMOTIDINE 20 MG: 20 TABLET, FILM COATED ORAL at 08:24

## 2018-02-05 RX ADMIN — INSULIN ASPART 10 UNITS: 100 INJECTION, SOLUTION INTRAVENOUS; SUBCUTANEOUS at 12:17

## 2018-02-05 RX ADMIN — OXYCODONE HYDROCHLORIDE AND ACETAMINOPHEN 1 TABLET: 7.5; 325 TABLET ORAL at 15:24

## 2018-02-05 RX ADMIN — ATORVASTATIN CALCIUM 80 MG: 80 TABLET, FILM COATED ORAL at 21:09

## 2018-02-05 RX ADMIN — INSULIN DETEMIR 40 UNITS: 100 INJECTION, SOLUTION SUBCUTANEOUS at 12:17

## 2018-02-05 RX ADMIN — CLOPIDOGREL BISULFATE 75 MG: 75 TABLET ORAL at 21:09

## 2018-02-05 RX ADMIN — LOSARTAN POTASSIUM AND HYDROCHLOROTHIAZIDE 1 TABLET: 50; 12.5 TABLET, FILM COATED ORAL at 08:26

## 2018-02-05 NOTE — SIGNIFICANT NOTE
"   02/05/18 7617   Rehab Treatment   Discipline physical therapy assistant   Treatment Not Performed patient/family declined treatment  (Pt reports \"Just not feeling well today.\". Therapy will f/u with pt tomorrow.  )     "

## 2018-02-05 NOTE — PLAN OF CARE
Problem: Patient Care Overview (Adult)  Goal: Plan of Care Review  Outcome: Ongoing (interventions implemented as appropriate)   02/05/18 0857   Patient Care Overview   Progress improving   Coping/Psychosocial Response Interventions   Plan Of Care Reviewed With patient       Problem: Perioperative Period (Adult)  Goal: Signs and Symptoms of Listed Potential Problems Will be Absent or Manageable (Perioperative Period)  Outcome: Ongoing (interventions implemented as appropriate)      Problem: Fall Risk (Adult)  Goal: Absence of Falls  Outcome: Ongoing (interventions implemented as appropriate)      Problem: Diabetes, Type 2 (Adult)  Goal: Signs and Symptoms of Listed Potential Problems Will be Absent or Manageable (Diabetes, Type 2)  Outcome: Ongoing (interventions implemented as appropriate)      Problem: Orthopaedic Fracture (Adult)  Goal: Signs and Symptoms of Listed Potential Problems Will be Absent or Manageable (Orthopaedic Fracture)  Outcome: Ongoing (interventions implemented as appropriate)

## 2018-02-05 NOTE — PLAN OF CARE
Problem: Patient Care Overview (Adult)  Goal: Plan of Care Review  Outcome: Ongoing (interventions implemented as appropriate)   02/04/18 1909   Outcome Evaluation   Outcome Summary/Follow up Plan Patient is able to demonstrate improving strength and mobility as seen by decreasing amount of assistance needed for mobilty. She is able to perform AROM to elbows, wrists and hands and pendulum exercises for left shoulder. She is expected to benefit from additional PT    Patient Care Overview   Progress improving   Coping/Psychosocial Response Interventions   Plan Of Care Reviewed With patient       Problem: Inpatient Physical Therapy  Goal: Bed Mobility Goal LTG- PT  Outcome: Ongoing (interventions implemented as appropriate)   02/03/18 1211 02/04/18 1909   Bed Mobility PT LTG   Bed Mobility PT LTG, Date Established 02/04/18 --    Bed Mobility PT LTG, Time to Achieve 2 wks --    Bed Mobility PT LTG, Activity Type all bed mobility --    Bed Mobility PT LTG, [place] Level contact guard assist --    Bed Mobility PT LTG, Outcome --  goal ongoing     Goal: Transfer Training Goal 1 LTG- PT  Outcome: Ongoing (interventions implemented as appropriate)   02/03/18 1211 02/04/18 1909   Transfer Training PT LTG   Transfer Training PT LTG, Date Established 02/04/18 --    Transfer Training PT LTG, Time to Achieve 2 wks --    Transfer Training PT LTG, Activity Type sit to stand/stand to sit;bed to chair /chair to bed --    Transfer Training PT LTG, [place] Level contact guard assist --    Transfer Training PT LTG, Outcome --  goal ongoing     Goal: Gait Training Goal LTG- PT  Outcome: Ongoing (interventions implemented as appropriate)   02/03/18 1211 02/04/18 1909   Gait Training PT LTG   Gait Training Goal PT LTG, Date Established 02/04/18 --    Gait Training Goal PT LTG, Time to Achieve 2 wks --    Gait Training Goal PT LTG, [place] Level contact guard assist --    Gait Training Goal PT LTG, Distance to Achieve 125 --     Gait Training Goal PT LTG, Additional Goal with limited SOA --    Gait Training Goal PT LTG, Outcome --  goal ongoing

## 2018-02-05 NOTE — PROGRESS NOTES
Continued Stay Note   Jose     Patient Name: Kathryn Davison  MRN: [id number]  Today's Date: 2/5/2018    Admit Date: 2/2/2018          Discharge Plan       02/05/18 1624    Case Management/Social Work Plan    Plan Received call from Elena at [facility] and can offer a bed, a Pvt Room.  Per Elena would be covered for 100days at 100%.  Spoke to daughter Ayala and pt in room and informed.  Daughter given Elena's # at [phone] to call for financial information.  Received call back from Ayala and want to accept the bed at [facility], states it is close to her [employer] Family.  Elena informed.  Nursing will need to call report to  [phone], and Fax discharge summary to .  Pt will need EMS transport.                Discharge Codes     None        Expected Discharge Date and Time     Expected Discharge Date Expected Discharge Time    Feb 5, 2018             Tangela Tam

## 2018-02-05 NOTE — PROGRESS NOTES
Continued Stay Note  RUDY Garcia     Patient Name: Kathryn Davison  MRN: [id number]  Today's Date: 2/5/2018    Admit Date: 2/2/2018          Discharge Plan       02/05/18 0949    Case Management/Social Work Plan    Additional Comments [facility] no beds until wed or thurday               Discharge Codes     None        Expected Discharge Date and Time     Expected Discharge Date Expected Discharge Time    Feb 5, 2018             Adalgisa Samuel

## 2018-02-05 NOTE — PROGRESS NOTES
PROGRESS NOTE        Date of Admission: 2/2/2018  Length of Stay: 2  Primary Care Physician: SANFORD Gill    Subjective   Chief Complaint: Dyspnea  HPI:  This is a 77-year-old female who was admitted after she had sustained a fall at home resulting in a closed fracture left shoulder and a right wrist fracture.  She did undergo an anterior left reverse total shoulder arthroplasty and an ORIF with a volar plate for a distal radius fracture.  Patient had some dyspnea over the weekend for which a troponin was ordered for this morning.  Her troponin was noted to be elevated.  Patient currently denies any chest pain however upon further questioning she states over the weekend when she got up with therapy she did have some pressure in her chest that did not radiate.  Dr. Stewart with cardiology was consulted.  He did see and evaluate the patient and apparently patient has been symptomatic for the last couple years.  She feels it has gotten worse in the last couple days.  She was told in the 90s that she had some small vessel disease.  It is felt that there is a high probability of patient does have significant coronary artery disease which has led to the elevation of her troponin.  She has been offered invasive workup to further evaluate however the family made the decision to follow-up with her primary cardiologist.  At this time we will optimize medical management including dual antiplatelet therapy.  2-D echo is currently pending.  She does have lower extremity edema which is likely secondary to chronic venous insufficiency however we are waiting the LV function assessment.  PT/OT has been consulted for strength and mobility as well as case management for discharge planning.  Referrals have been sent and [facility].  We will see how she does symptomatically and if she is cleared from the cardiac standpoint tomorrow then we will plan to [facility].      Review Of Systems:   Review of Systems   General ROS: Patient denies any fevers, chills or loss of consciousness.    Psychological ROS: Denies any hallucinations and delusions.  Ophthalmic ROS: Denies any diplopia or transient loss of vision.  ENT ROS: Denies sore throat, nasal congestion or ear pain.   Allergy and Immunology ROS: Denies rash or itching.  Hematological and Lymphatic ROS: Denies neck swelling or easy bleeding.  Endocrine ROS: Denies any recent unintentional weight gain or loss.  Breast ROS: Denies any pain or swelling.  Respiratory ROS: Denies cough or shortness of breath.   Cardiovascular ROS: Denies chest pain or palpitations. No history of exertional chest pain.   Gastrointestinal ROS: Denies nausea and vomiting. Denies any abdominal pain. No diarrhea.  Genito-Urinary ROS: Denies dysuria or hematuria.  Musculoskeletal ROS: Denies back pain. No muscle pain. No calf pain.   Neurological ROS: Denies any focal weakness. No loss of consciousness. Denies any numbness. Denies neck pain.   Dermatological ROS: Denies any redness or pruritis.    Objective      Temp:  [97.9 °F (36.6 °C)-98.7 °F (37.1 °C)] 97.9 °F (36.6 °C)  Heart Rate:  [81-95] 81  Resp:  [17-18] 17  BP: (150-175)/(71-78) 168/72  Physical Exam    General Appearance:  Alert and cooperative, not in any acute distress.   Head:  Atraumatic and normocephalic, without obvious abnormality.   Eyes:          PERRLA, conjunctivae and sclerae normal, no Icterus. No pallor. Extra-occular movements are within normal limits.   Ears:  Ears appear intact with no abnormalities noted.   Throat: No oral lesions, no thrush, oral mucosa moist.   Neck: Supple, trachea midline, no thyromegaly, no carotid bruit.   Back:   No kyphoscoliosis present. No tenderness to palpation,   range of motion normal.   Lungs:   Chest shape is normal. Breath sounds heard bilaterally equally.  No crackles or wheezing. No Pleural rub or bronchial breathing.    Heart:  Normal S1 and S2, no murmur, no gallop, no rub. No JVD   Abdomen:   Normal bowel sounds, no masses, no organomegaly. Soft     non-tender, non-distended, no guarding, no rebound                Tenderness, obese   Extremities: Moves all extremities well, no edema, no cyanosis, no clubbing.  Trace - +1 edema BLE   Pulses: Pulses palpable and equal bilaterally   Skin: No bleeding, bruising or rash   Lymph nodes: No palpable adenopathy   Neurologic:    Psychiatric/Behavior:     Cranial nerves 2 - 12 grossly intact, sensation intact, Motor power is normal and equal bilaterally.  Mood normal, behavior normal       Results Review:    I have reviewed the labs, radiology results and diagnostic studies.      Results from last 7 days  Lab Units 02/05/18  0614   WBC 10*3/mm3 12.40*   HEMOGLOBIN g/dL 9.1*   PLATELETS 10*3/mm3 338       Results from last 7 days  Lab Units 02/05/18  0614   SODIUM mmol/L 141   POTASSIUM mmol/L 4.0   CO2 mmol/L 33.0*   CREATININE mg/dL 1.00   GLUCOSE mg/dL 178*       Culture Data:   Urine Culture   Date Value Ref Range Status   02/01/2018 Mixed Natalia Isolated  Final     Radiology Data:   Cardiology Data:    I have reviewed the medications.    Assessment/Plan     Assessment and Plan:  1.  Non-ST elevated myocardial infarction-Dr. Stewart with cardiology was consulted he did see and evaluate the patient at this time and the family has opted to follow-up with primary cardiologist as an outpatient.  Cardiology has recommended dual antiplatelet therapy as well as statin therapy.  Her troponins are trending down at this time.  2-D echo has been ordered to assess her left ventricular systolic function which is currently pending.    2.  Closed fracture of the left shoulder s/p left reverse total shoulder arthroplasty     3.  Right wrist fracture status post ORIF with a volar plate for a distal radius fracture     4.  Chronic back pain    5.  Obstructive sleep apnea on home CPAP therapy    6.  Morbid  obesity with BMI 43-nutritional counseling    7.  Diabetes mellitus type 2-patient is on insulin protocol.  Her blood sugars are stable.  Continue to monitor and titrate accordingly.        DVT prophylaxis:  lovenox  Discharge Planning:   NANNETTE Long 02/05/18 11:27 AM

## 2018-02-05 NOTE — SIGNIFICANT NOTE
02/05/18 0900   Rehab Treatment   Discipline physical therapy assistant   Treatment Not Performed patient unavailable for treatment  (D/t pt elevated troponin nurse recommended to not work with pt until placed on heart monitor. Therapy will f/u with pt after transfer to 3rd floor.  )

## 2018-02-05 NOTE — PROGRESS NOTES
Continued Stay Note   Jose     Patient Name: Kathryn Davison  MRN: [id number]  Today's Date: 2/5/2018    Admit Date: 2/2/2018          Discharge Plan       02/05/18 1624    Case Management/Social Work Plan    Plan Received call from Elena at [facility] and can offer a bed, a Pvt Room.  Per Elena would be covered for 100days at 100%.  Spoke to daughter Ayala and pt in room and informed.  Daughter given Elena's # at [phone] to call for financial information.  Received call back from Ayala and want to accept the bed at [facility], states it is close to her [employer] [facility].  Elena informed.  Nursing will need to call report to  [phone], and Fax discharge summary to .  Pt will need EMS transport.                Discharge Codes     None        Expected Discharge Date and Time     Expected Discharge Date Expected Discharge Time    Feb 5, 2018             Tangela Tam

## 2018-02-05 NOTE — NURSING NOTE
Received report from 4th floor nurse MATILDE Antunez. Patient was transported into room 321 at approx. 1000. Patient is without s/s of distress, call light within reach, needs met at this time.Will continue to monitor patient.

## 2018-02-05 NOTE — PROGRESS NOTES
Continued Stay Note  [facility]     Patient Name: Kathryn Davison  MRN: [id number]  Today's Date: 2/5/2018    Admit Date: 2/2/2018          Discharge Plan       02/05/18 1034    Case Management/Social Work Plan    Plan Received call from [facility], has no beds, called to [facility] may have bed but unsure.  Called to Elena at [facility] has bed at [facility] and [facility] and will look at referral.  Has had Noro and Flu at [facility] of which pt needs to be aware if accepts.  Cm  will continue to follow.        02/05/18 0906    Case Management/Social Work Plan    Additional Comments [facility] no beds until wed or thurday               Discharge Codes     None        Expected Discharge Date and Time     Expected Discharge Date Expected Discharge Time    Feb 5, 2018             Tangela Tam

## 2018-02-05 NOTE — PROGRESS NOTES
Continued Stay Note   [facility]     Patient Name: Kathryn Davison  MRN: [id number]  Today's Date: 2/5/2018    Admit Date: 2/2/2018          Discharge Plan       02/05/18 1228    Case Management/Social Work Plan    Plan Recieved call from Sunitha at [facility] and can accept tomorrow in Semi Pvt room.  Spoke to pt and daughter in room, informed [facility] can accept but is hoping for discharges.  Pt and daughter undecided as to whether will go to [facility] or [facility]. CM will Follow up later today.  Both pt and daughter aware [facility] cannot hold bed       02/05/18 1059    Case Management/Social Work Plan    Additional Comments [facility] has accpeted pt for toamrrow 02/06 02/05/18 1034    Case Management/Social Work Plan    Plan Received call from [facility], has no beds, called to [facility] may have bed but unsure.  Called to Elena at [facility] has bed at [facility] and [facility] and will look at referral.  Has had Noro and Flu at [facility] of which pt needs to be aware if accepts.  Cm  will continue to follow.        02/05/18 0992    Case Management/Social Work Plan    Additional Comments [facility] no beds until wed or thurday               Discharge Codes     None        Expected Discharge Date and Time     Expected Discharge Date Expected Discharge Time    Feb 5, 2018             Tangela Tam

## 2018-02-05 NOTE — THERAPY EVALUATION
"Acute Care - Occupational Therapy Initial Evaluation  [facility]     Patient Name: Kathryn Davison  : 1940  MRN: [id number]  Today's Date: 2018  Onset of Illness/Injury or Date of Surgery Date: 18  Date of Referral to OT: 18  Referring Physician: Dr. Meneses    Admit Date: 2018       ICD-10-CM ICD-9-CM   1. Impaired functional mobility, balance, gait, and endurance Z74.09 V49.89   2. Pre-op exam Z01.818 V72.84   3. Impaired mobility and ADLs Z74.09 799.89     Patient Active Problem List   Diagnosis   • Constipation   • Left lower quadrant pain   • Colon cancer screening   • Left upper quadrant pain   • Diverticulosis of large intestine without hemorrhage   • Colon polyps   • Vascular ectasia of colon   • Internal hemorrhoids   • External hemorrhoid   • Elevated liver function tests   • Elevated alkaline phosphatase level   • Gastritis without bleeding   • Helicobacter pylori infection   • Abnormal ultrasound of liver   • Gastric polyp   • Pre-op exam   • Right wrist fracture   • Diabetes mellitus type 2 in obese   • Essential hypertension   • Acquired hypothyroidism   • Morbid obesity with BMI of 40.0-44.9, adult   • Closed fracture of left shoulder   • Status post total shoulder replacement, left     Past Medical History:   Diagnosis Date   • Back pain    • Body piercing     BOTH EARS   • Carotid artery stenosis     BILATERAL-FOLLOWING WITH DR. WINCHESTER.  SEES EVERY 6 MONTHS.  STATES ABOUT 70% OCCLUSIION   • Cataract, bilateral    • Colon polyp 12/10/2012   • Diabetes    • Disease of thyroid gland    • Diverticulosis    • Fatty liver 2017   • Fracture     RIGHT ANKLE AND LEFT ARM    • High cholesterol    • History of nuclear stress test 2016    \"IT WAS OK\"   • Hypertension    • Lumbosacral disc disease    • Osteoarthritis    • Osteoporosis    • Sleep apnea     CPAP HS - TO BRING IN DOS   • Wears glasses     FOR DISTANCE     Past Surgical History:   Procedure Laterality Date   • " ANKLE ARTHROSCOPY Right    • BREAST SURGERY Left     CYST REMOVED   • CARDIAC CATHETERIZATION      NO STENT   • CATARACT EXTRACTION Bilateral     2014 and 2015   • COLONOSCOPY  12/10/2012   • COLONOSCOPY N/A 2/22/2017    Procedure: COLONOSCOPY with hot and cold snare polypectomies, resolution clip placement, cold biopsy polypectomy;  Surgeon: Mg Awan MD;  Location: [facility] ENDOSCOPY;  Service:    • DILATION AND CURETTAGE, DIAGNOSTIC / THERAPEUTIC      X4   • ENDOSCOPY N/A 7/20/2017    Procedure: ESOPHAGOGASTRODUODENOSCOPY with biopsies and cold biopsy polypectomy;  Surgeon: Mg Awan MD;  Location: [facility] ENDOSCOPY;  Service:    • FRACTURE SURGERY Right     ANKLE   • ORIF WRIST FRACTURE Right 2/2/2018    Procedure: WRIST RIGHT OPEN REDUCTION INTERNAL FIXATION WITH VOLAR PLATE;  Surgeon: Phu Meneses MD;  Location: [facility] OR;  Service:    • TOTAL SHOULDER ARTHROPLASTY W/ DISTAL CLAVICLE EXCISION Left 2/2/2018    Procedure: SHOULDER TOTAL LEFT  REVERSE ARTHROPLASTY WITH TUBEROSITY RECONSTRUCTION;  Surgeon: Phu Meneses MD;  Location: [facility] OR;  Service:    • TUBAL ABDOMINAL LIGATION  1980?   • UPPER GASTROINTESTINAL ENDOSCOPY  07/20/2017          OT ASSESSMENT FLOWSHEET (last 72 hours)      OT Evaluation       02/05/18 1334 02/04/18 1440 02/04/18 1127 02/03/18 1211 02/03/18 1133    Rehab Evaluation    Document Type evaluation  -SD therapy note (daily note)  -JR therapy note (daily note)  -JR evaluation  -JR     Subjective Information agree to therapy;complains of;pain  -SD  agree to therapy  -JR agree to therapy;complains of;weakness  -JR     Patient Effort, Rehab Treatment adequate  -SD   good  -JR     Symptoms Noted During/After Treatment fatigue;increased pain  -SD   increased pain  -JR     General Information    Patient Profile Review yes  -SD   yes  -JR     Onset of Illness/Injury or Date of Surgery Date 02/02/18  -SD   02/02/18  -JR     Referring Physician Dr. Meneses  -JENNIFER Bay  MD Gideon  -JR     General Observations Supine in bed, UE in sling, daughter present at bedside  -SD   Supine with LUE in sling and swathe, Rt hand wrapped in elastic bandage with family at bedside  -JR     Pertinent History Of Current Problem Fall resulting in fx; L rTSA and R wrist ORIF with volar plate  -SD   Fall with resulting Left shoulder fx and Right wrist fx s/p ORIF in Rt wrist, and Lt shoulder reverse replacement  -JR     Precautions/Limitations fall precautions  -SD   fall precautions  -JR     Prior Level of Function independent:;community mobility  -SD   independent:;community mobility  -JR     Equipment Currently Used at Home cane, quad;shower chair;rollator;walker, standard;power chair, (recliner lift)  -SD   bipap/ cpap;glucometer  -JR bipap/ cpap;glucometer  -LB    Plans/Goals Discussed With patient and family;agreed upon  -SD   patient and family;agreed upon  -JR     Risks Reviewed patient and family:;increased discomfort  -SD   patient and family:;increased discomfort  -JR     Benefits Reviewed patient and family:;improve function;increase independence;increase strength;increase balance  -SD   patient and family:;increase balance;increase strength;increase independence;improve function  -JR     Barriers to Rehab none identified  -SD   none identified  -JR     Living Environment    Lives With alone  -SD   alone  -JR alone  -LB    Living Arrangements mobile home  -SD   mobile home  -JR mobile home  -LB    Home Accessibility stairs to enter home  -SD   bed and bath on same level;stairs within home  -JR bed and bath on same level  -LB    Number of Stairs to Enter Home 3  -SD        Number of Stairs Within Home    2  -JR     Stair Railings at Home none  -SD    none  -LB    Transportation Available car  -SD        Clinical Impression    Date of Referral to OT 02/02/18  -SD        OT Diagnosis ADL decline  -SD        Impairments Found (describe specific impairments) aerobic capacity/endurance;gait,  locomotion, and balance;ROM  -SD        Patient/Family Goals Statement Increase strength and mobility  -SD        Criteria for Skilled Therapeutic Interventions Met yes  -SD        Rehab Potential good, to achieve stated therapy goals  -SD        Therapy Frequency 3-5 times/wk  -SD        Anticipated Discharge Disposition inpatient rehabilitation facility  -SD        Vital Signs    Pre Systolic BP Rehab 125  -SD        Pre Treatment Diastolic BP 56  -SD        Pain Assessment    Pain Assessment 0-10  -SD   0-10  -JR     Pain Score 6  -SD   7  -JR     Post Pain Score 6  -SD   6  -JR     Pain Type Acute pain;Surgical pain  -SD        Pain Location Shoulder  -SD   Shoulder  -JR     Pain Orientation Left  -SD   Left  -JR     Pain Descriptors    Throbbing  -JR     Pain Frequency    Constant/continuous  -JR     Pain Intervention(s) Repositioned  -SD   Repositioned;Ambulation/increased activity  -JR     Response to Interventions tolerated  -SD   decreased pain after treatment  -JR     Vision Assessment/Intervention    Visual Impairment WFL  -SD        Cognitive Assessment/Intervention    Current Cognitive/Communication Assessment functional  -SD   functional  -JR     Orientation Status oriented x 4  -SD   oriented x 4  -JR     Follows Commands/Answers Questions able to follow multi-step instructions;needs cueing  -SD   able to follow multi-step instructions  -JR     Personal Safety decreased awareness, need for assist;decreased awareness, need for safety  -SD   decreased awareness, need for safety;decreased awareness, need for assist  -JR     Personal Safety Interventions fall prevention program maintained;gait belt;nonskid shoes/slippers when out of bed  -SD   fall prevention program maintained;gait belt;nonskid shoes/slippers when out of bed  -JR     ROM (Range of Motion)    General ROM upper extremity range of motion deficits identified  -SD        General ROM Detail L shoulder NT, R wrist  -SD   BLE are WFL & Left  shoulder NT  -JR     MMT (Manual Muscle Testing)    General MMT Assessment upper extremity strength deficits identified;lower extremity strength deficits identified  -SD        General MMT Assessment Detail L shoulder and R wrist NT; 3+/5 otherwise  -SD   Left UE NT due to injury  -JR     Bed Mobility, Assessment/Treatment    Bed Mobility, Assistive Device bed rails;head of bed elevated  -SD   head of bed elevated;bed rails  -JR     Bed Mob, Supine to Sit, [place]    minimum assist (75% patient effort);moderate assist (50% patient effort)  -JR     Bed Mob, Sit to Supine, [place] minimum assist (75% patient effort)  -SD   minimum assist (75% patient effort);moderate assist (50% patient effort)  -JR     Bed Mob, Sidelying to Sit, [place] minimum assist (75% patient effort)  -SD        Bed Mobility, Safety Issues decreased use of arms for pushing/pulling  -SD        Bed Mobility, Impairments ROM decreased;strength decreased;pain  -SD   impaired balance;strength decreased;postural control impaired  -     Transfer Assessment/Treatment    Transfers, Sit-Stand [place] minimum assist (75% patient effort)  -SD   minimum assist (75% patient effort)  -JR     Transfers, Stand-Sit [place] minimum assist (75% patient effort)  -SD   minimum assist (75% patient effort)  -JR     Transfer, Impairments ROM decreased;strength decreased;impaired balance;pain  -SD   strength decreased;impaired balance;motor control impaired  -     Functional Mobility    Functional Mobility- Ind. Level minimum assist (75% patient effort)  -SD        Functional Mobility-Distance (Feet) 3  -SD        Functional Mobility- Safety Issues balance decreased during turns;step length decreased;weight-shifting ability decreased  -SD        Functional Mobility- Comment steps toward head of bed  -SD        Upper Body Bathing Assessment/Training    UB Bathing Assess/Train, [place] Level moderate assist (50% patient effort)  -SD         Lower Body Bathing Assessment/Training    LB Bathing Assess/Train, [place] Level maximum assist (25% patient effort)  -SD        Upper Body Dressing Assessment/Training    UB Dressing Assess/Train, [place] moderate assist (50% patient effort)  -SD        Lower Body Dressing Assessment/Training    LB Dressing Assess/Train, [place] maximum assist (25% patient effort)  -SD        Toileting Assessment/Training    Toileting Assess/Train, [place] Level moderate assist (50% patient effort)  -SD        Grooming Assessment/Training    Grooming Assess/Train, [place] Level contact guard assist  -SD        Therapy Exercises    Left Upper Extremity standing;PROM:;sitting   circular pendulums x 15 and distal AAROM seated   -SD        Exercise Protocols shoulder surgery   rTSA  -SD        Positioning and Restraints    Pre-Treatment Position in bed  -SD   in bed  -JR     Post Treatment Position bed  -SD   chair  -JR     In Bed supine;call light within reach;encouraged to call for assist;with family/caregiver  -SD        In Chair    sitting;call light within reach;encouraged to call for assist;with family/caregiver  -JR       02/03/18 1132 02/02/18 1630             Functional Level Prior    Ambulation 1-->assistive equipment  -LB 1-->assistive equipment  -CB       Transferring 1-->assistive equipment  -LB 1-->assistive equipment  -CB       Toileting 1-->assistive equipment  -LB 1-->assistive equipment  -CB       Bathing 0-->independent  -LB 1-->assistive equipment  -CB       Dressing 0-->independent  -LB 1-->assistive equipment  -CB       Eating  0-->independent  -CB       Communication 0-->understands/communicates without difficulty  -LB 0-->understands/communicates without difficulty  -CB       Swallowing 0-->swallows foods/liquids without difficulty  -LB 0-->swallows foods/liquids without difficulty  -CB       Prior Functional Level Comment  assitive equipment  -CB         User Key  (r) = Recorded By, (t) = Taken  By, (c) = Cosigned By    Initials Name Effective Dates    JR Addie Taylor, PT 10/26/16 -     CB Yohan Little, RN 10/26/16 -     LB Adalgisa Santiago, MATILDE 10/26/16 -     SD Rosangela Baker OT 06/30/17 -            Occupational Therapy Education     Title: PT OT SLP Therapies (Active)     Topic: Occupational Therapy (Active)     Point: ADL training (Done)    Description: Instruct learner(s) on proper safety adaptation and remediation techniques during self care or transfers.   Instruct in proper use of assistive devices.    Learning Progress Summary    Learner Readiness Method Response Comment Documented by Status   Patient Acceptance E VU Benefit of OT and OT POC SD 02/05/18 1531 Done                      User Key     Initials Effective Dates Name Provider Type Discipline    SD 06/30/17 -  Rosangela Baker, OT Occupational Therapist OT                  OT Recommendation and Plan  Anticipated Discharge Disposition: inpatient rehabilitation facility  Therapy Frequency: 3-5 times/wk  Plan of Care Review  Plan Of Care Reviewed With: patient  Progress: no change  Outcome Summary/Follow up Plan: OT eval completed. Patient presents deficits in ROM, strength, endurance, balance, mobility and ADL performance. Patient is expected to benefit from OT services to improve overall functional performance and mobility prior to DC.           OT Goals       02/05/18 1526          Bed Mobility OT LTG    Bed Mobility OT LTG, Date Established 02/05/18  -SD      Bed Mobility OT LTG, Time to Achieve 2 wks  -SD      Bed Mobility OT LTG, Activity Type supine to sit/sit to supine  -SD      Bed Mobility OT LTG, [place] Level contact guard assist  -SD      Bed Mobility OT LTG, Outcome goal ongoing  -SD      Transfer Training 2 OT LTG    Transfer Training 2 OT LTG, Date Established 02/05/18  -SD      Transfer Training 2 OT LTG, Time to Achieve 2 wks  -SD      Transfer Training 2 OT LTG, Activity Type sit to stand/stand to sit  -SD       Transfer Training 2 OT LTG, [place] Level contact guard assist  -SD      Transfer Training 2 OT LTG, Outcome goal ongoing  -SD      Patient Education OT LTG    Patient Education OT LTG, Date Established 02/05/18  -SD      Patient Education OT LTG, Time to Achieve 2 wks  -SD      Patient Education OT LTG, Education Type precautions per surgeon   Distal ROM; pendulums; sling mgmt  -SD      Patient Education OT LTG, Education Understanding demonstrates adequately;verbalizes understanding  -SD      Patient Education OT LTG Outcome goal ongoing  -SD      Bathing OT LTG    Bathing Goal OT LTG, Date Established 02/05/18  -SD      Bathing Goal OT LTG, Time to Achieve 2 wks  -SD      Bathing Goal OT LTG, Activity Type upper body bathing  -SD      Bathing Goal OT LTG, [place] Level minimum assist (75% patient effort)  -SD      Bathing Goal OT LTG, Outcome goal ongoing  -SD      Toileting OT LTG    Toileting Goal OT LTG, Date Established 02/05/18  -SD      Toileting Goal OT LTG, Time to Achieve 2 wks  -SD      Toileting Goal OT LTG, [place] Level minimum assist (75% patient effort)  -SD      Toileting Goal OT LTG, Outcome goal ongoing  -SD      Functional Mobility OT STG    Functional Mobility Goal OT STG, Date Established 02/05/18  -SD      Functional Mobility Goal OT STG, Time to Achieve 2 wks  -SD      Functional Mobility Goal OT STG, [place] Level contact guard  -SD      Functional Mobility Goal OT STG, Distance to Achieve in hallway  -SD      Functional Mobility Goal OT STG, Additional Goal 100  -SD      Functional Mobility Goal OT STG, Outcome goal ongoing  -SD        User Key  (r) = Recorded By, (t) = Taken By, (c) = Cosigned By    Initials Name Provider Type    SD Rosangela Baker, OT Occupational Therapist                Outcome Measures       02/05/18 1334 02/03/18 1211       How much help from another person do you currently need...    Turning from your back to your side while in flat bed without  using bedrails?  2  -JR     Moving from lying on back to sitting on the side of a flat bed without bedrails?  2  -JR     Moving to and from a bed to a chair (including a wheelchair)?  2  -JR     Standing up from a chair using your arms (e.g., wheelchair, bedside chair)?  2  -JR     Climbing 3-5 steps with a railing?  1  -JR     To walk in hospital room?  2  -JR     AM-PAC 6 Clicks Score  11  -JR     How much help from another is currently needed...    Putting on and taking off regular lower body clothing? 2  -SD      Bathing (including washing, rinsing, and drying) 2  -SD      Toileting (which includes using toilet bed pan or urinal) 2  -SD      Putting on and taking off regular upper body clothing 2  -SD      Taking care of personal grooming (such as brushing teeth) 3  -SD      Eating meals 3  -SD      Score 14  -SD      Functional Assessment    Outcome Measure Options AM-PAC 6 Clicks Daily Activity (OT)  -SD AM-PAC 6 Clicks Basic Mobility (PT)  -JR       User Key  (r) = Recorded By, (t) = Taken By, (c) = Cosigned By    Initials Name Provider Type    JR Addie Taylor, PT Physical Therapist    SD Rosangela Baker, OT Occupational Therapist          Time Calculation:   OT Start Time: 1334    Therapy Charges for Today     Code Description Service Date Service Provider Modifiers Qty    [id number]  OT EVAL MOD COMPLEXITY 4 2/5/2018 Rosangela Baker OT GO 1               Rosangela Baker OT  2/5/2018

## 2018-02-05 NOTE — PROGRESS NOTES
Continued Stay Note   [facility]     Patient Name: Kathryn Davison  MRN: [id number]  Today's Date: 2/5/2018    Admit Date: 2/2/2018          Discharge Plan       02/05/18 1059    Case Management/Social Work Plan    Additional Comments  Jaleel has accpeted pt for tomorrow  02/06 02/05/18 1034    Case Management/Social Work Plan    Plan Received call from [facility], has no beds, called to [facility] may have bed but unsure.  Called to Elena at [facility] bed at [facility] and [facility] and will look at referral.  Has had Noro and Flu at [facility] of which pt needs to be aware if accepts.  Cm  will continue to follow.        02/05/18 0907    Case Management/Social Work Plan    Additional Comments [facility] no beds until wed or thurday               Discharge Codes     None        Expected Discharge Date and Time     Expected Discharge Date Expected Discharge Time    Feb 5, 2018             Adalgisa Samuel

## 2018-02-05 NOTE — PROGRESS NOTES
"      [facility]  PROGRESS NOTE    Name:  Kathryn Davison   Age:  77 y.o.  Sex:  female  :  1940  MRN:  [id number]   Visit Number:  [id number]  Admission Date:  2018  6:42 AM  Date Of Service:  18  Primary Care Physician:  SANFORD Gill     LOS: 2 days :  Patient Care Team:  SANFORD Gill as PCP - General:    Chief Complaint:      \" I am doing some better today\"  Subjective / Interval History:       Patient is being seen for daily rounds in regards to right wrist and left shoulder surgery.    POD #3 left reverse total shoulder arthroplasty with tuberosity reconstruction for severe comminuted displaced unstable four part proximal humerus fracture, and ORIF with a volar plate for comminuted unstable intraarticular distal radius fracture.  Patient states she is doing better.  She states she did have some shortness of breath earlier in which she saw a cardiologist and feels better about this.  She currently denies chest pain, dizziness or shortness of breath.  She is anticipating discharge to a rehabilitation facility in [place].    Patient tolerated both procedures well.  She did have elevated troponin levels due to complaints of dyspnea.  She was seen by cardiology - currently awaiting evaluation notes.    Review of Systems:     General ROS: No fever spike, no chills or loss of consciousness.  Ophthalmic ROS: no acute visual disturbance.  ENT ROS: No sinus congestion or sore throat.   Respiratory ROS: No shortness of breath.   Cardiovascular ROS: No chest pain or palpitations.  Gastrointestinal ROS: No acute abdominal change. Non-distended.  Genito-Urinary ROS: No reported dysuria or hematuria.  Musculoskeletal ROS: + Tenderness to left shoulder.  No deep calf pain. No acute focal motor deficit  Neurological ROS: No cyanosis, no new numbness or tingling.  Dermatological ROS: No erythema.  No rash or pruritis.    Vital Signs:    Temp:  [97.9 °F (36.6 " °C)-98.7 °F (37.1 °C)] 98 °F (36.7 °C)  Heart Rate:  [73-95] 73  Resp:  [17-18] 17  BP: (125-175)/(55-78) 135/55    Intake and output:    I/O last 3 completed shifts:  In: -   Out: 4000 [Urine:4000]  I/O this shift:  In: 240 [P.O.:240]  Out: -     Physical Examination:    General Appearance:    Alert and cooperative, no acute distress.   Head:    Atraumatic and normocephalic, without obvious abnormality.   Eyes:    PERRLA.  Extraocular movements are within normal limits.   ENT:   No acute change.   Neck:   Supple, trachea midline.   Lungs:     Normal respirations, unlabored.    Heart:    Regular rate.   Abdomen:     Soft non-tender, non-distended.   Extremities:   No new motor deficit, no calf pain, Gilberto sign negative.   Skin:     No rash.  No cyanosis.  No erythema.  No active drainage.    The initial dressing on the left shoulder is still intact very clean and dry.  I would like to leave this on preferably until her postop appointment.    The incision to the right wrist is clean, dry and pristine.  A nonstick dressing was placed.     Neurologic:   Sensation intact, pulses intact.     Laboratory results:    Lab Results (last 24 hours)     Procedure Component Value Units Date/Time    POC Glucose Once [[id number]]  (Abnormal) Collected:  02/04/18 1635    Specimen:  Blood Updated:  02/04/18 1705     Glucose 282 (H) mg/dL       Serial Number: [id number]:  [id number]       POC Glucose Once [[id number]]  (Abnormal) Collected:  02/04/18 2058    Specimen:  Blood Updated:  02/04/18 2125     Glucose 258 (H) mg/dL       Serial Number: [id number]:  [id number]       Hemoglobin A1c [[id number]]  (Abnormal) Collected:  02/03/18 0521    Specimen:  Blood Updated:  02/05/18 0155     Hemoglobin A1C 9.2 (H) %     Narrative:       Expected HgbA1C results:     3% to 6% HgbA1C      HgbA1C                 Estimated Average Glucose     5%                              97 mg/dl   6%                             126 mg/dl   7%                              154 mg/dl   8%                             183 mg/dl   9%                             212 mg/dl  >10%                           >240 mg/dl      POC Glucose Once [[id number]]  (Abnormal) Collected:  02/05/18 0605    Specimen:  Blood Updated:  02/05/18 0615     Glucose 190 (H) mg/dL       Serial Number: [id number]:  [id number]       CBC & Differential [[id number]] Collected:  02/05/18 0614    Specimen:  Blood Updated:  02/05/18 0631    Narrative:       The following orders were created for panel order CBC & Differential.  Procedure                               Abnormality         Status                     ---------                               -----------         ------                     CBC Auto Differential[[id number]]        Abnormal            Final result                 Please view results for these tests on the individual orders.    CBC Auto Differential [[id number]]  (Abnormal) Collected:  02/05/18 0614    Specimen:  Blood Updated:  02/05/18 0631     WBC 12.40 (H) 10*3/mm3      RBC 3.38 (L) 10*6/mm3      Hemoglobin 9.1 (L) g/dL      Hematocrit 28.7 (L) %      MCV 84.9 fL      MCH 26.9 (L) pg      MCHC 31.7 g/dL      RDW 13.7 %      RDW-SD 42.7 fl      MPV 9.6 fL      Platelets 338 10*3/mm3      Neutrophil % 66.7 %      Lymphocyte % 21.3 %      Monocyte % 8.5 %      Eosinophil % 1.6 %      Basophil % 0.7 %      Immature Grans % 1.2 (H) %      Neutrophils, Absolute 8.27 (H) 10*3/mm3      Lymphocytes, Absolute 2.64 10*3/mm3      Monocytes, Absolute 1.05 (H) 10*3/mm3      Eosinophils, Absolute 0.20 10*3/mm3      Basophils, Absolute 0.09 10*3/mm3      Immature Grans, Absolute 0.15 (H) 10*3/mm3      nRBC 0.0 /100 WBC     Basic Metabolic Panel [[id number]]  (Abnormal) Collected:  02/05/18 0614    Specimen:  Blood Updated:  02/05/18 0655     Glucose 178 (H) mg/dL      BUN 29 (H) mg/dL      Creatinine 1.00 mg/dL      Sodium 141 mmol/L      Potassium 4.0 mmol/L      Chloride 100 mmol/L      CO2 33.0  (H) mmol/L      Calcium 9.1 mg/dL      eGFR Non African Amer 54 (L) mL/min/1.73      BUN/Creatinine Ratio 29.0 (H)     Anion Gap 12.0 mmol/L     Narrative:       The MDRD GFR formula is only valid for adults with stable renal function between ages 18 and 70.    Troponin [[id number]]  (Abnormal) Collected:  02/05/18 0614    Specimen:  Blood Updated:  02/05/18 0744     Troponin I 0.644 (C) ng/mL     Narrative:       Normal Patient Upper Reference Limit (URL) (99th Percentile)=0.03 ng/mL   Non-AMI Illness Reference Limit=0.03-0.11 ng/mL   AMI Confirmation=0.12 ng/mL and above    CK [[id number]]  (Normal) Collected:  02/05/18 0614    Specimen:  Blood Updated:  02/05/18 0849     Creatine Kinase 106 U/L     Tissue Pathology Exam - Bone, Shoulder, Left [[id number]] Collected:  02/02/18 1208    Specimen:  Bone from Shoulder, Left Updated:  02/05/18 0921    Lipid Panel [[id number]] Collected:  02/05/18 0614    Specimen:  Blood Updated:  02/05/18 0954     Total Cholesterol 153 mg/dL      Triglycerides 109 mg/dL      HDL Cholesterol 51 mg/dL      LDL Cholesterol  80 mg/dL      VLDL Cholesterol 21.8 mg/dL      LDL/HDL Ratio 1.57    Narrative:       Reference ranges for triglycerides, VLDL cholesterol, LDL cholesterol, and HDL cholesterol are not true population normal ranges but are threshold levels for increased risk of coronary artery disease established by ATP III guidelines from the National Cholesterol Education Program.    TSH [[id number]]  (Normal) Collected:  02/05/18 0942    Specimen:  Blood Updated:  02/05/18 1050     TSH 3.830 mIU/mL     Troponin [[id number]]  (Abnormal) Collected:  02/05/18 0942    Specimen:  Blood Updated:  02/05/18 1059     Troponin I 0.562 (C) ng/mL     Narrative:       Normal Patient Upper Reference Limit (URL) (99th Percentile)=0.03 ng/mL   Non-AMI Illness Reference Limit=0.03-0.11 ng/mL   AMI Confirmation=0.12 ng/mL and above    POC Glucose Once [[id number]]  (Abnormal) Collected:  02/05/18 1129     Specimen:  Blood Updated:  02/05/18 1230     Glucose 154 (H) mg/dL       Serial Number: [id number]:  [id number]       Troponin [[id number]]  (Abnormal) Collected:  02/05/18 1529    Specimen:  Blood Updated:  02/05/18 1624     Troponin I 0.485 (C) ng/mL       Corrected result. Previous result was 0.478 ng/mL on 2/5/2018 at 1602 EST       Narrative:       Normal Patient Upper Reference Limit (URL) (99th Percentile)=0.03 ng/mL   Non-AMI Illness Reference Limit=0.03-0.11 ng/mL   AMI Confirmation=0.12 ng/mL and above          I have reviewed the patient's laboratory results.    Radiology results:    Imaging Results (last 24 hours)     ** No results found for the last 24 hours. **                 Assessment/Plan    Principal Problem:    Closed fracture of left shoulder  Active Problems:    Right wrist fracture    Diabetes mellitus type 2 in obese    Essential hypertension    Acquired hypothyroidism    Morbid obesity with BMI of 40.0-44.9, adult    Status post total shoulder replacement, left      S/P Right wrist ORIF and Left rTSA:    Continue current management per the detailed orders and instructions, including skin, safety and fall precautions, respiratory therapy with incentive spirometer, advance diet as tolerated, bowel regimen, multi-modal analgesia, multi-modal DVT prophylaxis,, PT/OT following post-surgical rehab protocol, and Case Management for discharge plans.    From the orthopedic standpoint, the patient is stable for discharge to rehabilitation.  She must wear the shoulder sling on the left shoulder for at least 3 weeks until cleared by orthopedics.  No weightbearing activity with the left upper extremity.  Also, no weightbearing to the right wrist.  Patient must use a Velcro wrist brace to the right wrist daily.  May remove the wrist brace for hygiene purposes.  Must keep all incision sites clean and dry.        Enzo Nguyen PA-C  02/05/18  4:42 PM

## 2018-02-05 NOTE — PROGRESS NOTES
"Adult Nutrition  Assessment    Patient Name:  Kathryn Davison  YOB: 1940  MRN: [id number]  Admit Date:  2/2/2018    Assessment Date:  2/5/2018    Comments:  Rec. #1: continue with diet as ordered. Pt. Consuming ~70% of meals on average per NSG x 5 meals. RD added Glucerna BID. Rec. #2: Consider adding MVI with minerals daily to promote healing. Consult RD PRN.           Reason for Assessment       02/05/18 1334    Reason for Assessment    Reason For Assessment/Visit admission assessment    Identified At Risk By Screening Criteria BMI;diagnosis    Cardiac HTN    Endocrine DM Type 2    Ortho Fracture    Factors Affecting Nutrition Factors    Appetite Good                  Anthropometrics       02/05/18 1335    Anthropometrics    Height Method Stated    Height 160 cm (63\")    Weight Method Bed scale    Weight 114 kg (251 lb 5.2 oz)    Ideal Body Weight (IBW)    Ideal Body Weight (IBW), Female 53.12    % Ideal Body Weight 215.07    Body Mass Index (BMI)    BMI (kg/m2) 44.61    BMI Grade greater than 40 - obesity grade III            Labs/Tests/Procedures/Meds       02/05/18 1336    Labs/Tests/Procedures/Meds    Labs/Tests Review Reviewed;Glucose;BUN;Hgb Hct   High: Gluc, BUN    Medication Review Reviewed, pertinent;Antacid;Insulin;Other (comment)   Lasix            Physical Findings       02/05/18 1336    Physical Findings/Assessment    Additional Documentation Physical Appearance (Group)    Physical Appearance    Overall Physical Appearance obese    Skin surgical wound            Estimated/Assessed Needs       02/05/18 1337    Calculation Measurements    Weight Used For Calculations 53.1 kg (117 lb 1.7 oz)    Height Used for Calculations 1.6 m (5' 3\")    Estimated/Assessed Energy Needs    Energy Need Method Woodlyn-St Jeor    Age 77    RMR (Woodlyn-St. Jeor Equation) 985.32    Activity Factors (Woodlyn St Jeor)  Out of bed, ambulatory  1.3    Estimated Kcal Range  ~3261-8603    Estimated/Assessed " Protein Needs    Weight Used for Protein Calculation 53.1 kg (117 lb 1.7 oz)    Protein (gm/kg) 1.2    1.2 Gm Protein (gm) 63.74    Estimated Protein Range ~53.12-63.74    Estimated/Assessed Fluid Needs    Fluid Need Method RDA method    RDA Method (mL)  --   ~9679-7417            Nutrition Prescription Ordered       02/05/18 1338    Nutrition Prescription PO    Current PO Diet Regular    Common Modifiers Cardiac;Consistent Carbohydrate            Evaluation of Received Nutrient/Fluid Intake       02/05/18 1338    PO Evaluation    Number of Days PO Intake Evaluated 2 days    Number of Meals 5    % PO Intake 70            Electronically signed by:  Bethany Randolph RD  02/05/18 1:40 PM

## 2018-02-05 NOTE — PLAN OF CARE
Problem: Patient Care Overview (Adult)  Goal: Plan of Care Review  Outcome: Ongoing (interventions implemented as appropriate)   02/05/18 1526   Outcome Evaluation   Outcome Summary/Follow up Plan OT ebony completed. Patient presents deficits in ROM, strength, endurance, balance, mobility and ADL performance. Patient is expected to benefit from OT services to improve overall functional performance and mobility prior to DC.    Patient Care Overview   Progress no change   Coping/Psychosocial Response Interventions   Plan Of Care Reviewed With patient       Problem: Inpatient Occupational Therapy  Goal: Bed Mobility Goal LTG- OT  Outcome: Ongoing (interventions implemented as appropriate)   02/05/18 1526   Bed Mobility OT LTG   Bed Mobility OT LTG, Date Established 02/05/18   Bed Mobility OT LTG, Time to Achieve 2 wks   Bed Mobility OT LTG, Activity Type supine to sit/sit to supine   Bed Mobility OT LTG, [place] Level contact guard assist   Bed Mobility OT LTG, Outcome goal ongoing     Goal: Transfer Training Goal 2 LTG- OT  Outcome: Ongoing (interventions implemented as appropriate)   02/05/18 1526   Transfer Training 2 OT LTG   Transfer Training 2 OT LTG, Date Established 02/05/18   Transfer Training 2 OT LTG, Time to Achieve 2 wks   Transfer Training 2 OT LTG, Activity Type sit to stand/stand to sit   Transfer Training 2 OT LTG, [place] Level contact guard assist   Transfer Training 2 OT LTG, Outcome goal ongoing     Goal: Patient Education Goal LTG- OT  Outcome: Ongoing (interventions implemented as appropriate)   02/05/18 1526   Patient Education OT LTG   Patient Education OT LTG, Date Established 02/05/18   Patient Education OT LTG, Time to Achieve 2 wks   Patient Education OT LTG, Education Type precautions per surgeon  (Distal ROM; pendulums; sling mgmt)   Patient Education OT LTG, Education Understanding demonstrates adequately;verbalizes understanding   Patient Education OT LTG Outcome goal ongoing      Goal: Bathing Goal LTG- OT  Outcome: Ongoing (interventions implemented as appropriate)   02/05/18 1526   Bathing OT LTG   Bathing Goal OT LTG, Date Established 02/05/18   Bathing Goal OT LTG, Time to Achieve 2 wks   Bathing Goal OT LTG, Activity Type upper body bathing   Bathing Goal OT LTG, [place] Level minimum assist (75% patient effort)   Bathing Goal OT LTG, Outcome goal ongoing     Goal: Toileting Goal LTG- OT  Outcome: Ongoing (interventions implemented as appropriate)   02/05/18 1526   Toileting OT LTG   Toileting Goal OT LTG, Date Established 02/05/18   Toileting Goal OT LTG, Time to Achieve 2 wks   Toileting Goal OT LTG, [place] Level minimum assist (75% patient effort)   Toileting Goal OT LTG, Outcome goal ongoing     Goal: Functional Mobility Goal STG- OT  Outcome: Ongoing (interventions implemented as appropriate)   02/05/18 1526   Functional Mobility OT STG   Functional Mobility Goal OT STG, Date Established 02/05/18   Functional Mobility Goal OT STG, Time to Achieve 2 wks   Functional Mobility Goal OT STG, [place] Level contact guard   Functional Mobility Goal OT STG, Distance to Achieve in hallway   Functional Mobility Goal OT STG, Additional Goal 100   Functional Mobility Goal OT STG, Outcome goal ongoing

## 2018-02-05 NOTE — PROGRESS NOTES
[facility]    Acute pain service Inpatient Progress Note    Patient Name: Kathryn Davison  :  1940  MRN:  [id number]        Acute Pain  Service Inpatient Progress Note:    Analgesia:Good  Pain Score:0/10  LOC: alert and awake  Resp Status: room air  Cardiac: VS stable  Side Effects:None  Catheter Site:clean, dry and dressing intact  Cath type: peripheral nerve cath with ON Q  Infusion rate: 8ml/hr  Catheter Plan:Catheter removed and tip intact and Catheter to remain Insitu  Comments: Pt catheter D/C'd per RN

## 2018-02-05 NOTE — PROGRESS NOTES
Continued Stay Note  [facility]     Patient Name: Kathryn Davison  MRN: [id number]  Today's Date: 2/5/2018    Admit Date: 2/2/2018          Discharge Plan       02/05/18 1228    Case Management/Social Work Plan    Plan Recieved call from Sunitha at [facility] and can accept tomorrow in Semi Pvt room.  Spoke to pt and daughter in room, informed [facility] can accept but is hoping for discharges.  Pt and daughter undecided as to whether will go to [facility] or [facility]. CM will Follow up later today both daughter and pt aware that [facility] cannot hold beds      02/05/18 1059    Case Management/Social Work Plan    Additional Comments [facility] has accpeted pt for toamrrow 02/06 02/05/18 1034    Case Management/Social Work Plan    Plan Received call from [facility], has no beds, called to [facility] may have bed but unsure.  Called to Elena at [facility] has bed at [facility] and [facility] and will look at referral.  Has had Noro and Flu at [facility] of which pt needs to be aware if accepts.  Cm  will continue to follow.                Discharge Codes     None        Expected Discharge Date and Time     Expected Discharge Date Expected Discharge Time    Feb 5, 2018             Tangela Tam

## 2018-02-06 VITALS
RESPIRATION RATE: 17 BRPM | OXYGEN SATURATION: 96 % | BODY MASS INDEX: 44.33 KG/M2 | HEART RATE: 78 BPM | DIASTOLIC BLOOD PRESSURE: 66 MMHG | HEIGHT: 63 IN | WEIGHT: 250.2 LBS | SYSTOLIC BLOOD PRESSURE: 128 MMHG | TEMPERATURE: 97.7 F

## 2018-02-06 PROBLEM — I21.4 NON Q WAVE MYOCARDIAL INFARCTION: Status: ACTIVE | Noted: 2018-02-06

## 2018-02-06 LAB
ANION GAP SERPL CALCULATED.3IONS-SCNC: 12.3 MMOL/L
BASOPHILS # BLD AUTO: 0.05 10*3/MM3 (ref 0–0.2)
BASOPHILS NFR BLD AUTO: 0.5 % (ref 0–2.5)
BUN BLD-MCNC: 40 MG/DL (ref 7–20)
BUN/CREAT SERPL: 36.4 (ref 7.1–23.5)
CALCIUM SPEC-SCNC: 8.6 MG/DL (ref 8.4–10.2)
CHLORIDE SERPL-SCNC: 101 MMOL/L (ref 98–107)
CO2 SERPL-SCNC: 32 MMOL/L (ref 26–30)
CREAT BLD-MCNC: 1.1 MG/DL (ref 0.6–1.3)
DEPRECATED RDW RBC AUTO: 43.8 FL (ref 37–54)
EOSINOPHIL # BLD AUTO: 0.4 10*3/MM3 (ref 0–0.7)
EOSINOPHIL NFR BLD AUTO: 4.2 % (ref 0–7)
ERYTHROCYTE [DISTWIDTH] IN BLOOD BY AUTOMATED COUNT: 13.9 % (ref 11.5–14.5)
GFR SERPL CREATININE-BSD FRML MDRD: 48 ML/MIN/1.73
GLUCOSE BLD-MCNC: 147 MG/DL (ref 74–98)
GLUCOSE BLDC GLUCOMTR-MCNC: 148 MG/DL (ref 70–130)
GLUCOSE BLDC GLUCOMTR-MCNC: 240 MG/DL (ref 70–130)
HCT VFR BLD AUTO: 27.6 % (ref 37–47)
HGB BLD-MCNC: 8.6 G/DL (ref 12–16)
IMM GRANULOCYTES # BLD: 0.11 10*3/MM3 (ref 0–0.06)
IMM GRANULOCYTES NFR BLD: 1.1 % (ref 0–0.6)
LYMPHOCYTES # BLD AUTO: 2.08 10*3/MM3 (ref 0.6–3.4)
LYMPHOCYTES NFR BLD AUTO: 21.6 % (ref 10–50)
MCH RBC QN AUTO: 26.8 PG (ref 27–31)
MCHC RBC AUTO-ENTMCNC: 31.2 G/DL (ref 30–37)
MCV RBC AUTO: 86 FL (ref 81–99)
MONOCYTES # BLD AUTO: 0.84 10*3/MM3 (ref 0–0.9)
MONOCYTES NFR BLD AUTO: 8.7 % (ref 0–12)
NEUTROPHILS # BLD AUTO: 6.14 10*3/MM3 (ref 2–6.9)
NEUTROPHILS NFR BLD AUTO: 63.9 % (ref 37–80)
NRBC BLD MANUAL-RTO: 0 /100 WBC (ref 0–0)
PLATELET # BLD AUTO: 354 10*3/MM3 (ref 130–400)
PMV BLD AUTO: 9.5 FL (ref 6–12)
POTASSIUM BLD-SCNC: 4.3 MMOL/L (ref 3.5–5.1)
RBC # BLD AUTO: 3.21 10*6/MM3 (ref 4.2–5.4)
SODIUM BLD-SCNC: 141 MMOL/L (ref 137–145)
TROPONIN I SERPL-MCNC: 0.39 NG/ML (ref 0–0.03)
WBC NRBC COR # BLD: 9.62 10*3/MM3 (ref 4.8–10.8)

## 2018-02-06 PROCEDURE — 94799 UNLISTED PULMONARY SVC/PX: CPT

## 2018-02-06 PROCEDURE — 85025 COMPLETE CBC W/AUTO DIFF WBC: CPT | Performed by: NURSE PRACTITIONER

## 2018-02-06 PROCEDURE — 97116 GAIT TRAINING THERAPY: CPT

## 2018-02-06 PROCEDURE — 25010000002 ENOXAPARIN PER 10 MG: Performed by: ORTHOPAEDIC SURGERY

## 2018-02-06 PROCEDURE — 82962 GLUCOSE BLOOD TEST: CPT

## 2018-02-06 PROCEDURE — 63710000001 INSULIN DETEMIR PER 5 UNITS: Performed by: INTERNAL MEDICINE

## 2018-02-06 PROCEDURE — 84484 ASSAY OF TROPONIN QUANT: CPT | Performed by: NURSE PRACTITIONER

## 2018-02-06 PROCEDURE — 97530 THERAPEUTIC ACTIVITIES: CPT

## 2018-02-06 PROCEDURE — 97110 THERAPEUTIC EXERCISES: CPT

## 2018-02-06 PROCEDURE — 99239 HOSP IP/OBS DSCHRG MGMT >30: CPT | Performed by: NURSE PRACTITIONER

## 2018-02-06 PROCEDURE — 63710000001 INSULIN ASPART PER 5 UNITS: Performed by: ORTHOPAEDIC SURGERY

## 2018-02-06 PROCEDURE — 80048 BASIC METABOLIC PNL TOTAL CA: CPT | Performed by: NURSE PRACTITIONER

## 2018-02-06 PROCEDURE — 63710000001 INSULIN ASPART PER 5 UNITS: Performed by: INTERNAL MEDICINE

## 2018-02-06 RX ORDER — IPRATROPIUM BROMIDE AND ALBUTEROL SULFATE 2.5; .5 MG/3ML; MG/3ML
3 SOLUTION RESPIRATORY (INHALATION) EVERY 6 HOURS PRN
Qty: 360 ML
Start: 2018-02-06 | End: 2021-03-01

## 2018-02-06 RX ORDER — LOSARTAN POTASSIUM AND HYDROCHLOROTHIAZIDE 12.5; 5 MG/1; MG/1
1 TABLET ORAL
Start: 2018-02-07 | End: 2020-08-21

## 2018-02-06 RX ORDER — OXYCODONE AND ACETAMINOPHEN 7.5; 325 MG/1; MG/1
1 TABLET ORAL EVERY 6 HOURS PRN
Qty: 10 TABLET | Refills: 0 | Status: SHIPPED | OUTPATIENT
Start: 2018-02-06 | End: 2018-02-12

## 2018-02-06 RX ORDER — CARVEDILOL 12.5 MG/1
12.5 TABLET ORAL 2 TIMES DAILY WITH MEALS
Start: 2018-02-06

## 2018-02-06 RX ORDER — AMLODIPINE BESYLATE 5 MG/1
5 TABLET ORAL
Start: 2018-02-07 | End: 2022-09-08

## 2018-02-06 RX ORDER — BISACODYL 10 MG
10 SUPPOSITORY, RECTAL RECTAL DAILY PRN
Start: 2018-02-06 | End: 2018-10-04

## 2018-02-06 RX ORDER — LORAZEPAM 1 MG/1
0.5 TABLET ORAL EVERY 8 HOURS PRN
Qty: 6 TABLET | Refills: 0 | Status: SHIPPED | OUTPATIENT
Start: 2018-02-06 | End: 2018-02-13

## 2018-02-06 RX ORDER — PSEUDOEPHEDRINE HCL 30 MG
100 TABLET ORAL 2 TIMES DAILY PRN
Start: 2018-02-06 | End: 2020-08-31 | Stop reason: HOSPADM

## 2018-02-06 RX ORDER — LISINOPRIL 20 MG/1
20 TABLET ORAL
Start: 2018-02-07 | End: 2020-08-21

## 2018-02-06 RX ADMIN — POTASSIUM CHLORIDE 10 MEQ: 750 CAPSULE, EXTENDED RELEASE ORAL at 08:40

## 2018-02-06 RX ADMIN — INSULIN ASPART 3 UNITS: 100 INJECTION, SOLUTION INTRAVENOUS; SUBCUTANEOUS at 12:14

## 2018-02-06 RX ADMIN — AMLODIPINE BESYLATE 5 MG: 5 TABLET ORAL at 08:40

## 2018-02-06 RX ADMIN — LISINOPRIL 20 MG: 20 TABLET ORAL at 08:40

## 2018-02-06 RX ADMIN — LINAGLIPTIN 5 MG: 5 TABLET, FILM COATED ORAL at 08:40

## 2018-02-06 RX ADMIN — ENOXAPARIN SODIUM 40 MG: 40 INJECTION SUBCUTANEOUS at 08:40

## 2018-02-06 RX ADMIN — INSULIN DETEMIR 40 UNITS: 100 INJECTION, SOLUTION SUBCUTANEOUS at 12:13

## 2018-02-06 RX ADMIN — FUROSEMIDE 20 MG: 20 TABLET ORAL at 08:40

## 2018-02-06 RX ADMIN — FAMOTIDINE 20 MG: 20 TABLET, FILM COATED ORAL at 08:40

## 2018-02-06 RX ADMIN — INSULIN ASPART 10 UNITS: 100 INJECTION, SOLUTION INTRAVENOUS; SUBCUTANEOUS at 08:42

## 2018-02-06 RX ADMIN — LEVOTHYROXINE SODIUM 100 MCG: 100 TABLET ORAL at 06:45

## 2018-02-06 RX ADMIN — OXYCODONE HYDROCHLORIDE AND ACETAMINOPHEN 1 TABLET: 7.5; 325 TABLET ORAL at 06:45

## 2018-02-06 RX ADMIN — CARVEDILOL 12.5 MG: 12.5 TABLET, FILM COATED ORAL at 08:40

## 2018-02-06 RX ADMIN — LOSARTAN POTASSIUM AND HYDROCHLOROTHIAZIDE 1 TABLET: 50; 12.5 TABLET, FILM COATED ORAL at 08:40

## 2018-02-06 RX ADMIN — OYSTER SHELL CALCIUM WITH VITAMIN D 1 TABLET: 500; 200 TABLET, FILM COATED ORAL at 08:40

## 2018-02-06 RX ADMIN — DOCUSATE SODIUM 100 MG: 100 CAPSULE, LIQUID FILLED ORAL at 08:39

## 2018-02-06 RX ADMIN — INSULIN ASPART 10 UNITS: 100 INJECTION, SOLUTION INTRAVENOUS; SUBCUTANEOUS at 12:14

## 2018-02-06 NOTE — DISCHARGE INSTRUCTIONS
She must wear the shoulder sling on the left shoulder for at least 3 weeks until cleared by orthopedics.  No weightbearing activity with the left upper extremity.  Also, no weightbearing to the right wrist.  Patient must use a Velcro wrist brace to the right wrist daily.  May remove the wrist brace for hygiene purposes.  Must keep all incision sites clean and dry.      - - -

## 2018-02-06 NOTE — PROGRESS NOTES
Continued Stay Note  [facility]     Patient Name: Kathryn Davison  MRN: [id number]  Today's Date: 2/6/2018    Admit Date: 2/2/2018          Discharge Plan       02/06/18 1152    Case Management/Social Work Plan    Additional Comments Pt may transfer to [facility] See report number above [facility] to transport               Discharge Codes     None        Expected Discharge Date and Time     Expected Discharge Date Expected Discharge Time    Feb 6, 2018             Adalgisa Samuel

## 2018-02-06 NOTE — PROGRESS NOTES
Continued Stay Note  [facility]     Patient Name: Kathryn Davison  MRN: [id number]  Today's Date: 2/6/2018    Admit Date: 2/2/2018          Discharge Plan       02/06/18 1157    Case Management/Social Work Plan    Additional Comments Discharged  to SNF       02/06/18 1152    Case Management/Social Work Plan    Additional Comments Pt may transfer to [facility] See report number above [facility] to transport               Discharge Codes       02/06/18 1156    Discharge Codes    Discharge Codes 03  Discharged/transferred to skilled nursing facility (SNF) with Medicare certification in anticipation of skilled care        Expected Discharge Date and Time     Expected Discharge Date Expected Discharge Time    Feb 6, 2018             Adalgisa Samuel

## 2018-02-06 NOTE — THERAPY TREATMENT NOTE
Acute Care - Physical Therapy Treatment Note  [facility]     Patient Name: Kathryn Davison  : 1940  MRN: [id number]  Today's Date: 2018  Onset of Illness/Injury or Date of Surgery Date: 18  Date of Referral to PT: 18  Referring Physician: Dr. Meneses    Admit Date: 2018    Visit Dx:    ICD-10-CM ICD-9-CM   1. Impaired functional mobility, balance, gait, and endurance Z74.09 V49.89   2. Pre-op exam Z01.818 V72.84   3. Impaired mobility and ADLs Z74.09 799.89     Patient Active Problem List   Diagnosis   • Constipation   • Left lower quadrant pain   • Colon cancer screening   • Left upper quadrant pain   • Diverticulosis of large intestine without hemorrhage   • Colon polyps   • Vascular ectasia of colon   • Internal hemorrhoids   • External hemorrhoid   • Elevated liver function tests   • Elevated alkaline phosphatase level   • Gastritis without bleeding   • Helicobacter pylori infection   • Abnormal ultrasound of liver   • Gastric polyp   • Pre-op exam   • Right wrist fracture   • Diabetes mellitus type 2 in obese   • Essential hypertension   • Acquired hypothyroidism   • Morbid obesity with BMI of 40.0-44.9, adult   • Closed fracture of left shoulder   • Status post total shoulder replacement, left               Adult Rehabilitation Note       18 1440 18 1127       Rehab Assessment/Intervention    Discipline physical therapist  -JR physical therapist  -JR     Document Type therapy note (daily note)  -JR therapy note (daily note)  -JR     Subjective Information  agree to therapy  -JR     Patient Effort, Rehab Treatment  good  -JR     Symptoms Noted During/After Treatment  none  -JR     Precautions/Limitations  fall precautions  -JR     Patient Response to Treatment  pleased with progress  -JR     Recorded by [JR] Addie Taylor, PT [JR] Addie Taylor, PT     Pain Assessment    Pain Assessment  0-10  -JR     Pain Score  6  -JR     Post Pain Score  4  -JR     Pain Location   Shoulder  -JR     Pain Orientation  Left  -JR     Pain Descriptors  Throbbing  -JR     Pain Frequency  Constant/continuous  -JR     Pain Intervention(s)  Repositioned;Ambulation/increased activity  -JR     Response to Interventions  pain is decreasing  -JR     Recorded by  [JR] Addie Taylor PT     Cognitive Assessment/Intervention    Current Cognitive/Communication Assessment  functional  -JR     Orientation Status  oriented x 4  -JR     Follows Commands/Answers Questions  able to follow single-step instructions;needs cueing  -JR     Personal Safety  decreased awareness, need for safety;decreased awareness, need for assist  -JR     Personal Safety Interventions  fall prevention program maintained;gait belt;nonskid shoes/slippers when out of bed  -JR     Recorded by  [JR] Addie Taylor PT     Bed Mobility, Assessment/Treatment    Bed Mobility, Comment  UIC  -JR     Recorded by  [JR] Addie Taylor PT     Transfer Assessment/Treatment    Transfers, Sit-Stand [place]  minimum assist (75% patient effort)  -JR     Transfers, Stand-Sit [place]  contact guard assist  -JR     Transfer, Impairments  strength decreased;ROM decreased;impaired balance  -JR     Recorded by  [JR] Addie Taylor PT     Gait Assessment/Treatment    Gait, [place] Level  minimum assist (75% patient effort);contact guard assist  -JR     Gait, Distance (Feet)  65  -JR     Gait, Gait Pattern Analysis  swing-through gait  -JR     Gait, Gait Deviations  toe-to-floor clearance decreased;stride width increased;stride length decreased;marimar decreased  -JR     Gait, Safety Issues  sequencing ability decreased;balance decreased during turns;step length decreased  -JR     Gait, Impairments  impaired balance;coordination impaired;strength decreased  -JR     Recorded by  [JR] Addie Taylor PT     Positioning and Restraints    Pre-Treatment Position  in bed  -JR     Post Treatment Position  chair  -JR     In Chair  sitting;call light within  reach;encouraged to call for assist  -JR     Recorded by  [] Addie Taylor PT       User Key  (r) = Recorded By, (t) = Taken By, (c) = Cosigned By    Initials Name Effective Dates    JR Addie Taylor PT 10/26/16 -                 IP PT Goals       02/04/18 1909 02/03/18 1211       Bed Mobility PT LTG    Bed Mobility PT LTG, Date Established  02/04/18  -JR     Bed Mobility PT LTG, Time to Achieve  2 wks  -JR     Bed Mobility PT LTG, Activity Type  all bed mobility  -JR     Bed Mobility PT LTG, [place] Level  contact guard assist  -JR     Bed Mobility PT LTG, Outcome goal ongoing  -JR      Transfer Training PT LTG    Transfer Training PT LTG, Date Established  02/04/18  -JR     Transfer Training PT LTG, Time to Achieve  2 wks  -JR     Transfer Training PT LTG, Activity Type  sit to stand/stand to sit;bed to chair /chair to bed  -JR     Transfer Training PT LTG, [place] Level  contact guard assist  -JR     Transfer Training PT LTG, Outcome goal ongoing  -JR      Gait Training PT LTG    Gait Training Goal PT LTG, Date Established  02/04/18  -JR     Gait Training Goal PT LTG, Time to Achieve  2 wks  -JR     Gait Training Goal PT LTG, [place] Level  contact guard assist  -JR     Gait Training Goal PT LTG, Distance to Achieve  125  -JR     Gait Training Goal PT LTG, Additional Goal  with limited SOA  -JR     Gait Training Goal PT LTG, Outcome goal ongoing  -JR        User Key  (r) = Recorded By, (t) = Taken By, (c) = Cosigned By    Initials Name Provider Type    JR Addie Taylor PT Physical Therapist          Physical Therapy Education     Title: PT OT SLP Therapies (Active)     Topic: Physical Therapy (Active)     Point: Mobility training (Done)    Learning Progress Summary    Learner Readiness Method Response Comment Documented by Status   Patient Acceptance E VU Importance of ROM of elbow, wrists and hands and left shoulder pendulum JR 02/04/18 1105 Done                      User Key     Initials Effective  Dates Name Provider Type Discipline     10/26/16 -  Addie Taylor, PT Physical Therapist PT                    PT Recommendation and Plan  Anticipated Discharge Disposition: inpatient rehabilitation facility  Planned Therapy Interventions: balance training, strengthening, bed mobility training, ROM (Range of Motion), transfer training, gait training, patient/family education  PT Frequency: 2 times/day  Plan of Care Review  Plan Of Care Reviewed With: patient  Progress: improving  Outcome Summary/Follow up Plan: Patient is able to demonstrate improving strength and mobility as seen by decreasing amount of assistance needed for mobilty.  She is able to perform AROM to elbows, wrists and hands and pendulum  exercises for left shoulder.  She is expected to benefit from additional PT           Outcome Measures       02/05/18 1334 02/04/18 1127 02/03/18 1211    How much help from another person do you currently need...    Turning from your back to your side while in flat bed without using bedrails?  2  -JR 2  -JR    Moving from lying on back to sitting on the side of a flat bed without bedrails?  2  -JR 2  -JR    Moving to and from a bed to a chair (including a wheelchair)?  2  -JR 2  -JR    Standing up from a chair using your arms (e.g., wheelchair, bedside chair)?  3  -JR 2  -JR    Climbing 3-5 steps with a railing?  2  -JR 1  -JR    To walk in hospital room?  3  -JR 2  -JR    AM-PAC 6 Clicks Score  14  -JR 11  -JR    How much help from another is currently needed...    Putting on and taking off regular lower body clothing? 2  -SD      Bathing (including washing, rinsing, and drying) 2  -SD      Toileting (which includes using toilet bed pan or urinal) 2  -SD      Putting on and taking off regular upper body clothing 2  -SD      Taking care of personal grooming (such as brushing teeth) 3  -SD      Eating meals 3  -SD      Score 14  -SD      Functional Assessment    Outcome Measure Options AM-PAC 6 Clicks Daily Activity  (OT)  -SD AM-PAC 6 Clicks Basic Mobility (PT)  -JR AM-PAC 6 Clicks Basic Mobility (PT)  -JR      User Key  (r) = Recorded By, (t) = Taken By, (c) = Cosigned By    Initials Name Provider Type    JR Addie Taylor, PT Physical Therapist    JENNIFER Baker, OT Occupational Therapist           Time Calculation:           PT G-Codes  Outcome Measure Options: AM-PAC 6 Clicks Daily Activity (OT)    Addie Taylor, PT  2/6/2018

## 2018-02-06 NOTE — PLAN OF CARE
Problem: Patient Care Overview (Adult)  Goal: Plan of Care Review  Outcome: Ongoing (interventions implemented as appropriate)   02/06/18 1134   Outcome Evaluation   Outcome Summary/Follow up Plan VSS, pain control, blood glucose monitoring, to be transferred to rehab   Patient Care Overview   Progress progress toward functional goals as expected   Coping/Psychosocial Response Interventions   Plan Of Care Reviewed With patient;daughter       Problem: Fall Risk (Adult)  Goal: Identify Related Risk Factors and Signs and Symptoms  Outcome: Ongoing (interventions implemented as appropriate)    Goal: Absence of Falls  Outcome: Ongoing (interventions implemented as appropriate)      Problem: Diabetes, Type 2 (Adult)  Goal: Signs and Symptoms of Listed Potential Problems Will be Absent or Manageable (Diabetes, Type 2)  Outcome: Ongoing (interventions implemented as appropriate)      Problem: Orthopaedic Fracture (Adult)  Goal: Signs and Symptoms of Listed Potential Problems Will be Absent or Manageable (Orthopaedic Fracture)  Outcome: Ongoing (interventions implemented as appropriate)

## 2018-02-06 NOTE — THERAPY TREATMENT NOTE
Acute Care - Physical Therapy Treatment Note  [facility]     Patient Name: Kathryn Davison  : 1940  MRN: [id number]  Today's Date: 2018  Onset of Illness/Injury or Date of Surgery Date: 18  Date of Referral to PT: 18  Referring Physician: Dr. Meneses    Admit Date: 2018    Visit Dx:    ICD-10-CM ICD-9-CM   1. Impaired functional mobility, balance, gait, and endurance Z74.09 V49.89   2. Pre-op exam Z01.818 V72.84   3. Impaired mobility and ADLs Z74.09 799.89     Patient Active Problem List   Diagnosis   • Constipation   • Left lower quadrant pain   • Colon cancer screening   • Left upper quadrant pain   • Diverticulosis of large intestine without hemorrhage   • Colon polyps   • Vascular ectasia of colon   • Internal hemorrhoids   • External hemorrhoid   • Elevated liver function tests   • Elevated alkaline phosphatase level   • Gastritis without bleeding   • Helicobacter pylori infection   • Abnormal ultrasound of liver   • Gastric polyp   • Pre-op exam   • Right wrist fracture   • Diabetes mellitus type 2 in obese   • Essential hypertension   • Acquired hypothyroidism   • Morbid obesity with BMI of 40.0-44.9, adult   • Closed fracture of left shoulder   • Status post total shoulder replacement, left   • Non Q wave myocardial infarction               Adult Rehabilitation Note       18 1051 18 1440 18 1127    Rehab Assessment/Intervention    Discipline physical therapy assistant  -RM physical therapist  -JR physical therapist  -JR    Document Type therapy note (daily note)  -RM therapy note (daily note)  -JR therapy note (daily note)  -JR    Subjective Information agree to therapy;complains of;weakness;pain  -RM agree to therapy  -JR agree to therapy  -JR    Patient Effort, Rehab Treatment  good  -JR good  -JR    Symptoms Noted During/After Treatment fatigue  -RM none  -JR none  -JR    Precautions/Limitations fall precautions  -RM  fall precautions  -JR    Patient  Response to Treatment   pleased with progress  -JR    Recorded by [RM] Williams Mosley PTA [JR] Addie Taylor, PT [JR] Addie Taylor, PT    Pain Assessment    Pain Assessment 0-10  -RM 0-10  -JR 0-10  -JR    Pain Score 4  -RM 5  -JR 6  -JR    Post Pain Score 4  -RM 3  -JR 4  -JR    Pain Type Acute pain;Surgical pain  -RM      Pain Location Shoulder  -RM Shoulder  -JR Shoulder  -JR    Pain Orientation Left  -RM Left  -JR Left  -JR    Pain Descriptors Aching;Throbbing  -RM Throbbing  -JR Throbbing  -JR    Pain Frequency Constant/continuous  -RM Constant/continuous  -JR Constant/continuous  -JR    Pain Intervention(s) Repositioned;Ambulation/increased activity  -RM Repositioned;Ambulation/increased activity  -JR Repositioned;Ambulation/increased activity  -JR    Response to Interventions  pain is decreased after movements  -JR pain is decreasing  -JR    Recorded by [RM] Williams Mosley, PTA [JR] Addie Taylor, PT [JR] Addie Taylor, PT    Cognitive Assessment/Intervention    Current Cognitive/Communication Assessment  functional  -JR functional  -JR    Orientation Status  oriented x 4  -JR oriented x 4  -JR    Follows Commands/Answers Questions  able to follow single-step instructions  -JR able to follow single-step instructions;needs cueing  -JR    Personal Safety  decreased awareness, need for safety;decreased awareness, need for assist  -JR decreased awareness, need for safety;decreased awareness, need for assist  -JR    Personal Safety Interventions  fall prevention program maintained;gait belt;nonskid shoes/slippers when out of bed  -JR fall prevention program maintained;gait belt;nonskid shoes/slippers when out of bed  -JR    Recorded by  [JR] Addie Taylor, PT [JR] Addie Taylor, PT    Bed Mobility, Assessment/Treatment    Bed Mobility, Assistive Device head of bed elevated  -RM      Bed Mob, Supine to Sit, [place] minimum assist (75% patient effort)  -RM      Bed Mobility, Safety Issues decreased use of arms for  pushing/pulling;decreased use of legs for bridging/pushing  -RM      Bed Mobility, Impairments ROM decreased;strength decreased  -RM      Bed Mobility, Comment   UIC  -JR    Recorded by [RM] Williams Mosley PTA  [JR] Addie Taylor, PT    Transfer Assessment/Treatment    Transfers, Sit-Stand [place] contact guard assist;minimum assist (75% patient effort)  -RM minimum assist (75% patient effort)  -JR minimum assist (75% patient effort)  -JR    Transfers, Stand-Sit [place] contact guard assist;minimum assist (75% patient effort)  -RM contact guard assist  -JR contact guard assist  -JR    Transfer, Impairments  strength decreased;impaired balance;motor control impaired  -JR strength decreased;ROM decreased;impaired balance  -JR    Recorded by [RM] Williams Mosley PTA [JR] Addie Taylor, PT [JR] Addie Taylor, PT    Gait Assessment/Treatment    Gait, [place] Level contact guard assist;hand held assist  -RM minimum assist (75% patient effort)  -JR minimum assist (75% patient effort);contact guard assist  -JR    Gait, Distance (Feet) 128  -RM 85  -JR 65  -JR    Gait, Gait Pattern Analysis swing-through gait  -RM swing-through gait  -JR swing-through gait  -JR    Gait, Gait Deviations [place] decreased;decreased heel strike;step length decreased;stride width increased;toe-to-floor clearance decreased  -RM toe-to-floor clearance decreased;decreased heel strike;stride width increased;stride length decreased  -JR toe-to-floor clearance decreased;stride width increased;stride length decreased;marimar decreased  -JR    Gait, Safety Issues balance decreased during turns;step length decreased  -RM sequencing ability decreased;step length decreased;balance decreased during turns  -JR sequencing ability decreased;balance decreased during turns;step length decreased  -JR    Gait, Impairments strength decreased  -RM impaired balance;coordination impaired;strength decreased  -JR impaired balance;coordination  impaired;strength decreased  -JR    Gait, Comment pt required x2 standing rests.   -RM      Recorded by [RM] Williams Mosley, PTA [JR] Addie Taylor, PT [JR] Addie Taylor PT    Positioning and Restraints    Pre-Treatment Position in bed  -RM sitting in chair/recliner  -JR in bed  -JR    Post Treatment Position chair  -RM chair  -JR chair  -JR    In Chair sitting;call light within reach;encouraged to call for assist;with OT;notified nsg  -RM sitting;call light within reach;encouraged to call for assist;with family/caregiver  -JR sitting;call light within reach;encouraged to call for assist  -JR    Recorded by [RM] Williams Mosley PTA [JR] Addie Taylor, PT [JR] Addie Taylor, PT      User Key  (r) = Recorded By, (t) = Taken By, (c) = Cosigned By    Initials Name Effective Dates    JR Addie Taylor, PT 10/26/16 -     RM Williams Mosley PTA 10/26/16 -                 IP PT Goals       02/06/18 1147 02/04/18 1909 02/03/18 1211    Bed Mobility PT LTG    Bed Mobility PT LTG, Date Established   02/04/18  -JR    Bed Mobility PT LTG, Time to Achieve   2 wks  -JR    Bed Mobility PT LTG, Activity Type   all bed mobility  -JR    Bed Mobility PT LTG, [place] Level   contact guard assist  -JR    Bed Mobility PT LTG, Outcome goal partially met  -RM goal ongoing  -JR     Transfer Training PT LTG    Transfer Training PT LTG, Date Established   02/04/18  -JR    Transfer Training PT LTG, Time to Achieve   2 wks  -JR    Transfer Training PT LTG, Activity Type   sit to stand/stand to sit;bed to chair /chair to bed  -JR    Transfer Training PT LTG, [place] Level   contact guard assist  -JR    Transfer Training PT LTG, Outcome goal partially met  -RM goal ongoing  -JR     Gait Training PT LTG    Gait Training Goal PT LTG, Date Established   02/04/18  -JR    Gait Training Goal PT LTG, Time to Achieve   2 wks  -JR    Gait Training Goal PT LTG, [place] Level   contact guard assist  -JR    Gait Training Goal PT LTG, Distance to  Achieve   125  -JR    Gait Training Goal PT LTG, Additional Goal   with limited SOA  -JR    Gait Training Goal PT LTG, Outcome goal partially met  -RM goal ongoing  -JR       User Key  (r) = Recorded By, (t) = Taken By, (c) = Cosigned By    Initials Name Provider Type    JR Addie Taylor, PT Physical Therapist     Williams Mosley, [place] Physical Therapy Assistant          Physical Therapy Education     Title: PT OT SLP Therapies (Done)     Topic: Physical Therapy (Done)     Point: Mobility training (Done)    Learning Progress Summary    Learner Readiness Method Response Comment Documented by Status   Patient Acceptance E,D VU,NR   02/06/18 1147 Done    Acceptance E VU Importance of ROM of elbow, wrists and hands and left shoulder pendulum  02/04/18 1105 Done                      User Key     Initials Effective Dates Name Provider Type Discipline     10/26/16 -  Addie Taylor, PT Physical Therapist PT     10/26/16 -  Williams Mosley PTA Physical Therapy Assistant PT                    PT Recommendation and Plan  Anticipated Discharge Disposition: inpatient rehabilitation facility  Planned Therapy Interventions: balance training, strengthening, bed mobility training, ROM (Range of Motion), transfer training, gait training, patient/family education  PT Frequency: 2 times/day  Plan of Care Review  Plan Of Care Reviewed With: patient, daughter  Progress: improving  Outcome Summary/Follow up Plan: Pt was able to tolerate advancement in gistance during gait training. Pt has a very rigid gait patern at this time however. See flowsheet for details.           Outcome Measures       02/06/18 1051 02/05/18 1334 02/04/18 1440    How much help from another person do you currently need...    Turning from your back to your side while in flat bed without using bedrails? 2  -RM  2  -JR    Moving from lying on back to sitting on the side of a flat bed without bedrails? 3  -RM  2  -JR    Moving to and from a bed to a chair  (including a wheelchair)? 3  -RM  2  -JR    Standing up from a chair using your arms (e.g., wheelchair, bedside chair)? 3  -RM  3  -JR    Climbing 3-5 steps with a railing? 2  -RM  2  -JR    To walk in hospital room? 3  -RM  3  -JR    AM-PAC 6 Clicks Score 16  -RM  14  -JR    How much help from another is currently needed...    Putting on and taking off regular lower body clothing?  2  -SD     Bathing (including washing, rinsing, and drying)  2  -SD     Toileting (which includes using toilet bed pan or urinal)  2  -SD     Putting on and taking off regular upper body clothing  2  -SD     Taking care of personal grooming (such as brushing teeth)  3  -SD     Eating meals  3  -SD     Score  14  -SD     Functional Assessment    Outcome Measure Options AM-PAC 6 Clicks Basic Mobility (PT)  -RM AM-PAC 6 Clicks Daily Activity (OT)  -SD AM-PAC 6 Clicks Basic Mobility (PT)  -JR      02/04/18 1127 02/03/18 1211       How much help from another person do you currently need...    Turning from your back to your side while in flat bed without using bedrails? 2  -JR 2  -JR     Moving from lying on back to sitting on the side of a flat bed without bedrails? 2  -JR 2  -JR     Moving to and from a bed to a chair (including a wheelchair)? 2  -JR 2  -JR     Standing up from a chair using your arms (e.g., wheelchair, bedside chair)? 3  -JR 2  -JR     Climbing 3-5 steps with a railing? 2  -JR 1  -JR     To walk in hospital room? 3  -JR 2  -JR     AM-PAC 6 Clicks Score 14  -JR 11  -JR     Functional Assessment    Outcome Measure Options AM-PAC 6 Clicks Basic Mobility (PT)  -JR AM-PAC 6 Clicks Basic Mobility (PT)  -JR       User Key  (r) = Recorded By, (t) = Taken By, (c) = Cosigned By    Initials Name Provider Type    JR Addie Taylor, PT Physical Therapist    RM Williams Mosley, PTA Physical Therapy Assistant    JENNIFER Baker, OT Occupational Therapist           Time Calculation:         PT Charges       02/06/18 1152          Time  Calculation    Start Time 1051  -RM      PT Received On 02/06/18  -RM      PT Goal Re-Cert Due Date 02/13/18  -RM      Time Calculation- PT    Total Timed Code Minutes- PT 23 minute(s)  -RM        User Key  (r) = Recorded By, (t) = Taken By, (c) = Cosigned By    Initials Name Provider Type     Williams Mosley PTA Physical Therapy Assistant          Therapy Charges for Today     Code Description Service Date Service Provider Modifiers Qty    90306053182 HC GAIT TRAINING EA 15 MIN 2/6/2018 Williams Mosley PTA GP 1    84426329329 HC PT THERAPEUTIC ACT EA 15 MIN 2/6/2018 Williams Mosley PTA GP 1          PT G-Codes  Outcome Measure Options: AM-PAC 6 Clicks Basic Mobility (PT)    Williams Mosley PTA  2/6/2018

## 2018-02-06 NOTE — THERAPY TREATMENT NOTE
Acute Care - Physical Therapy Treatment Note  [facility]     Patient Name: Kathryn Davison  : 1940  MRN: [id number]  Today's Date: 2018  Onset of Illness/Injury or Date of Surgery Date: 18  Date of Referral to PT: 18  Referring Physician: Dr. Meneses    Admit Date: 2018    Visit Dx:    ICD-10-CM ICD-9-CM   1. Impaired functional mobility, balance, gait, and endurance Z74.09 V49.89   2. Pre-op exam Z01.818 V72.84   3. Impaired mobility and ADLs Z74.09 799.89     Patient Active Problem List   Diagnosis   • Constipation   • Left lower quadrant pain   • Colon cancer screening   • Left upper quadrant pain   • Diverticulosis of large intestine without hemorrhage   • Colon polyps   • Vascular ectasia of colon   • Internal hemorrhoids   • External hemorrhoid   • Elevated liver function tests   • Elevated alkaline phosphatase level   • Gastritis without bleeding   • Helicobacter pylori infection   • Abnormal ultrasound of liver   • Gastric polyp   • Pre-op exam   • Right wrist fracture   • Diabetes mellitus type 2 in obese   • Essential hypertension   • Acquired hypothyroidism   • Morbid obesity with BMI of 40.0-44.9, adult   • Closed fracture of left shoulder   • Status post total shoulder replacement, left               Adult Rehabilitation Note       18 1440 18 1127       Rehab Assessment/Intervention    Discipline physical therapist  -JR physical therapist  -JR     Document Type therapy note (daily note)  -JR therapy note (daily note)  -JR     Subjective Information agree to therapy  -JR agree to therapy  -JR     Patient Effort, Rehab Treatment good  -JR good  -JR     Symptoms Noted During/After Treatment none  -JR none  -JR     Precautions/Limitations  fall precautions  -JR     Patient Response to Treatment  pleased with progress  -JR     Recorded by [JR] Addie Taylor, PT [JR] Addie Taylor, PT     Pain Assessment    Pain Assessment 0-10  -JR 0-10  -JR     Pain Score 5  -JR 6   -JR     Post Pain Score 3  -JR 4  -JR     Pain Location Shoulder  -JR Shoulder  -JR     Pain Orientation Left  -JR Left  -JR     Pain Descriptors Throbbing  -JR Throbbing  -JR     Pain Frequency Constant/continuous  -JR Constant/continuous  -JR     Pain Intervention(s) Repositioned;Ambulation/increased activity  -JR Repositioned;Ambulation/increased activity  -JR     Response to Interventions pain is decreased after movements  -JR pain is decreasing  -JR     Recorded by [JR] Addie Taylor, PT [JR] Addie Taylor, PT     Cognitive Assessment/Intervention    Current Cognitive/Communication Assessment functional  -JR functional  -JR     Orientation Status oriented x 4  -JR oriented x 4  -JR     Follows Commands/Answers Questions able to follow single-step instructions  -JR able to follow single-step instructions;needs cueing  -JR     Personal Safety decreased awareness, need for safety;decreased awareness, need for assist  -JR decreased awareness, need for safety;decreased awareness, need for assist  -JR     Personal Safety Interventions fall prevention program maintained;gait belt;nonskid shoes/slippers when out of bed  -JR fall prevention program maintained;gait belt;nonskid shoes/slippers when out of bed  -JR     Recorded by [JR] Addie Taylor, PT [JR] Addie Taylor, PT     Bed Mobility, Assessment/Treatment    Bed Mobility, Comment  UIC  -JR     Recorded by  [JR] Addie Taylor PT     Transfer Assessment/Treatment    Transfers, Sit-Stand [place] minimum assist (75% patient effort)  -JR minimum assist (75% patient effort)  -JR     Transfers, Stand-Sit [place] contact guard assist  -JR contact guard assist  -JR     Transfer, Impairments strength decreased;impaired balance;motor control impaired  -JR strength decreased;ROM decreased;impaired balance  -JR     Recorded by [JR] Addie Taylor, PT [JR] Addie Taylor, PT     Gait Assessment/Treatment    Gait, [place] Level minimum assist (75% patient effort)  -JR minimum  assist (75% patient effort);contact guard assist  -JR     Gait, Distance (Feet) 85  -JR 65  -JR     Gait, Gait Pattern Analysis swing-through gait  -JR swing-through gait  -JR     Gait, Gait Deviations toe-to-floor clearance decreased;decreased heel strike;stride width increased;stride length decreased  -JR toe-to-floor clearance decreased;stride width increased;stride length decreased;[place] decreased  -JR     Gait, Safety Issues sequencing ability decreased;step length decreased;balance decreased during turns  -JR sequencing ability decreased;balance decreased during turns;step length decreased  -JR     Gait, Impairments impaired balance;coordination impaired;strength decreased  -JR impaired balance;coordination impaired;strength decreased  -JR     Recorded by [JR] Addie Taylor, PT [JR] Addie Taylor, PT     Positioning and Restraints    Pre-Treatment Position sitting in chair/recliner  -JR in bed  -JR     Post Treatment Position chair  -JR chair  -JR     In Chair sitting;call light within reach;encouraged to call for assist;with family/caregiver  -JR sitting;call light within reach;encouraged to call for assist  -JR     Recorded by [JR] Addie Taylor, PT [JR] Addie Taylor, PT       User Key  (r) = Recorded By, (t) = Taken By, (c) = Cosigned By    Initials Name Effective Dates    JR Addie Taylor, PT 10/26/16 -                 IP PT Goals       02/04/18 1909 02/03/18 1211       Bed Mobility PT LTG    Bed Mobility PT LTG, Date Established  02/04/18  -JR     Bed Mobility PT LTG, Time to Achieve  2 wks  -JR     Bed Mobility PT LTG, Activity Type  all bed mobility  -JR     Bed Mobility PT LTG, [place] Level  contact guard assist  -JR     Bed Mobility PT LTG, Outcome goal ongoing  -JR      Transfer Training PT LTG    Transfer Training PT LTG, Date Established  02/04/18  -JR     Transfer Training PT LTG, Time to Achieve  2 wks  -JR     Transfer Training PT LTG, Activity Type  sit to stand/stand to sit;bed to chair /chair  to bed  -JR     Transfer Training PT LTG, [place] Level  contact guard assist  -JR     Transfer Training PT LTG, Outcome goal ongoing  -JR      Gait Training PT LTG    Gait Training Goal PT LTG, Date Established  02/04/18  -JR     Gait Training Goal PT LTG, Time to Achieve  2 wks  -JR     Gait Training Goal PT LTG, [place] Level  contact guard assist  -JR     Gait Training Goal PT LTG, Distance to Achieve  125  -JR     Gait Training Goal PT LTG, Additional Goal  with limited SOA  -JR     Gait Training Goal PT LTG, Outcome goal ongoing  -JR        User Key  (r) = Recorded By, (t) = Taken By, (c) = Cosigned By    Initials Name Provider Type    JR Addie Taylor PT Physical Therapist          Physical Therapy Education     Title: PT OT SLP Therapies (Active)     Topic: Physical Therapy (Active)     Point: Mobility training (Done)    Learning Progress Summary    Learner Readiness Method Response Comment Documented by Status   Patient Acceptance E VU Importance of ROM of elbow, wrists and hands and left shoulder pendulum  02/04/18 1105 Done                      User Key     Initials Effective Dates Name Provider Type Discipline     10/26/16 -  Addie Taylor PT Physical Therapist PT                    PT Recommendation and Plan  Anticipated Discharge Disposition: inpatient rehabilitation facility  Planned Therapy Interventions: balance training, strengthening, bed mobility training, ROM (Range of Motion), transfer training, gait training, patient/family education  PT Frequency: 2 times/day  Plan of Care Review  Plan Of Care Reviewed With: patient  Progress: improving  Outcome Summary/Follow up Plan: Patient is able to demonstrate improving strength and mobility as seen by decreasing amount of assistance needed for mobilty.  She is able to perform AROM to elbows, wrists and hands and pendulum  exercises for left shoulder.  She is expected to benefit from additional PT           Outcome Measures       02/05/18  1334 02/04/18 1440 02/04/18 1127    How much help from another person do you currently need...    Turning from your back to your side while in flat bed without using bedrails?  2  -JR 2  -JR    Moving from lying on back to sitting on the side of a flat bed without bedrails?  2  -JR 2  -JR    Moving to and from a bed to a chair (including a wheelchair)?  2  -JR 2  -JR    Standing up from a chair using your arms (e.g., wheelchair, bedside chair)?  3  -JR 3  -JR    Climbing 3-5 steps with a railing?  2  -JR 2  -JR    To walk in hospital room?  3  -JR 3  -JR    AM-PAC 6 Clicks Score  14  -JR 14  -JR    How much help from another is currently needed...    Putting on and taking off regular lower body clothing? 2  -SD      Bathing (including washing, rinsing, and drying) 2  -SD      Toileting (which includes using toilet bed pan or urinal) 2  -SD      Putting on and taking off regular upper body clothing 2  -SD      Taking care of personal grooming (such as brushing teeth) 3  -SD      Eating meals 3  -SD      Score 14  -SD      Functional Assessment    Outcome Measure Options AM-PAC 6 Clicks Daily Activity (OT)  -SD AM-PAC 6 Clicks Basic Mobility (PT)  -JR AM-PAC 6 Clicks Basic Mobility (PT)  -JR      02/03/18 1211          How much help from another person do you currently need...    Turning from your back to your side while in flat bed without using bedrails? 2  -JR      Moving from lying on back to sitting on the side of a flat bed without bedrails? 2  -JR      Moving to and from a bed to a chair (including a wheelchair)? 2  -JR      Standing up from a chair using your arms (e.g., wheelchair, bedside chair)? 2  -JR      Climbing 3-5 steps with a railing? 1  -JR      To walk in hospital room? 2  -JR      AM-PAC 6 Clicks Score 11  -JR      Functional Assessment    Outcome Measure Options AM-PAC 6 Clicks Basic Mobility (PT)  -JR        User Key  (r) = Recorded By, (t) = Taken By, (c) = Cosigned By    Initials Name Provider  Type    JR Addie Taylor, PT Physical Therapist    JENNIFER Baker, OT Occupational Therapist           Time Calculation:           PT G-Codes  Outcome Measure Options: AM-PAC 6 Clicks Daily Activity (OT)    Addie Taylor, PT  2/6/2018

## 2018-02-06 NOTE — PLAN OF CARE
Problem: Patient Care Overview (Adult)  Goal: Plan of Care Review  Outcome: Ongoing (interventions implemented as appropriate)   02/06/18 1246   Outcome Evaluation   Outcome Summary/Follow up Plan Pt able to walk 128' with 2 standing rest breaks. Pt performed BUE ex as per flow sheet. Pt was left in the bathroom with her daughter and PCT present. Pt able to verbalize precautions with BUEs today.   Patient Care Overview   Progress improving   Coping/Psychosocial Response Interventions   Plan Of Care Reviewed With patient       Problem: Inpatient Occupational Therapy  Goal: Bed Mobility Goal LTG- OT  Outcome: Ongoing (interventions implemented as appropriate)    Goal: Transfer Training Goal 2 LTG- OT  Outcome: Ongoing (interventions implemented as appropriate)   02/05/18 1526 02/06/18 1246   Transfer Training 2 OT LTG   Transfer Training 2 OT LTG, Date Established 02/05/18 --    Transfer Training 2 OT LTG, Time to Achieve 2 wks --    Transfer Training 2 OT LTG, Activity Type sit to stand/stand to sit --    Transfer Training 2 OT LTG, [place] Level contact guard assist --    Transfer Training 2 OT LTG, Date Goal Reviewed --  02/06/18   Transfer Training 2 OT LTG, Outcome --  goal ongoing     Goal: Patient Education Goal LTG- OT  Outcome: Outcome(s) achieved Date Met: 02/06/18 02/05/18 1526 02/06/18 1246   Patient Education OT LTG   Patient Education OT LTG, Date Established 02/05/18 --    Patient Education OT LTG, Time to Achieve 2 wks --    Patient Education OT LTG, Education Type precautions per surgeon  (Distal ROM; pendulums; sling mgmt) --    Patient Education OT LTG, Education Understanding demonstrates adequately;verbalizes understanding --    Patient Education OT LTG, Date Goal Reviewed --  02/06/18   Patient Education OT LTG Outcome --  goal met     Goal: Bathing Goal LTG- OT  Outcome: Ongoing (interventions implemented as appropriate)   02/05/18 1526 02/06/18 1246   Bathing OT LTG   Bathing Goal OT  LTG, Date Established 02/05/18 --    Bathing Goal OT LTG, Time to Achieve 2 wks --    Bathing Goal OT LTG, Activity Type upper body bathing --    Bathing Goal OT LTG, [place] Level minimum assist (75% patient effort) --    Bathing Goal OT LTG, Date Goal Reviewed --  02/06/18   Bathing Goal OT LTG, Outcome --  goal ongoing     Goal: Toileting Goal LTG- OT  Outcome: Ongoing (interventions implemented as appropriate)   02/05/18 1526 02/06/18 1246   Toileting OT LTG   Toileting Goal OT LTG, Date Established 02/05/18 --    Toileting Goal OT LTG, Time to Achieve 2 wks --    Toileting Goal OT LTG, [place] Level minimum assist (75% patient effort) --    Toileting Goal OT LTG, Date Goal Reviewed --  02/06/18   Toileting Goal OT LTG, Outcome --  goal ongoing     Goal: Functional Mobility Goal STG- OT  Outcome: Outcome(s) achieved Date Met: 02/06/18 02/05/18 1526 02/06/18 1246   Functional Mobility OT STG   Functional Mobility Goal OT STG, Date Established 02/05/18 --    Functional Mobility Goal OT STG, Time to Achieve 2 wks --    Functional Mobility Goal OT STG, [place] Level contact guard --    Functional Mobility Goal OT STG, Distance to Achieve in hallway --    Functional Mobility Goal OT STG, Additional Goal 100 --    Functional Mobility Goal OT STG, Date Goal Reviewed --  02/06/18   Functional Mobility Goal OT STG, Outcome --  goal met

## 2018-02-06 NOTE — PLAN OF CARE
Problem: Patient Care Overview (Adult)  Goal: Plan of Care Review  Outcome: Ongoing (interventions implemented as appropriate)   02/06/18 0301   Outcome Evaluation   Outcome Summary/Follow up Plan Pt. pain has been managed by pain meds order. Vital signs are within normal limits. Able to get to the bedside commode with one assist. Slept good last night. Repeat troponin, awaiting for result. Denies any chest pain. Will continue to monitor and implement the plan of care per MD order   Patient Care Overview   Progress no change   Coping/Psychosocial Response Interventions   Plan Of Care Reviewed With patient       Problem: Diabetes, Type 2 (Adult)  Goal: Signs and Symptoms of Listed Potential Problems Will be Absent or Manageable (Diabetes, Type 2)  Outcome: Ongoing (interventions implemented as appropriate)      Problem: Orthopaedic Fracture (Adult)  Goal: Signs and Symptoms of Listed Potential Problems Will be Absent or Manageable (Orthopaedic Fracture)  Outcome: Ongoing (interventions implemented as appropriate)

## 2018-02-06 NOTE — DISCHARGE SUMMARY
"    [facility]    Name:  Kathryn Davison   Age:  77 y.o.  Sex:  female  :  1940  MRN:  [id number]   Visit Number:  [id number]  Primary Care Physician:  SANFORD Gill  Date of Discharge:  2018  Admission Date:  2018      Presenting Problem:    Pre-op exam [Z01.818]  Right wrist fracture [S62.101A]  Status post total shoulder replacement, left [Z96.612]       Discharge Diagnosis:     Principal Problem:    Closed fracture of left shoulder  Active Problems:    Right wrist fracture    Diabetes mellitus type 2 in obese    Essential hypertension    Acquired hypothyroidism    Morbid obesity with BMI of 40.0-44.9, adult    Status post total shoulder replacement, left    Non Q wave myocardial infarction        Past Medical History:  Past Medical History:   Diagnosis Date   • Back pain    • Body piercing     BOTH EARS   • Carotid artery stenosis     BILATERAL-FOLLOWING WITH DR. WINCHESTER.  SEES EVERY 6 MONTHS.  STATES ABOUT 70% OCCLUSIION   • Cataract, bilateral    • Colon polyp 12/10/2012   • Diabetes    • Disease of thyroid gland    • Diverticulosis    • Fatty liver 2017   • Fracture     RIGHT ANKLE AND LEFT ARM    • High cholesterol    • History of nuclear stress test 2016    \"IT WAS OK\"   • Hypertension    • Lumbosacral disc disease    • Osteoarthritis    • Osteoporosis    • Sleep apnea     CPAP HS - TO BRING IN DOS   • Wears glasses     FOR DISTANCE         Consults:     Consults     Date and Time Order Name Status Description    2018 0815 Inpatient Consult to Cardiology      2018 1653 Inpatient Consult to Hospitalist Completed           Procedures Performed:    Procedure(s):  SHOULDER TOTAL LEFT  REVERSE ARTHROPLASTY WITH TUBEROSITY RECONSTRUCTION  WRIST RIGHT OPEN REDUCTION INTERNAL FIXATION WITH VOLAR PLATE         History of presenting illness:  This is a 77-year-old female who presented to the emergency room with complaints " of pain in her left shoulder and right wrist.  According to the patient on 10/31/2018 she tripped over and fell onto the sidewalk.  She landed on her right readdressed and then her left shoulder.  She was brought into the emergency room and noted to have fracture of the right wrist and left shoulder.  Splints were placed and she was to follow-up with Dr. Meneses as an outpatient.          Hospital Course:  She was brought in on the day of admission where she underwent an anterior left reverse the total shoulder arthroplasty and ORIF with volar plate for a distal radius fracture.  Patient tolerated the procedure without any complications.    Postoperatively PT OT was consulted for strength and mobility.  Case management was also consulted for discharge planning.      Patient was noted over the weekend to have some dyspnea when she got up with physical therapy and some pressure in her chest.  According to the patient this is been going on for the past couple years.  It had gotten worse in the last couple days.  In the 1990s she was told that she had some small vessel disease and it was felt that there was a high probability that she has some significant coronary artery disease.  Troponin was ordered which was noted to be elevated.  Dr. Stewart with cardiology was consulted for further evaluation.  She is also noted to have lower extremity edema which is likely secondary to chronic venous insufficiency.    She was evaluated by Dr. Stewart and noted to have a non-Q-wave myocardial infarction with a positive troponin.  Her symptoms have been going on for quite some time.  He feels there is a high probability that she has significant coronary artery disease.  A long discussion was had with the patient's family and at this time they will continue with medical therapy.  They do want to discuss with her primary cardiologist as an outpatient in respect to further workup.  Dr. Stewart did speak with Dr. Kimberley holguin and informed him of  the proceedings.  She was also noted to have some wall motion abnormality in the anterior and septal walls on the 2-D echo.  Her LV function is stable.  We will continue with medical management at this time and have her follow-up with Dr. Kimberley holguin in the next couple weeks.    Patient at this time denies any chest pain.  She seems to be doing better.  She does have a bed availability at the [facility] in [place].  We will plan to discharge her today to rehabilitation.  We will arrange for to follow-up with Dr. Meneses on an outpatient basis.        Vital Signs:    Temp:  [97.7 °F (36.5 °C)-98.2 °F (36.8 °C)] 97.7 °F (36.5 °C)  Heart Rate:  [67-78] 78  Resp:  [16-18] 17  BP: (117-135)/(55-66) 128/66    Physical Exam:  General Appearance:    Alert and cooperative, not in any acute distress.   Head:    Atraumatic and normocephalic, without obvious abnormality.   Eyes:            PERRLA, conjunctivae and sclerae normal, no Icterus. No pallor. Extra-occular movements are within normal limits.   Ears:    Ears appear intact with no abnormalities noted.   Throat:   No oral lesions, no thrush, oral mucosa moist.   Neck:   Supple, trachea midline, no thyromegaly, no carotid bruit.   Back:     No kyphoscoliosis present. No tenderness to palpation,   range of motion normal.   Lungs:     Chest shape is normal. Breath sounds heard bilaterally equally.  No crackles or wheezing. No Pleural rub or bronchial breathing.    Heart:    Normal S1 and S2, no murmur, no gallop, no rub. No JVD   Abdomen:     Normal bowel sounds, no masses, no organomegaly. Soft        non-tender, non-distended, no guarding, no rebound                Tenderness, obese   Extremities:   Moves all extremities well, no edema, no cyanosis, no             Clubbing, sling left up extremity.  Incision right wrist is clean, dry.  Non stick dressing intact.     Pulses:   Pulses palpable and equal bilaterally   Skin:   No bleeding, bruising or rash   Lymph  nodes:  Psychiatric/Behavior:    No palpable adenopathy    Normal mood, normal behavior   Neurologic:   Cranial nerves 2 - 12 grossly intact, sensation intact, Motor power is normal and equal bilaterally.           Pertinent Lab Results:       Results from last 7 days  Lab Units 02/06/18  0530 02/05/18  0614 02/04/18  0549  02/01/18  1301   SODIUM mmol/L 141 141 141  < > 139   POTASSIUM mmol/L 4.3 4.0 4.3  < > 4.0   CHLORIDE mmol/L 101 100 103  < > 100   CO2 mmol/L 32.0* 33.0* 29.0  < > 27.0   BUN mg/dL 40* 29* 32*  < > 23*   CREATININE mg/dL 1.10 1.00 1.00  < > 1.40*   CALCIUM mg/dL 8.6 9.1 9.3  < > 9.7   BILIRUBIN mg/dL  --   --   --   --  0.3   ALK PHOS U/L  --   --   --   --  149*   ALT (SGPT) U/L  --   --   --   --  31   AST (SGOT) U/L  --   --   --   --  20   GLUCOSE mg/dL 147* 178* 211*  < > 301*   < > = values in this interval not displayed.    Results from last 7 days  Lab Units 02/06/18  0530 02/05/18  0614 02/04/18  0549   WBC 10*3/mm3 9.62 12.40* 13.45*   HEMOGLOBIN g/dL 8.6* 9.1* 8.5*   HEMATOCRIT % 27.6* 28.7* 26.6*   PLATELETS 10*3/mm3 354 338 300         Urine Culture   Date Value Ref Range Status   02/01/2018 Mixed Natalia Isolated  Final         Pertinent Radiology Results:    Imaging Results (all)     Procedure Component Value Units Date/Time    FL C Arm During Surgery [[id number]] Resulted:  02/02/18 1414     Updated:  02/02/18 1414    Narrative:       This procedure was auto-finalized with no dictation required.    XR Shoulder 2+ View Left [[id number]] Collected:  02/02/18 1534     Updated:  02/02/18 1537    Narrative:       Left shoulder     HISTORY: Fracture     COMPARISON: 1/31/2018     FINDINGS: Single view left shoulder shows interval arthroplasty of  shoulder joint. Hardware is unremarkable. A small area of cortical  irregularity is seen along the proximal aspect of the lateral humeral  cortex which may be remnant fracture line from original fracture. AC  joint is intact.       Impression:        Post arthroplasty changes.     This report was finalized on 2/2/2018 3:35 PM by Jan Hinojosa MD.    XR Wrist 3+ View Right [[id number]] Collected:  02/02/18 1602     Updated:  02/02/18 1608    Narrative:       RIGHT WRIST     HISTORY: Fracture.     COMPARISON: 1/31/2018.     FINDINGS: 6 digital spot intraoperative radiographs were obtained. The  examination shows placement of a malleable cortical plate along the  volar aspect of the distal radius. Near anatomic reduction is noted.  Ulnar styloid process fracture is noted.       Impression:       Status post ORIF of distal radial fracture.     This report was finalized on 2/2/2018 4:06 PM by Jan Hinojosa MD.          Condition on Discharge:    Stable        Discharge Disposition:    Rehab Facility or Unit (DC - External)    Discharge Medication:     Kathryn Davison   Home Medication Instructions MUMTAZ:[id number]    Printed on:02/06/18 1123   Medication Information                      albuterol (PROVENTIL HFA;VENTOLIN HFA) 108 (90 Base) MCG/ACT inhaler  Inhale 2 puffs Every 4 (Four) Hours As Needed for Wheezing.             amLODIPine (NORVASC) 5 MG tablet  Take 1 tablet by mouth Daily.             aspirin 81 MG tablet  Take 1 tablet by mouth Daily.             bisacodyl (DULCOLAX) 10 MG suppository  Insert 1 suppository into the rectum Daily As Needed for Constipation.             Calcium Citrate-Vitamin D (CALCIUM + D PO)  Take 1 tablet by mouth Daily.             carvedilol (COREG) 12.5 MG tablet  Take 1 tablet by mouth 2 (Two) Times a Day With Meals.             CloNIDine (CATAPRES) 0.1 MG tablet  Take 0.1 mg by mouth 2 (Two) Times a Day As Needed for High Blood Pressure.             clopidogrel (PLAVIX) 75 MG tablet  Take 75 mg by mouth Daily.             docusate sodium 100 MG capsule  Take 100 mg by mouth 2 (Two) Times a Day As Needed for Constipation.             Furosemide (LASIX PO)  Take 20 mg by mouth Daily As Needed.             insulin aspart  (novoLOG) 100 UNIT/ML injection  Inject 0-7 Units under the skin 4 (Four) Times a Day Before Meals & at Bedtime.             insulin aspart (novoLOG) 100 UNIT/ML injection  Inject 10 Units under the skin 3 (Three) Times a Day With Meals.             insulin glargine (LANTUS) 100 UNIT/ML injection  Inject 33 Units under the skin Every Night.             ipratropium-albuterol (DUO-NEB) 0.5-2.5 mg/mL nebulizer  Take 3 mL by nebulization Every 6 (Six) Hours As Needed for Wheezing or Shortness of Air.             levothyroxine (SYNTHROID, LEVOTHROID) 100 MCG tablet  Take 100 mcg by mouth Daily.             lisinopril (PRINIVIL,ZESTRIL) 20 MG tablet  Take 1 tablet by mouth Daily.             LORazepam (ATIVAN) 1 MG tablet  Take 0.5 tablets by mouth Every 8 (Eight) Hours As Needed for Anxiety for up to 7 days.             losartan-hydrochlorothiazide (HYZAAR) 50-12.5 MG per tablet  Take 1 tablet by mouth Daily.             montelukast (SINGULAIR) 10 MG tablet  Take 10 mg by mouth Every Night.             oxyCODONE-acetaminophen (PERCOCET) 7.5-325 MG per tablet  Take 1 tablet by mouth Every 6 (Six) Hours As Needed for Moderate Pain  for up to 6 days.             Potassium Chloride (KLOR-CON 10 PO)  Take 10 mEq by mouth 2 (Two) Times a Day As Needed (TAKES WHEN LASIX IS TAKEN).             pravastatin (PRAVACHOL) 80 MG tablet  Take 80 mg by mouth Daily.             raNITIdine (ZANTAC) 150 MG tablet  TAKE ONE TABLET BY MOUTH TWICE DAILY             SITagliptin (JANUVIA) 100 MG tablet  Take 100 mg by mouth Daily.                 Discharge Diet:     Diet Instructions     Diet: Consistent Carbohydrate, Cardiac; Thin       Discharge Diet:   Consistent Carbohydrate  Cardiac      Fluid Consistency:  Thin                 Activity at Discharge:     Activity Instructions     Discharge Activity       She must wear the shoulder sling on the left shoulder for at least 3 weeks until cleared by orthopedics.  No weightbearing activity with  the left upper extremity.  Also, no weightbearing to the right wrist.  Patient must use a Velcro wrist brace to the right wrist daily.  May remove the wrist brace for hygiene purposes.                 Follow-up Appointments:    Future Appointments  Date Time Provider Department Center   2/14/2018 1:45 PM SHANITA De Leon None   3/5/2018 11:30 AM NANNETTE Torres None     Dr. Pittman - Feb. 22nd 1pm           Additional Instructions:  She must wear the shoulder sling on the left shoulder for at least 3 weeks until cleared by orthopedics.  No weightbearing activity with the left upper extremity.  Also, no weightbearing to the right wrist.  Patient must use a Velcro wrist brace to the right wrist daily.  May remove the wrist brace for hygiene purposes.  Must keep all incision sites clean and dry.             Test Results Pending at Discharge:     Order Current Status    Tissue Pathology Exam - Bone, Shoulder, Left In process             NANNETTE Long  02/06/18  11:23 AM    Time: 40 minutes were spent reviewing labs, history, evaluating patient and discharge planning.

## 2018-02-06 NOTE — THERAPY DISCHARGE NOTE
Acute Care - Occupational Therapy Discharge Summary  [facility]     Patient Name: Kathryn Davison  : 1940  MRN: [id number]    Today's Date: 2018  Onset of Illness/Injury or Date of Surgery Date: 18    Date of Referral to OT: 18  Referring Physician: Dr. Meneses      Admit Date: 2018        OT Recommendation and Plan    Visit Dx:    ICD-10-CM ICD-9-CM   1. Impaired functional mobility, balance, gait, and endurance Z74.09 V49.89   2. Pre-op exam Z01.818 V72.84   3. Impaired mobility and ADLs Z74.09 799.89               Time Calculation- OT       18 1250          Time Calculation- OT    OT Start Time 1104  -      Total Timed Code Minutes- OT 33 minute(s)  -      OT Received On 18  -      OT Goal Re-Cert Due Date 02/15/18  -        User Key  (r) = Recorded By, (t) = Taken By, (c) = Cosigned By    Initials Name Provider Type     Sunitha Santacruz Occupational Therapist                  OT Goals       18 1246 18 1526       Bed Mobility OT LTG    Bed Mobility OT LTG, Date Established  18  -SD     Bed Mobility OT LTG, Time to Achieve  2 wks  -SD     Bed Mobility OT LTG, Activity Type  supine to sit/sit to supine  -SD     Bed Mobility OT LTG, [place] Level  contact guard assist  -SD     Bed Mobility OT LTG, Date Goal Reviewed 18  -      Bed Mobility OT LTG, Outcome goal ongoing  [facility] goal ongoing  -SD     Transfer Training 2 OT LTG    Transfer Training 2 OT LTG, Date Established  18  -SD     Transfer Training 2 OT LTG, Time to Achieve  2 wks  -SD     Transfer Training 2 OT LTG, Activity Type  sit to stand/stand to sit  -SD     Transfer Training 2 OT LTG, [place] Level  contact guard assist  -SD     Transfer Training 2 OT LTG, Date Goal Reviewed 18  -      Transfer Training 2 OT LTG, Outcome goal ongoing  - goal ongoing  -SD     Patient Education OT LTG    Patient Education OT LTG, Date Established  18  -SD     Patient  Education OT LTG, Time to Achieve  2 wks  -SD     Patient Education OT LTG, Education Type  precautions per surgeon   Distal ROM; pendulums; sling mgmt  -SD     Patient Education OT LTG, Education Understanding  demonstrates adequately;verbalizes understanding  -SD     Patient Education OT LTG, Date Goal Reviewed 02/06/18  -      Patient Education OT LTG Outcome goal met  - goal ongoing  -SD     Bathing OT LTG    Bathing Goal OT LTG, Date Established  02/05/18  -SD     Bathing Goal OT LTG, Time to Achieve  2 wks  -SD     Bathing Goal OT LTG, Activity Type  upper body bathing  -SD     Bathing Goal OT LTG, [place] Level  minimum assist (75% patient effort)  -SD     Bathing Goal OT LTG, Date Goal Reviewed 02/06/18  -      Bathing Goal OT LTG, Outcome goal ongoing  - goal ongoing  -SD     Toileting OT LTG    Toileting Goal OT LTG, Date Established  02/05/18  -SD     Toileting Goal OT LTG, Time to Achieve  2 wks  -SD     Toileting Goal OT LTG, [place] Level  minimum assist (75% patient effort)  -SD     Toileting Goal OT LTG, Date Goal Reviewed 02/06/18  -      Toileting Goal OT LTG, Outcome goal ongoing  - goal ongoing  -SD     Functional Mobility OT STG    Functional Mobility Goal OT STG, Date Established  02/05/18  -SD     Functional Mobility Goal OT STG, Time to Achieve  2 wks  -SD     Functional Mobility Goal OT STG, [place] Level  contact guard  -SD     Functional Mobility Goal OT STG, Distance to Achieve  in hallway  -SD     Functional Mobility Goal OT STG, Additional Goal  100  -SD     Functional Mobility Goal OT STG, Date Goal Reviewed 02/06/18  -      Functional Mobility Goal OT STG, Outcome goal met  - goal ongoing  -SD       User Key  (r) = Recorded By, (t) = Taken By, (c) = Cosigned By    Initials Name Provider Type    DINESH Santacruz Occupational Therapist    JENNIFER Baker, OT Occupational Therapist                Outcome Measures       02/06/18 1104 02/06/18 1051  02/05/18 1334    How much help from another person do you currently need...    Turning from your back to your side while in flat bed without using bedrails?  2  -RM     Moving from lying on back to sitting on the side of a flat bed without bedrails?  3  -RM     Moving to and from a bed to a chair (including a wheelchair)?  3  -RM     Standing up from a chair using your arms (e.g., wheelchair, bedside chair)?  3  -RM     Climbing 3-5 steps with a railing?  2  -RM     To walk in hospital room?  3  -RM     AM-PAC 6 Clicks Score  16  -RM     How much help from another is currently needed...    Putting on and taking off regular lower body clothing? 2  -AH  2  -SD    Bathing (including washing, rinsing, and drying) 2  -AH  2  -SD    Toileting (which includes using toilet bed pan or urinal) 2  -AH  2  -SD    Putting on and taking off regular upper body clothing 2  -AH  2  -SD    Taking care of personal grooming (such as brushing teeth) 3  -AH  3  -SD    Eating meals 3  -AH  3  -SD    Score 14  -AH  14  -SD    Functional Assessment    Outcome Measure Options AM-PAC 6 Clicks Daily Activity (OT)  -AH AM-PAC 6 Clicks Basic Mobility (PT)  -RM AM-PAC 6 Clicks Daily Activity (OT)  -SD      02/04/18 1440 02/04/18 1127       How much help from another person do you currently need...    Turning from your back to your side while in flat bed without using bedrails? 2  -JR 2  -JR     Moving from lying on back to sitting on the side of a flat bed without bedrails? 2  -JR 2  -JR     Moving to and from a bed to a chair (including a wheelchair)? 2  -JR 2  -JR     Standing up from a chair using your arms (e.g., wheelchair, bedside chair)? 3  -JR 3  -JR     Climbing 3-5 steps with a railing? 2  -JR 2  -JR     To walk in hospital room? 3  -JR 3  -JR     AM-PAC 6 Clicks Score 14  -JR 14  -JR     Functional Assessment    Outcome Measure Options AM-PAC 6 Clicks Basic Mobility (PT)  -JR AM-PAC 6 Clicks Basic Mobility (PT)  -JR       User Key  (r)  = Recorded By, (t) = Taken By, (c) = Cosigned By    Initials Name Provider Type     Sunitha Santacruz Occupational Therapist    JR Addie Taylor, PT Physical Therapist    RM Williams Mosley, PTA Physical Therapy Assistant    EJNNIFER Baker, OT Occupational Therapist          Therapy Charges for Today     Code Description Service Date Service Provider Modifiers Qty    [id number] HC OT THER PROC EA 15 MIN 2/6/2018 Sunitha Santacruz GO 1          OT Discharge Summary  Anticipated Discharge Disposition: inpatient rehabilitation facility  Reason for Discharge: Discharge from facility  Outcomes Achieved: Patient able to partially acheive established goals  Discharge Destination: Inpatient rehabilitation facility      Sunitha Santacruz  2/6/2018

## 2018-02-06 NOTE — PROGRESS NOTES
"   LOS: 3 days   Patient Care Team:  SANFORD Gill as PCP - General    Interval History: She feels better.  Is not short-winded today.    Over the last 24 hours:    The options of invasive workup were discussed with the patient as well as her daughter.  The general consensus was to continue medical management at this time.  They're going to discuss with her primary cardiologist with respect to her long-term options.  This would also be helpful as she is immediately postop at this point in time.        Review of Systems:   Pertinent items are noted in HPI.      Objective     Vital Sign Min/Max for last 24 hours  Temp  Min: 97.7 °F (36.5 °C)  Max: 98.2 °F (36.8 °C)   BP  Min: 117/58  Max: 168/72   Pulse  Min: 67  Max: 81   Resp  Min: 16  Max: 18   SpO2  Min: 93 %  Max: 96 %   No Data Recorded   Weight  Min: 113 kg (250 lb 3.2 oz)  Max: 114 kg (251 lb 5.2 oz)     Flowsheet Rows         First Filed Value    Admission Height  160 cm (63\") Documented at 02/02/2018 0703    Admission Weight  111 kg (245 lb) Documented at 02/02/2018 0703          Physical Exam:     General Appearance:    Alert, cooperative, in no acute distress   Head:    Normocephalic, without obvious abnormality, atraumatic   Eyes:            Lids and lashes normal, conjunctivae and sclerae normal, no   icterus, no pallor, corneas clear, PERRLA   Ears:    Ears appear intact with no abnormalities noted   Throat:   No oral lesions, no thrush, oral mucosa moist   Neck:   No adenopathy, supple, trachea midline, no thyromegaly, no     carotid bruit, no JVD   Back:     No kyphosis present, no scoliosis present, no skin lesions,       erythema or scars, no tenderness to percussion or                   palpation,   range of motion normal   Lungs:   Decreased breath sounds in the bases.      Heart:    Regular rhythm and normal rate, normal S1 and S2, no            murmur, no gallop, no rub, no click   Breast Exam:    Deferred   Abdomen:     Normal " bowel sounds, no masses, no organomegaly, soft        non-tender, non-distended, no guarding, no rebound                 tenderness   Genitalia:    Deferred   Extremities:   Moves all extremities well, no edema, no cyanosis, no              redness   Pulses:   Pulses palpable and equal bilaterally   Skin:   No bleeding, bruising or rash   Lymph nodes:   No palpable adenopathy   Neurologic:   Cranial nerves 2 - 12 grossly intact, sensation intact, DTR        present and equal bilaterally        Results Review:     I reviewed the patient's new clinical results.    Results Review:   I reviewed the patient's new clinical results.    EKG: Sinus rhythm with nonspecific ST wave changes.    LAB DATA :       Results from last 7 days  Lab Units 02/05/18  0614   CHOLESTEROL mg/dL 153   TRIGLYCERIDES mg/dL 109   HDL CHOL mg/dL 51       Laboratory results:      Results from last 7 days  Lab Units 02/06/18  0530 02/05/18  0614 02/04/18  0549 02/03/18  0521 02/01/18  1301   SODIUM mmol/L 141 141 141 140 139   POTASSIUM mmol/L 4.3 4.0 4.3 4.6 4.0   CHLORIDE mmol/L 101 100 103 102 100   CO2 mmol/L 32.0* 33.0* 29.0 28.0 27.0   BUN mg/dL 40* 29* 32* 29* 23*   CREATININE mg/dL 1.10 1.00 1.00 1.20 1.40*   CALCIUM mg/dL 8.6 9.1 9.3 9.1 9.7   BILIRUBIN mg/dL  --   --   --   --  0.3   ALK PHOS U/L  --   --   --   --  149*   ALT (SGPT) U/L  --   --   --   --  31   AST (SGOT) U/L  --   --   --   --  20   GLUCOSE mg/dL 147* 178* 211* 305* 301*       Results from last 7 days  Lab Units 02/06/18  0530 02/05/18  0614 02/04/18  0549 02/03/18  0521 02/01/18  1308   WBC 10*3/mm3 9.62 12.40* 13.45* 14.96* 11.92*   HEMOGLOBIN g/dL 8.6* 9.1* 8.5* 9.0* 10.7*   HEMATOCRIT % 27.6* 28.7* 26.6* 28.2* 34.7*   PLATELETS 10*3/mm3 354 338 300 305 379           Results from last 7 days  Lab Units 02/01/18  1301   URINECX  Mixed Natalia Isolated           Results from last 7 days  Lab Units 02/06/18  0530  02/05/18  0614   CK TOTAL U/L  --   --  106   TROPONIN I  ng/mL 0.392*  < > 0.644*   < > = values in this interval not displayed.              Lab Results   Component Value Date    HGBA1C 9.2 (H) 02/03/2018       Results from last 7 days  Lab Units 02/05/18  0942   TSH mIU/mL 3.830           IMAGING DATA:     Xr Chest 2 Vw    Result Date: 2/1/2018  Narrative: PROCEDURE: XR CHEST 2 VW-   HISTORY: Pre-op; [id number] for other preprocedural examination    COMPARISON: None.  FINDINGS:  The lungs are clear.   There is no evidence of effusion or other pleural disease.  The mediastinum has a normal appearance.  The cardiac silhouette is unremarkable.           Impression: Unremarkable chest exam.    This report was finalized on 2/1/2018 2:07 PM by Jan Hinojosa MD.    Xr Shoulder 2+ View Left    Result Date: 2/2/2018  Narrative: Left shoulder  HISTORY: Fracture  COMPARISON: 1/31/2018  FINDINGS: Single view left shoulder shows interval arthroplasty of shoulder joint. Hardware is unremarkable. A small area of cortical irregularity is seen along the proximal aspect of the lateral humeral cortex which may be remnant fracture line from original fracture. AC joint is intact.      Impression: Post arthroplasty changes.  This report was finalized on 2/2/2018 3:35 PM by Jan Hinojosa MD.    Xr Shoulder 2+ View Left    Result Date: 2/1/2018  Narrative: XR SHOULDER 2+ VIEW LEFT-  THREE VIEW  HISTORY: fall  FINDINGS:  Three views show comminuted fracture of the proximal humerus. There is anterior displacement of humeral head. Fracture does involve greater tuberosity..  There is mild displacement.     There is inferior pseudosubluxation presumably due to hemarthrosis. AC joint arthropathy is noted.          Impression: Comminuted proximal humeral fracture as above.         This report was finalized on 2/1/2018 8:43 AM by Jan Hinojosa MD.    Xr Humerus Left    Result Date: 2/1/2018  Narrative: XR HUMERUS LEFT-  HISTORY: fall, pain.  FINDINGS:  Two views show fracture of the proximal  humerus involving humeral neck and head.  There is moderate displacement.     No dislocation is seen.          Impression: Comminuted proximal humeral fracture as above.         This report was finalized on 2/1/2018 8:33 AM by Jan Hinojosa MD.    Xr Wrist 3+ View Right    Result Date: 2/2/2018  Narrative: RIGHT WRIST  HISTORY: Fracture.  COMPARISON: 1/31/2018.  FINDINGS: 6 digital spot intraoperative radiographs were obtained. The examination shows placement of a malleable cortical plate along the volar aspect of the distal radius. Near anatomic reduction is noted. Ulnar styloid process fracture is noted.      Impression: Status post ORIF of distal radial fracture.  This report was finalized on 2/2/2018 4:06 PM by Jan Hinojosa MD.    Xr Wrist 3+ View Right    Result Date: 2/1/2018  Narrative: XR WRIST 3+ VIEW RIGHT-  HISTORY: Fall.  FINDINGS:  3 views show comminuted fracture of the distal radius extending into the radiocarpal joint. There is mild displacement.     The joint spaces appear normal.          Impression: Comminuted distal radial fracture as above.         This report was finalized on 2/1/2018 8:33 AM by Jan Hinojosa MD.    Xr Knee 3 View Left    Result Date: 2/1/2018  Narrative: XR KNEE 3 VIEW LEFT-  HISTORY: Fall, pain.  FINDINGS:  Three views show no evidence of an acute, displaced fracture or dislocation of the visualized bony architecture.  The joint spaces appear normal.         Impression: Unremarkable exam.           This report was finalized on 2/1/2018 9:02 AM by Jan Hinojosa MD.    Fl C Arm During Surgery    Result Date: 2/2/2018  Narrative: This procedure was auto-finalized with no dictation required.    Ct Upper Extremity Left Without Contrast    Result Date: 1/31/2018  Narrative: FINAL REPORT CLINICAL HISTORY: Abnormal xray, shoulder; Shoulder FINDINGS: Multiple contiguous axial slices through the left shoulder were obtained with coronal and sagittal reformatted images. There is a  comminuted humeral head and surgical neck fracture without dislocation or radiopaque foreign body. Degenerative changes are present. Impression: Comminuted humeral head and neck fracture     Impression: Authenticated by Eugene Pan MD on 01/31/2018 11:08:34 PM          Assessment/Plan    #1 non-Q-wave myocardial infarction: Patient has had positive troponin.  Has classic symptoms which is been going on for some time.  Has been known to have small vessel disease over a dedicated ago.  Her sugars have been elevated also in the meantime.  There is a high probability that she has significant CAD.  On discussion with the family it was decided to proceed with medical therapy at this time.  They're going to discuss with her primary cardiologist an outpatient basis with respect to further workup.  At this point would continue dual antiplatelet therapy high-dose statin therapy and beta blocker therapy which all have been initiated.  Patient has been asked to follow up with primary audiologist within the next one to 2 weeks time.  I have made a call to the primary cardiologist Dr. Villafana and informed him of the proceedings.  Also has wall motion of normality involving the inferior and septal walls by echocardiography though the LV function seems to be maintained.    #2 hypertension: Seems to be doing well on the present medications.    #3 anemia: Appears to be long-standing.  Workup as per primary.    #4 dyslipidemia: To continue high-dose statin therapy at this point.    #5 morbid obesity.    #6 chronic venous insufficiency: Compression therapy suggested.    Thank you for letting me take part in the care of Mrs. Davison please feel free to give me a call if any further questions.    Principal Problem:    Closed fracture of left shoulder  Active Problems:    Right wrist fracture    Diabetes mellitus type 2 in obese    Essential hypertension    Acquired hypothyroidism    Morbid obesity with BMI of 40.0-44.9, adult     Status post total shoulder replacement, left            Shay Stewart MD  02/06/18  8:27 AM

## 2018-02-06 NOTE — PLAN OF CARE
Problem: Patient Care Overview (Adult)  Goal: Plan of Care Review  Outcome: Ongoing (interventions implemented as appropriate)   02/06/18 1147   Outcome Evaluation   Outcome Summary/Follow up Plan Pt was able to tolerate advancement in gistance during gait training. Pt has a very rigid gait patern at this time however. See flowsheet for details.    Patient Care Overview   Progress improving   Coping/Psychosocial Response Interventions   Plan Of Care Reviewed With patient;daughter       Problem: Inpatient Physical Therapy  Goal: Bed Mobility Goal LTG- PT  Outcome: Ongoing (interventions implemented as appropriate)   02/03/18 1211 02/06/18 1147   Bed Mobility PT LTG   Bed Mobility PT LTG, Date Established 02/04/18 --    Bed Mobility PT LTG, Time to Achieve 2 wks --    Bed Mobility PT LTG, Activity Type all bed mobility --    Bed Mobility PT LTG, Oran Level contact guard assist --    Bed Mobility PT LTG, Outcome --  goal partially met     Goal: Transfer Training Goal 1 LTG- PT  Outcome: Ongoing (interventions implemented as appropriate)   02/03/18 1211 02/06/18 1147   Transfer Training PT LTG   Transfer Training PT LTG, Date Established 02/04/18 --    Transfer Training PT LTG, Time to Achieve 2 wks --    Transfer Training PT LTG, Activity Type sit to stand/stand to sit;bed to chair /chair to bed --    Transfer Training PT LTG, Or[place] Level contact guard assist --    Transfer Training PT LTG, Outcome --  goal partially met     Goal: Gait Training Goal LTG- PT  Outcome: Ongoing (interventions implemented as appropriate)   02/03/18 1211 02/06/18 1147   Gait Training PT LTG   Gait Training Goal PT LTG, Date Established 02/04/18 --    Gait Training Goal PT LTG, Time to Achieve 2 wks --    Gait Training Goal PT LTG, Oran Level contact guard assist --    Gait Training Goal PT LTG, Distance to Achieve 125 --    Gait Training Goal PT LTG, Additional Goal with limited SOA --    Gait Training Goal PT LTG,  Outcome --  goal partially met

## 2018-02-06 NOTE — THERAPY TREATMENT NOTE
Acute Care - Occupational Therapy Treatment Note  [facility]     Patient Name: Kathryn Davisno  : 1940  MRN: [id number]  Today's Date: 2018  Onset of Illness/Injury or Date of Surgery Date: 18  Date of Referral to OT: 18  Referring Physician: Dr. Meneses      Admit Date: 2018    Visit Dx:     ICD-10-CM ICD-9-CM   1. Impaired functional mobility, balance, gait, and endurance Z74.09 V49.89   2. Pre-op exam Z01.818 V72.84   3. Impaired mobility and ADLs Z74.09 799.89     Patient Active Problem List   Diagnosis   • Constipation   • Left lower quadrant pain   • Colon cancer screening   • Left upper quadrant pain   • Diverticulosis of large intestine without hemorrhage   • Colon polyps   • Vascular ectasia of colon   • Internal hemorrhoids   • External hemorrhoid   • Elevated liver function tests   • Elevated alkaline phosphatase level   • Gastritis without bleeding   • Helicobacter pylori infection   • Abnormal ultrasound of liver   • Gastric polyp   • Pre-op exam   • Right wrist fracture   • Diabetes mellitus type 2 in obese   • Essential hypertension   • Acquired hypothyroidism   • Morbid obesity with BMI of 40.0-44.9, adult   • Closed fracture of left shoulder   • Status post total shoulder replacement, left   • Non Q wave myocardial infarction             Adult Rehabilitation Note       18 1104 18 1051 18 1440    Rehab Assessment/Intervention    Discipline occupational therapist  - physical therapy assistant  -RM physical therapist  -JR    Document Type therapy note (daily note)  - therapy note (daily note)  -RM therapy note (daily note)  -JR    Subjective Information agree to therapy;complains of;pain  - agree to therapy;complains of;weakness;pain  - agree to therapy  -JR    Patient Effort, Rehab Treatment   good  -JR    Symptoms Noted During/After Treatment fatigue  - fatigue  -RM none  -JR    Precautions/Limitations fall precautions  - fall precautions   -RM     Recorded by [] Sunitha Santacruz [] Williams Mosley PTA [JR] Addie Taylor, PT    Pain Assessment    Pain Assessment 0-10  - 0-10  - 0-10  -JR    Pain Score 4  -AH 4  -RM 5  -JR    Post Pain Score 4  -AH 4  -RM 3  -JR    Pain Type Acute pain;Surgical pain  - Acute pain;Surgical pain  -RM     Pain Location Shoulder  - Shoulder  -RM Shoulder  -JR    Pain Orientation Left  -AH Left  -RM Left  -JR    Pain Descriptors  Aching;Throbbing  -RM Throbbing  -JR    Pain Frequency  Constant/continuous  -RM Constant/continuous  -JR    Pain Intervention(s) Repositioned;Ambulation/increased activity  - Repositioned;Ambulation/increased activity  - Repositioned;Ambulation/increased activity  -    Response to Interventions tolerated  -  pain is decreased after movements  -JR    Recorded by [] Sunitha Santacruz [] Williams Mosley PTA [JR] Addie Taylor, PT    Cognitive Assessment/Intervention    Current Cognitive/Communication Assessment   functional  -    Orientation Status   oriented x 4  -    Follows Commands/Answers Questions   able to follow single-step instructions  -    Personal Safety   decreased awareness, need for safety;decreased awareness, need for assist  -    Personal Safety Interventions   fall prevention program maintained;gait belt;nonskid shoes/slippers when out of bed  -JR    Recorded by   [JR] Addie Taylor, PT    Bed Mobility, Assessment/Treatment    Bed Mobility, Assistive Device  head of bed elevated  -     Bed Mob, Supine to Sit, [place]  minimum assist (75% patient effort)  -     Bed Mobility, Safety Issues  decreased use of arms for pushing/pulling;decreased use of legs for bridging/pushing  -     Bed Mobility, Impairments  ROM decreased;strength decreased  -     Recorded by  [] Williams Mosley PTA     Transfer Assessment/Treatment    Transfers, Sit-Stand [place] contact guard assist;minimum assist (75% patient effort)  - contact guard assist;minimum  assist (75% patient effort)  - minimum assist (75% patient effort)  -    Transfers, Stand-Sit [place] contact guard assist;minimum assist (75% patient effort)  - contact guard assist;minimum assist (75% patient effort)  - contact guard assist  -    Transfer, Impairments   strength decreased;impaired balance;motor control impaired  -    Recorded by [] Sunitha Santacruz [] Williams Mosley, PTA [JR] Addie Taylor, PT    Gait Assessment/Treatment    Gait, [place] Level  contact guard assist;hand held assist  - minimum assist (75% patient effort)  -    Gait, Distance (Feet)  128  -RM 85  -JR    Gait, Gait Pattern Analysis  swing-through gait  - swing-through gait  -    Gait, Gait Deviations  [place] decreased;decreased heel strike;step length decreased;stride width increased;toe-to-floor clearance decreased  - toe-to-floor clearance decreased;decreased heel strike;stride width increased;stride length decreased  -    Gait, Safety Issues  balance decreased during turns;step length decreased  - sequencing ability decreased;step length decreased;balance decreased during turns  -    Gait, Impairments  strength decreased  - impaired balance;coordination impaired;strength decreased  -    Gait, Comment  pt required x2 standing rests.   -     Recorded by  [] Williams Mosley, PTA [JR] Addie Taylor, PT    Functional Mobility    Functional Mobility- Ind. Level contact guard assist  -      Functional Mobility- Device other (see comments)   hnad held assist  -      Functional Mobility-Distance (Feet) 128  -      Functional Mobility- Comment Pt required 2 standing rest breaks  -      Recorded by [] Sunitha Santacruz      Therapy Exercises    Right Upper Extremity AROM:;10 reps;elbow flexion/extension;hand pumps;pronation/supination  -      Left Upper Extremity AROM:;10 reps;elbow flexion/extension;hand pumps;pronation/supination;shoulder pendulum  -      Recorded by [] Sunitha  Neeta      Positioning and Restraints    Pre-Treatment Position in bed  - in bed  -RM sitting in chair/recliner  -JR    Post Treatment Position bathroom  -AH chair  -RM chair  -JR    In Chair  sitting;call light within reach;encouraged to call for assist;with OT;notified nsg  -RM sitting;call light within reach;encouraged to call for assist;with family/caregiver  -JR    Bathroom sitting;call light within reach;with family/caregiver   PCT was present when OT left pts room  -      Recorded by [] Sunitha Santacruz [] Williams Mosley PTA [JR] Addie Taylor, PT      02/04/18 1127          Rehab Assessment/Intervention    Discipline physical therapist  -      Document Type therapy note (daily note)  -JR      Subjective Information agree to therapy  -JR      Patient Effort, Rehab Treatment good  -JR      Symptoms Noted During/After Treatment none  -JR      Precautions/Limitations fall precautions  -JR      Patient Response to Treatment pleased with progress  -JR      Recorded by [JR] Addie Taylor, PT      Pain Assessment    Pain Assessment 0-10  -JR      Pain Score 6  -JR      Post Pain Score 4  -JR      Pain Location Shoulder  -JR      Pain Orientation Left  -JR      Pain Descriptors Throbbing  -JR      Pain Frequency Constant/continuous  -JR      Pain Intervention(s) Repositioned;Ambulation/increased activity  -JR      Response to Interventions pain is decreasing  -JR      Recorded by [JR] Addie Taylor, PT      Cognitive Assessment/Intervention    Current Cognitive/Communication Assessment functional  -JR      Orientation Status oriented x 4  -JR      Follows Commands/Answers Questions able to follow single-step instructions;needs cueing  -JR      Personal Safety decreased awareness, need for safety;decreased awareness, need for assist  -JR      Personal Safety Interventions fall prevention program maintained;gait belt;nonskid shoes/slippers when out of bed  -JR      Recorded by [JR] Addie Taylor, PT      Bed  Mobility, Assessment/Treatment    Bed Mobility, Comment UIC  -JR      Recorded by [JR] Addie Taylor, PT      Transfer Assessment/Treatment    Transfers, Sit-Stand [place] minimum assist (75% patient effort)  -JR      Transfers, Stand-Sit [place] contact guard assist  -JR      Transfer, Impairments strength decreased;ROM decreased;impaired balance  -JR      Recorded by [JR] Addie Taylor, PT      Gait Assessment/Treatment    Gait, [place] Level minimum assist (75% patient effort);contact guard assist  -JR      Gait, Distance (Feet) 65  -JR      Gait, Gait Pattern Analysis swing-through gait  -JR      Gait, Gait Deviations toe-to-floor clearance decreased;stride width increased;stride length decreased;[place] decreased  -JR      Gait, Safety Issues sequencing ability decreased;balance decreased during turns;step length decreased  -JR      Gait, Impairments impaired balance;coordination impaired;strength decreased  -JR      Recorded by [JR] Addie Taylor PT      Positioning and Restraints    Pre-Treatment Position in bed  -JR      Post Treatment Position chair  -JR      In Chair sitting;call light within reach;encouraged to call for assist  -JR      Recorded by [JR] Addie Taylor PT        User Key  (r) = Recorded By, (t) = Taken By, (c) = Cosigned By    Initials Name Effective Dates     Sunitha Santacruz 10/26/16 -     JR Addie Taylor, PT 10/26/16 -      Williams Mosley, PTA 10/26/16 -                 OT Goals       02/06/18 1246 02/05/18 1526       Bed Mobility OT LTG    Bed Mobility OT LTG, Date Established  02/05/18  -SD     Bed Mobility OT LTG, Time to Achieve  2 wks  -SD     Bed Mobility OT LTG, Activity Type  supine to sit/sit to supine  -SD     Bed Mobility OT LTG, [place] Level  contact guard assist  -SD     Bed Mobility OT LTG, Date Goal Reviewed 02/06/18  -      Bed Mobility OT LTG, Outcome goal ongoing  - goal ongoing  -SD     Transfer Training 2 OT LTG    Transfer Training 2 OT LTG, Date  Established  02/05/18  -SD     Transfer Training 2 OT LTG, Time to Achieve  2 wks  -SD     Transfer Training 2 OT LTG, Activity Type  sit to stand/stand to sit  -SD     Transfer Training 2 OT LTG, [place] Level  contact guard assist  -SD     Transfer Training 2 OT LTG, Date Goal Reviewed 02/06/18  -      Transfer Training 2 OT LTG, Outcome goal ongoing  - goal ongoing  -SD     Patient Education OT LTG    Patient Education OT LTG, Date Established  02/05/18  -SD     Patient Education OT LTG, Time to Achieve  2 wks  -SD     Patient Education OT LTG, Education Type  precautions per surgeon   Distal ROM; pendulums; sling mgmt  -SD     Patient Education OT LTG, Education Understanding  demonstrates adequately;verbalizes understanding  -SD     Patient Education OT LTG, Date Goal Reviewed 02/06/18  -      Patient Education OT LTG Outcome goal met  - goal ongoing  -SD     Bathing OT LTG    Bathing Goal OT LTG, Date Established  02/05/18  -SD     Bathing Goal OT LTG, Time to Achieve  2 wks  -SD     Bathing Goal OT LTG, Activity Type  upper body bathing  -SD     Bathing Goal OT LTG, [place] Level  minimum assist (75% patient effort)  -SD     Bathing Goal OT LTG, Date Goal Reviewed 02/06/18  -      Bathing Goal OT LTG, Outcome goal ongoing  - goal ongoing  -SD     Toileting OT LTG    Toileting Goal OT LTG, Date Established  02/05/18  -SD     Toileting Goal OT LTG, Time to Achieve  2 wks  -SD     Toileting Goal OT LTG, [place] Level  minimum assist (75% patient effort)  -SD     Toileting Goal OT LTG, Date Goal Reviewed 02/06/18  -      Toileting Goal OT LTG, Outcome goal ongoing  - goal ongoing  -SD     Functional Mobility OT STG    Functional Mobility Goal OT STG, Date Established  02/05/18  -SD     Functional Mobility Goal OT STG, Time to Achieve  2 wks  -SD     Functional Mobility Goal OT STG, [place] Level  contact guard  -SD     Functional Mobility Goal OT STG, Distance to Achieve  in  hallway  -SD     Functional Mobility Goal OT STG, Additional Goal  100  -SD     Functional Mobility Goal OT STG, Date Goal Reviewed 02/06/18  -      Functional Mobility Goal OT STG, Outcome goal met  - goal ongoing  -SD       User Key  (r) = Recorded By, (t) = Taken By, (c) = Cosigned By    Initials Name Provider Type     Sunitha Santacruz Occupational Therapist    SD Rosangela Baker OT Occupational Therapist          Occupational Therapy Education     Title: PT OT SLP Therapies (Done)     Topic: Occupational Therapy (Resolved)     Point: ADL training (Resolved)    Description: Instruct learner(s) on proper safety adaptation and remediation techniques during self care or transfers.   Instruct in proper use of assistive devices.    Learning Progress Summary    Learner Readiness Method Response Comment Documented by Status   Patient Acceptance E VU Benefit of OT and OT POC SD 02/05/18 1531 Done                      User Key     Initials Effective Dates Name Provider Type Discipline    SD 06/30/17 -  Rosangela Baker OT Occupational Therapist OT                  OT Recommendation and Plan  Anticipated Discharge Disposition: inpatient rehabilitation facility  Therapy Frequency: 3-5 times/wk  Plan of Care Review  Plan Of Care Reviewed With: patient  Progress: improving  Outcome Summary/Follow up Plan: Pt able to walk 128' with 2 standing rest breaks.  Pt performed BUE ex as per flow sheet.  Pt was left in the bathroom with her daughter and PCT present.  Pt able to verbalize precautions with BUEs today.        Outcome Measures       02/06/18 1104 02/06/18 1051 02/05/18 1334    How much help from another person do you currently need...    Turning from your back to your side while in flat bed without using bedrails?  2  -RM     Moving from lying on back to sitting on the side of a flat bed without bedrails?  3  -RM     Moving to and from a bed to a chair (including a wheelchair)?  3  -RM     Standing up from a chair  using your arms (e.g., wheelchair, bedside chair)?  3  -RM     Climbing 3-5 steps with a railing?  2  -RM     To walk in hospital room?  3  -RM     AM-PAC 6 Clicks Score  16  -RM     How much help from another is currently needed...    Putting on and taking off regular lower body clothing? 2  -AH  2  -SD    Bathing (including washing, rinsing, and drying) 2  -AH  2  -SD    Toileting (which includes using toilet bed pan or urinal) 2  -AH  2  -SD    Putting on and taking off regular upper body clothing 2  -AH  2  -SD    Taking care of personal grooming (such as brushing teeth) 3  -AH  3  -SD    Eating meals 3  -AH  3  -SD    Score 14  -AH  14  -SD    Functional Assessment    Outcome Measure Options AM-PAC 6 Clicks Daily Activity (OT)  -AH AM-PAC 6 Clicks Basic Mobility (PT)  -RM AM-PAC 6 Clicks Daily Activity (OT)  -SD      02/04/18 1440 02/04/18 1127       How much help from another person do you currently need...    Turning from your back to your side while in flat bed without using bedrails? 2  -JR 2  -JR     Moving from lying on back to sitting on the side of a flat bed without bedrails? 2  -JR 2  -JR     Moving to and from a bed to a chair (including a wheelchair)? 2  -JR 2  -JR     Standing up from a chair using your arms (e.g., wheelchair, bedside chair)? 3  -JR 3  -JR     Climbing 3-5 steps with a railing? 2  -JR 2  -JR     To walk in hospital room? 3  -JR 3  -JR     AM-PAC 6 Clicks Score 14  -JR 14  -JR     Functional Assessment    Outcome Measure Options AM-PAC 6 Clicks Basic Mobility (PT)  -JR AM-PAC 6 Clicks Basic Mobility (PT)  -JR       User Key  (r) = Recorded By, (t) = Taken By, (c) = Cosigned By    Initials Name Provider Type    DNIESH Santacruz Occupational Therapist    JR Addie Taylor, PT Physical Therapist    RM Williams Mosley, PTA Physical Therapy Assistant    JENNIFER Baker, OT Occupational Therapist           Time Calculation:         Time Calculation- OT       02/06/18 1250          Time  Calculation- OT    OT Start Time 1104  -      Total Timed Code Minutes- OT 33 minute(s)  -      OT Received On 02/06/18  -      OT Goal Re-Cert Due Date 02/15/18  -        User Key  (r) = Recorded By, (t) = Taken By, (c) = Cosigned By    Initials Name Provider Type     Sunitha Santacruz Occupational Therapist           Therapy Charges for Today     Code Description Service Date Service Provider Modifiers Qty    [id number]  OT THER PROC EA 15 MIN 2/6/2018 Sunitha Santacruz GO 1               Sunitha Santacruz  2/6/2018

## 2018-02-08 LAB
LAB AP CASE REPORT: NORMAL
Lab: NORMAL
PATH REPORT.FINAL DX SPEC: NORMAL

## 2018-02-13 DIAGNOSIS — S42.202A CLOSED FRACTURE OF PROXIMAL END OF LEFT HUMERUS, UNSPECIFIED FRACTURE MORPHOLOGY, INITIAL ENCOUNTER: ICD-10-CM

## 2018-02-13 DIAGNOSIS — S52.501A CLOSED FRACTURE OF DISTAL END OF RIGHT RADIUS, UNSPECIFIED FRACTURE MORPHOLOGY, INITIAL ENCOUNTER: Primary | ICD-10-CM

## 2018-02-14 ENCOUNTER — OFFICE VISIT (OUTPATIENT)
Dept: ORTHOPEDIC SURGERY | Facility: CLINIC | Age: 78
End: 2018-02-14

## 2018-02-14 VITALS — RESPIRATION RATE: 16 BRPM | BODY MASS INDEX: 44.17 KG/M2 | HEIGHT: 63 IN | WEIGHT: 249.3 LBS

## 2018-02-14 DIAGNOSIS — Z87.81 S/P ORIF (OPEN REDUCTION INTERNAL FIXATION) FRACTURE: ICD-10-CM

## 2018-02-14 DIAGNOSIS — Z96.612 S/P REVERSE TOTAL SHOULDER ARTHROPLASTY, LEFT: ICD-10-CM

## 2018-02-14 DIAGNOSIS — S52.501A CLOSED FRACTURE OF DISTAL END OF RIGHT RADIUS, UNSPECIFIED FRACTURE MORPHOLOGY, INITIAL ENCOUNTER: Primary | ICD-10-CM

## 2018-02-14 DIAGNOSIS — S42.202A CLOSED FRACTURE OF PROXIMAL END OF LEFT HUMERUS, UNSPECIFIED FRACTURE MORPHOLOGY, INITIAL ENCOUNTER: ICD-10-CM

## 2018-02-14 DIAGNOSIS — Z98.890 S/P ORIF (OPEN REDUCTION INTERNAL FIXATION) FRACTURE: ICD-10-CM

## 2018-02-14 PROCEDURE — 99024 POSTOP FOLLOW-UP VISIT: CPT | Performed by: PHYSICIAN ASSISTANT

## 2018-02-14 RX ORDER — OXYCODONE HYDROCHLORIDE AND ACETAMINOPHEN 5; 325 MG/1; MG/1
1 TABLET ORAL EVERY 6 HOURS PRN
Qty: 40 TABLET | Refills: 0 | Status: SHIPPED | OUTPATIENT
Start: 2018-02-14 | End: 2020-08-04

## 2018-02-14 RX ORDER — CEPHALEXIN 500 MG/1
500 CAPSULE ORAL 3 TIMES DAILY
Qty: 30 CAPSULE | Refills: 0 | Status: SHIPPED | OUTPATIENT
Start: 2018-02-14 | End: 2018-10-04

## 2018-02-14 RX ORDER — LORAZEPAM 0.5 MG/1
0.5 TABLET ORAL EVERY 8 HOURS PRN
COMMUNITY
End: 2020-08-28

## 2018-02-14 NOTE — PROGRESS NOTES
"Subjective   Patient ID: Kathryn Davison is a 78 y.o. right hand dominant female is here today for a post-operative visit  Post-op of the Right Wrist and Post-op of the Left Shoulder         History of Present Illness     Patient presents for her initial postop visit in regards to left reverse total shoulder arthroplasty as well as right wrist distal radius plating.  Patient reports she is doing fairly well.  She does request a prescription for pain medicine.  She denies chest pain or shortness of breath.  Patient initially fell on 1/31/2018 landing on her right outstretched wrist and left shoulder.  Patient is currently in rehabilitation.  She does express concern for her right wrist incision site.  She states latex bandages were placed and she feels she may be allergic to these.  She noticed a red pink rash surrounding the right wrist incision.    Pain Score: 8  Pain Location: Shoulder  Pain Orientation: Left     Pain Descriptors: Aching, Throbbing  Pain Frequency: Constant/continuous                               Past Medical History:   Diagnosis Date   • Back pain    • Body piercing     BOTH EARS   • Carotid artery stenosis     BILATERAL-FOLLOWING WITH DR. WINCHESTER.  SEES EVERY 6 MONTHS.  STATES ABOUT 70% OCCLUSIION   • Cataract, bilateral    • Colon polyp 12/10/2012   • Diabetes    • Disease of thyroid gland    • Diverticulosis    • Fatty liver 06/26/2017   • Fracture     RIGHT ANKLE AND LEFT ARM    • High cholesterol    • History of nuclear stress test 2016    \"IT WAS OK\"   • Hypertension    • Lumbosacral disc disease    • Osteoarthritis    • Osteoporosis    • Sleep apnea     CPAP HS - TO BRING IN DOS   • Wears glasses     FOR DISTANCE        Past Surgical History:   Procedure Laterality Date   • ANKLE ARTHROSCOPY Right    • BREAST SURGERY Left     CYST REMOVED   • CARDIAC CATHETERIZATION      NO STENT   • CATARACT EXTRACTION Bilateral     2014 and 2015   • COLONOSCOPY  12/10/2012   • COLONOSCOPY N/A " 2/22/2017    Procedure: COLONOSCOPY with hot and cold snare polypectomies, resolution clip placement, cold biopsy polypectomy;  Surgeon: Mg Awan MD;  Location: [facility] ENDOSCOPY;  Service:    • DILATION AND CURETTAGE, DIAGNOSTIC / THERAPEUTIC      X4   • ENDOSCOPY N/A 7/20/2017    Procedure: ESOPHAGOGASTRODUODENOSCOPY with biopsies and cold biopsy polypectomy;  Surgeon: Mg Awan MD;  Location: [facility] ENDOSCOPY;  Service:    • FRACTURE SURGERY Right     ANKLE   • ORIF WRIST FRACTURE Right 2/2/2018    Procedure: WRIST RIGHT OPEN REDUCTION INTERNAL FIXATION WITH VOLAR PLATE;  Surgeon: Phu Meneses MD;  Location: [facility] OR;  Service:    • TOTAL SHOULDER ARTHROPLASTY W/ DISTAL CLAVICLE EXCISION Left 2/2/2018    Procedure: SHOULDER TOTAL LEFT  REVERSE ARTHROPLASTY WITH TUBEROSITY RECONSTRUCTION;  Surgeon: Phu Meneses MD;  Location: [facility] OR;  Service:    • TUBAL ABDOMINAL LIGATION  1980?   • UPPER GASTROINTESTINAL ENDOSCOPY  07/20/2017       Family History   Problem Relation Age of Onset   • Heart disease Other    • Cancer Other    • Colon cancer Neg Hx    • Liver cancer Neg Hx    • Liver disease Neg Hx    • Stomach cancer Neg Hx    • Esophageal cancer Neg Hx         Social History     Social History   • Marital status:      Spouse name: N/A   • Number of children: N/A   • Years of education: N/A     Occupational History   • Not on file.     Social History Main Topics   • Smoking status: Never Smoker   • Smokeless tobacco: Never Used   • Alcohol use No   • Drug use: No   • Sexual activity: Defer     Other Topics Concern   • Not on file     Social History Narrative       No Known Allergies    Review of Systems   Constitutional: Negative for diaphoresis, fever and unexpected weight change.   HENT: Negative for dental problem and sore throat.    Eyes: Negative for visual disturbance.   Respiratory: Negative for shortness of breath.    Cardiovascular: Negative for chest pain.  "  Gastrointestinal: Positive for constipation and nausea. Negative for abdominal pain, diarrhea and vomiting.   Genitourinary: Negative for difficulty urinating and frequency.   Musculoskeletal: Positive for arthralgias.   Neurological: Negative for headaches.   Hematological: Does not bruise/bleed easily.   All other systems reviewed and are negative.      Objective   Resp 16  Ht 160 cm (62.99\")  Wt 113 kg (249 lb 4.8 oz)  BMI 44.17 kg/m2   Physical Exam   Constitutional: She is oriented to person, place, and time. She appears well-nourished.   Eyes: Conjunctivae are normal.   Pulmonary/Chest: Effort normal.   Musculoskeletal:        Left shoulder: She exhibits decreased range of motion and tenderness. She exhibits no deformity.        Right wrist: She exhibits no tenderness, no bony tenderness, no swelling, no crepitus and no deformity.        Right hand: She exhibits normal capillary refill, no deformity and no swelling. Normal strength noted. She exhibits no thumb/finger opposition.   Neurological: She is alert and oriented to person, place, and time.   Psychiatric: She has a normal mood and affect. Her behavior is normal.   Vitals reviewed.    Right Hand Exam     Other   Erythema: absent  Scars: present      Left Shoulder Exam     Other   Erythema: absent           Extremity DVT signs are Negative by clinical screen.   Neurologic Exam     Mental Status   Oriented to person, place, and time.      Ortho Exam    Right wrist incision is clean and dry. There is mild pink blanching irritation       Incision to the left shoulder is healing well. Staples removed and steri strips applied  Assessment/Plan   Independent Review of Radiographic Studies:    Indication to evaluate status of prosthesis and joint, and compared with prior imaging, shows no acute fracture or dislocation. Good position and alignment of prosthesis with no radiographic signs of loosening.   Laboratory and Other Studies:  No new results reviewed " today.       Procedures  [x] No procedures were performed in office today.    Kathryn was seen today for post-op and post-op.    Diagnoses and all orders for this visit:    Closed fracture of distal end of right radius, unspecified fracture morphology, initial encounter    Closed fracture of proximal end of left humerus, unspecified fracture morphology, initial encounter    S/P reverse total shoulder arthroplasty, left    S/P ORIF (open reduction internal fixation) fracture    Other orders  -     oxyCODONE-acetaminophen (PERCOCET) 5-325 MG per tablet; Take 1 tablet by mouth Every 6 (Six) Hours As Needed (for pain).  -     cephalexin (KEFLEX) 500 MG capsule; Take 1 capsule by mouth 3 (Three) Times a Day.     Orthopedic activities reviewed and patient expressed appreciation  Discussion of orthopedic goals  Risk, benefits, and merits of treatment alternatives reviewed with the patient and questions answered  Ice, heat, and/or modalities as beneficial  Watch for signs and symptoms of infection    Recommendations/Plan:   Patient is encouraged to call or return for any issues or concerns.     Continue to use the right wrist Velcro brace and continue physical therapy.  The patient was provided with a protocol specific for reverse total shoulder  FU in 6 weeks      Patient agreeable to call or return sooner for any concerns.

## 2018-02-21 NOTE — OP NOTE
[address],  [address]  [place]  [address]  [phone]      OPERATIVE REPORT      PATIENT NAME:  Kathryn Davison                            YOB: 1940      PREOP DIAGNOSIS: Left shoulder four-part comminuted displaced unstable proximal humerus fracture.   Right wrist comminuted displaced shortened volar angulated intra-articular distal radius fracture.    POSTOP DIAGNOSIS:   Same.    PROCEDURE:    Left reverse total shoulder arthroplasty with rotator cuff and tuberosity reconstruction for complex fracture.   Open reduction and internal fixation of right distal radius fracture with volar plate.    SURGEON:     Myles Meneses MD    OPERATIVE TEAM:  Circulator: Natalya Kumar RN; Jenny Hoyos RN; Phoebe Mosley RN      Scrub Person: Faizan Mcmahan; Kiara Crews; Colleen Castorena; Darlene Cardenas    ANESTHETIST:  CRNA: Yaya Marinelli CRNA; Tera Al CRNA    ANESTHESIA:  General plus left shoulder block    FLUIDS:   2400 ml    ESTIM BLOOD LOSS:   325 ml (left shoulder 300 ml and right wrist 25 ml)    URINE:   100 ml    FINDINGS:   Comminuted displaced unstable four-part proximal humerus fracture.   Comminuted displaced unstable intra-articular distal radius fracture.    SPECIMENS:    Humeral head to Pathology     IMPLANTS:     For left shoulder a Wilian-Biomet reverse total shoulder arthroplasty with a left humeral 11 mm diameter press-fit mini stem with a standard 44 x 36 mm humeral tray and ArComXL polyethylene humeral socket bearing, and a 36 mm polished metal alloy glenosphere set on 'C' offset with a +3 thickness and with a mini glenoid baseplate with a 20 mm central screw and quadrant locking screws (20 mm at the one o'clock postion,  15 mm at the four o'clock position, no screw placed at the seven o'clock position, and 15 mm at the ten o'clock position).  For right wrist a Synthes distal radius variable angle volar contoured locking plate with cortical  and locking screws      COMPLICATIONS:    None.    DISPOSITION:    Stable to recovery.    INDICATIONS:   Complex displaced, unstable comminuted four-part proximal humerus fracture.      Displaced, comminuted intra-articular unstable distal radius fracture.    NARRATIVE:    Clinical exam and imaging reveal a severe comminuted displaced unstable four-part proximal humerus head, neck and tuberosity fracture with traumatic rotator cuff tears; and also revealing markedly displaced, comminuted and unstable pattern fracture of the distal radius with intra-articular displacement.  Non-surgical and surgical alternatives were reviewed with the patient and family including the option of reverse total shoulder arthroplasty with rotator cuff and tuberosity reconstruction for the traumatic fracture condition.  Risks, benefits and alternatives were discussed and informed consent for surgical procedure obtained. Risks discussed including but not limited to anesthesia, infection, blood loss, transfusion, nerve, vessel or tendon injury, fracture, prosthetic loosening, wear or dislocation, DVT/PE and persistent pain, stiffness, weakness dysfunction or limitations.  Goals include the potential for pain relief, improved shoulder and arm mobility and function for routine daily activities.  The patient presents for the planned surgical procedure.    Antibiotic prophylaxis was given.  Surgeon site marking and a time out were performed prior to the procedure.  Anesthesia was effective and well tolerated.  The shoulder, arm and hand was prepped and draped in the usual sterile fashion.  An anterior shoulder deltopectoral incision was made to approach the shoulder joint.  The cephalic vein was protected, the conjoint tendon was partially released and tagged for later repair, and a self retaining Bell-Fountain shoulder retractor placed.  The subscapularis and lesser tuberosity bone fragmentation was and tagged with suture and for the option of  subsequent repair.  The greater tuberosity and superoposterior rotator cuff tendons also identified and tagged with suture for subsequent repair.  The long head of the biceps was identified, released and tagged with suture for later tenodesis. The glenoid surface was intact and with anatomic version.  The labrum and capsule were debrided as appropriate in preparation for the planned glenoid baseplate and glenosphere component.  On the humeral side, The humeral head and the comminuted neck fragmentation was removed and the proximal humerus prepared for the stem.  Hand reamers were used sequentially to the desired diameter fit.  The humeral shaft was also prepared with sequential hand broaches for the desired size humeral mini stem.    The glenoid guide was used to pass a threaded pin in the desired location mid to low on the glenoid, central anterior to posterior, with desired near neutral glenoid version and with 10 degrees of superior tilt.  Next, over the pin the glenoid was prepared with the cannulated spherical step reamer.  The actual glenoid baseplate was press fit in place with the tamp and a mallet.  The pin was removed and the central screw was placed with standard drilling, depth gauge measure and self tapping screw placement.  Next, the four quadrant screws were placed with similar drilling, measurement off of the drill lines and with depth gauge, and locking screw placement.    The glenosphere trials, and the humeral stem and humeral head tray socket trials were assembled and showed good shoulder joint motion, position and stability.  Care was taken to keep the stem in 20 - 30 degrees of retroversion using anatomy landmarks, handle guides and to select an appropriate diameter, thickness and offset of the glenosphere for optimum, position, level, alignment, mobility and stability.   With the humeral and glenoid trials the shoulder joint showed good mobility and stability.  The trials were removed and the  wound, shoulder joint and humeral canal were irrigated copiously with pulsed antibiotic solution.  After thorough irrigation, the actual glenosphere component was impacted and secured to the Allen taper. Next, the actual humeral mini stem was press fit in place and the trial humeral tray and socket again showed good stability and motion.  The trial humeral tray was removed and the actual implant humeral tray and socket impacted and secured to the Allen taper of the stem.  There was again good shoulder motion, position and stability with the final implants.  The rotator cuff and tuberosity reconstruction was performed with heavy nonabsorbable suture repairing the tendons and bone tuberosities in the best near anatomic position to each other and to the implant stem via the suture slots provided and also repaired to the proximal humerus through bone tunnels. Care was taken to repair the subscapularis tendon in 20 degrees of shoulder external rotation with no tension.  A biceps tenodesis the proximal humerus was also performed.    The Hung- Fountain retractor was removed.  Further thorough irrigation with antibiotic solution was performed and then the delto-pectoral interval, subcutaneous tissues and skin were closed.  A sterile padded dressing was applied.  The patient was placed in a shoulder immobilizer.      Next, the arm and hand was prepped and draped in the usual sterile fashion.  A tourniquet was not used and there was good hemostasis throughout the procedure.  After sterile prep and drape, centered over the fracture an incision was made in the volar distal forearm was for a volar approach to the distal radius..  The interval between the flexor carpi radialis (median nerve) and the radial artery and brachioradialis (radial nerve) was utilized.  The superficial radial nerve sensory branches and radial artery were identified and protected. The pronator was dissected from radial to ulnar direction off of the distal  radius bone exposing the displaced comminuted fracture.  Minor venous bleeding was well controlled with electrocautery.  A self retaining blunt retractor was placed to facilitate visualization.    Subperiosteal dissection well-exposed the fracture and an anatomic reduction achieved and the bone and plate held with bone holding clamps and two provisional temporary pins across the plate.  Using the Synthes small fragment system, the fracture was well stabilized in anatomic position utilizing a contoured distal radius volar locking plate with hybrid cortical and locking screws.  Screws were placed using standard soft tissue protected drilling, depth gauge measure and screw placement.  The temporary pins were removed.  A secure stable distal radius fracture fixation was achieved.  Final C-arm images were saved with a good reduction and alignment of the fracture and wrist, and good position of the hardware.  There was full wrist passive mobility with fracture stability.    The incision was irrigated copiously with antibiotic solution throughout the procedure and suctioned clear.  Hemostasis was excellent.  Routine closure was performed and a sterile dressing and well padded arm splint immobilizer was applied.  Anesthesia was effective and well tolerated.  There were no complications of the procedure.  The patient was transferred in stable condition to recovery and post-operative radiographs requested.

## 2018-04-18 DIAGNOSIS — Z09 FOLLOW UP: Primary | ICD-10-CM

## 2018-04-19 ENCOUNTER — OFFICE VISIT (OUTPATIENT)
Dept: ORTHOPEDIC SURGERY | Facility: CLINIC | Age: 78
End: 2018-04-19

## 2018-04-19 VITALS — HEIGHT: 63 IN | RESPIRATION RATE: 16 BRPM | BODY MASS INDEX: 41.64 KG/M2 | WEIGHT: 235 LBS

## 2018-04-19 DIAGNOSIS — S52.501D CLOSED FRACTURE OF DISTAL END OF RIGHT RADIUS WITH ROUTINE HEALING, UNSPECIFIED FRACTURE MORPHOLOGY, SUBSEQUENT ENCOUNTER: ICD-10-CM

## 2018-04-19 DIAGNOSIS — Z09 FOLLOW UP: Primary | ICD-10-CM

## 2018-04-19 DIAGNOSIS — Z96.612 S/P REVERSE TOTAL SHOULDER ARTHROPLASTY, LEFT: Primary | ICD-10-CM

## 2018-04-19 PROCEDURE — 99024 POSTOP FOLLOW-UP VISIT: CPT | Performed by: PHYSICIAN ASSISTANT

## 2018-04-19 NOTE — PROGRESS NOTES
"Subjective   Patient ID: Kathryn Davison is a 78 y.o.  female  Follow-up of the Right Wrist and Follow-up of the Left Shoulder         History of Present Illness  Patient is following up in regards to left reverse total shoulder arthroplasty as well as right wrist distal radius ORIF.  Patient initially fell on 1/31/2018 landing on her right outstretched wrist as well as on her left shoulder.  Patient states she is doing fairly well.  She completed home physical therapy in regards to the left shoulder and right wrist.  She states she is ready to consider formal outpatient therapy in regards to her left shoulder.      Pain Score: 3  Pain Location: Wrist (and left shoulder)  Pain Orientation: Right     Pain Descriptors: Aching  Pain Frequency: Intermittent                       Result of Injury: Yes (patient had fell)  Work-Related Injury: No    Past Medical History:   Diagnosis Date   • Back pain    • Body piercing     BOTH EARS   • Carotid artery stenosis     BILATERAL-FOLLOWING WITH DR. WINCHESTER.  SEES EVERY 6 MONTHS.  STATES ABOUT 70% OCCLUSIION   • Cataract, bilateral    • Colon polyp 12/10/2012   • Diabetes    • Disease of thyroid gland    • Diverticulosis    • Fatty liver 06/26/2017   • Fracture     RIGHT ANKLE AND LEFT ARM    • High cholesterol    • History of nuclear stress test 2016    \"IT WAS OK\"   • Hypertension    • Lumbosacral disc disease    • Osteoarthritis    • Osteoporosis    • Sleep apnea     CPAP HS - TO BRING IN DOS   • Wears glasses     FOR DISTANCE        Past Surgical History:   Procedure Laterality Date   • ANKLE ARTHROSCOPY Right    • BREAST SURGERY Left     CYST REMOVED   • CARDIAC CATHETERIZATION      NO STENT   • CATARACT EXTRACTION Bilateral     2014 and 2015   • COLONOSCOPY  12/10/2012   • COLONOSCOPY N/A 2/22/2017    Procedure: COLONOSCOPY with hot and cold snare polypectomies, resolution clip placement, cold biopsy polypectomy;  Surgeon: Mg Awan MD;  Location: [facility]" ENDOSCOPY;  Service:    • DILATION AND CURETTAGE, DIAGNOSTIC / THERAPEUTIC      X4   • ENDOSCOPY N/A 7/20/2017    Procedure: ESOPHAGOGASTRODUODENOSCOPY with biopsies and cold biopsy polypectomy;  Surgeon: Mg Awan MD;  Location: [facility] ENDOSCOPY;  Service:    • FRACTURE SURGERY Right     ANKLE   • ORIF WRIST FRACTURE Right 2/2/2018    Procedure: WRIST RIGHT OPEN REDUCTION INTERNAL FIXATION WITH VOLAR PLATE;  Surgeon: Phu Meneses MD;  Location: [facility] OR;  Service:    • TOTAL SHOULDER ARTHROPLASTY W/ DISTAL CLAVICLE EXCISION Left 2/2/2018    Procedure: SHOULDER TOTAL LEFT  REVERSE ARTHROPLASTY WITH TUBEROSITY RECONSTRUCTION;  Surgeon: Phu Meneses MD;  Location: [facility] OR;  Service:    • TUBAL ABDOMINAL LIGATION  1980?   • UPPER GASTROINTESTINAL ENDOSCOPY  07/20/2017       Family History   Problem Relation Age of Onset   • Heart disease Other    • Cancer Other    • Colon cancer Neg Hx    • Liver cancer Neg Hx    • Liver disease Neg Hx    • Stomach cancer Neg Hx    • Esophageal cancer Neg Hx        Social History     Social History   • Marital status:      Spouse name: N/A   • Number of children: N/A   • Years of education: N/A     Occupational History   • Not on file.     Social History Main Topics   • Smoking status: Never Smoker   • Smokeless tobacco: Never Used   • Alcohol use No   • Drug use: No   • Sexual activity: Defer     Other Topics Concern   • Not on file     Social History Narrative   • No narrative on file       No Known Allergies    Review of Systems   Constitutional: Negative for fever.   HENT: Negative for voice change.    Eyes: Negative for visual disturbance.   Respiratory: Negative for shortness of breath.    Cardiovascular: Negative for chest pain.   Gastrointestinal: Negative for abdominal distention and abdominal pain.   Genitourinary: Negative for dysuria.   Musculoskeletal: Positive for arthralgias. Negative for gait problem and joint swelling.   Skin: Negative for  "rash.   Neurological: Negative for speech difficulty.   Hematological: Does not bruise/bleed easily.   Psychiatric/Behavioral: Negative for confusion.   All other systems reviewed and are negative.      Objective   Resp 16   Ht 160 cm (62.99\")   Wt 107 kg (235 lb)   BMI 41.64 kg/m²    Physical Exam   Constitutional: She is oriented to person, place, and time. She appears well-nourished.   Pulmonary/Chest: No respiratory distress.   Musculoskeletal:        Left shoulder: She exhibits decreased range of motion. She exhibits no tenderness, no bony tenderness and no deformity.        Right wrist: She exhibits normal range of motion, no tenderness, no bony tenderness, no swelling, no effusion, no crepitus and no deformity.        Right hand: She exhibits normal capillary refill, no deformity and no swelling. Normal sensation noted. Normal strength noted. She exhibits no thumb/finger opposition.        Left hand: She exhibits no swelling. Normal sensation noted. Normal strength noted.   Neurological: She is alert and oriented to person, place, and time.   Skin: Capillary refill takes less than 2 seconds.   Psychiatric: She has a normal mood and affect. Her behavior is normal.   Vitals reviewed.    Right Hand Exam     Range of Motion   The patient has normal right wrist ROM.     Muscle Strength   The patient has normal right wrist strength.    Other   Erythema: absent  Scars: present  Sensation: normal      Left Shoulder Exam     Range of Motion   Left shoulder active abduction: 65.   Left shoulder forward flexion: 70.     Muscle Strength   The patient has normal left shoulder strength.    Other   Erythema: absent  Scars: present            Extremity DVT signs are Negative by clinical screen.   Neurologic Exam     Mental Status   Oriented to person, place, and time.      Right Hand/Wrist Exam     Range of Motion   Range of motion is normal right wrist ROM.    Muscle Strength   Normal right wrist strength    Left " Shoulder Exam     Muscle Strength   Normal left shoulder strength               Assessment/Plan   Independent Review of Radiographic Studies:    Indication to evaluate status of prosthesis and joint, and compared with prior imaging, shows no acute fracture or dislocation. Good position and alignment of prosthesis with no radiographic signs of loosening.       Procedures       Kathryn was seen today for follow-up and follow-up.    Diagnoses and all orders for this visit:    S/P reverse total shoulder arthroplasty, left  -     Ambulatory Referral to Physical Therapy Evaluate and treat (please see attached rTSA protocol), Ortho; Heat; Ultrasound, Phonophoresis, Moist heat; Soft Tissue Mobilizaton; Stretching, ROM, Strengthening    Closed fracture of distal end of right radius with routine healing, unspecified fracture morphology, subsequent encounter       Orthopedic activities reviewed and patient expressed appreciation  Discussion of orthopedic goals  Risk, benefits, and merits of treatment alternatives reviewed with the patient and questions answered  Physical therapy referral given    Recommendations/Plan:  Patient is encouraged to call or return for any issues or concerns.    FU in 8 - 10 weeks   Patient agreeable to call or return sooner for any concerns.

## 2018-07-02 ENCOUNTER — OFFICE VISIT (OUTPATIENT)
Dept: ORTHOPEDIC SURGERY | Facility: CLINIC | Age: 78
End: 2018-07-02

## 2018-07-02 VITALS — WEIGHT: 240.3 LBS | RESPIRATION RATE: 12 BRPM | HEIGHT: 63 IN | BODY MASS INDEX: 42.58 KG/M2

## 2018-07-02 DIAGNOSIS — S52.501D CLOSED FRACTURE OF DISTAL END OF RIGHT RADIUS WITH ROUTINE HEALING, UNSPECIFIED FRACTURE MORPHOLOGY, SUBSEQUENT ENCOUNTER: ICD-10-CM

## 2018-07-02 DIAGNOSIS — Z96.612 S/P REVERSE TOTAL SHOULDER ARTHROPLASTY, LEFT: Primary | ICD-10-CM

## 2018-07-02 DIAGNOSIS — Z09 FOLLOW UP: ICD-10-CM

## 2018-07-02 PROCEDURE — 99214 OFFICE O/P EST MOD 30 MIN: CPT | Performed by: ORTHOPAEDIC SURGERY

## 2018-07-02 NOTE — PROGRESS NOTES
"Subjective   Patient ID: Kathryn Davison is a 78 y.o. right hand dominant female is here today for orthopaedic evaluation of recovery progress with treatment.  Follow-up of the Left Shoulder and Follow-up of the Right Wrist       History of Present Illness     Progress Note:  Patient reports that with treatments and since the last visit, overall pain in left shoulder is better, still night time discomfort lying on the left side, strength is improved, and range of motion is improving.  Right wrist pain is better, states occasional residual pain is at the top of her hand. She states her wrist aches in particular in the morning. Patient is currently using ibuprofen medication prn.  Outpatient Physical Therapy is ongoing with [facility] for her shoulder and wrist. She commends her therapists for their care of her conditions.  Injection therapy has not been given..   Patient is retired and remains very active.  She has returned to driving and has no concerns or limitations as a .  Patient does not  present with recent labs, imaging or other studies for review.     Past Medical History:   Diagnosis Date   • Back pain    • Body piercing     BOTH EARS   • Carotid artery stenosis     BILATERAL-FOLLOWING WITH DR. WINCHESTER.  SEES EVERY 6 MONTHS.  STATES ABOUT 70% OCCLUSIION   • Cataract, bilateral    • Colon polyp 12/10/2012   • Diabetes (CMS/HCC)    • Disease of thyroid gland    • Diverticulosis    • Fatty liver 06/26/2017   • Fracture     RIGHT ANKLE AND LEFT ARM    • High cholesterol    • History of nuclear stress test 2016    \"IT WAS OK\"   • Hypertension    • Lumbosacral disc disease    • Osteoarthritis    • Osteoporosis    • Sleep apnea     CPAP HS - TO BRING IN DOS   • Wears glasses     FOR DISTANCE        Past Surgical History:   Procedure Laterality Date   • ANKLE ARTHROSCOPY Right    • BREAST SURGERY Left     CYST REMOVED   • CARDIAC CATHETERIZATION      NO STENT   • CATARACT EXTRACTION " Bilateral     2014 and 2015   • COLONOSCOPY  12/10/2012   • COLONOSCOPY N/A 2/22/2017    Procedure: COLONOSCOPY with hot and cold snare polypectomies, resolution clip placement, cold biopsy polypectomy;  Surgeon: Mg Awan MD;  Location: [facility] ENDOSCOPY;  Service:    • DILATION AND CURETTAGE, DIAGNOSTIC / THERAPEUTIC      X4   • ENDOSCOPY N/A 7/20/2017    Procedure: ESOPHAGOGASTRODUODENOSCOPY with biopsies and cold biopsy polypectomy;  Surgeon: Mg Awan MD;  Location: [facility] ENDOSCOPY;  Service:    • FRACTURE SURGERY Right     ANKLE   • ORIF WRIST FRACTURE Right 2/2/2018    Procedure: WRIST RIGHT OPEN REDUCTION INTERNAL FIXATION WITH VOLAR PLATE;  Surgeon: Phu Meneses MD;  Location: [facility] OR;  Service:    • TOTAL SHOULDER ARTHROPLASTY W/ DISTAL CLAVICLE EXCISION Left 2/2/2018    Procedure: SHOULDER TOTAL LEFT  REVERSE ARTHROPLASTY WITH TUBEROSITY RECONSTRUCTION;  Surgeon: Phu Meneses MD;  Location: [facility] OR;  Service:    • TUBAL ABDOMINAL LIGATION  1980?   • UPPER GASTROINTESTINAL ENDOSCOPY  07/20/2017        Family History   Problem Relation Age of Onset   • Heart disease Other    • Cancer Other    • Colon cancer Neg Hx    • Liver cancer Neg Hx    • Liver disease Neg Hx    • Stomach cancer Neg Hx    • Esophageal cancer Neg Hx         Social History     Social History   • Marital status:      Spouse name: N/A   • Number of children: N/A   • Years of education: N/A     Occupational History   • Not on file.     Social History Main Topics   • Smoking status: Never Smoker   • Smokeless tobacco: Never Used   • Alcohol use No   • Drug use: No   • Sexual activity: Defer     Other Topics Concern   • Not on file     Social History Narrative   • No narrative on file       No Known Allergies    Review of Systems   Constitutional: Negative for fever.   Musculoskeletal: Positive for arthralgias and joint swelling ( pt reports mild right wrist swelling today).         Objective   Resp 12   Ht  "160 cm (62.99\")   Wt 109 kg (240 lb 4.8 oz)   BMI 42.58 kg/m²     Physical Exam   Constitutional: She appears well-developed. No distress.   Musculoskeletal: She exhibits no deformity.   Neurological: She is alert. Gait normal.   Skin: Skin is warm and dry. No rash noted. No erythema.   Psychiatric: She has a normal mood and affect. Her speech is normal.   Vitals reviewed.    Right Hand Exam     Tenderness   The patient is experiencing tenderness in the dorsal area.    Range of Motion     Wrist   Extension: 30   Flexion: 30   Pronation: 80   Supination: 70     Muscle Strength   Wrist Extension: 5/5   Wrist Flexion: 5/5   : 5/5     Tests   Tinel’s Sign (Medial Nerve): negative    Other   Erythema: absent  Scars: present (well healed volar distal forearm scar.)  Sensation: normal  Pulse: present      Left Hand Exam     Tenderness   The patient is experiencing no tenderness.         Range of Motion     Wrist   Extension: 50   Flexion: 60   Pronation: 90   Supination: 80     Muscle Strength   Wrist Extension: 5/5   Wrist Flexion: 5/5   :  5/5       Left Shoulder Exam     Tenderness   Left shoulder tenderness location: soreness only at end range of motion.    Range of Motion   Active Abduction: 70   Forward Flexion: 80   External Rotation: 40   Internal Rotation 0 degrees: L4     Muscle Strength   Abduction: 4/5   Internal Rotation: 4/5   External Rotation: 4/5   Biceps: 4/5     Tests   Apprehension: negative  Drop Arm: negative    Other   Erythema: absent  Scars: present (well healed anterior shoulder)  Sensation: normal  Pulse: present           Neurologic Exam     Mental Status   Attention: normal.   Speech: speech is normal   Level of consciousness: alert  Knowledge: good.     Motor Exam   Overall muscle tone: normal    Gait, Coordination, and Reflexes     Gait  Gait: normal    Ortho Exam right wrist stiffness, limited ROM    Assessment/Plan   Independent Review of Radiographic Studies:    No new imaging " done today.  Reviewed with patient at a prior visit (4-19-18 images and earlier reviewed) and again today.  Laboratory and Other Studies:  No new results reviewed today.   Medical Decision Making:    No complications of procedure and treatments.  Stable neurovascular and fracture follow-up exam.  Good progress, significantly improved.  Continue current plan, and management.    Procedures    Kathryn was seen today for follow-up and follow-up.    Diagnoses and all orders for this visit:    S/P reverse total shoulder arthroplasty, left    Closed fracture of distal end of right radius with routine healing, unspecified fracture morphology, subsequent encounter    Follow up       Discussion of orthopaedic goals and activities and patient and/or guardian expressed appreciation.  Regular exercise as tolerated  Guided on proper techniques for mobility, strength, agility and/or conditioning exercises  Weight bearing parameters reviewed  Physical therapy program ongoing    Recommendations/Plan:  Exercise, medications, injections, other patient advice, and return appointment as noted.  Brace: No brace was given at today's visit  Referral: No referrals made at today's visit  Test/Studies: No additional studies ordered.  Surgery: No surgery proposed at this visit.  Work/Activity Status: May perform usual activities as tolerated, May return to routine exercise and physical work as tolerated. and No strenuous activity.  Patient is encouraged to call or return for any issues or concerns.     Return in about 3 months (around 10/2/2018) for XOA right wrist, left shoulder, repeat exam, update plan.  Patient agreeable to call or return sooner for any concerns.

## 2018-07-14 DIAGNOSIS — R12 HEARTBURN: ICD-10-CM

## 2018-07-16 RX ORDER — RANITIDINE 150 MG/1
TABLET ORAL
Qty: 60 TABLET | Refills: 3 | Status: SHIPPED | OUTPATIENT
Start: 2018-07-16 | End: 2018-08-31 | Stop reason: ALTCHOICE

## 2018-08-31 ENCOUNTER — OFFICE VISIT (OUTPATIENT)
Dept: GASTROENTEROLOGY | Facility: CLINIC | Age: 78
End: 2018-08-31

## 2018-08-31 ENCOUNTER — LAB (OUTPATIENT)
Dept: LAB | Facility: HOSPITAL | Age: 78
End: 2018-08-31

## 2018-08-31 VITALS
RESPIRATION RATE: 16 BRPM | HEART RATE: 61 BPM | DIASTOLIC BLOOD PRESSURE: 64 MMHG | BODY MASS INDEX: 41.29 KG/M2 | WEIGHT: 233 LBS | TEMPERATURE: 97.3 F | HEIGHT: 63 IN | SYSTOLIC BLOOD PRESSURE: 164 MMHG

## 2018-08-31 DIAGNOSIS — D50.9 IRON DEFICIENCY ANEMIA, UNSPECIFIED IRON DEFICIENCY ANEMIA TYPE: ICD-10-CM

## 2018-08-31 DIAGNOSIS — D64.9 ANEMIA, UNSPECIFIED TYPE: ICD-10-CM

## 2018-08-31 DIAGNOSIS — D50.9 IRON DEFICIENCY ANEMIA, UNSPECIFIED IRON DEFICIENCY ANEMIA TYPE: Chronic | ICD-10-CM

## 2018-08-31 DIAGNOSIS — R10.12 LEFT UPPER QUADRANT PAIN: Chronic | ICD-10-CM

## 2018-08-31 DIAGNOSIS — D64.9 ANEMIA, UNSPECIFIED TYPE: Primary | ICD-10-CM

## 2018-08-31 DIAGNOSIS — K59.00 CONSTIPATION, UNSPECIFIED CONSTIPATION TYPE: Chronic | ICD-10-CM

## 2018-08-31 PROBLEM — Z96.1 BILATERAL PSEUDOPHAKIA: Status: ACTIVE | Noted: 2017-02-02

## 2018-08-31 PROBLEM — E11.9 TYPE 2 DIABETES MELLITUS WITHOUT COMPLICATION: Status: ACTIVE | Noted: 2017-02-02

## 2018-08-31 PROBLEM — E11.9 TYPE 2 DIABETES MELLITUS: Status: ACTIVE | Noted: 2018-02-02

## 2018-08-31 PROBLEM — R06.00 DYSPNEA: Status: ACTIVE | Noted: 2017-05-30

## 2018-08-31 PROBLEM — R60.9 EDEMA: Status: ACTIVE | Noted: 2017-05-30

## 2018-08-31 LAB
ALBUMIN SERPL-MCNC: 3.8 G/DL (ref 3.5–5)
ALBUMIN/GLOB SERPL: 1.2 G/DL (ref 1–2)
ALP SERPL-CCNC: 134 U/L (ref 38–126)
ALT SERPL W P-5'-P-CCNC: 39 U/L (ref 13–69)
ANION GAP SERPL CALCULATED.3IONS-SCNC: 10.9 MMOL/L (ref 10–20)
AST SERPL-CCNC: 23 U/L (ref 15–46)
BASOPHILS # BLD AUTO: 0.03 10*3/MM3 (ref 0–0.2)
BASOPHILS NFR BLD AUTO: 0.3 % (ref 0–2.5)
BILIRUB SERPL-MCNC: 0.3 MG/DL (ref 0.2–1.3)
BUN BLD-MCNC: 35 MG/DL (ref 7–20)
BUN/CREAT SERPL: 35 (ref 7.1–23.5)
CALCIUM SPEC-SCNC: 9.2 MG/DL (ref 8.4–10.2)
CHLORIDE SERPL-SCNC: 101 MMOL/L (ref 98–107)
CO2 SERPL-SCNC: 32 MMOL/L (ref 26–30)
CREAT BLD-MCNC: 1 MG/DL (ref 0.6–1.3)
DEPRECATED RDW RBC AUTO: 45.9 FL (ref 37–54)
EOSINOPHIL # BLD AUTO: 0.02 10*3/MM3 (ref 0–0.7)
EOSINOPHIL NFR BLD AUTO: 0.2 % (ref 0–7)
ERYTHROCYTE [DISTWIDTH] IN BLOOD BY AUTOMATED COUNT: 14.9 % (ref 11.5–14.5)
FERRITIN SERPL-MCNC: 21.5 NG/ML (ref 11.1–264)
GFR SERPL CREATININE-BSD FRML MDRD: 54 ML/MIN/1.73
GLOBULIN UR ELPH-MCNC: 3.1 GM/DL
GLUCOSE BLD-MCNC: 259 MG/DL (ref 74–98)
HCT VFR BLD AUTO: 38.7 % (ref 37–47)
HGB BLD-MCNC: 11.8 G/DL (ref 12–16)
IMM GRANULOCYTES # BLD: 0.11 10*3/MM3 (ref 0–0.06)
IMM GRANULOCYTES NFR BLD: 0.9 % (ref 0–0.6)
INR PPP: 1.05 (ref 0.9–1.1)
IRON 24H UR-MRATE: 69 MCG/DL (ref 37–181)
IRON SATN MFR SERPL: 19 % (ref 11–46)
LYMPHOCYTES # BLD AUTO: 1.63 10*3/MM3 (ref 0.6–3.4)
LYMPHOCYTES NFR BLD AUTO: 13.9 % (ref 10–50)
MCH RBC QN AUTO: 25.5 PG (ref 27–31)
MCHC RBC AUTO-ENTMCNC: 30.5 G/DL (ref 30–37)
MCV RBC AUTO: 83.8 FL (ref 81–99)
MONOCYTES # BLD AUTO: 0.74 10*3/MM3 (ref 0–0.9)
MONOCYTES NFR BLD AUTO: 6.3 % (ref 0–12)
NEUTROPHILS # BLD AUTO: 9.22 10*3/MM3 (ref 2–6.9)
NEUTROPHILS NFR BLD AUTO: 78.4 % (ref 37–80)
NRBC BLD MANUAL-RTO: 0 /100 WBC (ref 0–0)
PLATELET # BLD AUTO: 345 10*3/MM3 (ref 130–400)
PMV BLD AUTO: 10 FL (ref 6–12)
POTASSIUM BLD-SCNC: 4.9 MMOL/L (ref 3.5–5.1)
PROT SERPL-MCNC: 6.9 G/DL (ref 6.3–8.2)
PROTHROMBIN TIME: 11.7 SECONDS (ref 9.3–12.1)
RBC # BLD AUTO: 4.62 10*6/MM3 (ref 4.2–5.4)
SODIUM BLD-SCNC: 139 MMOL/L (ref 137–145)
TIBC SERPL-MCNC: 360 MCG/DL (ref 261–497)
WBC NRBC COR # BLD: 11.75 10*3/MM3 (ref 4.8–10.8)

## 2018-08-31 PROCEDURE — 80053 COMPREHEN METABOLIC PANEL: CPT

## 2018-08-31 PROCEDURE — 82728 ASSAY OF FERRITIN: CPT

## 2018-08-31 PROCEDURE — 84075 ASSAY ALKALINE PHOSPHATASE: CPT

## 2018-08-31 PROCEDURE — 99214 OFFICE O/P EST MOD 30 MIN: CPT | Performed by: NURSE PRACTITIONER

## 2018-08-31 PROCEDURE — 83550 IRON BINDING TEST: CPT

## 2018-08-31 PROCEDURE — 85610 PROTHROMBIN TIME: CPT

## 2018-08-31 PROCEDURE — 84080 ASSAY ALKALINE PHOSPHATASES: CPT

## 2018-08-31 PROCEDURE — 36415 COLL VENOUS BLD VENIPUNCTURE: CPT

## 2018-08-31 PROCEDURE — 85025 COMPLETE CBC W/AUTO DIFF WBC: CPT

## 2018-08-31 PROCEDURE — 83540 ASSAY OF IRON: CPT

## 2018-08-31 RX ORDER — PANTOPRAZOLE SODIUM 40 MG/1
TABLET, DELAYED RELEASE ORAL
Qty: 30 TABLET | Refills: 3 | Status: SHIPPED | OUTPATIENT
Start: 2018-08-31 | End: 2019-01-07 | Stop reason: SDUPTHER

## 2018-09-04 LAB
ALP BONE CFR SERPL: 38 % (ref 14–68)
ALP INTEST CFR SERPL: 0 % (ref 0–18)
ALP LIVER CFR SERPL: 62 % (ref 18–85)
ALP SERPL-CCNC: 135 IU/L (ref 39–117)

## 2018-09-10 ENCOUNTER — APPOINTMENT (OUTPATIENT)
Dept: LAB | Facility: HOSPITAL | Age: 78
End: 2018-09-10

## 2018-09-10 PROCEDURE — 87338 HPYLORI STOOL AG IA: CPT

## 2018-09-14 LAB — H PYLORI AG STL QL IA: NEGATIVE

## 2018-09-21 ENCOUNTER — TELEPHONE (OUTPATIENT)
Dept: GASTROENTEROLOGY | Facility: CLINIC | Age: 78
End: 2018-09-21

## 2018-09-21 NOTE — TELEPHONE ENCOUNTER
----- Message from NANNETTE Torres sent at 9/14/2018  8:50 AM EDT -----  Let her know the stool studies were negative for H. Pylori infection.

## 2018-09-26 DIAGNOSIS — S52.501D CLOSED FRACTURE OF DISTAL END OF RIGHT RADIUS WITH ROUTINE HEALING, UNSPECIFIED FRACTURE MORPHOLOGY, SUBSEQUENT ENCOUNTER: ICD-10-CM

## 2018-09-26 DIAGNOSIS — Z96.612 S/P REVERSE TOTAL SHOULDER ARTHROPLASTY, LEFT: Primary | ICD-10-CM

## 2018-10-03 ENCOUNTER — OFFICE VISIT (OUTPATIENT)
Dept: ORTHOPEDIC SURGERY | Facility: CLINIC | Age: 78
End: 2018-10-03

## 2018-10-03 VITALS — RESPIRATION RATE: 18 BRPM | WEIGHT: 240.2 LBS | HEIGHT: 63 IN | BODY MASS INDEX: 42.56 KG/M2

## 2018-10-03 DIAGNOSIS — S52.501D CLOSED FRACTURE OF DISTAL END OF RIGHT RADIUS WITH ROUTINE HEALING, UNSPECIFIED FRACTURE MORPHOLOGY, SUBSEQUENT ENCOUNTER: ICD-10-CM

## 2018-10-03 DIAGNOSIS — M25.612 DECREASED ROM OF LEFT SHOULDER: ICD-10-CM

## 2018-10-03 DIAGNOSIS — Z96.612 S/P REVERSE TOTAL SHOULDER ARTHROPLASTY, LEFT: Primary | ICD-10-CM

## 2018-10-03 PROCEDURE — 99213 OFFICE O/P EST LOW 20 MIN: CPT | Performed by: PHYSICIAN ASSISTANT

## 2018-10-03 NOTE — PROGRESS NOTES
"Subjective   Patient ID: Kathryn Davison is a 78 y.o. right hand dominant female is here today for orthopaedic evaluation of recovery progress with treatment.  Follow-up of the Right Wrist and Follow-up of the Left Shoulder         CHIEF COMPLAINT:    Follow up right wrist ORIF with volar plate and left shoulder total reverse arthroplasty with tuberosity reconstruction done 2/2/18.    History of Present Illness    Patient is following up in regards to left reverse total shoulder arthroplasty as well as right wrist distal radius ORIF.  Patient initially fell on 1/31/2018 landing on her right outstretched wrist as well as on her left shoulder.  Progress Note:  Patient reports that with treatments and since the last visit, overall pain in left shoulder is slight, strength is improved, and range of motion is limited.  Patient is currently using medication Tylenol as needed.  Physical therapy has been performed at home. She was going to [facility], these visits ended in June.  Injection therapy has not been given..   Patient is retired. Patient does not  present today to go over recent labs, imaging or other studies.  Patient states no pain in right wrist, range of motion is improved, strength is improved.       Past Medical History:   Diagnosis Date   • Anemia    • Back pain    • Body piercing     BOTH EARS   • Carotid artery stenosis     BILATERAL-FOLLOWING WITH DR. WINCHESTER.  SEES EVERY 6 MONTHS.  STATES ABOUT 70% OCCLUSIION   • Cataract, bilateral    • Colon polyp 12/10/2012   • Diabetes (CMS/HCC)    • Disease of thyroid gland     hypothyroid   • Diverticulosis    • Fatty liver 06/26/2017   • Fracture     RIGHT ANKLE AND LEFT ARM    • High cholesterol    • History of nuclear stress test 2016    \"IT WAS OK\"   • Hypertension    • Lumbosacral disc disease    • Osteoarthritis    • Osteoporosis    • Sleep apnea     CPAP HS - TO BRING IN DOS   • Wears glasses     FOR DISTANCE        Past Surgical History:   Procedure " "Laterality Date   • ANKLE ARTHROSCOPY Right    • BREAST SURGERY Left     CYST REMOVED   • CARDIAC CATHETERIZATION      NO STENT   • CATARACT EXTRACTION Bilateral     2014 and 2015   • COLONOSCOPY  12/10/2012   • COLONOSCOPY N/A 2/22/2017    Procedure: COLONOSCOPY with hot and cold snare polypectomies, resolution clip placement, cold biopsy polypectomy;  Surgeon: Mg Awan MD;  Location: [facility] ENDOSCOPY;  Service:    • DILATION AND CURETTAGE, DIAGNOSTIC / THERAPEUTIC      X4   • ENDOSCOPY N/A 7/20/2017    Procedure: ESOPHAGOGASTRODUODENOSCOPY with biopsies and cold biopsy polypectomy;  Surgeon: Mg Awan MD;  Location: [facility] ENDOSCOPY;  Service:    • FRACTURE SURGERY Right     ANKLE   • ORIF WRIST FRACTURE Right 2/2/2018    Procedure: WRIST RIGHT OPEN REDUCTION INTERNAL FIXATION WITH VOLAR PLATE;  Surgeon: Phu Meneses MD;  Location: [facility] OR;  Service:    • TOTAL SHOULDER ARTHROPLASTY W/ DISTAL CLAVICLE EXCISION Left 2/2/2018    Procedure: SHOULDER TOTAL LEFT  REVERSE ARTHROPLASTY WITH TUBEROSITY RECONSTRUCTION;  Surgeon: Phu Meneses MD;  Location: [facility] OR;  Service:    • TUBAL ABDOMINAL LIGATION  1980?   • UPPER GASTROINTESTINAL ENDOSCOPY  07/20/2017        Family History   Problem Relation Age of Onset   • Heart disease Other    • Cancer Other    • Colon cancer Neg Hx         Social History     Social History   • Marital status:      Spouse name: N/A   • Number of children: N/A   • Years of education: N/A     Occupational History   • Not on file.     Social History Main Topics   • Smoking status: Never Smoker   • Smokeless tobacco: Never Used   • Alcohol use No   • Drug use: No   • Sexual activity: Defer     Other Topics Concern   • Not on file     Social History Narrative   • No narrative on file       No Known Allergies    Review of Systems      Objective   Resp 18   Ht 160 cm (62.99\")   Wt 109 kg (240 lb 3.2 oz)   LMP  (LMP Unknown)   BMI 42.56 kg/m²     Physical Exam "   Constitutional: She is oriented to person, place, and time. She appears well-nourished.   Pulmonary/Chest: No respiratory distress.   Musculoskeletal:        Left shoulder: She exhibits decreased range of motion. She exhibits no tenderness, no deformity, no spasm and normal pulse.        Right wrist: She exhibits no tenderness, no bony tenderness, no swelling, no effusion, no crepitus, no deformity and no laceration.        Right hand: She exhibits normal capillary refill, no deformity and no swelling. Normal sensation noted. Normal strength noted. She exhibits no thumb/finger opposition.   Neurological: She is alert and oriented to person, place, and time.   Skin: Capillary refill takes less than 2 seconds.   Psychiatric: She has a normal mood and affect.   Vitals reviewed.    Right Hand Exam     Range of Motion     Wrist   Extension: 40   Flexion: 60     Muscle Strength   The patient has normal right wrist strength.    Other   Erythema: absent  Sensation: normal  Pulse: present      Left Shoulder Exam     Range of Motion   Left shoulder active abduction: 90.   Left shoulder passive abduction: 95.   Left shoulder forward flexion: 100.     Muscle Strength   The patient has normal left shoulder strength.    Other   Erythema: absent  Sensation: normal  Pulse: present               Neurologic Exam     Mental Status   Oriented to person, place, and time.     Right Hand/Wrist Exam     Muscle Strength   Normal right wrist strength    Left Shoulder Exam     Muscle Strength   Normal left shoulder strength            Assessment/Plan   Independent Review of Radiographic Studies:    Indication to evaluate fracture healing, and compared with prior imaging, shows interim fracture healing with good maintained reduction and alignment and intact position of fracture fixation hardware.  Laboratory and Other Studies:  No new results reviewed today.       Procedures    Kathryn was seen today for follow-up and follow-up.    Diagnoses  and all orders for this visit:    S/P reverse total shoulder arthroplasty, left  -     XR Shoulder 2+ View Left  -     Ambulatory Referral to Physical Therapy Evaluate and treat, Ortho; Heat; Moist heat, Ultrasound; ROM, Stretching    Closed fracture of distal end of right radius with routine healing, unspecified fracture morphology, subsequent encounter  -     XR Wrist 2 View Right    Decreased ROM of left shoulder  -     Ambulatory Referral to Physical Therapy Evaluate and treat, Ortho; Heat; Moist heat, Ultrasound; ROM, Stretching       Discussion of orthopaedic goals and activities and patient and/or guardian expressed appreciation.  Risk, benefits, and merits of treatment alternatives reviewed with the patient and/or guardian and questions answered  Regular exercise as tolerated  Guided on proper techniques for mobility, strength, agility and/or conditioning exercises  Physical therapy referral given          Recommendations/Plan:    Patient and/or guardian are encouraged to call or return for any issues or concerns.     Was like to reenroll patient therapy as she states this was helping until she was discharged in June.  Patient agreeable to call or return sooner for any concerns.

## 2018-10-04 ENCOUNTER — OFFICE VISIT (OUTPATIENT)
Dept: GASTROENTEROLOGY | Facility: CLINIC | Age: 78
End: 2018-10-04

## 2018-10-04 VITALS
TEMPERATURE: 98.3 F | SYSTOLIC BLOOD PRESSURE: 150 MMHG | RESPIRATION RATE: 18 BRPM | HEART RATE: 74 BPM | HEIGHT: 63 IN | BODY MASS INDEX: 42.35 KG/M2 | WEIGHT: 239 LBS | DIASTOLIC BLOOD PRESSURE: 62 MMHG

## 2018-10-04 DIAGNOSIS — D64.9 ANEMIA, UNSPECIFIED TYPE: Primary | ICD-10-CM

## 2018-10-04 DIAGNOSIS — R74.8 ELEVATED ALKALINE PHOSPHATASE LEVEL: ICD-10-CM

## 2018-10-04 DIAGNOSIS — K59.00 CONSTIPATION, UNSPECIFIED CONSTIPATION TYPE: ICD-10-CM

## 2018-10-04 DIAGNOSIS — R10.12 LEFT UPPER QUADRANT PAIN: ICD-10-CM

## 2018-10-04 PROBLEM — R12 HEARTBURN: Status: ACTIVE | Noted: 2018-10-04

## 2018-10-04 PROBLEM — A04.8 HELICOBACTER PYLORI INFECTION: Status: RESOLVED | Noted: 2017-08-17 | Resolved: 2018-10-04

## 2018-10-04 PROBLEM — Z12.11 COLON CANCER SCREENING: Status: RESOLVED | Noted: 2017-01-25 | Resolved: 2018-10-04

## 2018-10-04 PROBLEM — R79.89 ELEVATED LIVER FUNCTION TESTS: Status: RESOLVED | Noted: 2017-06-26 | Resolved: 2018-10-04

## 2018-10-04 PROBLEM — R12 HEARTBURN: Status: RESOLVED | Noted: 2018-10-04 | Resolved: 2018-10-04

## 2018-10-04 PROCEDURE — 99214 OFFICE O/P EST MOD 30 MIN: CPT | Performed by: NURSE PRACTITIONER

## 2018-10-04 RX ORDER — POLYETHYLENE GLYCOL 3350 17 G/17G
17 POWDER, FOR SOLUTION ORAL AS NEEDED
COMMUNITY

## 2018-10-04 NOTE — PROGRESS NOTES
Chief Complaint   Patient presents with   • Follow-up   • Abdominal Pain     The patient is here for follow up. She has been doing well. The patient had shoulder replacement surgery in February 2018. Prior to discharge from hospital, she was diagnosed with anemia. The patient denies overt GI bleed, no hematemesis, hematochezia or melena. She tested negative for stool for occult blood in July 2018.      The patient has a long standing history of  pain in her left upper quadrant. The pain has been improving. The pain occurs intermittently when she bends over. She states it is tender to touch at times, but it is not bothering her this week. Eating does not affect the pain. Changing positions can improve the pain. She has not taken anything for the pain. The pain is described as burning. It does not radiate.  She has been having pain in her back and had steroid injection today per orthopedics.      The patient has a long-standing history of constipation. Constipation is improving. The patient is taking Miralax daily with significant improvement. She has 1 bowel movement almost every day. The patient denies bright red blood per rectum or melena.       There is no history of nausea or vomiting. The patient denies heartburn. There is no difficulty swallowing.  The patient denies diarrhea.  There is no history of bright red blood per rectum or melena. Recent stool for H. Pylori is negative. The patient's last colonoscopy was February 2017 and last EGD was in July 2017.  There is no family history of colon cancer.    Abdominal Pain   This is a chronic problem. Episode onset: over 5 years. The onset quality is sudden. The problem occurs rarely. The problem has been waxing and waning. The pain is located in the LUQ. The pain is mild. The quality of the pain is burning. The abdominal pain does not radiate. Associated symptoms include arthralgias. Pertinent negatives include no constipation, diarrhea, dysuria, fever, headaches,  hematochezia, hematuria, melena, myalgias, nausea or vomiting. The pain is aggravated by certain positions and movement. The pain is relieved by movement and certain positions. She has tried nothing for the symptoms. Prior diagnostic workup includes upper endoscopy, lower endoscopy, ultrasound and CT scan.   Anemia   Presents for follow-up visit. The condition has lasted for 6 months. Symptoms include abdominal pain. There has been no bruising/bleeding easily, fever or palpitations. Signs of blood loss that are not present include hematemesis, hematochezia, melena and vaginal bleeding. Past treatments include nothing. Procedure history includes colonoscopy (2017), EGD (2017) and FOBT (fecal occult blood test negative).   Constipation   This is a chronic problem. Episode onset: over 5 years. The problem has been gradually improving since onset. Her stool frequency is 1 time per day. The stool is described as formed. The patient is not on a high fiber diet. There has been adequate water intake. Associated symptoms include abdominal pain and back pain. Pertinent negatives include no diarrhea, fever, hematochezia, melena, nausea or vomiting. Treatments tried: Miralax, stool softeners. The treatment provided significant relief.     Review of Systems   Constitutional: Positive for fatigue. Negative for appetite change, chills, fever and unexpected weight change.   HENT: Negative for mouth sores, nosebleeds and trouble swallowing.    Eyes: Negative for discharge and redness.   Respiratory: Positive for apnea. Negative for cough and shortness of breath.    Cardiovascular: Positive for leg swelling. Negative for chest pain and palpitations.   Gastrointestinal: Positive for abdominal pain. Negative for abdominal distention, anal bleeding, blood in stool, constipation, diarrhea, hematemesis, hematochezia, melena, nausea and vomiting.   Endocrine: Negative for cold intolerance, heat intolerance and polydipsia.   Genitourinary:  Negative for dysuria, hematuria, urgency and vaginal bleeding.   Musculoskeletal: Positive for arthralgias and back pain. Negative for joint swelling and myalgias.   Skin: Negative for rash.   Allergic/Immunologic: Negative for food allergies and immunocompromised state.   Neurological: Negative for dizziness, seizures, syncope and headaches.   Hematological: Negative for adenopathy. Does not bruise/bleed easily.   Psychiatric/Behavioral: Negative for dysphoric mood. The patient is nervous/anxious. The patient is not hyperactive.      Patient Active Problem List   Diagnosis   • Constipation   • Left upper quadrant pain   • Diverticulosis of large intestine without hemorrhage   • Colon polyps   • Vascular ectasia of colon   • Internal hemorrhoids   • External hemorrhoid   • Elevated alkaline phosphatase level   • Gastritis without bleeding   • Abnormal ultrasound of liver   • Gastric polyp   • Pre-op exam   • Right wrist fracture   • Type 2 diabetes mellitus (CMS/HCC)   • Hypertensive disorder   • Hypothyroidism   • Morbid obesity with BMI of 40.0-44.9, adult (CMS/HCC)   • Closed fracture of left shoulder   • Status post total shoulder replacement, left   • Non Q wave myocardial infarction (CMS/HCC)   • Bilateral pseudophakia   • After-cataract   • Abnormal blood chemistry level   • Carotid artery occlusion   • Carotid artery stenosis   • Dyspnea   • Edema   • Hyperlipidemia   • Lack of energy   • Left carotid artery occlusion   • Left carotid artery stenosis   • Osteoporosis   • Proliferative diabetic retinopathy associated with type 2 diabetes mellitus (CMS/HCC)   • Type 2 diabetes mellitus without complication (CMS/HCC)   • Iron deficiency anemia   • Anemia     Past Medical History:   Diagnosis Date   • Anemia    • Back pain    • Body piercing     BOTH EARS   • Carotid artery stenosis     BILATERAL-FOLLOWING WITH DR. WINCHESTER.  SEES EVERY 6 MONTHS.  STATES ABOUT 70% OCCLUSIION   • Cataract, bilateral    • Colon  "polyp 12/10/2012   • Diabetes (CMS/HCC)    • Disease of thyroid gland     hypothyroid   • Diverticulosis    • Fatty liver 06/26/2017   • Fracture     RIGHT ANKLE AND LEFT ARM    • High cholesterol    • History of nuclear stress test 2016    \"IT WAS OK\"   • Hypertension    • Lumbosacral disc disease    • Osteoarthritis    • Osteoporosis    • Sleep apnea     CPAP HS - TO BRING IN DOS   • Wears glasses     FOR DISTANCE     Past Surgical History:   Procedure Laterality Date   • ANKLE ARTHROSCOPY Right    • BREAST SURGERY Left     CYST REMOVED   • CARDIAC CATHETERIZATION      NO STENT   • CATARACT EXTRACTION Bilateral     2014 and 2015   • COLONOSCOPY  12/10/2012   • COLONOSCOPY N/A 2/22/2017    Procedure: COLONOSCOPY with hot and cold snare polypectomies, resolution clip placement, cold biopsy polypectomy;  Surgeon: Mg Awan MD;  Location: [facility] ENDOSCOPY;  Service:    • DILATION AND CURETTAGE, DIAGNOSTIC / THERAPEUTIC      X4   • ENDOSCOPY N/A 7/20/2017    Procedure: ESOPHAGOGASTRODUODENOSCOPY with biopsies and cold biopsy polypectomy;  Surgeon: Mg Awan MD;  Location: [facility] ENDOSCOPY;  Service:    • FRACTURE SURGERY Right     ANKLE   • ORIF WRIST FRACTURE Right 2/2/2018    Procedure: WRIST RIGHT OPEN REDUCTION INTERNAL FIXATION WITH VOLAR PLATE;  Surgeon: Phu Meneses MD;  Location: [facility] OR;  Service:    • TOTAL SHOULDER ARTHROPLASTY W/ DISTAL CLAVICLE EXCISION Left 2/2/2018    Procedure: SHOULDER TOTAL LEFT  REVERSE ARTHROPLASTY WITH TUBEROSITY RECONSTRUCTION;  Surgeon: Phu Meneses MD;  Location: [facility] OR;  Service:    • TUBAL ABDOMINAL LIGATION  1980?   • UPPER GASTROINTESTINAL ENDOSCOPY  07/20/2017     Family History   Problem Relation Age of Onset   • Heart disease Other    • Cancer Other    • Colon cancer Neg Hx      Social History   Substance Use Topics   • Smoking status: Never Smoker   • Smokeless tobacco: Never Used   • Alcohol use No       Current Outpatient Prescriptions:   •  " albuterol (PROVENTIL HFA;VENTOLIN HFA) 108 (90 Base) MCG/ACT inhaler, Inhale 2 puffs Every 4 (Four) Hours As Needed for Wheezing., Disp: , Rfl:   •  amLODIPine (NORVASC) 5 MG tablet, Take 1 tablet by mouth Daily., Disp: , Rfl:   •  aspirin 81 MG tablet, Take 1 tablet by mouth Daily., Disp: , Rfl:   •  Calcium Citrate-Vitamin D (CALCIUM + D PO), Take 1 tablet by mouth Daily., Disp: , Rfl:   •  carvedilol (COREG) 12.5 MG tablet, Take 1 tablet by mouth 2 (Two) Times a Day With Meals., Disp: , Rfl:   •  CloNIDine (CATAPRES) 0.1 MG tablet, Take 0.1 mg by mouth 2 (Two) Times a Day As Needed for High Blood Pressure., Disp: , Rfl:   •  clopidogrel (PLAVIX) 75 MG tablet, Take 75 mg by mouth Daily., Disp: , Rfl:   •  docusate sodium 100 MG capsule, Take 100 mg by mouth 2 (Two) Times a Day As Needed for Constipation., Disp: , Rfl:   •  Furosemide (LASIX PO), Take 20 mg by mouth Daily As Needed., Disp: , Rfl:   •  insulin aspart (novoLOG) 100 UNIT/ML injection, Inject 10 Units under the skin 3 (Three) Times a Day With Meals., Disp: , Rfl: 12  •  insulin glargine (LANTUS) 100 UNIT/ML injection, Inject 33 Units under the skin Every Night., Disp: , Rfl:   •  ipratropium-albuterol (DUO-NEB) 0.5-2.5 mg/mL nebulizer, Take 3 mL by nebulization Every 6 (Six) Hours As Needed for Wheezing or Shortness of Air., Disp: 360 mL, Rfl:   •  levothyroxine (SYNTHROID, LEVOTHROID) 100 MCG tablet, Take 100 mcg by mouth Daily., Disp: , Rfl:   •  lisinopril (PRINIVIL,ZESTRIL) 20 MG tablet, Take 1 tablet by mouth Daily., Disp: , Rfl:   •  LORazepam (ATIVAN) 0.5 MG tablet, Take 0.5 mg by mouth Every 8 (Eight) Hours As Needed for Anxiety., Disp: , Rfl:   •  losartan-hydrochlorothiazide (HYZAAR) 50-12.5 MG per tablet, Take 1 tablet by mouth Daily., Disp: , Rfl:   •  montelukast (SINGULAIR) 10 MG tablet, Take 10 mg by mouth Every Night., Disp: , Rfl:   •  oxyCODONE-acetaminophen (PERCOCET) 5-325 MG per tablet, Take 1 tablet by mouth Every 6 (Six) Hours As  "Needed (for pain)., Disp: 40 tablet, Rfl: 0  •  pantoprazole (PROTONIX) 40 MG EC tablet, 1 po daily in the am 30 minutes before breakfast, Disp: 30 tablet, Rfl: 3  •  polyethylene glycol (MIRALAX) packet, Take 17 g by mouth Daily., Disp: , Rfl:   •  pravastatin (PRAVACHOL) 80 MG tablet, Take 80 mg by mouth Daily., Disp: , Rfl:   •  SITagliptin (JANUVIA) 100 MG tablet, Take 100 mg by mouth Daily., Disp: , Rfl:     No Known Allergies    /62   Pulse 74   Temp 98.3 °F (36.8 °C)   Resp 18   Ht 160 cm (62.99\")   Wt 108 kg (239 lb)   LMP  (LMP Unknown)   BMI 42.35 kg/m²     Physical Exam   Constitutional: She is oriented to person, place, and time. She appears well-developed and well-nourished. No distress.   HENT:   Head: Normocephalic and atraumatic.   Right Ear: Hearing and external ear normal.   Left Ear: Hearing and external ear normal.   Nose: Nose normal.   Mouth/Throat: Oropharynx is clear and moist and mucous membranes are normal. Mucous membranes are not pale, not dry and not cyanotic. No oral lesions. No oropharyngeal exudate.   Eyes: Conjunctivae and EOM are normal. Right eye exhibits no discharge. Left eye exhibits no discharge.   Neck: Trachea normal. Neck supple. No JVD present. No edema present. No thyroid mass and no thyromegaly present.   Cardiovascular: Normal rate, regular rhythm, S2 normal and normal heart sounds.  Exam reveals no gallop, no S3 and no friction rub.    No murmur heard.  Pulmonary/Chest: Effort normal and breath sounds normal. No respiratory distress. She exhibits no tenderness.   Abdominal: Normal appearance and bowel sounds are normal. She exhibits no distension, no ascites and no mass. There is no splenomegaly or hepatomegaly. There is no tenderness. There is no rigidity, no rebound and no guarding. No hernia.     Vascular Status -  Her right foot exhibits no edema. Her left foot exhibits no edema.  Lymphadenopathy:     She has no cervical adenopathy.        Left: No " supraclavicular adenopathy present.   Neurological: She is alert and oriented to person, place, and time. She has normal strength. No cranial nerve deficit or sensory deficit.   Skin: No rash noted. She is not diaphoretic. No cyanosis. No pallor. Nails show no clubbing.   Psychiatric: She has a normal mood and affect.   Nursing note and vitals reviewed.  Stigmata of chronic liver disease:  None.  Asterixis:  None.    Laboratory Tests:   Upon review of records:     Dated 8/17/2017 glucose 143 BUN 29 creatinine 1.1 sodium 140 potassium 4.1 chloride 103 CO2 28 calcium 9.3 albumin 4.0 ALT 39 AST 20 alkaline phosphatase 119 total bilirubin 0.5 GGT 25 lipase 109 TSH 0.769     Dated 10/18/2017 glucose 183 BUN 28 creatinine 1.1 sodium 139 potassium 4.7 chloride 98 CO2 33 calcium 9.4 albumin 3.9 ALT 23 AST 16 alkaline phosphatase 124 total bilirubin 0.4 TSH 1.176   total T4 9.3  T3 uptake 34.1  free T4 1.63     Dated 2/6/2018 glucose 147 BUN 40 creatinine 1.1 sodium 141 potassium 4.3 chloride 101 CO2 32 calcium 8.6W BC 9.62 hemoglobin 8.6 hematocrit 27.6 platelet count 354 MCV 86.0     Dated 7/13/2018 fecal occult blood in stool: Negative     Dated 7/25/2018  Iron 46, TIBC 312, UIBC 266, ferritin 31.9, saturation 15%, folic acid 12.2, vitamin B12 474    Dated 8/31/2018 glucose 259 BUN 35 creatinine 1.0 sodium 139 potassium 4.9 chloride 101 CO2 32 calcium 9.2 albumin 3.8 ALT 39 AST 23 alkaline phosphatase 134 total bilirubin 0.3W BC 11.75 hemoglobin 11.8 hematocrit 38.7 platelet count 345 MCV 83.8 iron 69 TIBC 360 iron saturation 19% ferritin 21.5 PT/INR 11.7/1.05 alkaline phosphatase 135 liver fraction 62%, bone fraction 38%, intestinal fraction 0%    Dated 9/10/2018 H. pylori stool antigen: Negative     Abdominal Imaging:   Upon review of records:     Dated 9/19/2016 CT of abdomen with contrast reveals lung bases clear. No obvious cardiac abnormality. Liver, gallbladder, spleen, pancreas, adrenals and both kidneys are  normal. Stomach, small bowel and colon are normal. No mesenteric lymphadenopathy or peritoneal free fluid. Aorta shows atherosclerotic calcifications with normal aortic caliber. IVC and branches are patent and normal. No retroperitoneal lymphadenopathy. Body while a musculoskeletal structure showed degenerative changes of the lumbar spine noted. Anterior abdominal wall is normal.     [facility] H[facility] scan dated 10/6/2016 reveals ejection fraction of 85.9%     Abdominal ultrasound dated 6/26/2017 reveals Limited images of the pancreas are unremarkable.  The liver parenchyma is echogenic consistent with fatty infiltration.  The gallbladder is normal with no shadowing stones or wall thickening.  There is no pericholecystic fluid.  The common duct measures 3.6 mm.  Limited images the right kidney are unremarkable.     Procedures:    Upon review of medical records:     Colonoscopy dated 2/22/2017 reveals pandiverticulosis. Colon polyps. Vascular ectasia, nonbleeding. Internal hemorrhoids and small scar external hemorrhoidal tissue. A descending colon polyp biopsy reveals adenomatous polyps. Negative for high-grade dysplasia. Cecum polyp biopsy reveals adenomatous polyp. Negative for high-grade dysplasia.     EGD dated 7/20/2017 reveals small sliding hiatal hernia less than 3 cm. Small cardia polyp.  Erythematous gastritis with occasional erosions.  This test does not explain the cause of left upper quadrant and adjacent upper abdominal pain. Second portion of duodenum biopsies reveal no pathologic abnormalities.  Stomach cardia biopsy polyp reveals chronic active gastritis. Positive for H. pylori.  Antrum body angulus biopsy reveals chronic active gastritis with focal atrophy.  Positive for H. pylori.    Assessment:      ICD-10-CM ICD-9-CM   1. Anemia, unspecified type D64.9 285.9   2. Left upper quadrant pain R10.12 789.02   3. Constipation, unspecified constipation type K59.00 564.00   4. Elevated alkaline phosphatase level  R74.8 790.5     Discussion:  1. History of anemia status post shoulder replacement.  Of interest, fecal occult blood is negative.  Recent labs improved. Patient denies overt GI bleed.  2. Long-standing history of left upper quadrant pain.  Etiology unclear.  Improving.  3. Constipation significantly improved.  4. History of mild elevation of alkaline phosphatase.  Of interest, patient recently had shoulder replacement.  Alkaline phosphatase isoenzymes are normal.    Plan/  Patient Instructions   1. Antireflux measures: Avoid fried, fatty foods, alcohol, chocolate, coffee, tea,  soft drinks, peppermint and spearmint, spicy foods, tomatoes and tomato based foods, onion based foods, and smoking. Other antireflux measures include weight reduction if overweight, avoiding tight clothing around the abdomen, elevating the head of the bed 6 inches with blocks under the head board, and don't drink or eat before going to bed and avoid lying down immediately after meals.  2. Pantoprazole 40 mg 1 po daily in the am 30 minutes before breakfast.  3. Recommended to take Levothyroxine first in the am upon waking, wait 30 minutes, then take Pantoprazole 40 mg, wait 30 minutes, then eat breakfast and take other medications.  4. High fiber diet with liberal water intake.   5. Miralax 17 grams in 8 ounces of noncarbonated beverage daily.  6. CBC, CMP - The patient wishes to obtain lab work through Dr. Hinojosa. She has appointment next week.  7. Follow up: 3 months or sooner if any problems     NANNETTE Sarkar

## 2018-10-04 NOTE — PATIENT INSTRUCTIONS
1. Antireflux measures: Avoid fried, fatty foods, alcohol, chocolate, coffee, tea,  soft drinks, peppermint and spearmint, spicy foods, tomatoes and tomato based foods, onion based foods, and smoking. Other antireflux measures include weight reduction if overweight, avoiding tight clothing around the abdomen, elevating the head of the bed 6 inches with blocks under the head board, and don't drink or eat before going to bed and avoid lying down immediately after meals.  2. Pantoprazole 40 mg 1 po daily in the am 30 minutes before breakfast.  3. Recommended to take Levothyroxine first in the am upon waking, wait 30 minutes, then take Pantoprazole 40 mg, wait 30 minutes, then eat breakfast and take other medications.  4. High fiber diet with liberal water intake.   5. Miralax 17 grams in 8 ounces of noncarbonated beverage daily.  6. CBC, CMP - The patient wishes to obtain lab work through Dr. Hinojosa. She has appointment next week.  7. Follow up: 3 months or sooner if any problems

## 2018-12-07 ENCOUNTER — OFFICE VISIT (OUTPATIENT)
Dept: ORTHOPEDIC SURGERY | Facility: CLINIC | Age: 78
End: 2018-12-07

## 2018-12-07 VITALS — HEIGHT: 63 IN | RESPIRATION RATE: 18 BRPM | BODY MASS INDEX: 43.41 KG/M2 | WEIGHT: 245 LBS

## 2018-12-07 DIAGNOSIS — M25.612 DECREASED ROM OF LEFT SHOULDER: ICD-10-CM

## 2018-12-07 DIAGNOSIS — S42.292D OTHER CLOSED DISPLACED FRACTURE OF PROXIMAL END OF LEFT HUMERUS WITH ROUTINE HEALING, SUBSEQUENT ENCOUNTER: ICD-10-CM

## 2018-12-07 DIAGNOSIS — S52.571D OTHER CLOSED INTRA-ARTICULAR FRACTURE OF DISTAL END OF RIGHT RADIUS WITH ROUTINE HEALING, SUBSEQUENT ENCOUNTER: ICD-10-CM

## 2018-12-07 DIAGNOSIS — Z96.612 S/P REVERSE TOTAL SHOULDER ARTHROPLASTY, LEFT: Primary | ICD-10-CM

## 2018-12-07 PROCEDURE — 99214 OFFICE O/P EST MOD 30 MIN: CPT | Performed by: ORTHOPAEDIC SURGERY

## 2018-12-08 DIAGNOSIS — R12 HEARTBURN: ICD-10-CM

## 2018-12-10 RX ORDER — RANITIDINE 150 MG/1
TABLET ORAL
Qty: 60 TABLET | Refills: 3 | OUTPATIENT
Start: 2018-12-10

## 2018-12-10 NOTE — PROGRESS NOTES
"Subjective   Patient ID: Kathryn Davison is a 78 y.o. right hand dominant female is here today for orthopaedic evaluation of recovery progress with treatment.  Follow-up of the Left Shoulder       CHIEF COMPLAINT:    Progress and recovery after surgery.    History of Present Illness     Progress Note:  Patient reports that with treatments and since the last visit, overall right wrist pain is resolved, left shoulder pain is slight, strength is improved, and wrist range of motion is improving.  Shoulder motion improvement has been limited and on exam there appears to be shoulder blade compensation motion that can limit progress with shoulder ball and socket motion and this was reviewed with patient.  There is also in particular shoulder external rotation stiffness and she was guided in exercises to improve rotation motion.  Patient is not currently using pain medication.   Physical therapy has been helpful.  Injection therapy has not been given..   Patient does not  present today to go over recent labs, imaging or other studies.    Past Medical History:   Diagnosis Date   • Anemia    • Back pain    • Body piercing     BOTH EARS   • Carotid artery stenosis     BILATERAL-FOLLOWING WITH DR. WINCHESTER.  SEES EVERY 6 MONTHS.  STATES ABOUT 70% OCCLUSIION   • Cataract, bilateral    • Colon polyp 12/10/2012   • Diabetes (CMS/HCC)    • Disease of thyroid gland     hypothyroid   • Diverticulosis    • Fatty liver 06/26/2017   • Fracture     RIGHT ANKLE AND LEFT ARM    • High cholesterol    • History of nuclear stress test 2016    \"IT WAS OK\"   • Hypertension    • Lumbosacral disc disease    • Osteoarthritis    • Osteoporosis    • Sleep apnea     CPAP HS - TO BRING IN DOS   • Wears glasses     FOR DISTANCE        Past Surgical History:   Procedure Laterality Date   • ANKLE ARTHROSCOPY Right    • BREAST SURGERY Left     CYST REMOVED   • CARDIAC CATHETERIZATION      NO STENT   • CATARACT EXTRACTION Bilateral     2014 and 2015   • " COLONOSCOPY  12/10/2012   • COLONOSCOPY N/A 2/22/2017    Procedure: COLONOSCOPY with hot and cold snare polypectomies, resolution clip placement, cold biopsy polypectomy;  Surgeon: Mg Awan MD;  Location: [facility] ENDOSCOPY;  Service:    • COLONOSCOPY with hot and cold snare polypectomies, resolution clip placement, cold biopsy polypectomy N/A 2/22/2017    Performed by Mg Awan MD at [facility] ENDOSCOPY   • DILATION AND CURETTAGE, DIAGNOSTIC / THERAPEUTIC      X4   • ENDOSCOPY N/A 7/20/2017    Procedure: ESOPHAGOGASTRODUODENOSCOPY with biopsies and cold biopsy polypectomy;  Surgeon: Mg Awan MD;  Location: [facility] ENDOSCOPY;  Service:    • ESOPHAGOGASTRODUODENOSCOPY with biopsies and cold biopsy polypectomy N/A 7/20/2017    Performed by Mg Awan MD at [facility] ENDOSCOPY   • FRACTURE SURGERY Right     ANKLE   • ORIF WRIST FRACTURE Right 2/2/2018    Procedure: WRIST RIGHT OPEN REDUCTION INTERNAL FIXATION WITH VOLAR PLATE;  Surgeon: Phu Meneses MD;  Location: [facility] OR;  Service:    • SHOULDER TOTAL LEFT  REVERSE ARTHROPLASTY WITH TUBEROSITY RECONSTRUCTION Left 2/2/2018    Performed by Phu Meneses MD at [facility] OR   • TOTAL SHOULDER ARTHROPLASTY W/ DISTAL CLAVICLE EXCISION Left 2/2/2018    Procedure: SHOULDER TOTAL LEFT  REVERSE ARTHROPLASTY WITH TUBEROSITY RECONSTRUCTION;  Surgeon: Phu Meneses MD;  Location: [facility] OR;  Service:    • TUBAL ABDOMINAL LIGATION  1980?   • UPPER GASTROINTESTINAL ENDOSCOPY  07/20/2017   • WRIST RIGHT OPEN REDUCTION INTERNAL FIXATION WITH VOLAR PLATE Right 2/2/2018    Performed by Phu [facility]es MD at [facility]        Family History   Problem Relation Age of Onset   • Heart disease Other    • Cancer Other    • Colon cancer Neg Hx         Social History     Socioeconomic History   • Marital status:      Spouse name: Not on file   • Number of children: Not on file   • Years of education: Not on file   • Highest education level: Not on file  "  Social Needs   • Financial resource strain: Not on file   • Food insecurity - worry: Not on file   • Food insecurity - inability: Not on file   • Transportation needs - medical: Not on file   • Transportation needs - non-medical: Not on file   Occupational History   • Not on file   Tobacco Use   • Smoking status: Never Smoker   • Smokeless tobacco: Never Used   Substance and Sexual Activity   • Alcohol use: No   • Drug use: No   • Sexual activity: Defer   Other Topics Concern   • Not on file   Social History Narrative   • Not on file       No Known Allergies    Review of Systems   Constitutional: Negative for fever.   Musculoskeletal: Positive for arthralgias.         Objective   Resp 18   Ht 160 cm (62.99\")   Wt 111 kg (245 lb)   LMP  (LMP Unknown)   BMI 43.41 kg/m²     Physical Exam   Constitutional: She appears well-developed. No distress.   Neurological: She is alert.   Skin: Skin is warm and dry. No rash noted. No erythema.   Psychiatric: She has a normal mood and affect.   Vitals reviewed.    Right Hand Exam     Tenderness   The patient is experiencing no tenderness.     Range of Motion   Wrist   Extension: 30   Flexion: 40   Pronation: 80   Supination: 80     Muscle Strength   The patient has normal right wrist strength.    Tests   Tinel's sign (median nerve): negative    Other   Erythema: absent  Scars: present (well healed)  Sensation: normal  Pulse: present      Right Shoulder Exam     Tenderness   The patient is experiencing no tenderness.    Range of Motion   The patient has normal right shoulder ROM.    Muscle Strength   The patient has normal right shoulder strength.      Left Shoulder Exam     Tenderness   The patient is experiencing no tenderness.     Range of Motion   Active abduction: 80   External rotation: 40   Forward flexion: 90   Internal rotation 0 degrees: L2     Muscle Strength   Abduction: 4/5   Internal rotation: 4/5   External rotation: 3/5   Biceps: 4/5     Other   Erythema: " absent  Scars: present (well healed)  Sensation: normal  Pulse: present           Assessment/Plan   Independent Review of Radiographic Studies:    No new imaging done today.  Reviewed with patient at a prior visit.  Laboratory and Other Studies:  No new results reviewed today.   Medical Decision Making:    No complications of procedure and treatments.  Stable neurovascular exam.  Good progress with right wrist fracture and surgery recovery, significantly improved.  Continue current plan, and management.  Fair progress with left shoulder fracture and surgical recovery, though ongoing with residual symptoms.  Continue current management and any additional treatments and workup as outlined in plan.      Procedures    Kathryn was seen today for follow-up.    Diagnoses and all orders for this visit:    S/P reverse total shoulder arthroplasty, left    Other closed intra-articular fracture of distal end of right radius with routine healing, subsequent encounter    Decreased ROM of left shoulder    Other closed displaced fracture of proximal end of left humerus with routine healing, subsequent encounter       Discussion of orthopaedic goals and activities and patient and/or guardian expressed appreciation.  Regular exercise as tolerated  Guided on proper techniques for mobility, strength, agility and/or conditioning exercises  Ice, heat, and/or modalities as beneficial  Physical therapy program ongoing    Recommendations/Plan:  Exercise, medications, injections, other patient advice, and return appointment as noted.  Brace: No brace was given at today's visit  Referral: Physical and/or Occupational Therapy referral  Test/Studies: No additional studies ordered.  Surgery: No surgery proposed at this visit.  Work/Activity Status: May perform usual activities as tolerated, May return to routine exercise and physical work as tolerated. and No strenuous activity.  Patient is encouraged to call or return for any issues or  concerns.     Return in about 2 months (around 2/7/2019) for Annual recheck, XOA left shoulder and right wrist.  Patient agreeable to call or return sooner for any concerns.

## 2019-01-03 NOTE — PROGRESS NOTES
[address] [facility] [address]    Chief Complaint   Patient presents with   • Follow-up   • Abdominal Pain     The patient has a history of  pain in her left upper quadrant. The pain is worse with eating on occasion, but not always. She states the pain is also caused when she bends over. She states it is tender to touch. She has not taken anything for the pain. The pain has not improved.The pain is described as burning. It does not radiate.     The patient has a long-standing history of constipation. This is improving. The patient is taking Miralax and stool softeners daily. She has 1 bowel movement almost every day. The patient denies bright red blood per rectum or melena. The patient is taking Miralax with improvement.     There is no history of nausea or vomiting.  The patient denies heartburn.  There is no difficulty swallowing.  The patient denies diarrhea.  There is no history of bright red blood per rectum or melena.  The patient's last colonoscopy was February 2017 with polyps.  There is no family history of colon cancer.    Abdominal Pain   This is a chronic problem. The current episode started more than 1 year ago (August 2016). The onset quality is sudden. The problem occurs intermittently. The problem has been unchanged. The pain is located in the LUQ. The pain is at a severity of 3/10. The pain is mild. The quality of the pain is burning. The abdominal pain does not radiate. Associated symptoms include arthralgias. Pertinent negatives include no constipation, diarrhea, dysuria, fever, headaches, hematochezia, hematuria, melena, myalgias, nausea or vomiting. The pain is aggravated by certain positions and eating (bending over). The pain is relieved by nothing. She has tried H2 blockers for the symptoms. The treatment provided no relief. Prior diagnostic workup includes lower endoscopy, upper endoscopy and CT scan.   Constipation   This is a chronic problem. The current episode started more than 1  year ago (over 4 years). The problem has been rapidly improving since onset. Her stool frequency is 1 time per day. The stool is described as formed. The patient is not on a high fiber diet. She does not exercise regularly. There has been adequate water intake. Associated symptoms include abdominal pain and back pain. Pertinent negatives include no diarrhea, fever, hematochezia, melena, nausea or vomiting. She has tried stool softeners and fiber (Miralax) for the symptoms. The treatment provided significant relief.     Review of Systems   Constitutional: Negative for appetite change, chills, fatigue, fever and unexpected weight change.   HENT: Negative for mouth sores, nosebleeds and trouble swallowing.    Eyes: Negative for discharge and redness.   Respiratory: Positive for apnea. Negative for cough and shortness of breath.    Cardiovascular: Negative for chest pain, palpitations and leg swelling.   Gastrointestinal: Positive for abdominal distention and abdominal pain. Negative for anal bleeding, blood in stool, constipation, diarrhea, hematochezia, melena, nausea and vomiting.   Endocrine: Negative for cold intolerance, heat intolerance and polydipsia.   Genitourinary: Negative for dysuria, hematuria and urgency.   Musculoskeletal: Positive for arthralgias and back pain. Negative for joint swelling and myalgias.   Skin: Negative for rash.   Allergic/Immunologic: Negative for food allergies and immunocompromised state.   Neurological: Negative for dizziness, seizures, syncope and headaches.   Hematological: Negative for adenopathy. Does not bruise/bleed easily.   Psychiatric/Behavioral: Negative for dysphoric mood. The patient is not nervous/anxious and is not hyperactive.      Patient Active Problem List   Diagnosis   • Constipation   • Left lower quadrant pain   • Colon cancer screening   • Left upper quadrant pain   • Diverticulosis of large intestine without hemorrhage   • Colon polyps   • Vascular ectasia of  colon   • Internal hemorrhoids   • External hemorrhoid   • Elevated liver function tests   • Elevated alkaline phosphatase level   • Gastritis without bleeding   • Helicobacter pylori infection   • Abnormal ultrasound of liver   • Gastric polyp     Past Medical History:   Diagnosis Date   • Back pain    • Carotid artery stenosis     BILATERAL   • Colon polyp 12/10/2012   • Diabetes    • Fatty liver 06/26/2017   • Fracture     RIGHT ANKLE AND LEFT ARM    • High cholesterol    • Hypertension    • Osteoarthritis    • Sleep apnea     CPAP HS - TO BRING IN DOS   • Vision problems    • Wears glasses     FOR DISTANCE     Past Surgical History:   Procedure Laterality Date   • ANKLE ARTHROSCOPY Right    • BREAST SURGERY      CYST REMOVED   • CATARACT EXTRACTION      2014 and 2015   • COLONOSCOPY  12/10/2012   • COLONOSCOPY N/A 2/22/2017    Procedure: COLONOSCOPY with hot and cold snare polypectomies, resolution clip placement, cold biopsy polypectomy;  Surgeon: Mg Awan MD;  Location: [facility] ENDOSCOPY;  Service:    • ENDOSCOPY N/A 7/20/2017    Procedure: ESOPHAGOGASTRODUODENOSCOPY with biopsies and cold biopsy polypectomy;  Surgeon: Mg Awan MD;  Location: [facility] ENDOSCOPY;  Service:    • FRACTURE SURGERY Right     ANKLE   • TUBAL ABDOMINAL LIGATION  1980?   • UPPER GASTROINTESTINAL ENDOSCOPY  07/20/2017     Family History   Problem Relation Age of Onset   • Colon cancer Neg Hx    • Liver cancer Neg Hx    • Liver disease Neg Hx    • Stomach cancer Neg Hx    • Esophageal cancer Neg Hx      Social History   Substance Use Topics   • Smoking status: Never Smoker   • Smokeless tobacco: Never Used   • Alcohol use No       Current Outpatient Prescriptions:   •  alendronate (FOSAMAX) 10 MG tablet, Take 20 mg by mouth Every Morning Before Breakfast., Disp: , Rfl:   •  Calcium Citrate-Vitamin D (CALCIUM + D PO), Take 1 tablet by mouth Daily., Disp: , Rfl:   •  CloNIDine (CATAPRES) 0.1 MG tablet, Take 0.1 mg by mouth 2  "(Two) Times a Day As Needed for High Blood Pressure., Disp: , Rfl:   •  clopidogrel (PLAVIX) 75 MG tablet, Take 75 mg by mouth Daily., Disp: , Rfl:   •  diltiaZEM CD (DILTIAZEM CD) 240 MG 24 hr capsule, Take 120 mg by mouth Daily., Disp: , Rfl:   •  insulin glargine (LANTUS) 100 UNIT/ML injection, Inject 27-30 Units under the skin Daily., Disp: , Rfl:   •  insulin lispro (humaLOG) 100 UNIT/ML injection, Inject 10 Units under the skin 3 (Three) Times a Day Before Meals., Disp: , Rfl:   •  levothyroxine (SYNTHROID, LEVOTHROID) 100 MCG tablet, Take 100 mcg by mouth Daily., Disp: , Rfl:   •  montelukast (SINGULAIR) 10 MG tablet, Take 10 mg by mouth Every Night., Disp: , Rfl:   •  olmesartan-hydrochlorothiazide (BENICAR HCT) 20-12.5 MG per tablet, Take 1 tablet by mouth Daily., Disp: , Rfl:   •  pravastatin (PRAVACHOL) 80 MG tablet, Take 80 mg by mouth Daily., Disp: , Rfl:   •  raNITIdine (ZANTAC) 150 MG tablet, Take 1 tablet by mouth 2 (Two) Times a Day., Disp: 60 tablet, Rfl: 1  •  SITagliptin (JANUVIA) 100 MG tablet, Take 100 mg by mouth Daily., Disp: , Rfl:   •  Furosemide (LASIX PO), Take 20 mg by mouth Daily As Needed., Disp: , Rfl:   •  Potassium Chloride (KLOR-CON 10 PO), Take 10 mEq by mouth 2 (Two) Times a Day As Needed (TAKES WHEN LASIX IS TAKEN)., Disp: , Rfl:     No Known Allergies    /67  Pulse 56  Temp 98.7 °F (37.1 °C)  Resp 18  Ht 63\" (160 cm)  Wt 242 lb (110 kg)  BMI 42.87 kg/m2    Physical Exam   Constitutional: She is oriented to person, place, and time. She appears well-developed and well-nourished. No distress.   HENT:   Head: Normocephalic and atraumatic.   Right Ear: Hearing and external ear normal.   Left Ear: Hearing and external ear normal.   Nose: Nose normal.   Mouth/Throat: Oropharynx is clear and moist and mucous membranes are normal. Mucous membranes are not pale, not dry and not cyanotic. No oral lesions. No oropharyngeal exudate.   Eyes: Conjunctivae and EOM are normal. Right " eye exhibits no discharge. Left eye exhibits no discharge.   Neck: Trachea normal. Neck supple. No JVD present. No edema present. No thyroid mass and no thyromegaly present.   Cardiovascular: Normal rate, regular rhythm, S2 normal and normal heart sounds.  Exam reveals no gallop, no S3 and no friction rub.    No murmur heard.  Pulmonary/Chest: Effort normal and breath sounds normal. No respiratory distress. She exhibits no tenderness.   Abdominal: Normal appearance and bowel sounds are normal. She exhibits no distension, no ascites and no mass. There is no splenomegaly or hepatomegaly. There is tenderness (mild) in the left upper quadrant. There is no rigidity, no rebound and no guarding. No hernia.           Vascular Status -  Her exam exhibits no right foot edema. Her exam exhibits no left foot edema.  Lymphadenopathy:     She has no cervical adenopathy.        Left: No supraclavicular adenopathy present.   Neurological: She is alert and oriented to person, place, and time. She has normal strength. No cranial nerve deficit or sensory deficit.   Skin: No rash noted. She is not diaphoretic. No cyanosis. No pallor. Nails show no clubbing.   Psychiatric: She has a normal mood and affect.   Nursing note and vitals reviewed.  Stigmata of chronic liver disease:  None.  Asterixis:  None.    Laboratory Tests:   Upon review of records:     Dated 8/24/2016 glucose 64 BUN 23 creatinine 1.1 sodium 142 potassium 4.1 chloride 105 CO2 33 calcium 9.4 albumin 3.9 ALT 24 AST 21 alkaline phosphatase 120 TSH 2.774 T3 uptake 39.0 free T4 1.19 T4 total 9.3 amylase 70 lipase 36     1/25/2017 glucose 161 BUN 27 creatinine 1.1 sodium 143 potassium 4.3 chloride 104 CO2 31 calcium 9.4 albumin 3.8 ALT 34 AST 27 alkaline phosphatase 140 total bilirubin 0.4 WBC 7.15 hemoglobin 11.4 hematocrit 37.6 platelet count 292 MCV 85.8     Dated 3/9/2017 glucose 131 BUN 27 creatinine 1.1 sodium 140 potassium 4.2 chloride 104 CO2 28 calcium 9.7 albumin 3.9  ALT 28 AST 24 alkaline phosphatase 127 total bilirubin 0.3 hemoglobin A1c 8.9     Dated 6/13/2017 glucose 172 BUN 26 creatinine 1.1 sodium 142 potassium 4.6 chloride 106 CO2 32 calcium 9.7 albumin 4.0 ALT 20 AST 18 alkaline phosphatase 120 total bilirubin 0.3 hemoglobin A1c 9.50     Abdominal Imaging:   Upon review of records:     Dated 9/19/2016 CT of abdomen with contrast reveals lung bases clear. No obvious cardiac abnormality. Liver, gallbladder, spleen, pancreas, adrenals and both kidneys are normal. Stomach, small bowel and colon are normal. No mesenteric lymphadenopathy or peritoneal free fluid. Aorta shows atherosclerotic calcifications with normal aortic caliber. IVC and branches are patent and normal. No retroperitoneal lymphadenopathy. Body while a musculoskeletal structure showed degenerative changes of the lumbar spine noted. Anterior abdominal wall is normal.     [facility] HIDA scan dated 10/6/2016 reveals ejection fraction of 85.9%     Abdominal ultrasound dated 6/26/2017 reveals Limited images of the pancreas are unremarkable.  The liver parenchyma is echogenic consistent with fatty infiltration.  The gallbladder is normal with no shadowing stones or wall thickening.  There is no pericholecystic fluid.  The common duct measures 3.6 mm.  Limited images the right kidney are unremarkable.     Procedures:    Colonoscopy dated 2/22/2017 reveals pandiverticulosis. Colon polyps. Vascular ectasia, nonbleeding. Internal hemorrhoids and small scar external hemorrhoidal tissue. A descending colon polyp biopsy reveals adenomatous polyps. Negative for high-grade dysplasia. Cecum polyp biopsy reveals adenomatous polyp. Negative for high-grade dysplasia.     EGD dated 7/20/2017 reveals small sliding hiatal hernia less than 3 cm.  Small cardia polyp.  Erythematous gastritis with occasional erosions.  This test does not explain the cause of left upper quadrant and adjacent upper abdominal pain.  Second portion of duodenum  biopsies reveal no pathologic abnormalities.  Stomach cardia biopsy polyp reveals chronic active gastritis.  Positive for H. pylori.  Antrum body angulus biopsy reveals chronic active gastritis with focal atrophy.  Positive for H. pylori.    Assessment and Plan:      Kathryn was seen today for follow-up and abdominal pain.    Diagnoses and all orders for this visit:    Left upper quadrant pain  Comments:  Etiology unclear. Pain is worsened with movement. This may be musculoskeletal in nature.  Orders:  -     Comprehensive Metabolic Panel  -     Lipase  -     TSH    Constipation, unspecified constipation type  Comments:  Improved.  Orders:  -     Comprehensive Metabolic Panel  -     TSH    Elevated liver function tests  Comments:  History of elevated elevated alkaline phosphotase per labs.  Orders:  -     Gamma GT  -     Comprehensive Metabolic Panel  -     TSH    Elevated alkaline phosphatase level  Comments:  History of elevated alk phos level. Patient does have a history of osteoporosis.  Orders:  -     Gamma GT    Gastritis without bleeding, unspecified chronicity, unspecified gastritis type  Comments:  Positive for H. pylori.    Helicobacter pylori infection  Comments:  Positive biopsy for H. pylori.    Abnormal ultrasound of liver  Comments:  Consistent with fatty infiltration per ultrasound.    Gastric polyp  Comments:  Biopsy with chronic gastritis, H. pylori.        Plan  and Patient Instructions:  Patient Instructions   1. Treatment for H. Pylori:       A. Amoxicillin 500 mg 2 tablets by mouth twice a day x 14 days.       B. Clarithromycin 500 mg 1 tablet by mouth twice a day x 14 days.       C. Pantoprazole 40 mg 1 tablet by mouth twice a day x 14 days.  2. Patient may take probiotics while taking antibiotics  3. Antireflux measures: Avoid fried, fatty foods, alcohol, chocolate, coffee, tea,  soft drinks, peppermint and spearmint, spicy foods, tomatoes and tomato based foods, onion based foods, and  smoking. Other antireflux measures include weight reduction if overweight, avoiding tight clothing around the abdomen, elevating the head of the bed 6 inches with blocks under the head board, and don't drink or eat before going to bed and avoid lying down immediately after meals.  4. Hold Zantac while taking treatment for H. Pylori with Pantoprazole. Restart Zantac 150 mg 1 tablet by mouth twice a day after completing treatment for H. Pylori.  5. Check CMP, TSH, GGT  6. Low fat diet with exercise and weight loss.  7. Patient may call in 1 week for results.  8. Patient may need non-invasive liver work up in the future.  9. Follow up: 6 weeks    Patient Care Team:  SANFORD Gill as PCP - General    NANNETTE Sarkar       No

## 2019-01-07 ENCOUNTER — OFFICE VISIT (OUTPATIENT)
Dept: GASTROENTEROLOGY | Facility: CLINIC | Age: 79
End: 2019-01-07

## 2019-01-07 VITALS
TEMPERATURE: 98.5 F | HEART RATE: 70 BPM | SYSTOLIC BLOOD PRESSURE: 153 MMHG | WEIGHT: 250 LBS | DIASTOLIC BLOOD PRESSURE: 64 MMHG | BODY MASS INDEX: 44.3 KG/M2 | HEIGHT: 63 IN | RESPIRATION RATE: 18 BRPM

## 2019-01-07 DIAGNOSIS — K59.00 CONSTIPATION, UNSPECIFIED CONSTIPATION TYPE: Chronic | ICD-10-CM

## 2019-01-07 DIAGNOSIS — R12 HEARTBURN: Chronic | ICD-10-CM

## 2019-01-07 DIAGNOSIS — R74.8 ELEVATED ALKALINE PHOSPHATASE LEVEL: ICD-10-CM

## 2019-01-07 DIAGNOSIS — D64.9 ANEMIA, UNSPECIFIED TYPE: Primary | Chronic | ICD-10-CM

## 2019-01-07 DIAGNOSIS — R10.12 LEFT UPPER QUADRANT PAIN: Chronic | ICD-10-CM

## 2019-01-07 PROCEDURE — 99214 OFFICE O/P EST MOD 30 MIN: CPT | Performed by: NURSE PRACTITIONER

## 2019-01-07 RX ORDER — PANTOPRAZOLE SODIUM 40 MG/1
TABLET, DELAYED RELEASE ORAL
Qty: 30 TABLET | Refills: 5 | Status: SHIPPED | OUTPATIENT
Start: 2019-01-07 | End: 2019-08-13 | Stop reason: SDUPTHER

## 2019-01-07 RX ORDER — IBUPROFEN 200 MG
200 TABLET ORAL AS NEEDED
COMMUNITY
End: 2020-07-24 | Stop reason: HOSPADM

## 2019-01-07 NOTE — PROGRESS NOTES
Chief Complaint   Patient presents with   • Follow-up   • Abdominal Pain     The patient is here for follow up. She has been doing well. She has continued to heal from shoulder replacement. She is still in physical therapy.    The patient had shoulder replacement surgery in February 2018. Prior to discharge from hospital, she was diagnosed with anemia. The patient continues to deny overt GI bleed, no hematemesis, hematochezia or melena. She tested negative for stool occult blood in July 2018. She was due to have labs in November 2018 to check CBC, but the patient forgot. She is going today to PCP office to have her labs drawn.     The patient has a long standing history of  pain in her left upper quadrant. The pain has been improving. The pain occurs intermittently when she bends over. She states it is tender to touch at times, but it is not bothering her at this time. Eating does not affect the pain. Changing positions can improve the pain. She has not taken anything for the pain. The pain is described as burning. It does not radiate.        The patient has a long-standing history of constipation. Constipation is improving. The patient is taking Miralax daily with significant improvement. She has 1 soft bowel movement almost every day. The patient denies bright red blood per rectum or melena.       There is no history of nausea or vomiting. The patient denies heartburn. There is no difficulty swallowing.  The patient denies diarrhea.  There is no history of bright red blood per rectum or melena. Recent stool for H. Pylori is negative. The patient's last colonoscopy was February 2017 and last EGD was in July 2017. There is no family history of colon cancer.    Abdominal Pain   This is a chronic problem. Episode onset: over 5 years. The onset quality is sudden. The problem occurs rarely. The problem has been waxing and waning. The pain is located in the LUQ. The pain is mild. The quality of the pain is burning. The  abdominal pain does not radiate. Associated symptoms include arthralgias. Pertinent negatives include no constipation, diarrhea, dysuria, fever, headaches, hematochezia, hematuria, melena, myalgias, nausea or vomiting. The pain is aggravated by certain positions and movement. The pain is relieved by movement and certain positions. She has tried nothing for the symptoms. Prior diagnostic workup includes upper endoscopy, lower endoscopy, ultrasound and CT scan.   Anemia   Presents for follow-up visit. The condition has lasted for 6 months. Symptoms include abdominal pain and bruises/bleeds easily. There has been no fever or palpitations. Signs of blood loss that are not present include hematemesis, hematochezia, melena and vaginal bleeding. Past treatments include nothing. Procedure history includes colonoscopy (2017), EGD (2017) and FOBT (fecal occult blood test negative).   Constipation   This is a chronic problem. Episode onset: over 5 years. The problem has been gradually improving since onset. Her stool frequency is 1 time per day. The stool is described as formed. The patient is not on a high fiber diet. There has been adequate water intake. Associated symptoms include abdominal pain and back pain. Pertinent negatives include no diarrhea, fever, hematochezia, melena, nausea or vomiting. Treatments tried: Miralax, stool softeners. The treatment provided significant relief.     Review of Systems   Constitutional: Positive for fatigue. Negative for appetite change, chills, fever and unexpected weight change.   HENT: Positive for nosebleeds. Negative for mouth sores and trouble swallowing.    Eyes: Negative for discharge and redness.   Respiratory: Positive for apnea. Negative for cough and shortness of breath.    Cardiovascular: Positive for leg swelling. Negative for chest pain and palpitations.   Gastrointestinal: Positive for abdominal pain. Negative for abdominal distention, anal bleeding, blood in stool,  constipation, diarrhea, hematemesis, hematochezia, melena, nausea and vomiting.   Endocrine: Negative for cold intolerance, heat intolerance and polydipsia.   Genitourinary: Negative for dysuria, hematuria, urgency and vaginal bleeding.   Musculoskeletal: Positive for arthralgias, back pain and gait problem. Negative for joint swelling and myalgias.   Skin: Negative for rash.   Allergic/Immunologic: Negative for food allergies and immunocompromised state.   Neurological: Negative for dizziness, seizures, syncope and headaches.   Hematological: Negative for adenopathy. Bruises/bleeds easily.   Psychiatric/Behavioral: Negative for dysphoric mood. The patient is nervous/anxious. The patient is not hyperactive.      Patient Active Problem List   Diagnosis   • Constipation   • Left upper quadrant pain   • Diverticulosis of large intestine without hemorrhage   • Colon polyps   • Vascular ectasia of colon   • Internal hemorrhoids   • External hemorrhoid   • Gastritis without bleeding   • Abnormal ultrasound of liver   • Gastric polyp   • Pre-op exam   • Right wrist fracture   • Type 2 diabetes mellitus (CMS/HCC)   • Hypertensive disorder   • Hypothyroidism   • Morbid obesity with BMI of 40.0-44.9, adult (CMS/HCC)   • Closed fracture of left shoulder   • Status post total shoulder replacement, left   • Non Q wave myocardial infarction (CMS/HCC)   • Bilateral pseudophakia   • After-cataract   • Abnormal blood chemistry level   • Carotid artery occlusion   • Carotid artery stenosis   • Dyspnea   • Edema   • Hyperlipidemia   • Lack of energy   • Left carotid artery occlusion   • Left carotid artery stenosis   • Osteoporosis   • Proliferative diabetic retinopathy associated with type 2 diabetes mellitus (CMS/HCC)   • Type 2 diabetes mellitus without complication (CMS/HCC)   • Iron deficiency anemia   • Anemia   • Heartburn     Past Medical History:   Diagnosis Date   • Anemia    • Back pain    • Body piercing     BOTH EARS   •  "Carotid artery stenosis     BILATERAL-FOLLOWING WITH DR. WINCHESTER.  SEES EVERY 6 MONTHS.  STATES ABOUT 70% OCCLUSIION   • Cataract, bilateral    • Colon polyp 12/10/2012   • Diabetes (CMS/HCC)    • Disease of thyroid gland     hypothyroid   • Diverticulosis    • Fatty liver 06/26/2017   • Fracture     RIGHT ANKLE AND LEFT ARM    • High cholesterol    • History of nuclear stress test 2016    \"IT WAS OK\"   • Hypertension    • Lumbosacral disc disease    • Osteoarthritis    • Osteoporosis    • Sleep apnea     CPAP HS - TO BRING IN DOS   • Wears glasses     FOR DISTANCE     Past Surgical History:   Procedure Laterality Date   • ANKLE ARTHROSCOPY Right    • BREAST SURGERY Left     CYST REMOVED   • CARDIAC CATHETERIZATION      NO STENT   • CATARACT EXTRACTION Bilateral     2014 and 2015   • COLONOSCOPY  12/10/2012   • COLONOSCOPY N/A 2/22/2017    Procedure: COLONOSCOPY with hot and cold snare polypectomies, resolution clip placement, cold biopsy polypectomy;  Surgeon: Mg Awan MD;  Location: [facility] ENDOSCOPY;  Service:    • DILATION AND CURETTAGE, DIAGNOSTIC / THERAPEUTIC      X4   • ENDOSCOPY N/A 7/20/2017    Procedure: ESOPHAGOGASTRODUODENOSCOPY with biopsies and cold biopsy polypectomy;  Surgeon: Mg Awan MD;  Location: [facility] ENDOSCOPY;  Service:    • FRACTURE SURGERY Right     ANKLE   • ORIF WRIST FRACTURE Right 2/2/2018    Procedure: WRIST RIGHT OPEN REDUCTION INTERNAL FIXATION WITH VOLAR PLATE;  Surgeon: Phu Meneses MD;  Location: [facility] OR;  Service:    • TOTAL SHOULDER ARTHROPLASTY W/ DISTAL CLAVICLE EXCISION Left 2/2/2018    Procedure: SHOULDER TOTAL LEFT  REVERSE ARTHROPLASTY WITH TUBEROSITY RECONSTRUCTION;  Surgeon: Phu Meneses MD;  Location: [facility] OR;  Service:    • TUBAL ABDOMINAL LIGATION  1980?   • UPPER GASTROINTESTINAL ENDOSCOPY  07/20/2017     Family History   Problem Relation Age of Onset   • Heart disease Other    • Cancer Other    • Colon cancer Neg Hx      Social History "     Tobacco Use   • Smoking status: Never Smoker   • Smokeless tobacco: Never Used   Substance Use Topics   • Alcohol use: No       Current Outpatient Medications:   •  albuterol (PROVENTIL HFA;VENTOLIN HFA) 108 (90 Base) MCG/ACT inhaler, Inhale 2 puffs Every 4 (Four) Hours As Needed for Wheezing., Disp: , Rfl:   •  amLODIPine (NORVASC) 5 MG tablet, Take 1 tablet by mouth Daily., Disp: , Rfl:   •  Calcium Citrate-Vitamin D (CALCIUM + D PO), Take 1 tablet by mouth Daily., Disp: , Rfl:   •  carvedilol (COREG) 12.5 MG tablet, Take 1 tablet by mouth 2 (Two) Times a Day With Meals., Disp: , Rfl:   •  CloNIDine (CATAPRES) 0.1 MG tablet, Take 0.1 mg by mouth 2 (Two) Times a Day As Needed for High Blood Pressure., Disp: , Rfl:   •  clopidogrel (PLAVIX) 75 MG tablet, Take 75 mg by mouth Daily., Disp: , Rfl:   •  docusate sodium 100 MG capsule, Take 100 mg by mouth 2 (Two) Times a Day As Needed for Constipation., Disp: , Rfl:   •  Furosemide (LASIX PO), Take 20 mg by mouth Daily As Needed., Disp: , Rfl:   •  ibuprofen (ADVIL,MOTRIN) 200 MG tablet, Take 200 mg by mouth As Needed for Mild Pain ., Disp: , Rfl:   •  insulin aspart (novoLOG) 100 UNIT/ML injection, Inject 10 Units under the skin 3 (Three) Times a Day With Meals., Disp: , Rfl: 12  •  insulin glargine (LANTUS) 100 UNIT/ML injection, Inject 33 Units under the skin Every Night., Disp: , Rfl:   •  ipratropium-albuterol (DUO-NEB) 0.5-2.5 mg/mL nebulizer, Take 3 mL by nebulization Every 6 (Six) Hours As Needed for Wheezing or Shortness of Air., Disp: 360 mL, Rfl:   •  levothyroxine (SYNTHROID, LEVOTHROID) 100 MCG tablet, Take 100 mcg by mouth Daily., Disp: , Rfl:   •  lisinopril (PRINIVIL,ZESTRIL) 20 MG tablet, Take 1 tablet by mouth Daily., Disp: , Rfl:   •  LORazepam (ATIVAN) 0.5 MG tablet, Take 0.5 mg by mouth Every 8 (Eight) Hours As Needed for Anxiety., Disp: , Rfl:   •  losartan-hydrochlorothiazide (HYZAAR) 50-12.5 MG per tablet, Take 1 tablet by mouth Daily., Disp:  ", Rfl:   •  montelukast (SINGULAIR) 10 MG tablet, Take 10 mg by mouth Every Night., Disp: , Rfl:   •  pantoprazole (PROTONIX) 40 MG EC tablet, 1 po daily in the am 30 minutes before breakfast, Disp: 30 tablet, Rfl: 5  •  polyethylene glycol (MIRALAX) packet, Take 17 g by mouth Daily., Disp: , Rfl:   •  pravastatin (PRAVACHOL) 80 MG tablet, Take 80 mg by mouth Daily., Disp: , Rfl:   •  SITagliptin (JANUVIA) 100 MG tablet, Take 100 mg by mouth Daily., Disp: , Rfl:   •  aspirin 81 MG tablet, Take 1 tablet by mouth Daily., Disp: , Rfl:   •  oxyCODONE-acetaminophen (PERCOCET) 5-325 MG per tablet, Take 1 tablet by mouth Every 6 (Six) Hours As Needed (for pain)., Disp: 40 tablet, Rfl: 0    No Known Allergies    /64   Pulse 70   Temp 98.5 °F (36.9 °C)   Resp 18   Ht 160 cm (62.99\")   Wt 113 kg (250 lb)   LMP  (LMP Unknown)   BMI 44.30 kg/m²     Physical Exam   Constitutional: She is oriented to person, place, and time. She appears well-developed and well-nourished. No distress.   HENT:   Head: Normocephalic and atraumatic.   Right Ear: Hearing and external ear normal.   Left Ear: Hearing and external ear normal.   Nose: Nose normal.   Mouth/Throat: Oropharynx is clear and moist and mucous membranes are normal. Mucous membranes are not pale, not dry and not cyanotic. No oral lesions. No oropharyngeal exudate.   Eyes: Conjunctivae and EOM are normal. Right eye exhibits no discharge. Left eye exhibits no discharge.   Neck: Trachea normal. Neck supple. No JVD present. No edema present. No thyroid mass and no thyromegaly present.   Cardiovascular: Normal rate, regular rhythm, S2 normal and normal heart sounds. Exam reveals no gallop, no S3 and no friction rub.   No murmur heard.  Pulmonary/Chest: Effort normal and breath sounds normal. No respiratory distress. She exhibits no tenderness.   Abdominal: Normal appearance and bowel sounds are normal. She exhibits no distension, no ascites and no mass. There is no " splenomegaly or hepatomegaly. There is no tenderness. There is no rigidity, no rebound and no guarding. No hernia.     Vascular Status -  Her right foot exhibits no edema. Her left foot exhibits no edema.  Lymphadenopathy:     She has no cervical adenopathy.        Left: No supraclavicular adenopathy present.   Neurological: She is alert and oriented to person, place, and time. She has normal strength. No cranial nerve deficit or sensory deficit.   Skin: No rash noted. She is not diaphoretic. No cyanosis. No pallor. Nails show no clubbing.   Psychiatric: She has a normal mood and affect.   Nursing note and vitals reviewed.  Stigmata of chronic liver disease:  None.  Asterixis:  None.    Laboratory Tests:   Upon review of records:     Dated 8/17/2017 glucose 143 BUN 29 creatinine 1.1 sodium 140 potassium 4.1 chloride 103 CO2 28 calcium 9.3 albumin 4.0 ALT 39 AST 20 alkaline phosphatase 119 total bilirubin 0.5 GGT 25 lipase 109 TSH 0.769     Dated 10/18/2017 glucose 183 BUN 28 creatinine 1.1 sodium 139 potassium 4.7 chloride 98 CO2 33 calcium 9.4 albumin 3.9 ALT 23 AST 16 alkaline phosphatase 124 total bilirubin 0.4 TSH 1.176   total T4 9.3  T3 uptake 34.1  free T4 1.63     Dated 2/6/2018 glucose 147 BUN 40 creatinine 1.1 sodium 141 potassium 4.3 chloride 101 CO2 32 calcium 8.6W BC 9.62 hemoglobin 8.6 hematocrit 27.6 platelet count 354 MCV 86.0     Dated 7/13/2018 fecal occult blood in stool: Negative     Dated 7/25/2018  Iron 46, TIBC 312, UIBC 266, ferritin 31.9, saturation 15%, folic acid 12.2, vitamin B12 474     Dated 8/31/2018 glucose 259 BUN 35 creatinine 1.0 sodium 139 potassium 4.9 chloride 101 CO2 32 calcium 9.2 albumin 3.8 ALT 39 AST 23 alkaline phosphatase 134 total bilirubin 0.3W BC 11.75 hemoglobin 11.8 hematocrit 38.7 platelet count 345 MCV 83.8 iron 69 TIBC 360 iron saturation 19% ferritin 21.5 PT/INR 11.7/1.05 alkaline phosphatase 135 liver fraction 62%, bone fraction 38%, intestinal fraction 0%      Dated 9/10/2018 H. pylori stool antigen: Negative    Dated 11/7/2018 glucose 313 BUN 24 creatinine 1.12 sodium 140 potassium 4.3 chloride 106 CO2 26 albumin 3.4 total bilirubin 0.3 alkaline phosphatase 116 AST 17 ALT 20 hemoglobin A1c 10.4     Abdominal Imaging:   Upon review of records:     Dated 9/19/2016 CT of abdomen with contrast reveals lung bases clear. No obvious cardiac abnormality. Liver, gallbladder, spleen, pancreas, adrenals and both kidneys are normal. Stomach, small bowel and colon are normal. No mesenteric lymphadenopathy or peritoneal free fluid. Aorta shows atherosclerotic calcifications with normal aortic caliber. IVC and branches are patent and normal. No retroperitoneal lymphadenopathy. Body while a musculoskeletal structure showed degenerative changes of the lumbar spine noted. Anterior abdominal wall is normal.     [facility] HIDA scan dated 10/6/2016 reveals ejection fraction of 85.9%     Abdominal ultrasound dated 6/26/2017 reveals Limited images of the pancreas are unremarkable.  The liver parenchyma is echogenic consistent with fatty infiltration.  The gallbladder is normal with no shadowing stones or wall thickening.  There is no pericholecystic fluid.  The common duct measures 3.6 mm.  Limited images the right kidney are unremarkable.     Procedures:    Upon review of medical records:     Colonoscopy dated 2/22/2017 reveals pandiverticulosis. Colon polyps. Vascular ectasia, nonbleeding. Internal hemorrhoids and small scar external hemorrhoidal tissue. A descending colon polyp biopsy reveals adenomatous polyps. Negative for high-grade dysplasia. Cecum polyp biopsy reveals adenomatous polyp. Negative for high-grade dysplasia.     EGD dated 7/20/2017 reveals small sliding hiatal hernia less than 3 cm. Small cardia polyp.  Erythematous gastritis with occasional erosions.  This test does not explain the cause of left upper quadrant and adjacent upper abdominal pain. Second portion of duodenum  biopsies reveal no pathologic abnormalities.  Stomach cardia biopsy polyp reveals chronic active gastritis. Positive for H. pylori.  Antrum body angulus biopsy reveals chronic active gastritis with focal atrophy.  Positive for H. pylori.    Assessment:      ICD-10-CM ICD-9-CM   1. Anemia, unspecified type D64.9 285.9   2. Heartburn R12 787.1   3. Left upper quadrant pain R10.12 789.02   4. Constipation, unspecified constipation type K59.00 564.00   5. Elevated alkaline phosphatase level R74.8 790.5     Discussion:  1. History of anemia after joint replacement.  Patient denies overt GI bleed.  Of interest, fecal occult stool test was negative.  Unfortunately, the patient has not had recent labs as she had forgotten.  2. Long sitting history of heartburn.  Well controlled.  3. Long-standing history of left upper quadrant pain.  Etiology unclear, possibly musculoskeletal as the pain is more affected by  movement.  Waxing and waning.  4. Long-standing history of constipation.  Improving.  5. History of elevated alkaline phosphatase.  Recent labs normal.    Plan/  Patient Instructions   1. Obtain labs from PCP office. The patient is having them drawn today.  2. Antireflux measures: Avoid fried, fatty foods, alcohol, chocolate, coffee, tea,  soft drinks, peppermint and spearmint, spicy foods, tomatoes and tomato based foods, onion based foods, and smoking. Other antireflux measures include weight reduction if overweight, avoiding tight clothing around the abdomen, elevating the head of the bed 6 inches with blocks under the head board, and don't drink or eat before going to bed and avoid lying down immediately after meals.  3. Pantoprazole 40 mg 1 po daily in the am 30 minutes before breakfast.  4. Recommended to take Levothyroxine first in the am upon waking, wait 30 minutes, then take Pantoprazole 40 mg, wait 30 minutes, then eat breakfast and take other medications.  5. High fiber, low fat diet.  6. Miralax 17 grams in  8 ounces of noncarbonated beverage daily.  7. Exercise, weight reduction.   8. CBC-the patient is having drawn today at PCP office.  8. Follow up: 3 months or sooner if needed     Gavin Vázquez, APRN

## 2019-01-07 NOTE — PATIENT INSTRUCTIONS
1. Obtain labs from PCP office. The patient is having them drawn today.  2. Antireflux measures: Avoid fried, fatty foods, alcohol, chocolate, coffee, tea,  soft drinks, peppermint and spearmint, spicy foods, tomatoes and tomato based foods, onion based foods, and smoking. Other antireflux measures include weight reduction if overweight, avoiding tight clothing around the abdomen, elevating the head of the bed 6 inches with blocks under the head board, and don't drink or eat before going to bed and avoid lying down immediately after meals.  3. Pantoprazole 40 mg 1 po daily in the am 30 minutes before breakfast.  4. Recommended to take Levothyroxine first in the am upon waking, wait 30 minutes, then take Pantoprazole 40 mg, wait 30 minutes, then eat breakfast and take other medications.  5. High fiber, low fat diet.  6. Miralax 17 grams in 8 ounces of noncarbonated beverage daily.  7. Exercise, weight reduction.   8. CBC-the patient is having drawn today at PCP office.  8. Follow up: 3 months or sooner if needed

## 2019-01-22 ENCOUNTER — TRANSCRIBE ORDERS (OUTPATIENT)
Dept: ADMINISTRATIVE | Facility: HOSPITAL | Age: 79
End: 2019-01-22

## 2019-01-22 DIAGNOSIS — M54.16 LUMBAR RADICULOPATHY: Primary | ICD-10-CM

## 2019-01-25 ENCOUNTER — HOSPITAL ENCOUNTER (OUTPATIENT)
Dept: MRI IMAGING | Facility: HOSPITAL | Age: 79
Discharge: HOME | End: 2019-01-25
Attending: ORTHOPAEDIC SURGERY | Admitting: ORTHOPAEDIC SURGERY

## 2019-01-25 DIAGNOSIS — M54.16 LUMBAR RADICULOPATHY: ICD-10-CM

## 2019-01-25 PROCEDURE — 72148 MRI LUMBAR SPINE W/O DYE: CPT

## 2019-02-08 DIAGNOSIS — Z96.612 S/P REVERSE TOTAL SHOULDER ARTHROPLASTY, LEFT: Primary | ICD-10-CM

## 2019-02-11 ENCOUNTER — OFFICE VISIT (OUTPATIENT)
Dept: ORTHOPEDIC SURGERY | Facility: CLINIC | Age: 79
End: 2019-02-11

## 2019-02-11 VITALS — RESPIRATION RATE: 18 BRPM | HEIGHT: 63 IN | WEIGHT: 247 LBS | BODY MASS INDEX: 43.77 KG/M2

## 2019-02-11 DIAGNOSIS — Z96.612 S/P REVERSE TOTAL SHOULDER ARTHROPLASTY, LEFT: Primary | ICD-10-CM

## 2019-02-11 DIAGNOSIS — S52.571D OTHER CLOSED INTRA-ARTICULAR FRACTURE OF DISTAL END OF RIGHT RADIUS WITH ROUTINE HEALING, SUBSEQUENT ENCOUNTER: ICD-10-CM

## 2019-02-11 PROCEDURE — 73030 X-RAY EXAM OF SHOULDER: CPT | Performed by: ORTHOPAEDIC SURGERY

## 2019-02-11 PROCEDURE — 99214 OFFICE O/P EST MOD 30 MIN: CPT | Performed by: ORTHOPAEDIC SURGERY

## 2019-02-11 PROCEDURE — 73110 X-RAY EXAM OF WRIST: CPT | Performed by: ORTHOPAEDIC SURGERY

## 2019-02-11 RX ORDER — POTASSIUM CHLORIDE 750 MG/1
CAPSULE, EXTENDED RELEASE ORAL
COMMUNITY

## 2019-02-11 RX ORDER — DILTIAZEM HYDROCHLORIDE 180 MG/1
CAPSULE, EXTENDED RELEASE ORAL
COMMUNITY
End: 2020-08-21

## 2019-02-11 NOTE — PROGRESS NOTES
Subjective   Patient ID: Kathryn Davison is a 79 y.o. right hand dominant female is here today for orthopaedic evaluation of recovery progress with treatment.  Follow-up of the Left Shoulder and Fracture and Follow-up of the Right Wrist       CHIEF COMPLAINT: right wrist, left shoulder    Follow up right wrist and left shoulder traumatic fractures, surgery and rehabilitation progress.    History of Present Illness     Progress Note:  Patient reports that with treatments and since the last visit, overall right wrist and hand pain is resolved, left shoulder pain is slight and main issue is persistent stiffness, strength is improved, and range of motion of right wrist is full and left shoulder is limited.  Patient is currently using occasional ibuprofen prn medication.   Physical therapy has been consistent.  Injection therapy has not been given..   Since last visit, patient is retired and remains quite active.  Patient does not  present with past labs, imaging or other studies for review today.    Patient states she had an accidental fall at home on 2-9-19 in the morning. Her daughter found her that afternoon five hours later.  Family members helped her up to her feet and she gradually returned to her baseline activity level and cane assist ambulation.  She did not hit her head or lose consciousness. She bruised the right side of torso. She did not seek any medical treatment at the time.  She associates her fall with having just started gabapentin that was prescribed by another physician recently.  She has stopped this medication.   She states she is improved and feels fine today.    Past Medical History:   Diagnosis Date   • Anemia    • Back pain    • Body piercing     BOTH EARS   • Carotid artery stenosis     BILATERAL-FOLLOWING WITH DR. WINCHESTER.  SEES EVERY 6 MONTHS.  STATES ABOUT 70% OCCLUSIION   • Cataract, bilateral    • Colon polyp 12/10/2012   • Diabetes (CMS/[facility])    • Disease of thyroid gland      "hypothyroid   • Diverticulosis    • Fatty liver 06/26/2017   • Fracture     RIGHT ANKLE AND LEFT ARM    • High cholesterol    • History of nuclear stress test 2016    \"IT WAS OK\"   • Hypertension    • Lumbosacral disc disease    • Osteoarthritis    • Osteoporosis    • Sleep apnea     CPAP HS - TO BRING IN DOS   • Wears glasses     FOR DISTANCE        Past Surgical History:   Procedure Laterality Date   • ANKLE ARTHROSCOPY Right    • BREAST SURGERY Left     CYST REMOVED   • CARDIAC CATHETERIZATION      NO STENT   • CATARACT EXTRACTION Bilateral     2014 and 2015   • COLONOSCOPY  12/10/2012   • COLONOSCOPY with hot and cold snare polypectomies, resolution clip placement, cold biopsy polypectomy N/A 2/22/2017    Performed by Mg Awan MD at [facility] ENDOSCOPY   • DILATION AND CURETTAGE, DIAGNOSTIC / THERAPEUTIC      X4   • ESOPHAGOGASTRODUODENOSCOPY with biopsies and cold biopsy polypectomy N/A 7/20/2017    Performed by Mg Awan MD at [facility] ENDOSCOPY   • FRACTURE SURGERY Right     ANKLE   • SHOULDER TOTAL LEFT  REVERSE ARTHROPLASTY WITH TUBEROSITY RECONSTRUCTION Left 2/2/2018    Performed by Phu Meneses MD at [facility] OR   • TUBAL ABDOMINAL LIGATION  1980?   • UPPER GASTROINTESTINAL ENDOSCOPY  07/20/2017   • WRIST RIGHT OPEN REDUCTION INTERNAL FIXATION WITH VOLAR PLATE Right 2/2/2018    Performed by Phu Meneses MD at [facility] OR        Family History   Problem Relation Age of Onset   • Heart disease Other    • Cancer Other    • Colon cancer Neg Hx         Social History     Socioeconomic History   • Marital status:      Spouse name: Not on file   • Number of children: Not on file   • Years of education: Not on file   • Highest education level: Not on file   Social Needs   • Financial resource strain: Not on file   • Food insecurity - worry: Not on file   • Food insecurity - inability: Not on file   • Transportation needs - medical: Not on file   • Transportation needs - non-medical: Not " "on file   Occupational History   • Occupation: retired   Tobacco Use   • Smoking status: Never Smoker   • Smokeless tobacco: Never Used   Substance and Sexual Activity   • Alcohol use: No   • Drug use: No   • Sexual activity: Defer   Other Topics Concern   • Not on file   Social History Narrative    Right hand dominant       Allergies   Allergen Reactions   • Gabapentin Dizziness       Review of Systems   Constitutional: Negative for fever.   HENT: Negative for voice change.    Eyes: Negative for visual disturbance.   Respiratory: Negative for shortness of breath.    Cardiovascular: Negative for chest pain.   Gastrointestinal: Negative for abdominal pain.   Genitourinary: Negative for dysuria.   Musculoskeletal: Positive for arthralgias. Negative for gait problem (baseline cane assist).   Skin: Negative for color change and rash.   Neurological: Negative for dizziness.   Hematological: Does not bruise/bleed easily.   Psychiatric/Behavioral: Negative for confusion.         Objective   Resp 18   Ht 160 cm (62.99\")   Wt 112 kg (247 lb)   LMP  (LMP Unknown)   BMI 43.77 kg/m²     Physical Exam   Constitutional: She appears well-developed. No distress.   Neurological: She is alert.   Skin: Skin is warm and dry. No rash noted. No erythema.   Psychiatric: She has a normal mood and affect. Her speech is normal.   Vitals reviewed.    Right Hand Exam     Tenderness   The patient is experiencing no tenderness.     Range of Motion   Wrist   Right wrist extension: 60 degrees.   Flexion: 50   Pronation: 90   Supination: 70     Muscle Strength   The patient has normal right wrist strength.  Wrist flexion: 5/5     Other   Erythema: absent  Right hand scars: well healed.  Sensation: normal  Pulse: present      Left Shoulder Exam     Tenderness   The patient is experiencing no tenderness.     Range of Motion   Left shoulder active abduction: 95 degrees.   External rotation: 40   Forward flexion: 100   Internal rotation 0 degrees: " L2     Muscle Strength   Abduction: 4/5   Internal rotation: 5/5   External rotation: 4/5   Biceps: 4/5     Other   Erythema: absent  Scars: present (well healed)  Pulse: present           Neurologic Exam     Mental Status   Attention: normal.   Speech: speech is normal   Level of consciousness: alert  Knowledge: good.     Motor Exam   Overall muscle tone: normal      Assessment/Plan   Independent Review of Radiographic Studies:    AP and Y lateral left shoulder, indication to evaluate status of left shoulder prosthesis and joint, and compared with prior imaging, shows no acute fracture or dislocation. Good position and alignment of prosthesis with no radiographic signs of loosening.   AP, oblique and lateral of right wrist, indication to evaluate right distal radius fracture healing, and compared with prior imaging, shows interim fracture healing with good maintained reduction and alignment and intact position of fracture fixation hardware volar plate and screws.  Laboratory and Other Studies:  No new results reviewed today.   Medical Decision Making:    No complications of procedure and treatments.  Stable neurovascular exam.  Excellent progress with right wrist recovery.  May resume routine activity, work, sports and/or exercise as tolerated.  Fair progress though ongoing with residual symptoms of left shoulder primarily stiffness.  Continue current management and any additional treatments and workup as outlined in plan.  Patient is guided with wall climb, canes and supine gravity reduction and other techniques to gain additional shoulder mobility with goals of 120 degrees elevation and 70 degrees external rotation.    Procedures    Kathryn was seen today for follow-up, fracture and follow-up.    Diagnoses and all orders for this visit:    S/P reverse total shoulder arthroplasty, left    Other closed intra-articular fracture of distal end of right radius with routine healing, subsequent encounter  -     XR Wrist  3+ View Right       Discussion of orthopaedic goals and activities and patient and/or guardian expressed appreciation.  Regular exercise as tolerated  Guided on proper techniques for mobility, strength, agility and/or conditioning exercises  Ice, heat, and/or modalities as beneficial  Physical therapy program ongoing      Recommendations/Plan:  Exercise, medications, injections, other patient advice, and return appointment as noted.  Brace: No brace was given at today's visit  Referral: No referrals made at today's visit  Test/Studies: No additional studies ordered.  Surgery: Plan non-surgical treatment for current orthopaedic condition.  Work/Activity Status: May perform usual activities as tolerated, May return to routine exercise and physical work as tolerated. and No strenuous activity.  Patient is encouraged to call or return for any issues or concerns.     Return in about 6 months (around 8/11/2019) for Recheck.  Patient agreeable to call or return sooner for any concerns.      Portions of this note have been scribed for Phu Meneses MD by Jacinta Black CMA.. 2/11/2019  3:35 PM

## 2019-02-26 ENCOUNTER — TRANSCRIBE ORDERS (OUTPATIENT)
Dept: ADMINISTRATIVE | Facility: HOSPITAL | Age: 79
End: 2019-02-26

## 2019-02-26 DIAGNOSIS — Z78.0 POSTMENOPAUSAL STATE: Primary | ICD-10-CM

## 2019-02-28 ENCOUNTER — APPOINTMENT (OUTPATIENT)
Dept: BONE DENSITY | Facility: HOSPITAL | Age: 79
End: 2019-02-28

## 2019-02-28 DIAGNOSIS — Z78.0 POSTMENOPAUSAL STATE: ICD-10-CM

## 2019-02-28 PROCEDURE — 77080 DXA BONE DENSITY AXIAL: CPT

## 2019-04-09 ENCOUNTER — HOSPITAL ENCOUNTER (OUTPATIENT)
Dept: DIABETES SERVICES | Facility: HOSPITAL | Age: 79
Discharge: HOME | End: 2019-04-09
Admitting: INTERNAL MEDICINE

## 2019-04-09 PROCEDURE — G0108 DIAB MANAGE TRN  PER INDIV: HCPCS

## 2019-04-09 NOTE — PROGRESS NOTES
Diabetes Education    Patient Name:  Kathryn Davison  YOB: 1940  MRN: [id number]  Admit Date:  4/9/2019      See scanned notes in Media Tab in Epic      Electronically signed by:  Bethany Randolph RD  04/09/19 11:48 AM

## 2019-04-09 NOTE — CONSULTS
Diabetes Education  Assessment/Teaching    Patient Name:  Kathryn Davison  YOB: 1940  MRN: [id number]  Admit Date:  4/9/2019      Assessment Date:  4/9/2019            Other Comments:  Patient attended outpatient diabetes education class. See scanned notes.        Electronically signed by:  Corina Roa RN  04/09/19 12:45 PM

## 2019-08-13 DIAGNOSIS — D64.9 ANEMIA, UNSPECIFIED TYPE: Chronic | ICD-10-CM

## 2019-08-13 RX ORDER — PANTOPRAZOLE SODIUM 40 MG/1
TABLET, DELAYED RELEASE ORAL
Qty: 90 TABLET | Refills: 0 | Status: SHIPPED | OUTPATIENT
Start: 2019-08-13 | End: 2019-11-11 | Stop reason: SDUPTHER

## 2019-08-30 ENCOUNTER — LAB (OUTPATIENT)
Dept: LAB | Facility: HOSPITAL | Age: 79
End: 2019-08-30

## 2019-08-30 DIAGNOSIS — E11.8 DIABETIC COMPLICATION (HCC): Primary | ICD-10-CM

## 2019-08-30 DIAGNOSIS — I51.9 MYXEDEMA HEART DISEASE: ICD-10-CM

## 2019-08-30 DIAGNOSIS — E03.9 MYXEDEMA HEART DISEASE: ICD-10-CM

## 2019-08-30 DIAGNOSIS — E11.21 DIABETIC GLOMERULOPATHY (HCC): ICD-10-CM

## 2019-08-30 LAB
ANION GAP SERPL CALCULATED.3IONS-SCNC: 12.1 MMOL/L (ref 5–15)
BUN BLD-MCNC: 28 MG/DL (ref 8–23)
BUN/CREAT SERPL: 24.1 (ref 7–25)
CALCIUM SPEC-SCNC: 9 MG/DL (ref 8.6–10.5)
CHLORIDE SERPL-SCNC: 98 MMOL/L (ref 98–107)
CO2 SERPL-SCNC: 24.9 MMOL/L (ref 22–29)
CREAT BLD-MCNC: 1.16 MG/DL (ref 0.57–1)
CREAT UR-MCNC: 73.8 MG/DL
GFR SERPL CREATININE-BSD FRML MDRD: 45 ML/MIN/1.73
GLUCOSE BLD-MCNC: 260 MG/DL (ref 65–99)
HBA1C MFR BLD: 9.16 % (ref 4.8–5.6)
POTASSIUM BLD-SCNC: 4.5 MMOL/L (ref 3.5–5.2)
PROT UR-MCNC: 10 MG/DL
SODIUM BLD-SCNC: 135 MMOL/L (ref 136–145)
T4 FREE SERPL-MCNC: 1.67 NG/DL (ref 0.93–1.7)
T4 SERPL-MCNC: 7.77 MCG/DL (ref 4.5–11.7)
TSH SERPL DL<=0.05 MIU/L-ACNC: 2.79 UIU/ML (ref 0.27–4.2)

## 2019-08-30 PROCEDURE — 36415 COLL VENOUS BLD VENIPUNCTURE: CPT

## 2019-08-30 PROCEDURE — 82570 ASSAY OF URINE CREATININE: CPT

## 2019-08-30 PROCEDURE — 84439 ASSAY OF FREE THYROXINE: CPT

## 2019-08-30 PROCEDURE — 84443 ASSAY THYROID STIM HORMONE: CPT

## 2019-08-30 PROCEDURE — 80048 BASIC METABOLIC PNL TOTAL CA: CPT

## 2019-08-30 PROCEDURE — 84156 ASSAY OF PROTEIN URINE: CPT

## 2019-08-30 PROCEDURE — 83036 HEMOGLOBIN GLYCOSYLATED A1C: CPT

## 2019-09-03 ENCOUNTER — LAB (OUTPATIENT)
Dept: LAB | Facility: HOSPITAL | Age: 79
End: 2019-09-03

## 2019-09-03 ENCOUNTER — TRANSCRIBE ORDERS (OUTPATIENT)
Dept: LAB | Facility: HOSPITAL | Age: 79
End: 2019-09-03

## 2019-09-03 DIAGNOSIS — E11.21 DIABETIC GLOMERULOPATHY (HCC): ICD-10-CM

## 2019-09-03 DIAGNOSIS — E03.9 MYXEDEMA HEART DISEASE: ICD-10-CM

## 2019-09-03 DIAGNOSIS — I51.9 MYXEDEMA HEART DISEASE: ICD-10-CM

## 2019-09-03 DIAGNOSIS — E11.8 DIABETIC COMPLICATION (HCC): ICD-10-CM

## 2019-09-03 DIAGNOSIS — E11.8 DIABETIC COMPLICATION (HCC): Primary | ICD-10-CM

## 2019-09-03 PROCEDURE — 84479 ASSAY OF THYROID (T3 OR T4): CPT

## 2019-09-03 PROCEDURE — 36415 COLL VENOUS BLD VENIPUNCTURE: CPT

## 2019-09-04 LAB — T3RU NFR SERPL: 32 % (ref 24–39)

## 2019-11-11 DIAGNOSIS — D64.9 ANEMIA, UNSPECIFIED TYPE: Chronic | ICD-10-CM

## 2019-11-11 RX ORDER — PANTOPRAZOLE SODIUM 40 MG/1
TABLET, DELAYED RELEASE ORAL
Qty: 90 TABLET | Refills: 0 | Status: SHIPPED | OUTPATIENT
Start: 2019-11-11 | End: 2020-08-28

## 2020-06-24 ENCOUNTER — OFFICE VISIT (OUTPATIENT)
Dept: OBSTETRICS AND GYNECOLOGY | Facility: CLINIC | Age: 80
End: 2020-06-24

## 2020-06-24 ENCOUNTER — PREP FOR SURGERY (OUTPATIENT)
Dept: OTHER | Facility: HOSPITAL | Age: 80
End: 2020-06-24

## 2020-06-24 VITALS
SYSTOLIC BLOOD PRESSURE: 148 MMHG | DIASTOLIC BLOOD PRESSURE: 92 MMHG | WEIGHT: 236 LBS | BODY MASS INDEX: 41.82 KG/M2 | HEIGHT: 63 IN

## 2020-06-24 DIAGNOSIS — N95.0 PMB (POSTMENOPAUSAL BLEEDING): Primary | ICD-10-CM

## 2020-06-24 DIAGNOSIS — R93.89 THICKENED ENDOMETRIUM: ICD-10-CM

## 2020-06-24 PROCEDURE — 99204 OFFICE O/P NEW MOD 45 MIN: CPT | Performed by: OBSTETRICS & GYNECOLOGY

## 2020-06-24 RX ORDER — SODIUM CHLORIDE 0.9 % (FLUSH) 0.9 %
10 SYRINGE (ML) INJECTION AS NEEDED
Status: CANCELLED | OUTPATIENT
Start: 2020-07-24

## 2020-06-24 RX ORDER — SODIUM CHLORIDE 0.9 % (FLUSH) 0.9 %
3 SYRINGE (ML) INJECTION EVERY 12 HOURS SCHEDULED
Status: CANCELLED | OUTPATIENT
Start: 2020-07-24

## 2020-07-06 NOTE — DISCHARGE INSTRUCTIONS
Patient instructed on PAT PASS information.  Patient/family member verbalized understanding of instruction/education.PAT patient education COVID testing pre-op instructions reviewed with pt.  Verbalized understanding.  PAT PASS GIVEN/REVIEWED WITH PT.  VERBALIZED UNDERSTANDING OF THE FOLLOWING:  DO NOT EAT, DRINK, SMOKE, USE SMOKELESS TOBACCO OR CHEW GUM AFTER MIDNIGHT THE NIGHT BEFORE SURGERY.  THIS ALSO INCLUDES HARD CANDIES AND MINTS.    DO NOT SHAVE THE AREA TO BE OPERATED ON AT LEAST 48 HOURS PRIOR TO THE PROCEDURE.  DO NOT WEAR MAKE UP OR NAIL POLISH.  DO NOT LEAVE IN ANY PIERCING OR WEAR JEWELRY THE DAY OF SURGERY.      DO NOT USE ADHESIVES IF YOU WEAR DENTURES.    DO NOT WEAR EYE CONTACTS; BRING IN YOUR GLASSES.    ONLY TAKE MEDICATION THE MORNING OF YOUR PROCEDURE IF INSTRUCTED BY YOUR SURGEON WITH ENOUGH WATER TO SWALLOW THE MEDICATION.  IF YOUR SURGEON DID NOT SPECIFY WHICH MEDICATIONS TO TAKE, YOU WILL NEED TO CALL THEIR OFFICE FOR FURTHER INSTRUCTIONS AND DO AS THEY INSTRUCT.    LEAVE ANYTHING YOU CONSIDER VALUABLE AT HOME.    YOU WILL NEED TO ARRANGE FOR SOMEONE TO DRIVE YOU HOME AFTER SURGERY.  IT IS RECOMMENDED THAT YOU DO NOT DRIVE, WORK, DRINK ALCOHOL OR MAKE MAJOR DECISIONS FOR AT LEAST 24 HOURS AFTER YOUR PROCEDURE IS COMPLETE.      THE DAY OF YOUR PROCEDURE, BRING IN THE FOLLOWING IF APPLICABLE:   PICTURE ID AND INSURANCE/MEDICARE OR MEDICAID CARDS/ANY CO-PAY THAT MAY BE DUE   COPY OF ADVANCED DIRECTIVE/LIVING WILL/POWER OR    CPAP/BIPAP/INHALERS   SKIN PREP SHEET   YOUR PREADMISSION TESTING PASS (IF NOT A PHONE HISTORY)

## 2020-07-07 ENCOUNTER — APPOINTMENT (OUTPATIENT)
Dept: PREADMISSION TESTING | Facility: HOSPITAL | Age: 80
End: 2020-07-07

## 2020-07-07 VITALS — WEIGHT: 237.13 LBS | HEIGHT: 63 IN | BODY MASS INDEX: 42.02 KG/M2

## 2020-07-07 DIAGNOSIS — N95.0 PMB (POSTMENOPAUSAL BLEEDING): ICD-10-CM

## 2020-07-07 DIAGNOSIS — R93.89 THICKENED ENDOMETRIUM: ICD-10-CM

## 2020-07-07 LAB
ALBUMIN SERPL-MCNC: 3.4 G/DL (ref 3.5–5.2)
ALBUMIN/GLOB SERPL: 1.1 G/DL
ALP SERPL-CCNC: 105 U/L (ref 39–117)
ALT SERPL W P-5'-P-CCNC: 14 U/L (ref 1–33)
ANION GAP SERPL CALCULATED.3IONS-SCNC: 8.4 MMOL/L (ref 5–15)
ANISOCYTOSIS BLD QL: NORMAL
AST SERPL-CCNC: 17 U/L (ref 1–32)
BACTERIA UR QL AUTO: ABNORMAL /HPF
BASOPHILS # BLD AUTO: 0.05 10*3/MM3 (ref 0–0.2)
BASOPHILS NFR BLD AUTO: 0.7 % (ref 0–1.5)
BILIRUB SERPL-MCNC: 0.3 MG/DL (ref 0–1.2)
BILIRUB UR QL STRIP: NEGATIVE
BUN SERPL-MCNC: 28 MG/DL (ref 8–23)
BUN/CREAT SERPL: 23.3 (ref 7–25)
CALCIUM SPEC-SCNC: 9.6 MG/DL (ref 8.6–10.5)
CHLORIDE SERPL-SCNC: 102 MMOL/L (ref 98–107)
CLARITY UR: CLEAR
CO2 SERPL-SCNC: 30.6 MMOL/L (ref 22–29)
COLOR UR: YELLOW
CREAT SERPL-MCNC: 1.2 MG/DL (ref 0.57–1)
DEPRECATED RDW RBC AUTO: 48.6 FL (ref 37–54)
EOSINOPHIL # BLD AUTO: 0.24 10*3/MM3 (ref 0–0.4)
EOSINOPHIL NFR BLD AUTO: 3.5 % (ref 0.3–6.2)
ERYTHROCYTE [DISTWIDTH] IN BLOOD BY AUTOMATED COUNT: 15.9 % (ref 12.3–15.4)
GFR SERPL CREATININE-BSD FRML MDRD: 43 ML/MIN/1.73
GLOBULIN UR ELPH-MCNC: 3.1 GM/DL
GLUCOSE SERPL-MCNC: 152 MG/DL (ref 65–99)
GLUCOSE UR STRIP-MCNC: ABNORMAL MG/DL
HCT VFR BLD AUTO: 39 % (ref 34–46.6)
HGB BLD-MCNC: 11.6 G/DL (ref 12–15.9)
HGB UR QL STRIP.AUTO: NEGATIVE
HYALINE CASTS UR QL AUTO: ABNORMAL /LPF
HYPOCHROMIA BLD QL: NORMAL
IMM GRANULOCYTES # BLD AUTO: 0.05 10*3/MM3 (ref 0–0.05)
IMM GRANULOCYTES NFR BLD AUTO: 0.7 % (ref 0–0.5)
KETONES UR QL STRIP: NEGATIVE
LEUKOCYTE ESTERASE UR QL STRIP.AUTO: ABNORMAL
LYMPHOCYTES # BLD AUTO: 1.54 10*3/MM3 (ref 0.7–3.1)
LYMPHOCYTES NFR BLD AUTO: 22.5 % (ref 19.6–45.3)
MCH RBC QN AUTO: 25.4 PG (ref 26.6–33)
MCHC RBC AUTO-ENTMCNC: 29.7 G/DL (ref 31.5–35.7)
MCV RBC AUTO: 85.3 FL (ref 79–97)
MONOCYTES # BLD AUTO: 0.62 10*3/MM3 (ref 0.1–0.9)
MONOCYTES NFR BLD AUTO: 9.1 % (ref 5–12)
NEUTROPHILS NFR BLD AUTO: 4.34 10*3/MM3 (ref 1.7–7)
NEUTROPHILS NFR BLD AUTO: 63.5 % (ref 42.7–76)
NITRITE UR QL STRIP: NEGATIVE
NRBC BLD AUTO-RTO: 0 /100 WBC (ref 0–0.2)
PH UR STRIP.AUTO: <=5 [PH] (ref 5–8)
PLATELET # BLD AUTO: 277 10*3/MM3 (ref 140–450)
PMV BLD AUTO: 10.2 FL (ref 6–12)
POIKILOCYTOSIS BLD QL SMEAR: NORMAL
POTASSIUM SERPL-SCNC: 4.3 MMOL/L (ref 3.5–5.2)
PROT SERPL-MCNC: 6.5 G/DL (ref 6–8.5)
PROT UR QL STRIP: NEGATIVE
RBC # BLD AUTO: 4.57 10*6/MM3 (ref 3.77–5.28)
RBC # UR: ABNORMAL /HPF
REF LAB TEST METHOD: ABNORMAL
SMALL PLATELETS BLD QL SMEAR: ADEQUATE
SODIUM SERPL-SCNC: 141 MMOL/L (ref 136–145)
SP GR UR STRIP: 1.02 (ref 1–1.03)
SQUAMOUS #/AREA URNS HPF: ABNORMAL /HPF
UROBILINOGEN UR QL STRIP: ABNORMAL
WBC # BLD AUTO: 6.84 10*3/MM3 (ref 3.4–10.8)
WBC CLUMPS # UR AUTO: ABNORMAL /HPF
WBC MORPH BLD: NORMAL
WBC UR QL AUTO: ABNORMAL /HPF

## 2020-07-07 PROCEDURE — 93005 ELECTROCARDIOGRAM TRACING: CPT

## 2020-07-07 PROCEDURE — 80053 COMPREHEN METABOLIC PANEL: CPT | Performed by: OBSTETRICS & GYNECOLOGY

## 2020-07-07 PROCEDURE — 36415 COLL VENOUS BLD VENIPUNCTURE: CPT

## 2020-07-07 PROCEDURE — 87086 URINE CULTURE/COLONY COUNT: CPT | Performed by: OBSTETRICS & GYNECOLOGY

## 2020-07-07 PROCEDURE — 85025 COMPLETE CBC W/AUTO DIFF WBC: CPT | Performed by: OBSTETRICS & GYNECOLOGY

## 2020-07-07 PROCEDURE — 85007 BL SMEAR W/DIFF WBC COUNT: CPT | Performed by: OBSTETRICS & GYNECOLOGY

## 2020-07-07 PROCEDURE — 87186 SC STD MICRODIL/AGAR DIL: CPT | Performed by: OBSTETRICS & GYNECOLOGY

## 2020-07-07 PROCEDURE — 93010 ELECTROCARDIOGRAM REPORT: CPT | Performed by: INTERNAL MEDICINE

## 2020-07-07 PROCEDURE — 87077 CULTURE AEROBIC IDENTIFY: CPT | Performed by: OBSTETRICS & GYNECOLOGY

## 2020-07-07 PROCEDURE — 81001 URINALYSIS AUTO W/SCOPE: CPT | Performed by: OBSTETRICS & GYNECOLOGY

## 2020-07-07 RX ORDER — HYDROCHLOROTHIAZIDE 12.5 MG/1
12.5 TABLET ORAL DAILY
COMMUNITY
End: 2021-06-15

## 2020-07-07 RX ORDER — LOSARTAN POTASSIUM 50 MG/1
50 TABLET ORAL DAILY
COMMUNITY

## 2020-07-08 NOTE — H&P (VIEW-ONLY)
"Subjective   Chief Complaint   Patient presents with   • Vaginal Bleeding     Had bright red spotting after using restroom 3 weeks ago, has not had anymore bleeding since. TVS done today.     Kathryn Davison is a 80 y.o. year old  presenting to be seen for vaginal bleeding.    She reports bright red vaginal bleeding after urinating for several days 3 weeks ago.  Bleeding occurred after every void.  She describes the amount is similar to a period.  No pain symptoms.  No bleeding since that time.  She is not sexually active.  She is taking Plavix.    OB Hx: 2 term vaginal births, 1 miscarriage  Pap smear: Unsure, denies abnormals  Colonoscopy: 2017    She denies nausea, emesis, fevers, chills, significant weight changes, hair/skin/nail changes, dysuria, urinary frequency, palpitations, chest pain, headaches, myalgia, dyspnea.     History  Past Medical History:   Diagnosis Date   • Abnormal ECG    • Anemia    • Back pain    • Bleeding nose 2020   • Body piercing     BOTH EARS   • Bulging lumbar disc    • Carotid artery stenosis     BILATERAL-FOLLOWING WITH DR. WINCHESTER.  SEES EVERY 6 MONTHS.  STATES ABOUT 70% OCCLUSIION   • Cataract, bilateral    • Colon polyp 12/10/2012   • Diabetes (CMS/HCC)    • Diabetic retinopathy (CMS/HCC)    • Disease of thyroid gland     hypothyroid   • Diverticulosis    • Fatty liver 2017   • Fracture     RIGHT ANKLE AND LEFT ARM    • Frequency of urination    • GERD (gastroesophageal reflux disease)    • High cholesterol    • History of nuclear stress test 2016    \"IT WAS OK\"   • Hypertension    • Lumbosacral disc disease    • Macular degeneration    • Osteoarthritis    • Osteoporosis    • Sciatica    • Seasonal allergies    • Sleep apnea     CPAP HS - TO BRING IN DOS   • Swelling of both lower extremities    • Teeth missing     back teeth   • Wears glasses     FOR DISTANCE/and reading glasses   , Past Surgical History:   Procedure Laterality Date   • ANKLE ARTHROSCOPY " Right    • BREAST SURGERY Left     CYST REMOVED   • CARDIAC CATHETERIZATION      NO STENT   • CATARACT EXTRACTION Bilateral     2014 and 2015   • COLONOSCOPY  12/10/2012   • COLONOSCOPY N/A 2/22/2017    Procedure: COLONOSCOPY with hot and cold snare polypectomies, resolution clip placement, cold biopsy polypectomy;  Surgeon: Mg Awan MD;  Location: [facility] ENDOSCOPY;  Service:    • DILATION AND CURETTAGE, DIAGNOSTIC / THERAPEUTIC      X4   • ENDOSCOPY N/A 7/20/2017    Procedure: ESOPHAGOGASTRODUODENOSCOPY with biopsies and cold biopsy polypectomy;  Surgeon: Mg Awan MD;  Location: [facility] ENDOSCOPY;  Service:    • FRACTURE SURGERY Right     ANKLE   • ORIF WRIST FRACTURE Right 2/2/2018    Procedure: WRIST RIGHT OPEN REDUCTION INTERNAL FIXATION WITH VOLAR PLATE;  Surgeon: Phu Meneses MD;  Location: [facility] OR;  Service:    • TOTAL SHOULDER ARTHROPLASTY W/ DISTAL CLAVICLE EXCISION Left 2/2/2018    Procedure: SHOULDER TOTAL LEFT  REVERSE ARTHROPLASTY WITH TUBEROSITY RECONSTRUCTION;  Surgeon: Phu Meneses MD;  Location: [facility] OR;  Service:    • TUBAL ABDOMINAL LIGATION  1980?   • UPPER GASTROINTESTINAL ENDOSCOPY  07/20/2017   , Family History   Problem Relation Age of Onset   • Heart disease Other    • Cancer Other    • Prostate cancer Father    • Stroke Father    • Colon cancer Neg Hx    , Social History     Tobacco Use   • Smoking status: Never Smoker   • Smokeless tobacco: Never Used   Substance Use Topics   • Alcohol use: No   • Drug use: No   ,   (Not in a hospital admission) and Allergies:  Gabapentin    Current Outpatient Medications on File Prior to Visit   Medication Sig Dispense Refill   • albuterol (PROVENTIL HFA;VENTOLIN HFA) 108 (90 Base) MCG/ACT inhaler Inhale 2 puffs Every 4 (Four) Hours As Needed for Wheezing.     • amLODIPine (NORVASC) 5 MG tablet Take 1 tablet by mouth Daily.     • aspirin 81 MG tablet Take 1 tablet by mouth Daily.     • Calcium Citrate-Vitamin D (CALCIUM + D PO)  Take 1 tablet by mouth Daily.     • carvedilol (COREG) 12.5 MG tablet Take 1 tablet by mouth 2 (Two) Times a Day With Meals.     • CloNIDine (CATAPRES) 0.1 MG tablet Take 0.1 mg by mouth 2 (Two) Times a Day As Needed for High Blood Pressure.     • clopidogrel (PLAVIX) 75 MG tablet Take 75 mg by mouth Daily.     • diltiazem XR (DILT-XR) 180 MG 24 hr capsule DILT- mg capsule, extended release   TAKE ONE CAPSULE BY MOUTH ONCE DAILY     • docusate sodium 100 MG capsule Take 100 mg by mouth 2 (Two) Times a Day As Needed for Constipation.     • Furosemide (LASIX PO) Take 20 mg by mouth Daily As Needed.     • ibuprofen (ADVIL,MOTRIN) 200 MG tablet Take 200 mg by mouth As Needed for Mild Pain .     • insulin aspart (novoLOG) 100 UNIT/ML injection Inject 10 Units under the skin 3 (Three) Times a Day With Meals.  12   • insulin glargine (LANTUS) 100 UNIT/ML injection Inject 33 Units under the skin Every Night.     • ipratropium-albuterol (DUO-NEB) 0.5-2.5 mg/mL nebulizer Take 3 mL by nebulization Every 6 (Six) Hours As Needed for Wheezing or Shortness of Air. 360 mL    • levothyroxine (SYNTHROID, LEVOTHROID) 100 MCG tablet Take 100 mcg by mouth Daily.     • lisinopril (PRINIVIL,ZESTRIL) 20 MG tablet Take 1 tablet by mouth Daily.     • LORazepam (ATIVAN) 0.5 MG tablet Take 0.5 mg by mouth Every 8 (Eight) Hours As Needed for Anxiety.     • losartan-hydrochlorothiazide (HYZAAR) 50-12.5 MG per tablet Take 1 tablet by mouth Daily.     • montelukast (SINGULAIR) 10 MG tablet Take 10 mg by mouth Every Night.     • oxyCODONE-acetaminophen (PERCOCET) 5-325 MG per tablet Take 1 tablet by mouth Every 6 (Six) Hours As Needed (for pain). 40 tablet 0   • pantoprazole (PROTONIX) 40 MG EC tablet TAKE 1 TABLET BY MOUTH IN THE MORNING 30  MINUTES  BEFORE  BREAKFAST 90 tablet 0   • polyethylene glycol (MIRALAX) packet Take 17 g by mouth Daily.     • potassium chloride (MICRO-K) 10 MEQ CR capsule potassium chloride ER 10 mEq tablet,extended  "release   TAKE ONE TABLET BY MOUTH ONCE DAILY ON  THE  DAYS  YOU  TAKE  LASIX  (FUROSEMIDE)     • pravastatin (PRAVACHOL) 80 MG tablet Take 80 mg by mouth Daily.     • SITagliptin (JANUVIA) 100 MG tablet Take 100 mg by mouth Daily.       No current facility-administered medications on file prior to visit.        Social History    Tobacco Use      Smoking status: Never Smoker      Smokeless tobacco: Never Used      Review of Systems  Pertinent items are noted in HPI, all other systems were reviewed and negative       Objective   /92   Ht 160 cm (63\")   Wt 107 kg (236 lb)   LMP  (LMP Unknown)   BMI 41.81 kg/m²     Physical Exam:  General Appearance: alert, pleasant, appears stated age, interactive and cooperative  Head: normocephalic, without obvious abnormality and atraumatic  Eyes: lids and lashes normal and no icterus  Ears: ears appear intact with no abnormalities noted  Nose: nares normal, septum midline, mucosa normal and no drainage  Neck: suppple, trachea midline and no thyromegaly  Back: no kyphosis present, no scoliosis present and range of motion normal  Lungs: respirations regular, respirations even and respirations unlabored, clear to auscultation bilaterally   Heart: regular rhythm and normal rate, normal S1, S2, no murmur, gallop, or rubs and no click  Breasts: Not performed.  Abdomen: no masses, no hepatomegaly, no splenomegaly, soft non-tender, no guarding and no rebound tenderness  Extremities: moves extremities well, no edema, no cyanosis and no redness  Skin: no bleeding, bruising or rash and no lesions noted  Lymph Nodes: no palpable adenopathy  Neurologic: Cranial Nerves cranial nerves 2 - 12 grossly intact, Speech normal content and execusion, Coordination normal  Psych: normal mood and affect, oriented to person, time and place, thought content organized and appropriate judgment    Pelvis:  Pelvic: Clinical staff was present for exam  External genitalia:  normal appearance of the " external genitalia including Bartholin's and Humble's glands.  :  urethral meatus normal;  Vagina:  atrophic mucosal changes are present;  Cervix:  normal appearance.  Uterus:  normal size, shape and consistency.  Adnexa:  normal bimanual exam of the adnexa.  Rectal:  digital rectal exam not performed; anus visually normal appearing.    Review of Labs:   No data reviewed    Review of Imaging:  No data reviewed    Decision to obtain old records:  No.    Summarization of old records:  N/A    Independent review of image, tracing or specimen:  A pelvic ultrasound was ordered and independently reviewed today. Small, anteverted uterus with a roughly 2.3 cm calcified intramural fibroid present.  Endometrium measures roughly 12 mm.  Both ovaries are normal in appearance with normal vascularity.  No free fluid in the cul-de-sac.       Assessment   Postmenopausal bleeding  Thickened endometrium  Anticoagulation     Plan    Orders Placed This Encounter   Procedures   • US Non-ob Transvaginal     Order Specific Question:   Reason for Exam:     Answer:   pmb   • SCANNED - IMAGING     Medications ordered: none    Procedures performed: none    We reviewed her symptoms, exam findings and ultrasound in detail today.  We discussed the need for endometrial sampling to rule out hyperplasia/malignancy.  We discussed in-office endometrial biopsy versus D&C in the OR under sedation.  After reviewing the risks and benefits of each option, the patient desires to proceed with hysteroscopy, D&C under sedation.  Stop Plavix 5 days prior to surgery.  All questions answered.    Fidel Elizalde MD  Obstetrics and Gynecology  [facility]

## 2020-07-08 NOTE — PROGRESS NOTES
"Subjective   Chief Complaint   Patient presents with   • Vaginal Bleeding     Had bright red spotting after using restroom 3 weeks ago, has not had anymore bleeding since. TVS done today.     Kathryn Davison is a 80 y.o. year old  presenting to be seen for vaginal bleeding.    She reports bright red vaginal bleeding after urinating for several days 3 weeks ago.  Bleeding occurred after every void.  She describes the amount is similar to a period.  No pain symptoms.  No bleeding since that time.  She is not sexually active.  She is taking Plavix.    OB Hx: 2 term vaginal births, 1 miscarriage  Pap smear: Unsure, denies abnormals  Colonoscopy: 2017    She denies nausea, emesis, fevers, chills, significant weight changes, hair/skin/nail changes, dysuria, urinary frequency, palpitations, chest pain, headaches, myalgia, dyspnea.     History  Past Medical History:   Diagnosis Date   • Abnormal ECG    • Anemia    • Back pain    • Bleeding nose 2020   • Body piercing     BOTH EARS   • Bulging lumbar disc    • Carotid artery stenosis     BILATERAL-FOLLOWING WITH DR. WINCHESTER.  SEES EVERY 6 MONTHS.  STATES ABOUT 70% OCCLUSIION   • Cataract, bilateral    • Colon polyp 12/10/2012   • Diabetes (CMS/HCC)    • Diabetic retinopathy (CMS/HCC)    • Disease of thyroid gland     hypothyroid   • Diverticulosis    • Fatty liver 2017   • Fracture     RIGHT ANKLE AND LEFT ARM    • Frequency of urination    • GERD (gastroesophageal reflux disease)    • High cholesterol    • History of nuclear stress test 2016    \"IT WAS OK\"   • Hypertension    • Lumbosacral disc disease    • Macular degeneration    • Osteoarthritis    • Osteoporosis    • Sciatica    • Seasonal allergies    • Sleep apnea     CPAP HS - TO BRING IN DOS   • Swelling of both lower extremities    • Teeth missing     back teeth   • Wears glasses     FOR DISTANCE/and reading glasses   , Past Surgical History:   Procedure Laterality Date   • ANKLE ARTHROSCOPY " Right    • BREAST SURGERY Left     CYST REMOVED   • CARDIAC CATHETERIZATION      NO STENT   • CATARACT EXTRACTION Bilateral     2014 and 2015   • COLONOSCOPY  12/10/2012   • COLONOSCOPY N/A 2/22/2017    Procedure: COLONOSCOPY with hot and cold snare polypectomies, resolution clip placement, cold biopsy polypectomy;  Surgeon: Mg Awan MD;  Location: [facility] ENDOSCOPY;  Service:    • DILATION AND CURETTAGE, DIAGNOSTIC / THERAPEUTIC      X4   • ENDOSCOPY N/A 7/20/2017    Procedure: ESOPHAGOGASTRODUODENOSCOPY with biopsies and cold biopsy polypectomy;  Surgeon: Mg Awan MD;  Location: [facility] ENDOSCOPY;  Service:    • FRACTURE SURGERY Right     ANKLE   • ORIF WRIST FRACTURE Right 2/2/2018    Procedure: WRIST RIGHT OPEN REDUCTION INTERNAL FIXATION WITH VOLAR PLATE;  Surgeon: Phu Meneses MD;  Location: [facility] OR;  Service:    • TOTAL SHOULDER ARTHROPLASTY W/ DISTAL CLAVICLE EXCISION Left 2/2/2018    Procedure: SHOULDER TOTAL LEFT  REVERSE ARTHROPLASTY WITH TUBEROSITY RECONSTRUCTION;  Surgeon: Phu Meneses MD;  Location: [facility] OR;  Service:    • TUBAL ABDOMINAL LIGATION  1980?   • UPPER GASTROINTESTINAL ENDOSCOPY  07/20/2017   , Family History   Problem Relation Age of Onset   • Heart disease Other    • Cancer Other    • Prostate cancer Father    • Stroke Father    • Colon cancer Neg Hx    , Social History     Tobacco Use   • Smoking status: Never Smoker   • Smokeless tobacco: Never Used   Substance Use Topics   • Alcohol use: No   • Drug use: No   ,   (Not in a hospital admission) and Allergies:  Gabapentin    Current Outpatient Medications on File Prior to Visit   Medication Sig Dispense Refill   • albuterol (PROVENTIL HFA;VENTOLIN HFA) 108 (90 Base) MCG/ACT inhaler Inhale 2 puffs Every 4 (Four) Hours As Needed for Wheezing.     • amLODIPine (NORVASC) 5 MG tablet Take 1 tablet by mouth Daily.     • aspirin 81 MG tablet Take 1 tablet by mouth Daily.     • Calcium Citrate-Vitamin D (CALCIUM + D PO)  Take 1 tablet by mouth Daily.     • carvedilol (COREG) 12.5 MG tablet Take 1 tablet by mouth 2 (Two) Times a Day With Meals.     • CloNIDine (CATAPRES) 0.1 MG tablet Take 0.1 mg by mouth 2 (Two) Times a Day As Needed for High Blood Pressure.     • clopidogrel (PLAVIX) 75 MG tablet Take 75 mg by mouth Daily.     • diltiazem XR (DILT-XR) 180 MG 24 hr capsule DILT- mg capsule, extended release   TAKE ONE CAPSULE BY MOUTH ONCE DAILY     • docusate sodium 100 MG capsule Take 100 mg by mouth 2 (Two) Times a Day As Needed for Constipation.     • Furosemide (LASIX PO) Take 20 mg by mouth Daily As Needed.     • ibuprofen (ADVIL,MOTRIN) 200 MG tablet Take 200 mg by mouth As Needed for Mild Pain .     • insulin aspart (novoLOG) 100 UNIT/ML injection Inject 10 Units under the skin 3 (Three) Times a Day With Meals.  12   • insulin glargine (LANTUS) 100 UNIT/ML injection Inject 33 Units under the skin Every Night.     • ipratropium-albuterol (DUO-NEB) 0.5-2.5 mg/mL nebulizer Take 3 mL by nebulization Every 6 (Six) Hours As Needed for Wheezing or Shortness of Air. 360 mL    • levothyroxine (SYNTHROID, LEVOTHROID) 100 MCG tablet Take 100 mcg by mouth Daily.     • lisinopril (PRINIVIL,ZESTRIL) 20 MG tablet Take 1 tablet by mouth Daily.     • LORazepam (ATIVAN) 0.5 MG tablet Take 0.5 mg by mouth Every 8 (Eight) Hours As Needed for Anxiety.     • losartan-hydrochlorothiazide (HYZAAR) 50-12.5 MG per tablet Take 1 tablet by mouth Daily.     • montelukast (SINGULAIR) 10 MG tablet Take 10 mg by mouth Every Night.     • oxyCODONE-acetaminophen (PERCOCET) 5-325 MG per tablet Take 1 tablet by mouth Every 6 (Six) Hours As Needed (for pain). 40 tablet 0   • pantoprazole (PROTONIX) 40 MG EC tablet TAKE 1 TABLET BY MOUTH IN THE MORNING 30  MINUTES  BEFORE  BREAKFAST 90 tablet 0   • polyethylene glycol (MIRALAX) packet Take 17 g by mouth Daily.     • potassium chloride (MICRO-K) 10 MEQ CR capsule potassium chloride ER 10 mEq tablet,extended  "release   TAKE ONE TABLET BY MOUTH ONCE DAILY ON  THE  DAYS  YOU  TAKE  LASIX  (FUROSEMIDE)     • pravastatin (PRAVACHOL) 80 MG tablet Take 80 mg by mouth Daily.     • SITagliptin (JANUVIA) 100 MG tablet Take 100 mg by mouth Daily.       No current facility-administered medications on file prior to visit.        Social History    Tobacco Use      Smoking status: Never Smoker      Smokeless tobacco: Never Used      Review of Systems  Pertinent items are noted in HPI, all other systems were reviewed and negative       Objective   /92   Ht 160 cm (63\")   Wt 107 kg (236 lb)   LMP  (LMP Unknown)   BMI 41.81 kg/m²     Physical Exam:  General Appearance: alert, pleasant, appears stated age, interactive and cooperative  Head: normocephalic, without obvious abnormality and atraumatic  Eyes: lids and lashes normal and no icterus  Ears: ears appear intact with no abnormalities noted  Nose: nares normal, septum midline, mucosa normal and no drainage  Neck: suppple, trachea midline and no thyromegaly  Back: no kyphosis present, no scoliosis present and range of motion normal  Lungs: respirations regular, respirations even and respirations unlabored, clear to auscultation bilaterally   Heart: regular rhythm and normal rate, normal S1, S2, no murmur, gallop, or rubs and no click  Breasts: Not performed.  Abdomen: no masses, no hepatomegaly, no splenomegaly, soft non-tender, no guarding and no rebound tenderness  Extremities: moves extremities well, no edema, no cyanosis and no redness  Skin: no bleeding, bruising or rash and no lesions noted  Lymph Nodes: no palpable adenopathy  Neurologic: Cranial Nerves cranial nerves 2 - 12 grossly intact, Speech normal content and execusion, Coordination normal  Psych: normal mood and affect, oriented to person, time and place, thought content organized and appropriate judgment    Pelvis:  Pelvic: Clinical staff was present for exam  External genitalia:  normal appearance of the " external genitalia including Bartholin's and Hansford's glands.  :  urethral meatus normal;  Vagina:  atrophic mucosal changes are present;  Cervix:  normal appearance.  Uterus:  normal size, shape and consistency.  Adnexa:  normal bimanual exam of the adnexa.  Rectal:  digital rectal exam not performed; anus visually normal appearing.    Review of Labs:   No data reviewed    Review of Imaging:  No data reviewed    Decision to obtain old records:  No.    Summarization of old records:  N/A    Independent review of image, tracing or specimen:  A pelvic ultrasound was ordered and independently reviewed today. Small, anteverted uterus with a roughly 2.3 cm calcified intramural fibroid present.  Endometrium measures roughly 12 mm.  Both ovaries are normal in appearance with normal vascularity.  No free fluid in the cul-de-sac.       Assessment   Postmenopausal bleeding  Thickened endometrium  Anticoagulation     Plan    Orders Placed This Encounter   Procedures   • US Non-ob Transvaginal     Order Specific Question:   Reason for Exam:     Answer:   pmb   • SCANNED - IMAGING     Medications ordered: none    Procedures performed: none    We reviewed her symptoms, exam findings and ultrasound in detail today.  We discussed the need for endometrial sampling to rule out hyperplasia/malignancy.  We discussed in-office endometrial biopsy versus D&C in the OR under sedation.  After reviewing the risks and benefits of each option, the patient desires to proceed with hysteroscopy, D&C under sedation.  Stop Plavix 5 days prior to surgery.  All questions answered.    Fidel Elizalde MD  Obstetrics and Gynecology  [facility]

## 2020-07-10 DIAGNOSIS — N30.00 ACUTE CYSTITIS WITHOUT HEMATURIA: Primary | ICD-10-CM

## 2020-07-10 LAB — BACTERIA SPEC AEROBE CULT: ABNORMAL

## 2020-07-10 RX ORDER — SULFAMETHOXAZOLE AND TRIMETHOPRIM 800; 160 MG/1; MG/1
1 TABLET ORAL 2 TIMES DAILY
Qty: 6 TABLET | Refills: 0 | Status: SHIPPED | OUTPATIENT
Start: 2020-07-10 | End: 2020-07-13

## 2020-07-21 ENCOUNTER — TRANSCRIBE ORDERS (OUTPATIENT)
Dept: LAB | Facility: HOSPITAL | Age: 80
End: 2020-07-21

## 2020-07-21 ENCOUNTER — LAB (OUTPATIENT)
Dept: LAB | Facility: HOSPITAL | Age: 80
End: 2020-07-21

## 2020-07-21 DIAGNOSIS — Z01.818 PRE-OP TESTING: Primary | ICD-10-CM

## 2020-07-21 DIAGNOSIS — Z01.818 PRE-OP TESTING: ICD-10-CM

## 2020-07-21 LAB
REF LAB TEST METHOD: NORMAL
SARS-COV-2 RNA RESP QL NAA+PROBE: NOT DETECTED

## 2020-07-21 PROCEDURE — C9803 HOPD COVID-19 SPEC COLLECT: HCPCS

## 2020-07-21 PROCEDURE — U0004 COV-19 TEST NON-CDC HGH THRU: HCPCS

## 2020-07-21 PROCEDURE — U0002 COVID-19 LAB TEST NON-CDC: HCPCS

## 2020-07-24 ENCOUNTER — ANESTHESIA EVENT (OUTPATIENT)
Dept: PERIOP | Facility: HOSPITAL | Age: 80
End: 2020-07-24

## 2020-07-24 ENCOUNTER — ANESTHESIA (OUTPATIENT)
Dept: PERIOP | Facility: HOSPITAL | Age: 80
End: 2020-07-24

## 2020-07-24 ENCOUNTER — HOSPITAL ENCOUNTER (OUTPATIENT)
Facility: HOSPITAL | Age: 80
Setting detail: HOSPITAL OUTPATIENT SURGERY
Discharge: HOME | End: 2020-07-24
Attending: OBSTETRICS & GYNECOLOGY | Admitting: OBSTETRICS & GYNECOLOGY

## 2020-07-24 VITALS
TEMPERATURE: 98 F | SYSTOLIC BLOOD PRESSURE: 127 MMHG | DIASTOLIC BLOOD PRESSURE: 43 MMHG | RESPIRATION RATE: 17 BRPM | HEART RATE: 65 BPM | OXYGEN SATURATION: 94 %

## 2020-07-24 DIAGNOSIS — N95.0 PMB (POSTMENOPAUSAL BLEEDING): ICD-10-CM

## 2020-07-24 DIAGNOSIS — R93.89 THICKENED ENDOMETRIUM: ICD-10-CM

## 2020-07-24 LAB — GLUCOSE BLDC GLUCOMTR-MCNC: 124 MG/DL (ref 70–130)

## 2020-07-24 PROCEDURE — 25010000002 ONDANSETRON PER 1 MG: Performed by: NURSE ANESTHETIST, CERTIFIED REGISTERED

## 2020-07-24 PROCEDURE — 88305 TISSUE EXAM BY PATHOLOGIST: CPT | Performed by: OBSTETRICS & GYNECOLOGY

## 2020-07-24 PROCEDURE — 58558 HYSTEROSCOPY BIOPSY: CPT | Performed by: OBSTETRICS & GYNECOLOGY

## 2020-07-24 PROCEDURE — 25010000002 FENTANYL CITRATE (PF) 100 MCG/2ML SOLUTION: Performed by: NURSE ANESTHETIST, CERTIFIED REGISTERED

## 2020-07-24 PROCEDURE — C1782 MORCELLATOR: HCPCS | Performed by: OBSTETRICS & GYNECOLOGY

## 2020-07-24 PROCEDURE — 82962 GLUCOSE BLOOD TEST: CPT

## 2020-07-24 PROCEDURE — 25010000002 PROPOFOL 10 MG/ML EMULSION: Performed by: NURSE ANESTHETIST, CERTIFIED REGISTERED

## 2020-07-24 RX ORDER — IBUPROFEN 800 MG/1
800 TABLET ORAL EVERY 8 HOURS PRN
Qty: 30 TABLET | Refills: 0 | Status: SHIPPED | OUTPATIENT
Start: 2020-07-24 | End: 2020-08-31 | Stop reason: HOSPADM

## 2020-07-24 RX ORDER — SODIUM CHLORIDE 0.9 % (FLUSH) 0.9 %
10 SYRINGE (ML) INJECTION AS NEEDED
Status: DISCONTINUED | OUTPATIENT
Start: 2020-07-24 | End: 2020-07-24 | Stop reason: HOSPADM

## 2020-07-24 RX ORDER — PROPOFOL 10 MG/ML
VIAL (ML) INTRAVENOUS AS NEEDED
Status: DISCONTINUED | OUTPATIENT
Start: 2020-07-24 | End: 2020-07-24 | Stop reason: SURG

## 2020-07-24 RX ORDER — FENTANYL CITRATE 50 UG/ML
INJECTION, SOLUTION INTRAMUSCULAR; INTRAVENOUS AS NEEDED
Status: DISCONTINUED | OUTPATIENT
Start: 2020-07-24 | End: 2020-07-24 | Stop reason: SURG

## 2020-07-24 RX ORDER — HYDROCODONE BITARTRATE AND ACETAMINOPHEN 5; 325 MG/1; MG/1
1 TABLET ORAL ONCE AS NEEDED
Status: CANCELLED | OUTPATIENT
Start: 2020-07-24 | End: 2020-08-03

## 2020-07-24 RX ORDER — ACETAMINOPHEN 500 MG
1000 TABLET ORAL EVERY 8 HOURS PRN
Qty: 60 TABLET | Refills: 0 | Status: SHIPPED | OUTPATIENT
Start: 2020-07-24 | End: 2020-08-31 | Stop reason: HOSPADM

## 2020-07-24 RX ORDER — ONDANSETRON 2 MG/ML
INJECTION INTRAMUSCULAR; INTRAVENOUS AS NEEDED
Status: DISCONTINUED | OUTPATIENT
Start: 2020-07-24 | End: 2020-07-24 | Stop reason: SURG

## 2020-07-24 RX ORDER — SODIUM CHLORIDE, SODIUM LACTATE, POTASSIUM CHLORIDE, CALCIUM CHLORIDE 600; 310; 30; 20 MG/100ML; MG/100ML; MG/100ML; MG/100ML
1000 INJECTION, SOLUTION INTRAVENOUS CONTINUOUS
Status: DISCONTINUED | OUTPATIENT
Start: 2020-07-24 | End: 2020-07-24 | Stop reason: HOSPADM

## 2020-07-24 RX ORDER — IBUPROFEN 600 MG/1
600 TABLET ORAL EVERY 6 HOURS PRN
Status: CANCELLED | OUTPATIENT
Start: 2020-07-24

## 2020-07-24 RX ORDER — SODIUM CHLORIDE 0.9 % (FLUSH) 0.9 %
3 SYRINGE (ML) INJECTION EVERY 12 HOURS SCHEDULED
Status: DISCONTINUED | OUTPATIENT
Start: 2020-07-24 | End: 2020-07-24 | Stop reason: HOSPADM

## 2020-07-24 RX ORDER — LIDOCAINE HYDROCHLORIDE AND EPINEPHRINE 10; 10 MG/ML; UG/ML
INJECTION, SOLUTION INFILTRATION; PERINEURAL AS NEEDED
Status: DISCONTINUED | OUTPATIENT
Start: 2020-07-24 | End: 2020-07-24 | Stop reason: HOSPADM

## 2020-07-24 RX ORDER — KETAMINE HYDROCHLORIDE 50 MG/ML
INJECTION, SOLUTION, CONCENTRATE INTRAMUSCULAR; INTRAVENOUS AS NEEDED
Status: DISCONTINUED | OUTPATIENT
Start: 2020-07-24 | End: 2020-07-24 | Stop reason: SURG

## 2020-07-24 RX ORDER — MAGNESIUM HYDROXIDE 1200 MG/15ML
LIQUID ORAL AS NEEDED
Status: DISCONTINUED | OUTPATIENT
Start: 2020-07-24 | End: 2020-07-24 | Stop reason: HOSPADM

## 2020-07-24 RX ADMIN — KETAMINE HYDROCHLORIDE 20 MG: 50 INJECTION, SOLUTION INTRAMUSCULAR; INTRAVENOUS at 09:46

## 2020-07-24 RX ADMIN — PROPOFOL 100 MCG/KG/MIN: 10 INJECTION, EMULSION INTRAVENOUS at 09:46

## 2020-07-24 RX ADMIN — KETAMINE HYDROCHLORIDE 10 MG: 50 INJECTION, SOLUTION INTRAMUSCULAR; INTRAVENOUS at 10:03

## 2020-07-24 RX ADMIN — FENTANYL CITRATE 25 MCG: 50 INJECTION INTRAMUSCULAR; INTRAVENOUS at 09:47

## 2020-07-24 RX ADMIN — FENTANYL CITRATE 25 MCG: 50 INJECTION INTRAMUSCULAR; INTRAVENOUS at 09:45

## 2020-07-24 RX ADMIN — PROPOFOL 50 MG: 10 INJECTION, EMULSION INTRAVENOUS at 09:46

## 2020-07-24 RX ADMIN — SODIUM CHLORIDE, POTASSIUM CHLORIDE, SODIUM LACTATE AND CALCIUM CHLORIDE: 600; 310; 30; 20 INJECTION, SOLUTION INTRAVENOUS at 09:40

## 2020-07-24 RX ADMIN — FENTANYL CITRATE 25 MCG: 50 INJECTION INTRAMUSCULAR; INTRAVENOUS at 10:03

## 2020-07-24 RX ADMIN — ONDANSETRON 4 MG: 2 INJECTION INTRAMUSCULAR; INTRAVENOUS at 09:47

## 2020-07-24 NOTE — OP NOTE
RUDY Jose Davison  : 1940  MRN: [id number]  [facility]: [id number]  Date: 2020    Operative Report    Pre-op Diagnosis:  PMB (postmenopausal bleeding) [N95.0]  Thickened endometrium [R93.89]   Post-op Diagnosis:  Same  Numerous endometrial polyps   Procedure: HYSTEROSCOPY WITH MYOSURE  DILATION AND CURETTAGE   Surgeon:  Surgical assistant: YANE Healy was responsible for performing the following activities: Retraction and their skilled assistance was necessary for the success of this case.     OR Staff: Circulator: Brenda Omalley RN  Scrub Person: Russell Maria CSA; Kameron Kunz     Anesthesia: Monitored Anesthesia Care   Estimated Blood Loss: 10 mls   ABx: none   Specimens:  Endometrial curettings + polyps       Complications: None           Findings: Normal external genitalia and vaginal vault with atrophy. Normal-appearing cervix. Cervical canal was normal in appearance. Anteverted uterus that sounded to 8 cm.  Thickened endometrium with numerous endometrial polyps noted, ranging in size.  Roughly 10 polyps present. Both tubal ostia were able to be visualized after complete resection of the polyps.     Description of Procedure:   Following confirmation of informed consent, the patient was taken to the operating room where her anesthesia was obtained without difficulty. She was prepped and draped in the usual sterile fashion in the dorsal lithotomy position. A surgical timeout for safety was performed and agreed upon by all team members. A heavy-weighted speculum and Sanders retractor were placed into her vagina and the cervix was visualized. A paracervical block was performed using 1% lidocaine with epinephrine. A long Allis clamp was used to grasp the anterior lip of the cervix. Gentle traction was applied and the uterus was sounded to 8 cm using a uterine sound. The sound was removed and the cervix was gently dilated with sterile dilators to allow for  entrance of the operative hysteroscope. This hysteroscope was then gently advanced to the uterine fundus and the above findings were noted. Both ostia were visualized.  The Myosure device was inserted into the uterine cavity and used to fully resect the endometrial polyps and globally sample the endometrium. The hysteroscope was then removed and a sharp curettage was performed using a non-serrated curette until a gritty texture was noted throughout. All surgical specimens were labeled and sent to pathology for review. At the end of the procedure, excellent hemostasis was noted and all instruments were removed from the vagina. All counts were correct per the OR staff. The patient was awakened and taken to the recovery room in stable condition.    Fidel Elizalde MD  Obstetrics and Gynecology  [facility]

## 2020-07-24 NOTE — DISCHARGE INSTRUCTIONS
Please follow all post op instructions and follow up appointment time from your physician's office included in your discharge packet.  .   No pushing, pulling, tugging,  heavy lifting, or strenuous activity.  No major decision making, driving, or drinking alcoholic beverages for 24 hours. ( due to the medications you have  received)  Always use good hand hygiene/washing techniques.  NO driving while taking pain medications.    * if you have an incision:  Check your incision area every day for signs of infection.   Check for:  * more redness, swelling, or pain  *more fluid or blood  *warmth  *pus or bad smell  To assist you in voiding:  Drink plenty of fluids  Listen to running water while attempting to void.    If you are unable to urinate and you have an uncomfortable urge to void or it has been   6 hours since you were discharged, return to the Emergency Room                      Pelvic Surgery  Home Care Instructions:  Dr. Fidel Elizalde      Thank you for choosing [facility]. Please let us know if you have questions or concerns or do not understand the information we give you. Always ask us to explain words or phrases you do not understand.    You have had pelvic surgery. Here are some basic guidelines to help you recover more quickly at home. Your doctor may give you more guidelines. If you have any questions after you go home, please call the numbers listed on the next page.    General Guidelines    1. You may resume normal daily activities.  2. Do not put anything in the vagina (no tampons or douching).    3.   Do not have sex until cleared by your physician.  4.   You may climb steps.  5. You may bathe or shower.  6. The only exercise you should do is walking. This is important for your recovery.  7. Do not drive while taking narcotics.  8. Take the medicines you were taking before surgery. Other medicines you need to take at home are:    Alternate Motrin and Tylenol around the clock. Take each  every 8 hours in alternation so that you are getting one of the medications every 4 hours.      Metamucil + Colace daily to keep bowel movements soft        If you have questions about your medicines, let us know. Or, talk to your local pharmacist.    9. Surgery, lack of activity, pain medicines and iron pills tend to cause constipation. Take something every day to prevent constipation and straining.  Taking something like Metamucil® every day to increase fiber may prevent constipation. Follow the instructions on the container. If you need a gentle laxative, then something like Milk of Magnesia® or Correctol® work fairly well. You should not need to take laxatives often.    10. Call your doctor if you have:     a. Fever of 101 degrees or higher.  b. Heavy vaginal bleeding.  c. Worsening nausea or pain.  d. Other problems or concern.    If you have any questions or concerns about your usual routines, please talk to the nurse or doctor.       To talk with your doctor at [facility], call:  Obstetrics and Gynecology Outpatient [facility]  Phone: [phone]      We appreciate the opportunity you have given us to participate in your care.

## 2020-07-24 NOTE — ANESTHESIA POSTPROCEDURE EVALUATION
Patient: Kathryn Davison    Procedure Summary     Date:  07/24/20 Room / Location:  [facility] OR 2 /  [facility]    Anesthesia Start:  0945 Anesthesia Stop:  1018    Procedure:  HYSTEROSCOPY, DILATION AND CURETTAGE , MYOSURE (N/A Vagina) Diagnosis:       PMB (postmenopausal bleeding)      Thickened endometrium      (PMB (postmenopausal bleeding) [N95.0])      (Thickened endometrium [R93.89])    Surgeon:  Fidel Elizalde MD Provider:  Ramiro Klein CRNA    Anesthesia Type:  MAC ASA Status:  3          Anesthesia Type: MAC    Vitals  Vitals Value Taken Time   /43 07/24/20 1120   Temp 98 °F (36.7 °C) 07/24/20 1024   Pulse 65 07/24/20 1120   Resp 17 07/24/20 1120   SpO2 94 % 07/24/20 1120           Post Anesthesia Care and Evaluation    Patient location during evaluation: PHASE II  Patient participation: complete - patient participated  Level of consciousness: awake and sleepy but conscious  Pain management: adequate  Airway patency: patent  Anesthetic complications: No anesthetic complications  PONV Status: none  Cardiovascular status: acceptable and hemodynamically stable  Respiratory status: acceptable, nonlabored ventilation, spontaneous ventilation and face mask  Hydration status: acceptable

## 2020-07-24 NOTE — INTERVAL H&P NOTE
H&P reviewed. The patient was examined and there are no changes to the H&P.     Fidel Elizalde MD  Obstetrics and Gynecology  [facility]

## 2020-07-24 NOTE — ANESTHESIA PREPROCEDURE EVALUATION
Anesthesia Evaluation     Patient summary reviewed and Nursing notes reviewed   NPO Solid Status: > 8 hours  NPO Liquid Status: > 8 hours           Airway   Mallampati: II  TM distance: >3 FB  Neck ROM: full  No difficulty expected  Dental      Pulmonary    (+) shortness of breath, sleep apnea,   Cardiovascular     (+) hypertension, past MI , hyperlipidemia,  carotid artery disease    ROS comment: Interpretation Summary     Technically difficult study   1) Mild LVH with normal LV systolic function ( EF is over 55%)  2) Mild left atrial enlargement with mild elevation in LVedp   3) Mild MR   4) Mild TR   5) Dilated RV with normal RV function   6) Inferior and septal hypokinesis         Neuro/Psych  (+) numbness,     GI/Hepatic/Renal/Endo    (+) obesity,  GERD,  liver disease, diabetes mellitus,     Musculoskeletal     (+) back pain,   Abdominal    Substance History      OB/GYN          Other   arthritis,                      Anesthesia Plan    ASA 3     MAC     intravenous induction     Anesthetic plan, all risks, benefits, and alternatives have been provided, discussed and informed consent has been obtained with: patient.    Plan discussed with CRNA.

## 2020-07-28 DIAGNOSIS — C54.1 ADENOCARCINOMA OF THE ENDOMETRIUM/UTERUS (HCC): Primary | ICD-10-CM

## 2020-08-04 ENCOUNTER — OFFICE VISIT (OUTPATIENT)
Dept: OBSTETRICS AND GYNECOLOGY | Facility: CLINIC | Age: 80
End: 2020-08-04

## 2020-08-04 VITALS
DIASTOLIC BLOOD PRESSURE: 70 MMHG | HEIGHT: 63 IN | WEIGHT: 236 LBS | SYSTOLIC BLOOD PRESSURE: 132 MMHG | BODY MASS INDEX: 41.82 KG/M2

## 2020-08-04 DIAGNOSIS — C55 ENDOMETRIOID ADENOCARCINOMA OF UTERUS (HCC): Primary | ICD-10-CM

## 2020-08-04 DIAGNOSIS — Z98.890 S/P D&C (STATUS POST DILATION AND CURETTAGE): ICD-10-CM

## 2020-08-04 PROCEDURE — 99213 OFFICE O/P EST LOW 20 MIN: CPT | Performed by: OBSTETRICS & GYNECOLOGY

## 2020-08-04 NOTE — PROGRESS NOTES
"Chief Complaint   Patient presents with   • Post-op     11 days post op D&C hysteroscopy, myosure. No complaints.       80 y.o.  female who presents for a post-op visit.    Pre-op Diagnosis:  PMB (postmenopausal bleeding) [N95.0]  Thickened endometrium [R93.89]   Post-op Diagnosis:  Same  Numerous endometrial polyps   Procedure: HYSTEROSCOPY WITH MYOSURE  DILATION AND CURETTAGE     Pt reports that pain is well controlled, she is eating/drinking/stooling/voiding without issues. She is ambulating well. She denies vaginal bleeding. She has no complaints.    Vitals:    20 1013   BP: 132/70   Weight: 107 kg (236 lb)   Height: 160 cm (63\")       PHYSICAL EXAM   General appearance: well nourished, well hydrated, no acute distress  Abdomen: soft, non distended, non-tender, no masses  Mental Status Exam:   · Judgment, insight: intact  · Orientation: oriented to time, place, and person  · Memory: intact for recent and remote events  · Mood and affect: no depression, anxiety, or agitation    IMPRESSION/PLAN:    Endometrioid adenocarcinoma, grade 2 s/p D&C  Post-op evaluation    - Pt meeting all post-op milestones  - Pathology results reviewed with patient  - Referral to GYN ONC pending    Fidel Elizalde MD  Obstetrics and Gynecology  [facility]  "

## 2020-08-10 NOTE — PROGRESS NOTES
Kathryn Davison  [id number]  1940      Reason for visit: Grade 2 endometrial adenocarcinoma     Consultation:  Patient is being seen at the request of Dr. Elizalde.     History of present illness:  The patient is a 80 y.o. year old female who presents today for treatment and evaluation of the above issues.  The patient underwent hysteroscopy, D&C and myosure for post menopausal bleeding with Dr. Elizalde on 7/24.  Pathology showed sections with adenocarcinoma, enlarged vesicular nuclei, prominent mitotic activity. No evidence of LMVI. Focal areas of complex hyperplasia with atypia.   The patient states that in June she had an episode we have middle the night with bright red vaginal bleeding.  She was seen in the ED and told to follow-up with her primary OB/GYN.  Dr. Elizalde recommended a hysteroscopy D&C.  She got preoperative clearance by her cardiologist due to significant carotid stenosis of 65%.  She was cleared and proceed with a D&C.  She has not had any additional vaginal bleeding.  She presents with her daughter for new diagnosis of cancer planning.    She has significant comorbid conditions including diabetes, hyperlipidemia, hypertension, carotid stenosis, COPD, and sleep apnea.  She has been seeing her primary care provider as well as endocrinologist and cardiologist.  [facility] intake form was reviewed and confirmed.    OBGYN History:  She is a G 3 P 2012.  She has never use HRT. She does not have a history of abnormal pap smears.      Oncologic History:     Endometrial cancer (CMS/HCC)    8/4/2020 Initial Diagnosis     Endometrioid adenocarcinoma of uterus (CMS/HCC)      8/12/2020 Cancer Staged     Staging form: Corpus Uteri - Carcinoma And Carcinosarcoma, AJCC 8th Edition  - Clinical stage from 8/12/2020: FIGO Stage I, calculated as Stage Unknown (cT1, cNX, cM0) - Signed by Wilma Jo MD on 8/12/2020           Past Medical History:   Diagnosis Date   • Abnormal ECG    • Anemia    • Back  "pain    • Bleeding nose 02/2020   • Body piercing     BOTH EARS   • Bronchitis    • Bulging lumbar disc    • Carotid artery stenosis     BILATERAL-FOLLOWING WITH DR. WINCHESTER.  SEES EVERY 6 MONTHS.  STATES ABOUT 70% OCCLUSIION   • Cataract, bilateral    • Colon polyp 12/10/2012   • Diabetes (CMS/HCC)    • Diabetic retinopathy (CMS/HCC)    • Disease of thyroid gland     hypothyroid   • Diverticulosis    • Endometrioid adenocarcinoma of uterus (CMS/HCC) 8/4/2020   • Fatty liver 06/26/2017   • Fracture     RIGHT ANKLE AND LEFT ARM    • Frequency of urination    • GERD (gastroesophageal reflux disease)    • High cholesterol    • History of nuclear stress test 2016    \"IT WAS OK\"   • Hypertension    • Lumbosacral disc disease    • Macular degeneration    • Osteoarthritis    • Osteoporosis    • Sciatica    • Seasonal allergies    • Sleep apnea     CPAP HS - TO BRING IN DOS   • Swelling of both lower extremities    • Teeth missing     back teeth   • Thyroid activity decreased    • Wears glasses     FOR DISTANCE/and reading glasses       Past Surgical History:   Procedure Laterality Date   • ANKLE ARTHROSCOPY Right    • BREAST SURGERY Left     CYST REMOVED   • CARDIAC CATHETERIZATION      NO STENT   • CATARACT EXTRACTION Bilateral     2014 and 2015   • COLONOSCOPY  12/10/2012   • COLONOSCOPY N/A 2/22/2017    Procedure: COLONOSCOPY with hot and cold snare polypectomies, resolution clip placement, cold biopsy polypectomy;  Surgeon: Mg Awan MD;  Location: [facility] ENDOSCOPY;  Service:    • D&C HYSTEROSCOPY MYOSURE N/A 7/24/2020    Procedure: HYSTEROSCOPY, DILATION AND CURETTAGE , MYOSURE;  Surgeon: Fidel Elizalde MD;  Location: [facility] OR;  Service: Obstetrics/Gynecology;  Laterality: N/A;   • DILATION AND CURETTAGE, DIAGNOSTIC / THERAPEUTIC      X4   • ENDOSCOPY N/A 7/20/2017    Procedure: ESOPHAGOGASTRODUODENOSCOPY with biopsies and cold biopsy polypectomy;  Surgeon: Mg Awan MD;  Location: [facility] ENDOSCOPY;  " Service:    • FRACTURE SURGERY Right     ANKLE   • ORIF WRIST FRACTURE Right 2/2/2018    Procedure: WRIST RIGHT OPEN REDUCTION INTERNAL FIXATION WITH VOLAR PLATE;  Surgeon: Phu Meneses MD;  Location: [facility];  Service:    • TOTAL SHOULDER ARTHROPLASTY W/ DISTAL CLAVICLE EXCISION Left 2/2/2018    Procedure: SHOULDER TOTAL LEFT  REVERSE ARTHROPLASTY WITH TUBEROSITY RECONSTRUCTION;  Surgeon: Phu Meneses MD;  Location: [facility];  Service:    • TUBAL ABDOMINAL LIGATION  1980?   • UPPER GASTROINTESTINAL ENDOSCOPY  07/20/2017       MEDICATIONS: The current medication list was reviewed with the patient and updated in the EMR this date per the Medical Assistant. Medication dosages and frequencies were confirmed to be accurate.      Allergies:  is allergic to gabapentin.    Social History:   Social History     Socioeconomic History   • Marital status:      Spouse name: Not on file   • Number of children: Not on file   • Years of education: Not on file   • Highest education level: Not on file   Occupational History   • Occupation: retired   Tobacco Use   • Smoking status: Never Smoker   • Smokeless tobacco: Never Used   Substance and Sexual Activity   • Alcohol use: No   • Drug use: No   • Sexual activity: Defer   Social History Narrative    Right hand dominant       Family History:    Family History   Problem Relation Age of Onset   • Heart disease Other    • Cancer Other    • Prostate cancer Father    • Stroke Father    • Colon cancer Neg Hx        Health Maintenance:    Health Maintenance   Topic Date Due   • URINE MICROALBUMIN  1940   • ZOSTER VACCINE (1 of 2) 02/11/1990   • Pneumococcal Vaccine Once at 65 Years Old  02/11/2005   • MEDICARE ANNUAL WELLNESS  06/26/2017   • DIABETIC EYE EXAM  06/26/2017   • LIPID PANEL  02/05/2019   • TDAP/TD VACCINES (2 - Td) 09/17/2019   • HEMOGLOBIN A1C  02/29/2020   • INFLUENZA VACCINE  08/01/2020   • DXA SCAN  02/28/2021   • COLONOSCOPY  02/22/2022  "        Review of Systems   Constitutional: Negative for appetite change, chills, fatigue, fever and unexpected weight change.   HENT: Positive for sinus pain. Negative for congestion, hearing loss and sore throat.    Eyes: Negative for redness and visual disturbance.   Respiratory: Negative for cough, shortness of breath and wheezing.    Cardiovascular: Negative for chest pain, palpitations and leg swelling.   Gastrointestinal: Positive for constipation. Negative for abdominal distention, abdominal pain, blood in stool, diarrhea, nausea and vomiting.   Endocrine: Negative for cold intolerance and heat intolerance.   Genitourinary: Positive for urgency. Negative for difficulty urinating, dyspareunia, dysuria, frequency, genital sores, hematuria, pelvic pain, vaginal bleeding, vaginal discharge and vaginal pain.   Musculoskeletal: Positive for arthralgias and back pain. Negative for gait problem and joint swelling.   Skin: Negative for rash and wound.   Allergic/Immunologic: Negative for food allergies and immunocompromised state.   Neurological: Negative for dizziness, seizures, syncope, weakness, light-headedness, numbness and headaches.   Hematological: Negative for adenopathy. Bruises/bleeds easily.   Psychiatric/Behavioral: Negative for confusion, dysphoric mood and sleep disturbance. The patient is not nervous/anxious.        Physical Exam    Vitals:    08/12/20 1503   BP: 145/67   Pulse: 69   Resp: 16   Temp: 98.4 °F (36.9 °C)   TempSrc: Temporal   SpO2: 95%   Weight: 107 kg (235 lb)   Height: 160 cm (62.99\")   PainSc: 0-No pain       Body mass index is 41.64 kg/m².    Wt Readings from Last 3 Encounters:   08/12/20 107 kg (235 lb)   08/04/20 107 kg (236 lb)   07/07/20 108 kg (237 lb 2 oz)         GENERAL: Alert, well-appearing female appearing her stated age who is in no apparent distress.   HEENT: Sclera anicteric. Head normocephalic, atraumatic. Mucus membranes moist.   NECK: Trachea midline, supple, without " masses.  No thyromegaly.   BREASTS: Deferred  CARDIOVASCULAR: Normal rate, regular rhythm, no murmurs, rubs, or gallops.  No peripheral edema.  RESPIRATORY: Clear to auscultation bilaterally, normal respiratory effort  BACK:  No CVA tenderness, no vertebral tenderness on palpation  GASTROINTESTINAL:  Abdomen is soft, non-tender, non-distended, no rebound or guarding, no masses.  She has a large ventral abdominal wall hernias.    SKIN:  Warm, dry, well-perfused.  All visible areas intact.  No rashes, lesions, ulcers.  PSYCHIATRIC: AO x3, with appropriate affect, normal thought processes.  NEUROLOGIC: No focal deficits.  Moves extremities well.  She uses a walker to ambulate  MUSCULOSKELETAL: Normal gait and station.   EXTREMITIES:   No cyanosis, clubbing, symmetric.  LYMPHATICS:  No cervical or inguinal adenopathy noted.     PELVIC exam:    External genitalia are free from lesion. On speculum examination, the cervix was free from lesion. On bimanual examination no mass was appreciated.  Uterus was normal in size and shape. There is no cervical motion or uterine tenderness. No cervical mass was palpated. Parametria were smooth. Rectovaginal exam was deferred.     ECOG PS 1    PROCEDURES:  None    Diagnostic Data:  non3    No Images in the past 120 days found..    Lab Results   Component Value Date    WBC 6.84 07/07/2020    HGB 11.6 (L) 07/07/2020    HCT 39.0 07/07/2020    MCV 85.3 07/07/2020     07/07/2020    NEUTROABS 4.34 07/07/2020    GLUCOSE 152 (H) 07/07/2020    BUN 28 (H) 07/07/2020    CREATININE 1.20 (H) 07/07/2020    EGFRIFNONA 43 (L) 07/07/2020     07/07/2020    K 4.3 07/07/2020     07/07/2020    CO2 30.6 (H) 07/07/2020    CALCIUM 9.6 07/07/2020    ALBUMIN 3.40 (L) 07/07/2020    AST 17 07/07/2020    ALT 14 07/07/2020    BILITOT 0.3 07/07/2020     No results found for:         Assessment/Plan   This is a 80 y.o. woman with grade 2 endometrial adenocarcinoma found on D&C for post  menopausal bleeding.  Daughter present for consultation today.  Clinical stage I ([id number]) cancer.    Encounter Diagnoses   Name Primary?   • Endometrial cancer (CMS/[facility]) Yes   • Type 2 diabetes mellitus without complication, with long-term current use of insulin (CMS/[facility])    • Left carotid artery stenosis    • TERESA on CPAP    • Morbid obesity with BMI of 40.0-44.9, adult (CMS/[facility])         Patient was consented for robotic assisted total laparoscopic hysterectomy bilateral salpingo-oophorectomy, sentinel lymph node dissection, possible completion lymphadenectomy.    Risks and benefits of surgery were discussed.  This included, but was not limited to, infection and bleeding like when the skin is cut; damage to surrounding structures; and incisional complications.  Risk of DVT was addressed for major surgeries.  Standard of care efforts to minimize these risks were reviewed.  Typical hospital stay and recovery were discussed as well as post-procedure precautions.  Surgical implications of chronic illnesses on recovery and surgical outcome were reviewed.     Pain medication regimen for postoperative care was discussed.  Typical regimen and avoidance of narcotics was discussed.  Patient was educated that other factors, such as existing narcotic use, can impact postoperative pain management.      Risks and benefits of lymph node dissection were further discussed.  This included lymphocyst, hematoma, lymphedema, vascular injury, and nerve injury.    Patient verbalized understanding of the plan including the risks and benefits.  Appropriate perioperative testing including laboratory evaluation, EKG as clinically indicated, chest x-ray as clinically indicated, and preadmission evaluation were all ordered as a part of this patient's care.     Carotid stenosis  Patient was previously cleared by cardiologist for D&C surgery  Last evaluated patient had 70% stenosis on the right side and is currently on Plavix  Obtain clearance  again for cardiologist given this is more invasive surgery    Insulin-dependent diabetes  Last A1c by endocrinologist was found to be 9.1 in August 2019, patient states that she has been having improved blood sugars andis only using sliding scale for mealtime insulin.  Will have patient see endocrinologist for preoperative clearance and planning for perioperative insulin management    Obstructive sleep apnea  Encourage patient to obtain settings for her CPAP machine to be used while admitted to the hospital postoperatively.  If she is unsure, she should bring her machine.  We discussed bringing her own tubing/face mask.  States she uses CPAP regularly and only uses DuoNebs as needed    Multiple comorbid conditions  Patient will follow-up with primary care giver Dr. Sunitha Zimmerman for hypertension, hyperlipidemia, morbid obesity.  We discussed the relationship between obesity and endometrial cancer.  We discussed the patient's life is at risk due to her comorbidities and that she is likely to survive her cancer diagnosis.  Patient was strongly encouraged to pursue a healthy lifestyle and lose weight.    Pain assessment was performed today as a part of patient’s care.  For patients with pain related to surgery, gynecologic malignancy or cancer treatment, the plan is as noted in the assessment/plan.  For patients with pain not related to these issues, they are to seek any further needed care from a more appropriate provider, such as PCP.      No orders of the defined types were placed in this encounter.      FOLLOW UP: Surgery    Patient was seen and examined with Dr. Shaw,  resident, who performed portions of the examination and documentation for this patient's care under my direct supervision.  I agree with the above documentation and plan.    Wilma Jo MD  08/13/20  11:05 AM

## 2020-08-12 ENCOUNTER — OFFICE VISIT (OUTPATIENT)
Dept: GYNECOLOGIC ONCOLOGY | Facility: CLINIC | Age: 80
End: 2020-08-12

## 2020-08-12 VITALS
BODY MASS INDEX: 41.64 KG/M2 | HEART RATE: 69 BPM | OXYGEN SATURATION: 95 % | DIASTOLIC BLOOD PRESSURE: 67 MMHG | HEIGHT: 63 IN | RESPIRATION RATE: 16 BRPM | WEIGHT: 235 LBS | SYSTOLIC BLOOD PRESSURE: 145 MMHG | TEMPERATURE: 98.4 F

## 2020-08-12 DIAGNOSIS — E66.01 MORBID OBESITY WITH BMI OF 40.0-44.9, ADULT (HCC): ICD-10-CM

## 2020-08-12 DIAGNOSIS — Z79.4 TYPE 2 DIABETES MELLITUS WITHOUT COMPLICATION, WITH LONG-TERM CURRENT USE OF INSULIN (HCC): ICD-10-CM

## 2020-08-12 DIAGNOSIS — C54.1 ENDOMETRIAL CANCER (HCC): Primary | ICD-10-CM

## 2020-08-12 DIAGNOSIS — Z99.89 OSA ON CPAP: ICD-10-CM

## 2020-08-12 DIAGNOSIS — G47.33 OSA ON CPAP: ICD-10-CM

## 2020-08-12 DIAGNOSIS — E11.9 TYPE 2 DIABETES MELLITUS WITHOUT COMPLICATION, WITH LONG-TERM CURRENT USE OF INSULIN (HCC): ICD-10-CM

## 2020-08-12 DIAGNOSIS — I65.22 LEFT CAROTID ARTERY STENOSIS: ICD-10-CM

## 2020-08-12 PROBLEM — C55 ENDOMETRIOID ADENOCARCINOMA OF UTERUS (HCC): Status: RESOLVED | Noted: 2020-08-04 | Resolved: 2020-08-12

## 2020-08-12 PROCEDURE — 99205 OFFICE O/P NEW HI 60 MIN: CPT | Performed by: OBSTETRICS & GYNECOLOGY

## 2020-08-13 PROBLEM — G47.33 OSA ON CPAP: Status: ACTIVE | Noted: 2020-08-13

## 2020-08-13 PROBLEM — Z99.89 OSA ON CPAP: Status: ACTIVE | Noted: 2020-08-13

## 2020-08-14 ENCOUNTER — TELEPHONE (OUTPATIENT)
Dept: GYNECOLOGIC ONCOLOGY | Facility: CLINIC | Age: 80
End: 2020-08-14

## 2020-08-14 DIAGNOSIS — E66.01 MORBID OBESITY WITH BMI OF 40.0-44.9, ADULT (HCC): ICD-10-CM

## 2020-08-14 DIAGNOSIS — C54.1 ENDOMETRIAL CANCER (HCC): ICD-10-CM

## 2020-08-14 DIAGNOSIS — E11.9 TYPE 2 DIABETES MELLITUS WITHOUT COMPLICATION, WITH LONG-TERM CURRENT USE OF INSULIN (HCC): Primary | ICD-10-CM

## 2020-08-14 DIAGNOSIS — Z79.4 TYPE 2 DIABETES MELLITUS WITHOUT COMPLICATION, WITH LONG-TERM CURRENT USE OF INSULIN (HCC): Primary | ICD-10-CM

## 2020-08-14 NOTE — TELEPHONE ENCOUNTER
Spoke with cardiology, They will fax clearance over (scanned to chart already) . PCP to call patient to get her scheduled for pre op clearance. I have tried to reach Dr Frias for 2 days. Phone/fax are disconected and website no longer exists. Patient informed. She will try the first of the week and I will let surgery scheduler and Dr. Jo know.

## 2020-08-17 ENCOUNTER — TELEPHONE (OUTPATIENT)
Dept: GYNECOLOGIC ONCOLOGY | Facility: CLINIC | Age: 80
End: 2020-08-17

## 2020-08-17 NOTE — TELEPHONE ENCOUNTER
I have contacted [facility].       Dr. Amor has retired patient was not transferred to care under any of his partners.      Dr. Jo said that should have been Dr. Rehman and patient needs a new referral for continuation of care.       I have placed a new referral for care along with comments per Dr. Jo.      Message sent to referral coordinator.

## 2020-08-21 ENCOUNTER — OFFICE VISIT (OUTPATIENT)
Dept: ENDOCRINOLOGY | Facility: CLINIC | Age: 80
End: 2020-08-21

## 2020-08-21 ENCOUNTER — LAB (OUTPATIENT)
Dept: LAB | Facility: HOSPITAL | Age: 80
End: 2020-08-21

## 2020-08-21 VITALS
OXYGEN SATURATION: 96 % | DIASTOLIC BLOOD PRESSURE: 64 MMHG | RESPIRATION RATE: 20 BRPM | SYSTOLIC BLOOD PRESSURE: 116 MMHG | BODY MASS INDEX: 41.64 KG/M2 | HEIGHT: 63 IN | TEMPERATURE: 96.9 F | HEART RATE: 66 BPM

## 2020-08-21 DIAGNOSIS — E03.9 ACQUIRED HYPOTHYROIDISM: ICD-10-CM

## 2020-08-21 DIAGNOSIS — Z79.4 TYPE 2 DIABETES MELLITUS WITH DIABETIC POLYNEUROPATHY, WITH LONG-TERM CURRENT USE OF INSULIN (HCC): Primary | ICD-10-CM

## 2020-08-21 DIAGNOSIS — E78.00 PURE HYPERCHOLESTEROLEMIA: ICD-10-CM

## 2020-08-21 DIAGNOSIS — I10 ESSENTIAL HYPERTENSION: ICD-10-CM

## 2020-08-21 DIAGNOSIS — G47.33 OBSTRUCTIVE SLEEP APNEA SYNDROME: ICD-10-CM

## 2020-08-21 DIAGNOSIS — E11.42 TYPE 2 DIABETES MELLITUS WITH DIABETIC POLYNEUROPATHY, WITH LONG-TERM CURRENT USE OF INSULIN (HCC): Primary | ICD-10-CM

## 2020-08-21 PROBLEM — G47.30 SLEEP APNEA: Status: ACTIVE | Noted: 2020-08-13

## 2020-08-21 LAB
ALBUMIN SERPL-MCNC: 4 G/DL (ref 3.5–5.2)
ALBUMIN UR-MCNC: 9.7 MG/DL
ALBUMIN/GLOB SERPL: 1.5 G/DL
ALP SERPL-CCNC: 107 U/L (ref 39–117)
ALT SERPL W P-5'-P-CCNC: 16 U/L (ref 1–33)
ANION GAP SERPL CALCULATED.3IONS-SCNC: 12.8 MMOL/L (ref 5–15)
AST SERPL-CCNC: 18 U/L (ref 1–32)
BASOPHILS # BLD AUTO: 0.04 10*3/MM3 (ref 0–0.2)
BASOPHILS NFR BLD AUTO: 0.4 % (ref 0–1.5)
BILIRUB SERPL-MCNC: 0.3 MG/DL (ref 0–1.2)
BUN SERPL-MCNC: 19 MG/DL (ref 8–23)
BUN/CREAT SERPL: 17.3 (ref 7–25)
CALCIUM SPEC-SCNC: 9.2 MG/DL (ref 8.6–10.5)
CHLORIDE SERPL-SCNC: 100 MMOL/L (ref 98–107)
CO2 SERPL-SCNC: 28.2 MMOL/L (ref 22–29)
CORTIS SERPL-MCNC: 16.71 MCG/DL
CREAT SERPL-MCNC: 1.1 MG/DL (ref 0.57–1)
CREAT UR-MCNC: 89.7 MG/DL
DEPRECATED RDW RBC AUTO: 42.1 FL (ref 37–54)
EOSINOPHIL # BLD AUTO: 0.23 10*3/MM3 (ref 0–0.4)
EOSINOPHIL NFR BLD AUTO: 2.2 % (ref 0.3–6.2)
ERYTHROCYTE [DISTWIDTH] IN BLOOD BY AUTOMATED COUNT: 14.3 % (ref 12.3–15.4)
GFR SERPL CREATININE-BSD FRML MDRD: 48 ML/MIN/1.73
GLOBULIN UR ELPH-MCNC: 2.7 GM/DL
GLUCOSE BLDC GLUCOMTR-MCNC: 71 MG/DL (ref 70–130)
GLUCOSE SERPL-MCNC: 143 MG/DL (ref 65–99)
HBA1C MFR BLD: 7.1 %
HCT VFR BLD AUTO: 39.4 % (ref 34–46.6)
HGB BLD-MCNC: 12.4 G/DL (ref 12–15.9)
IMM GRANULOCYTES # BLD AUTO: 0.07 10*3/MM3 (ref 0–0.05)
IMM GRANULOCYTES NFR BLD AUTO: 0.7 % (ref 0–0.5)
LYMPHOCYTES # BLD AUTO: 1.35 10*3/MM3 (ref 0.7–3.1)
LYMPHOCYTES NFR BLD AUTO: 12.7 % (ref 19.6–45.3)
MCH RBC QN AUTO: 25.6 PG (ref 26.6–33)
MCHC RBC AUTO-ENTMCNC: 31.5 G/DL (ref 31.5–35.7)
MCV RBC AUTO: 81.2 FL (ref 79–97)
MICROALBUMIN/CREAT UR: 108.1 MG/G
MONOCYTES # BLD AUTO: 0.69 10*3/MM3 (ref 0.1–0.9)
MONOCYTES NFR BLD AUTO: 6.5 % (ref 5–12)
NEUTROPHILS NFR BLD AUTO: 77.5 % (ref 42.7–76)
NEUTROPHILS NFR BLD AUTO: 8.24 10*3/MM3 (ref 1.7–7)
NRBC BLD AUTO-RTO: 0 /100 WBC (ref 0–0.2)
PLATELET # BLD AUTO: 287 10*3/MM3 (ref 140–450)
PMV BLD AUTO: 10.8 FL (ref 6–12)
POTASSIUM SERPL-SCNC: 3.7 MMOL/L (ref 3.5–5.2)
PROT SERPL-MCNC: 6.7 G/DL (ref 6–8.5)
RBC # BLD AUTO: 4.85 10*6/MM3 (ref 3.77–5.28)
SODIUM SERPL-SCNC: 141 MMOL/L (ref 136–145)
TSH SERPL DL<=0.05 MIU/L-ACNC: 2.4 UIU/ML (ref 0.27–4.2)
WBC # BLD AUTO: 10.62 10*3/MM3 (ref 3.4–10.8)

## 2020-08-21 PROCEDURE — 80053 COMPREHEN METABOLIC PANEL: CPT | Performed by: INTERNAL MEDICINE

## 2020-08-21 PROCEDURE — 84244 ASSAY OF RENIN: CPT | Performed by: INTERNAL MEDICINE

## 2020-08-21 PROCEDURE — 99204 OFFICE O/P NEW MOD 45 MIN: CPT | Performed by: INTERNAL MEDICINE

## 2020-08-21 PROCEDURE — 82043 UR ALBUMIN QUANTITATIVE: CPT | Performed by: INTERNAL MEDICINE

## 2020-08-21 PROCEDURE — 82570 ASSAY OF URINE CREATININE: CPT | Performed by: INTERNAL MEDICINE

## 2020-08-21 PROCEDURE — 84681 ASSAY OF C-PEPTIDE: CPT | Performed by: INTERNAL MEDICINE

## 2020-08-21 PROCEDURE — 82088 ASSAY OF ALDOSTERONE: CPT | Performed by: INTERNAL MEDICINE

## 2020-08-21 PROCEDURE — 85025 COMPLETE CBC W/AUTO DIFF WBC: CPT | Performed by: INTERNAL MEDICINE

## 2020-08-21 PROCEDURE — 82533 TOTAL CORTISOL: CPT | Performed by: INTERNAL MEDICINE

## 2020-08-21 PROCEDURE — 82947 ASSAY GLUCOSE BLOOD QUANT: CPT | Performed by: INTERNAL MEDICINE

## 2020-08-21 PROCEDURE — 84443 ASSAY THYROID STIM HORMONE: CPT | Performed by: INTERNAL MEDICINE

## 2020-08-21 PROCEDURE — 82024 ASSAY OF ACTH: CPT | Performed by: INTERNAL MEDICINE

## 2020-08-21 PROCEDURE — 83036 HEMOGLOBIN GLYCOSYLATED A1C: CPT | Performed by: INTERNAL MEDICINE

## 2020-08-21 PROCEDURE — 36415 COLL VENOUS BLD VENIPUNCTURE: CPT | Performed by: INTERNAL MEDICINE

## 2020-08-21 NOTE — PROGRESS NOTES
Kathryn Davison 80 y.o.  CC: Diabetes (New Patient referred by Dr Wilma Jo for evaluation of Type 2 diabetes prior to surgery for endometrial cancer )      [place]:  Diabetes (New Patient referred by Dr Wilma Jo for evaluation of Type 2 diabetes prior to surgery for endometrial cancer )    Also h/o hypertension, hyperlipidemia with carotid stenosis , ricardo and hypothyroidism  Has had diabetes since 1980s  Was given oral agents (miscarriage in 1980- put on insulin at that time )  Is on lantus 33 units and humalog usually 10 uprior to meals  Also on arb (off prinivil)  Brought meter but is true metrix- reviewed manually   Overall sugars are better now - range  by meter manual review  Also on januvia and invokana  Reviewed gfr - 43 and we recommended d/c invokana and reduce januvia to 50 mg daily  Known carotid stenosis followed by Dr Pittman and cleared for surgery from cardiology standpoint   She has RICARDO-uses cpap with venous stasis legs   History of heavy menses - irregular in past- had spotting when moving to her daughter's and saw Gyn Dr Elizalde - d and c with 10 polyps and endometrial cancer  Low sugar overnight - takes 33 u lantus at night   Is not sure if low is assoc with late dinner and late dosing of humalog (at times she does tend to take the 2 insulin doses together)  Also takes humalog prior to other meals (3-4 u if sugar normal prior to meal, 10 units if higher)     Some back pain - occ epidural steroids and had been scheduled for steroid injection but now plans to delay until after recovered from surgery     Allergies   Allergen Reactions   • Gabapentin Dizziness       Current Outpatient Medications:   •  acetaminophen (TYLENOL) 500 MG tablet, Take 2 tablets by mouth Every 8 (Eight) Hours As Needed for Mild Pain ., Disp: 60 tablet, Rfl: 0  •  albuterol (PROVENTIL HFA;VENTOLIN HFA) 108 (90 Base) MCG/ACT inhaler, Inhale 2 puffs Every 4 (Four) Hours As Needed for Wheezing., Disp: , Rfl:   •   amLODIPine (NORVASC) 5 MG tablet, Take 1 tablet by mouth Daily., Disp: , Rfl:   •  Calcium Citrate-Vitamin D (CALCIUM + D PO), Take 1 tablet by mouth Daily., Disp: , Rfl:   •  carvedilol (COREG) 12.5 MG tablet, Take 1 tablet by mouth 2 (Two) Times a Day With Meals., Disp: , Rfl:   •  CloNIDine (CATAPRES) 0.1 MG tablet, Take 0.1 mg by mouth 2 (Two) Times a Day As Needed for High Blood Pressure., Disp: , Rfl:   •  clopidogrel (PLAVIX) 75 MG tablet, Take 75 mg by mouth Daily., Disp: , Rfl:   •  docusate sodium 100 MG capsule, Take 100 mg by mouth 2 (Two) Times a Day As Needed for Constipation., Disp: , Rfl:   •  Furosemide (LASIX PO), Take 20 mg by mouth Daily As Needed., Disp: , Rfl:   •  hydroCHLOROthiazide (HYDRODIURIL) 12.5 MG tablet, Take 12.5 mg by mouth Daily., Disp: , Rfl:   •  ibuprofen (ADVIL,MOTRIN) 800 MG tablet, Take 1 tablet by mouth Every 8 (Eight) Hours As Needed for Mild Pain, Disp: 30 tablet, Rfl: 0  •  insulin glargine (LANTUS) 100 UNIT/ML injection, Inject 33 Units under the skin Every Night., Disp: , Rfl:   •  Insulin Lispro (HUMALOG SC), Inject 10 Units under the skin into the appropriate area as directed. Sliding scale according to Blood sugar readings , Disp: , Rfl:   •  ipratropium-albuterol (DUO-NEB) 0.5-2.5 mg/mL nebulizer, Take 3 mL by nebulization Every 6 (Six) Hours As Needed for Wheezing or Shortness of Air., Disp: 360 mL, Rfl:   •  levothyroxine (SYNTHROID, LEVOTHROID) 100 MCG tablet, Take 100 mcg by mouth Daily., Disp: , Rfl:   •  LORazepam (ATIVAN) 0.5 MG tablet, Take 0.5 mg by mouth Every 8 (Eight) Hours As Needed for Anxiety., Disp: , Rfl:   •  losartan (COZAAR) 50 MG tablet, Take 50 mg by mouth Daily., Disp: , Rfl:   •  montelukast (SINGULAIR) 10 MG tablet, Take 10 mg by mouth Every Night., Disp: , Rfl:   •  Multiple Vitamins-Minerals (MULTIVITAMIN ADULT PO), Take 1 tablet by mouth Daily., Disp: , Rfl:   •  pantoprazole (PROTONIX) 40 MG EC tablet, TAKE 1 TABLET BY MOUTH IN THE  MORNING 30  MINUTES  BEFORE  BREAKFAST, Disp: 90 tablet, Rfl: 0  •  polyethylene glycol (MIRALAX) packet, Take 17 g by mouth Daily., Disp: , Rfl:   •  potassium chloride (MICRO-K) 10 MEQ CR capsule, potassium chloride ER 10 mEq tablet,extended release  TAKE ONE TABLET BY MOUTH ONCE DAILY ON  THE  DAYS  YOU  TAKE  LASIX  (FUROSEMIDE), Disp: , Rfl:   •  pravastatin (PRAVACHOL) 80 MG tablet, Take 80 mg by mouth Daily., Disp: , Rfl:   •  SITagliptin (JANUVIA) 100 MG tablet, Take 100 mg by mouth Daily., Disp: , Rfl:   Patient Active Problem List    Diagnosis   • TERESA on CPAP [G47.33, Z99.89]   • Endometrial cancer (CMS/HCC) [C54.1]   • PMB (postmenopausal bleeding) [N95.0]   • Thickened endometrium [R93.89]   • Anemia [D64.9]   • Heartburn [R12]   • Iron deficiency anemia [D50.9]   • Non Q wave myocardial infarction (CMS/HCC) [I21.4]   • Status post total shoulder replacement, left [Z96.612]   • Right wrist fracture [S62.101A]   • Type 2 diabetes mellitus (CMS/HCC) [E11.9]   • Hypertensive disorder [I10]   • Hypothyroidism [E03.9]   • Morbid obesity with BMI of 40.0-44.9, adult (CMS/HCC) [E66.01, Z68.41]   • Closed fracture of left shoulder [S42.92XA]   • Pre-op exam [Z01.818]   • Gastritis without bleeding [K29.70]   • Abnormal ultrasound of liver [R93.2]   • Gastric polyp [K31.7]   • Dyspnea [R06.00]   • Edema [R60.9]   • Left upper quadrant pain [R10.12]   • Diverticulosis of large intestine without hemorrhage [K57.30]   • Colon polyps [K63.5]   • Vascular ectasia of colon [K63.9]   • Internal hemorrhoids [K64.8]   • External hemorrhoid [K64.4]   • Bilateral pseudophakia [Z96.1]   • Type 2 diabetes mellitus without complication (CMS/HCC) [E11.9]   • Constipation [K59.00]   • Left carotid artery occlusion [I65.22]   • Left carotid artery stenosis [I65.22]   • Abnormal blood chemistry level [R79.9]   • Osteoporosis [M81.0]   • Proliferative diabetic retinopathy associated with type 2 diabetes mellitus (CMS/HCC)  [E11.3599]   • Carotid artery occlusion [I65.29]   • Carotid artery stenosis [I65.29]   • Hyperlipidemia [E78.5]   • Lack of energy [R53.83]   • After-cataract [H26.40]     Review of Systems   Constitutional: Positive for activity change, fatigue and unexpected weight change. Negative for appetite change, chills, diaphoresis and fever.   HENT: Negative for congestion, dental problem, drooling, ear discharge, ear pain, facial swelling, hearing loss, mouth sores, nosebleeds, postnasal drip, rhinorrhea, sinus pressure, sneezing, sore throat, tinnitus, trouble swallowing and voice change.    Eyes: Negative for photophobia, pain, discharge, redness, itching and visual disturbance.   Respiratory: Negative for apnea, cough, choking, chest tightness, shortness of breath, wheezing and stridor.         TERESA    Cardiovascular: Positive for leg swelling. Negative for chest pain and palpitations.   Gastrointestinal: Negative for abdominal distention, abdominal pain, anal bleeding, blood in stool, constipation, diarrhea, nausea, rectal pain and vomiting.   Endocrine: Negative for cold intolerance, heat intolerance, polydipsia, polyphagia and polyuria.   Genitourinary: Positive for vaginal bleeding. Negative for decreased urine volume, difficulty urinating, dysuria, enuresis, flank pain, frequency, genital sores, hematuria and urgency.   Musculoskeletal: Positive for arthralgias and back pain. Negative for gait problem, joint swelling, myalgias, neck pain and neck stiffness.   Skin: Negative for color change, pallor, rash and wound.        Venous stasis changes    Allergic/Immunologic: Negative for environmental allergies, food allergies and immunocompromised state.   Neurological: Negative for dizziness, tremors, seizures, syncope, facial asymmetry, speech difficulty, weakness, light-headedness, numbness and headaches.   Hematological: Negative for adenopathy. Does not bruise/bleed easily.   Psychiatric/Behavioral: Negative for  "agitation, behavioral problems, confusion, decreased concentration, dysphoric mood, hallucinations, self-injury, sleep disturbance and suicidal ideas. The patient is nervous/anxious. The patient is not hyperactive.      Social History     Socioeconomic History   • Marital status:      Spouse name: Not on file   • Number of children: Not on file   • Years of education: Not on file   • Highest education level: Not on file   Occupational History   • Occupation: retired   Tobacco Use   • Smoking status: Never Smoker   • Smokeless tobacco: Never Used   Substance and Sexual Activity   • Alcohol use: No   • Drug use: No   • Sexual activity: Defer   Social History Narrative    Right hand dominant     Family History   Problem Relation Age of Onset   • Heart disease Other    • Cancer Other    • Prostate cancer Father    • Stroke Father    • Colon cancer Neg Hx      /64   Pulse 66   Temp 96.9 °F (36.1 °C) (Temporal)   Resp 20   Ht 160 cm (62.99\")   LMP  (LMP Unknown)   SpO2 96%   BMI 41.64 kg/m²   Physical Exam   Constitutional: She is oriented to person, place, and time. She appears well-developed and well-nourished.   HENT:   Head: Normocephalic and atraumatic.   Nose: Nose normal.   Mouth/Throat: Oropharynx is clear and moist.   Eyes: Pupils are equal, round, and reactive to light. Conjunctivae, EOM and lids are normal.   Neck: Trachea normal and normal range of motion. Neck supple. Carotid bruit is not present. No tracheal deviation present. No thyroid mass and no thyromegaly present.   Cardiovascular: Normal rate, regular rhythm and intact distal pulses. Exam reveals no gallop and no friction rub.   Murmur heard.  Pulmonary/Chest: Effort normal and breath sounds normal. No respiratory distress. She has no wheezes.   Musculoskeletal: Normal range of motion. She exhibits no edema or deformity.   Lymphadenopathy:     She has no cervical adenopathy.   Neurological: She is alert and oriented to person, " place, and time. She has normal reflexes. She displays normal reflexes. No cranial nerve deficit. Coordination normal.   Skin: Skin is warm and dry. No rash noted. No cyanosis or erythema. Nails show no clubbing.   Venous stasis changes bilateral legs    Psychiatric: She has a normal mood and affect. Her speech is normal and behavior is normal. Judgment and thought content normal. Cognition and memory are normal.   Nursing note and vitals reviewed.    Results for orders placed or performed in visit on 08/21/20   POC Glucose Fingerstick   Result Value Ref Range    Glucose 71 70 - 130 mg/dL   POC Glycosylated Hemoglobin (Hb A1C)   Result Value Ref Range    Hemoglobin A1C 7.1 %     Kathryn was seen today for diabetes.    Diagnoses and all orders for this visit:    Type 2 diabetes mellitus with diabetic polyneuropathy, with long-term current use of insulin (CMS/[facility])  -     POC Glucose Fingerstick  -     POC Glycosylated Hemoglobin (Hb A1C)  -     Comprehensive Metabolic Panel  -     C-Peptide  -     Microalbumin / Creatinine Urine Ratio - Urine, Clean Catch    Acquired hypothyroidism  -     TSH    Essential hypertension  -     CBC Auto Differential  -     ACTH  -     Cortisol  -     Aldosterone  -     Renin Direct Assay    Pure hypercholesterolemia      Problem List Items Addressed This Visit        Cardiovascular and Mediastinum    Hypertensive disorder     bp is good- 116/64 today   On multiple drugs          Relevant Orders    CBC Auto Differential    ACTH    Cortisol    Aldosterone    Renin Direct Assay    Hyperlipidemia     Is on low fat diet and pravastatin 80 mg daily   Known vascular disease (Carotid)  ldl 80 or less in 2018              Respiratory    Sleep apnea     Wears cpap- will need close monitoring perioperatively            Endocrine    Type 2 diabetes mellitus (CMS/[facility]) - Primary     Blood sugar and 90 day average sugar reviewed  Results for orders placed or performed in visit on 08/21/20   POC Glucose  Fingerstick   Result Value Ref Range    Glucose 71 70 - 130 mg/dL   POC Glycosylated Hemoglobin (Hb A1C)   Result Value Ref Range    Hemoglobin A1C 7.1 %     Average sugar is 150   Is utd with eye exam- known retinopathy s/p laser, eye injections  Sees Dr Mendez and has had updated exam  Neuropathy without foot lesion   Venous stasis changes due to TERESA- does wear cpap   Ur alb due   Low sugar overnight - reduce lantus to 32 u and continue 4-10 u of humalog  gfr 43- stop invokana and reduce januvia to 50 mg daily   If sugars higher can consider change to combo agent (soliqua or xultophy)  Check c peptide- longstanding type 2 may be insulin deficient  Perioperatively should be able to continue lantus at reduced (25 u) dosing and use sliding scale (moderate) for coverage prn with resumption of normal lantus dosing and premeal insulin as oral intake approaches normal.   Can call for assistance with blood sugar management prn          Relevant Orders    POC Glucose Fingerstick (Completed)    POC Glycosylated Hemoglobin (Hb A1C) (Completed)    Comprehensive Metabolic Panel    C-Peptide    Microalbumin / Creatinine Urine Ratio - Urine, Clean Catch    Hypothyroidism     Is on levothyroxine 100 mcg daily   Check tfts          Relevant Orders    TSH      Return in about 3 months (around 11/21/2020) for Recheck.      Grace Duran MD  Signed Grace Duran MD

## 2020-08-22 NOTE — ASSESSMENT & PLAN NOTE
Blood sugar and 90 day average sugar reviewed  Results for orders placed or performed in visit on 08/21/20   POC Glucose Fingerstick   Result Value Ref Range    Glucose 71 70 - 130 mg/dL   POC Glycosylated Hemoglobin (Hb A1C)   Result Value Ref Range    Hemoglobin A1C 7.1 %     Average sugar is 150   Is utd with eye exam- known retinopathy s/p laser, eye injections  Sees Dr Mendez and has had updated exam  Neuropathy without foot lesion   Venous stasis changes due to TERESA- does wear cpap   Ur alb due   Low sugar overnight - reduce lantus to 32 u and continue 4-10 u of humalog  gfr 43- stop invokana and reduce januvia to 50 mg daily   If sugars higher can consider change to combo agent (soliqua or xultophy)  Check c peptide- longstanding type 2 may be insulin deficient  Perioperatively should be able to continue lantus at reduced (25 u) dosing and use sliding scale (moderate) for coverage prn with resumption of normal lantus dosing and premeal insulin as oral intake approaches normal.   Can call for assistance with blood sugar management prn

## 2020-08-22 NOTE — ASSESSMENT & PLAN NOTE
Is on low fat diet and pravastatin 80 mg daily   Known vascular disease (Carotid)  ldl 80 or less in 2018

## 2020-08-24 LAB
ACTH PLAS-MCNC: 15.4 PG/ML (ref 7.2–63.3)
C PEPTIDE SERPL-MCNC: 1.1 NG/ML (ref 1.1–4.4)

## 2020-08-25 DIAGNOSIS — C54.1 ENDOMETRIAL CANCER (HCC): Primary | ICD-10-CM

## 2020-08-25 RX ORDER — ACETAMINOPHEN 325 MG/1
650 TABLET ORAL ONCE
Status: CANCELLED | OUTPATIENT
Start: 2020-08-25 | End: 2020-08-25

## 2020-08-25 RX ORDER — CELECOXIB 100 MG/1
200 CAPSULE ORAL ONCE
Status: CANCELLED | OUTPATIENT
Start: 2020-08-25 | End: 2020-08-25

## 2020-08-25 NOTE — PROGRESS NOTES
Laparoscopic Surgery Instructions            Kathryn Davison  [id number]  1940      SURGEON:  Wilma Jo MD    Surgery Coordinator: Mirna Hussein  If you have any questions before or after surgery please call.  [phone]       Appointment    1. You have COVID testing on 08/28/2020 at 02:45 PM This is located at [address] [address] on the corner of [facility]. This is a drive through tent in the back parking lot.    2. You have pre-admission testing (PAT) appointment on 08/28/2020  at 02:00PM  You will need to be at hospital registration 10 minutes before that time. The registration department is located in the long hallway between the [facility] and [address] buildings.If your PAT appointment is on Sunday, please enter through the Emergency Department.     3.  Your surgery has been scheduled on 08/31/2020.  You will need to be at the [address] [place] [address] second floor surgery registration on that day at 05:30 AM.    The Day(s) Before Surgery     ·  Nothing by mouth after midnight on 08/30/2020.    · Plan to have someone take you home from the hospital.    · Do not use any products that contain nicotine or tobacco, such as cigarettes and e-cigarettes. These can delay healing after surgery. If you need help quitting, ask your health care provider.    ·  Do not take vitamins or aspirin one week before surgery ( if applicable).  On the morning of your surgery, you may take you prescription medications with a sip of water, unless told otherwise by your provider.  Bring them with you to the hospital (Diabetic patient should bring insulin if instructed by the managing physician). If you are taking a blood thinner ( Eliquis, Coumadin, Xarelto, Lovenox, Asprin, Heparin, etc.) please have the provider that manages this instruct you on when to stop taking prior to surgery.     · If you are feeling sick, have a fever or cough and have seen your PCP let our office know 48 hours prior to  surgery. It may be subject to rescheduling.            What can I expect after the procedure?    A normal length of stay for laparoscopic procedures can be same day or up to 23 hours.     After the procedure, it is common to have:  · Pain.  · Soreness and numbness in your incision areas.  · Vaginal bleeding and discharge up to 6 weeks after surgery.  · Constipation.  · Temporary change in bladder function.  · Feelings of sadness or other emotions.  · Small amounts of clear drainage from incisions  · If you are discharged with an abdominal binder, this is to be used as needed for incisional comfort. You may choose to discontinue use at any time.      Follow these instructions at home:  Medicines    · Please take any medications that have been prescribed after your surgery, you may take over the counter Tylenol and Ibuprofen, unless told otherwise by your provider.   · Please take your stool softener as prescribed. If you do not have a bowel movement within 24 hours following surgery try a laxative (milk of magnesia) or you may take Nida-Lax.    Activity    · Return to your normal activities as told by your health care provider.   · You may be told to take short walks every day and go farther each time.  · Do not lift anything that is heavier than 20 lbs.      General instructions    · Do not put anything in your vagina for 6 weeks after your surgery or as told by your health care provider. This includes tampons and douches.  · Do not have sex until your health care provider says you can.  · Do not take baths, swim, or use a hot tub until your health care provider approves.  · Drink enough fluid to keep your urine clear or pale yellow.  · Do not drive for 24 hours if you were given a sedative.  · Do not drive while taking Narcotic Pain medication ( oxycodone, hydrocodone, etc.).   · Keep all follow-up visits as told by your health care provider. This is important. You will have a post op appointment typically 3 weeks  after surgery.  · Make sure you are urinating on a scheduled basis, for example every 2 hours. This will help retrain your bladder after surgery and prevent urinary tract infections.     Contact a health care provider if:    · Your pain medicine is not helping.  · You have a fever over 101.0  · You have redness, swelling, or pain at your incision site.  · You continue to have difficulty urinating.  · You have not had a bowel movement 2-3 days after surgery, experience nausea and vomiting, are unable to pass gas.   · Large volumes of drainage or blood from incisions, requiring multiple guaze changes.     Get help right away if:    · You have severe abdominal or back pain that is not controlled with medication.  · You have heavy bleeding from your vagina that saturates a maxi pad within 1-2 hours. Note- vaginal bleeding and spotting is normal up to 6 weeks from a hysterectomy, Heavy Bleeding is not.     If you experience a medical emergency call 911 or have someone drive you to your nearest emergency department.

## 2020-08-26 LAB — ALDOST SERPL-MCNC: 3.2 NG/DL (ref 0–30)

## 2020-08-27 ENCOUNTER — TELEPHONE (OUTPATIENT)
Dept: GYNECOLOGIC ONCOLOGY | Facility: CLINIC | Age: 80
End: 2020-08-27

## 2020-08-28 ENCOUNTER — HOSPITAL ENCOUNTER (OUTPATIENT)
Dept: GENERAL RADIOLOGY | Facility: HOSPITAL | Age: 80
Discharge: HOME | End: 2020-08-28
Admitting: OBSTETRICS & GYNECOLOGY

## 2020-08-28 ENCOUNTER — APPOINTMENT (OUTPATIENT)
Dept: PREADMISSION TESTING | Facility: HOSPITAL | Age: 80
End: 2020-08-28

## 2020-08-28 VITALS — HEIGHT: 62 IN | WEIGHT: 237 LBS | BODY MASS INDEX: 43.61 KG/M2

## 2020-08-28 DIAGNOSIS — C54.1 ENDOMETRIAL CANCER (HCC): ICD-10-CM

## 2020-08-28 PROCEDURE — 71046 X-RAY EXAM CHEST 2 VIEWS: CPT

## 2020-08-28 PROCEDURE — U0002 COVID-19 LAB TEST NON-CDC: HCPCS

## 2020-08-28 PROCEDURE — C9803 HOPD COVID-19 SPEC COLLECT: HCPCS

## 2020-08-28 PROCEDURE — U0004 COV-19 TEST NON-CDC HGH THRU: HCPCS

## 2020-08-29 LAB
REF LAB TEST METHOD: NORMAL
SARS-COV-2 RNA RESP QL NAA+PROBE: NOT DETECTED

## 2020-08-30 ENCOUNTER — ANESTHESIA EVENT (OUTPATIENT)
Dept: PERIOP | Facility: HOSPITAL | Age: 80
End: 2020-08-30

## 2020-08-31 ENCOUNTER — ANESTHESIA (OUTPATIENT)
Dept: PERIOP | Facility: HOSPITAL | Age: 80
End: 2020-08-31

## 2020-08-31 ENCOUNTER — HOSPITAL ENCOUNTER (OUTPATIENT)
Facility: HOSPITAL | Age: 80
Discharge: HOME | End: 2020-08-31
Attending: OBSTETRICS & GYNECOLOGY | Admitting: OBSTETRICS & GYNECOLOGY

## 2020-08-31 VITALS
RESPIRATION RATE: 18 BRPM | TEMPERATURE: 97.3 F | SYSTOLIC BLOOD PRESSURE: 125 MMHG | HEART RATE: 59 BPM | DIASTOLIC BLOOD PRESSURE: 58 MMHG | OXYGEN SATURATION: 95 %

## 2020-08-31 DIAGNOSIS — C54.1 ENDOMETRIAL CANCER (HCC): ICD-10-CM

## 2020-08-31 LAB
GLUCOSE BLDC GLUCOMTR-MCNC: 105 MG/DL (ref 70–130)
GLUCOSE BLDC GLUCOMTR-MCNC: 224 MG/DL (ref 70–130)

## 2020-08-31 PROCEDURE — 88342 IMHCHEM/IMCYTCHM 1ST ANTB: CPT | Performed by: OBSTETRICS & GYNECOLOGY

## 2020-08-31 PROCEDURE — 38900 IO MAP OF SENT LYMPH NODE: CPT | Performed by: OBSTETRICS & GYNECOLOGY

## 2020-08-31 PROCEDURE — 82962 GLUCOSE BLOOD TEST: CPT

## 2020-08-31 PROCEDURE — 25010000002 PROPOFOL 10 MG/ML EMULSION: Performed by: NURSE ANESTHETIST, CERTIFIED REGISTERED

## 2020-08-31 PROCEDURE — 25010000003 CEFAZOLIN IN DEXTROSE 2-4 GM/100ML-% SOLUTION: Performed by: OBSTETRICS & GYNECOLOGY

## 2020-08-31 PROCEDURE — 25010000002 FENTANYL CITRATE (PF) 100 MCG/2ML SOLUTION: Performed by: NURSE ANESTHETIST, CERTIFIED REGISTERED

## 2020-08-31 PROCEDURE — 88309 TISSUE EXAM BY PATHOLOGIST: CPT | Performed by: OBSTETRICS & GYNECOLOGY

## 2020-08-31 PROCEDURE — 25010000002 ONDANSETRON PER 1 MG: Performed by: NURSE ANESTHETIST, CERTIFIED REGISTERED

## 2020-08-31 PROCEDURE — S0260 H&P FOR SURGERY: HCPCS | Performed by: OBSTETRICS & GYNECOLOGY

## 2020-08-31 PROCEDURE — 25010000002 DEXAMETHASONE PER 1 MG: Performed by: NURSE ANESTHETIST, CERTIFIED REGISTERED

## 2020-08-31 PROCEDURE — 63710000001 INSULIN LISPRO (HUMAN) PER 5 UNITS: Performed by: NURSE ANESTHETIST, CERTIFIED REGISTERED

## 2020-08-31 PROCEDURE — 38572 LAPAROSCOPY LYMPHADENECTOMY: CPT | Performed by: OBSTETRICS & GYNECOLOGY

## 2020-08-31 PROCEDURE — 58571 TLH W/T/O 250 G OR LESS: CPT | Performed by: OBSTETRICS & GYNECOLOGY

## 2020-08-31 PROCEDURE — 88341 IMHCHEM/IMCYTCHM EA ADD ANTB: CPT | Performed by: OBSTETRICS & GYNECOLOGY

## 2020-08-31 PROCEDURE — 25010000002 PHENYLEPHRINE PER 1 ML: Performed by: NURSE ANESTHETIST, CERTIFIED REGISTERED

## 2020-08-31 PROCEDURE — 25010000002 NEOSTIGMINE 10 MG/10ML SOLUTION: Performed by: NURSE ANESTHETIST, CERTIFIED REGISTERED

## 2020-08-31 PROCEDURE — 88307 TISSUE EXAM BY PATHOLOGIST: CPT | Performed by: OBSTETRICS & GYNECOLOGY

## 2020-08-31 DEVICE — FLOSEAL HEMOSTATIC MATRIX, 10ML
Type: IMPLANTABLE DEVICE | Site: ABDOMEN | Status: FUNCTIONAL
Brand: FLOSEAL HEMOSTATIC MATRIX

## 2020-08-31 RX ORDER — ONDANSETRON 4 MG/1
4 TABLET, ORALLY DISINTEGRATING ORAL EVERY 6 HOURS PRN
Status: DISCONTINUED | OUTPATIENT
Start: 2020-08-31 | End: 2020-08-31 | Stop reason: HOSPADM

## 2020-08-31 RX ORDER — IBUPROFEN 600 MG/1
600 TABLET ORAL EVERY 6 HOURS PRN
Status: DISCONTINUED | OUTPATIENT
Start: 2020-08-31 | End: 2020-08-31 | Stop reason: HOSPADM

## 2020-08-31 RX ORDER — FENTANYL CITRATE 50 UG/ML
INJECTION, SOLUTION INTRAMUSCULAR; INTRAVENOUS AS NEEDED
Status: DISCONTINUED | OUTPATIENT
Start: 2020-08-31 | End: 2020-08-31 | Stop reason: SURG

## 2020-08-31 RX ORDER — ACETAMINOPHEN 325 MG/1
650 TABLET ORAL EVERY 6 HOURS PRN
Status: DISCONTINUED | OUTPATIENT
Start: 2020-08-31 | End: 2020-08-31 | Stop reason: HOSPADM

## 2020-08-31 RX ORDER — GLYCOPYRROLATE 0.2 MG/ML
INJECTION INTRAMUSCULAR; INTRAVENOUS AS NEEDED
Status: DISCONTINUED | OUTPATIENT
Start: 2020-08-31 | End: 2020-08-31 | Stop reason: SURG

## 2020-08-31 RX ORDER — SODIUM CHLORIDE, SODIUM LACTATE, POTASSIUM CHLORIDE, CALCIUM CHLORIDE 600; 310; 30; 20 MG/100ML; MG/100ML; MG/100ML; MG/100ML
9 INJECTION, SOLUTION INTRAVENOUS CONTINUOUS
Status: DISCONTINUED | OUTPATIENT
Start: 2020-08-31 | End: 2020-08-31 | Stop reason: HOSPADM

## 2020-08-31 RX ORDER — ROCURONIUM BROMIDE 10 MG/ML
INJECTION, SOLUTION INTRAVENOUS AS NEEDED
Status: DISCONTINUED | OUTPATIENT
Start: 2020-08-31 | End: 2020-08-31 | Stop reason: SURG

## 2020-08-31 RX ORDER — CEFAZOLIN SODIUM 2 G/100ML
2 INJECTION, SOLUTION INTRAVENOUS ONCE
Status: COMPLETED | OUTPATIENT
Start: 2020-08-31 | End: 2020-08-31

## 2020-08-31 RX ORDER — INDOCYANINE GREEN AND WATER 25 MG
KIT INJECTION AS NEEDED
Status: DISCONTINUED | OUTPATIENT
Start: 2020-08-31 | End: 2020-08-31 | Stop reason: HOSPADM

## 2020-08-31 RX ORDER — ONDANSETRON 2 MG/ML
4 INJECTION INTRAMUSCULAR; INTRAVENOUS ONCE AS NEEDED
Status: DISCONTINUED | OUTPATIENT
Start: 2020-08-31 | End: 2020-08-31 | Stop reason: HOSPADM

## 2020-08-31 RX ORDER — SODIUM CHLORIDE 0.9 % (FLUSH) 0.9 %
10 SYRINGE (ML) INJECTION AS NEEDED
Status: DISCONTINUED | OUTPATIENT
Start: 2020-08-31 | End: 2020-08-31 | Stop reason: HOSPADM

## 2020-08-31 RX ORDER — BUPIVACAINE HYDROCHLORIDE AND EPINEPHRINE 5; 5 MG/ML; UG/ML
INJECTION, SOLUTION PERINEURAL AS NEEDED
Status: DISCONTINUED | OUTPATIENT
Start: 2020-08-31 | End: 2020-08-31 | Stop reason: HOSPADM

## 2020-08-31 RX ORDER — SODIUM CHLORIDE 9 MG/ML
INJECTION, SOLUTION INTRAVENOUS AS NEEDED
Status: DISCONTINUED | OUTPATIENT
Start: 2020-08-31 | End: 2020-08-31 | Stop reason: HOSPADM

## 2020-08-31 RX ORDER — HYDROMORPHONE HYDROCHLORIDE 1 MG/ML
0.5 INJECTION, SOLUTION INTRAMUSCULAR; INTRAVENOUS; SUBCUTANEOUS
Status: DISCONTINUED | OUTPATIENT
Start: 2020-08-31 | End: 2020-08-31 | Stop reason: HOSPADM

## 2020-08-31 RX ORDER — OXYCODONE HYDROCHLORIDE 5 MG/1
5 TABLET ORAL EVERY 4 HOURS PRN
Qty: 10 TABLET | Refills: 0 | Status: SHIPPED | OUTPATIENT
Start: 2020-08-31 | End: 2020-09-07

## 2020-08-31 RX ORDER — ONDANSETRON 2 MG/ML
INJECTION INTRAMUSCULAR; INTRAVENOUS AS NEEDED
Status: DISCONTINUED | OUTPATIENT
Start: 2020-08-31 | End: 2020-08-31 | Stop reason: SURG

## 2020-08-31 RX ORDER — ACETAMINOPHEN 325 MG/1
650 TABLET ORAL ONCE
Status: COMPLETED | OUTPATIENT
Start: 2020-08-31 | End: 2020-08-31

## 2020-08-31 RX ORDER — OXYCODONE HYDROCHLORIDE 5 MG/1
5 TABLET ORAL EVERY 4 HOURS PRN
Status: DISCONTINUED | OUTPATIENT
Start: 2020-08-31 | End: 2020-08-31 | Stop reason: HOSPADM

## 2020-08-31 RX ORDER — ONDANSETRON 4 MG/1
4 TABLET, ORALLY DISINTEGRATING ORAL EVERY 6 HOURS PRN
Qty: 10 TABLET | Refills: 5 | Status: SHIPPED | OUTPATIENT
Start: 2020-08-31 | End: 2020-09-23

## 2020-08-31 RX ORDER — LIDOCAINE HYDROCHLORIDE 10 MG/ML
INJECTION, SOLUTION EPIDURAL; INFILTRATION; INTRACAUDAL; PERINEURAL AS NEEDED
Status: DISCONTINUED | OUTPATIENT
Start: 2020-08-31 | End: 2020-08-31 | Stop reason: SURG

## 2020-08-31 RX ORDER — SODIUM CHLORIDE 0.9 % (FLUSH) 0.9 %
10 SYRINGE (ML) INJECTION EVERY 12 HOURS SCHEDULED
Status: DISCONTINUED | OUTPATIENT
Start: 2020-08-31 | End: 2020-08-31 | Stop reason: HOSPADM

## 2020-08-31 RX ORDER — DEXAMETHASONE SODIUM PHOSPHATE 4 MG/ML
INJECTION, SOLUTION INTRA-ARTICULAR; INTRALESIONAL; INTRAMUSCULAR; INTRAVENOUS; SOFT TISSUE AS NEEDED
Status: DISCONTINUED | OUTPATIENT
Start: 2020-08-31 | End: 2020-08-31 | Stop reason: SURG

## 2020-08-31 RX ORDER — IBUPROFEN 600 MG/1
600 TABLET ORAL EVERY 6 HOURS PRN
Qty: 30 TABLET | Refills: 1 | Status: SHIPPED | OUTPATIENT
Start: 2020-08-31 | End: 2021-03-01

## 2020-08-31 RX ORDER — FAMOTIDINE 20 MG/1
20 TABLET, FILM COATED ORAL ONCE
Status: COMPLETED | OUTPATIENT
Start: 2020-08-31 | End: 2020-08-31

## 2020-08-31 RX ORDER — MAGNESIUM HYDROXIDE 1200 MG/15ML
LIQUID ORAL AS NEEDED
Status: DISCONTINUED | OUTPATIENT
Start: 2020-08-31 | End: 2020-08-31 | Stop reason: HOSPADM

## 2020-08-31 RX ORDER — FENTANYL CITRATE 50 UG/ML
50 INJECTION, SOLUTION INTRAMUSCULAR; INTRAVENOUS
Status: DISCONTINUED | OUTPATIENT
Start: 2020-08-31 | End: 2020-08-31 | Stop reason: HOSPADM

## 2020-08-31 RX ORDER — PROPOFOL 10 MG/ML
VIAL (ML) INTRAVENOUS AS NEEDED
Status: DISCONTINUED | OUTPATIENT
Start: 2020-08-31 | End: 2020-08-31 | Stop reason: SURG

## 2020-08-31 RX ORDER — ESMOLOL HYDROCHLORIDE 10 MG/ML
INJECTION INTRAVENOUS AS NEEDED
Status: DISCONTINUED | OUTPATIENT
Start: 2020-08-31 | End: 2020-08-31 | Stop reason: SURG

## 2020-08-31 RX ORDER — IPRATROPIUM BROMIDE AND ALBUTEROL SULFATE 2.5; .5 MG/3ML; MG/3ML
3 SOLUTION RESPIRATORY (INHALATION) ONCE AS NEEDED
Status: DISCONTINUED | OUTPATIENT
Start: 2020-08-31 | End: 2020-08-31 | Stop reason: HOSPADM

## 2020-08-31 RX ORDER — FAMOTIDINE 10 MG/ML
20 INJECTION, SOLUTION INTRAVENOUS ONCE
Status: DISCONTINUED | OUTPATIENT
Start: 2020-08-31 | End: 2020-08-31 | Stop reason: HOSPADM

## 2020-08-31 RX ORDER — ACETAMINOPHEN 325 MG/1
650 TABLET ORAL EVERY 6 HOURS PRN
Qty: 60 TABLET | Refills: 3 | Status: SHIPPED | OUTPATIENT
Start: 2020-08-31 | End: 2021-03-01

## 2020-08-31 RX ORDER — LIDOCAINE HYDROCHLORIDE 10 MG/ML
0.5 INJECTION, SOLUTION EPIDURAL; INFILTRATION; INTRACAUDAL; PERINEURAL ONCE AS NEEDED
Status: COMPLETED | OUTPATIENT
Start: 2020-08-31 | End: 2020-08-31

## 2020-08-31 RX ORDER — LABETALOL HYDROCHLORIDE 5 MG/ML
5 INJECTION, SOLUTION INTRAVENOUS
Status: DISCONTINUED | OUTPATIENT
Start: 2020-08-31 | End: 2020-08-31 | Stop reason: HOSPADM

## 2020-08-31 RX ORDER — PSEUDOEPHEDRINE HCL 30 MG
200 TABLET ORAL 2 TIMES DAILY
Qty: 60 EACH | Refills: 3 | Status: SHIPPED | OUTPATIENT
Start: 2020-08-31

## 2020-08-31 RX ORDER — CELECOXIB 200 MG/1
200 CAPSULE ORAL ONCE
Status: COMPLETED | OUTPATIENT
Start: 2020-08-31 | End: 2020-08-31

## 2020-08-31 RX ORDER — DOCUSATE SODIUM 100 MG/1
200 CAPSULE, LIQUID FILLED ORAL 2 TIMES DAILY
Status: DISCONTINUED | OUTPATIENT
Start: 2020-08-31 | End: 2020-08-31 | Stop reason: HOSPADM

## 2020-08-31 RX ORDER — NEOSTIGMINE METHYLSULFATE 1 MG/ML
INJECTION, SOLUTION INTRAVENOUS AS NEEDED
Status: DISCONTINUED | OUTPATIENT
Start: 2020-08-31 | End: 2020-08-31 | Stop reason: SURG

## 2020-08-31 RX ADMIN — ACETAMINOPHEN 650 MG: 325 TABLET ORAL at 06:55

## 2020-08-31 RX ADMIN — CEFAZOLIN SODIUM 2 G: 2 INJECTION, SOLUTION INTRAVENOUS at 07:34

## 2020-08-31 RX ADMIN — ESMOLOL HYDROCHLORIDE 10 MG: 10 INJECTION, SOLUTION INTRAVENOUS at 07:44

## 2020-08-31 RX ADMIN — DEXAMETHASONE SODIUM PHOSPHATE 8 MG: 4 INJECTION, SOLUTION INTRAMUSCULAR; INTRAVENOUS at 07:52

## 2020-08-31 RX ADMIN — FAMOTIDINE 20 MG: 20 TABLET, FILM COATED ORAL at 06:54

## 2020-08-31 RX ADMIN — CELECOXIB 200 MG: 200 CAPSULE ORAL at 06:54

## 2020-08-31 RX ADMIN — GLYCOPYRROLATE 0.8 MG: 0.2 INJECTION INTRAMUSCULAR; INTRAVENOUS at 10:05

## 2020-08-31 RX ADMIN — ROCURONIUM BROMIDE 10 MG: 10 INJECTION INTRAVENOUS at 09:15

## 2020-08-31 RX ADMIN — ONDANSETRON 4 MG: 2 INJECTION INTRAMUSCULAR; INTRAVENOUS at 09:44

## 2020-08-31 RX ADMIN — FENTANYL CITRATE 50 MCG: 0.05 INJECTION, SOLUTION INTRAMUSCULAR; INTRAVENOUS at 11:07

## 2020-08-31 RX ADMIN — FENTANYL CITRATE 25 MCG: 50 INJECTION, SOLUTION INTRAMUSCULAR; INTRAVENOUS at 10:21

## 2020-08-31 RX ADMIN — EPHEDRINE SULFATE 5 MG: 50 INJECTION INTRAMUSCULAR; INTRAVENOUS; SUBCUTANEOUS at 08:56

## 2020-08-31 RX ADMIN — FENTANYL CITRATE 25 MCG: 50 INJECTION, SOLUTION INTRAMUSCULAR; INTRAVENOUS at 07:42

## 2020-08-31 RX ADMIN — PROPOFOL 25 MCG/KG/MIN: 10 INJECTION, EMULSION INTRAVENOUS at 07:56

## 2020-08-31 RX ADMIN — PROPOFOL 150 MG: 10 INJECTION, EMULSION INTRAVENOUS at 07:44

## 2020-08-31 RX ADMIN — LIDOCAINE HYDROCHLORIDE 50 MG: 10 INJECTION, SOLUTION EPIDURAL; INFILTRATION; INTRACAUDAL; PERINEURAL at 07:44

## 2020-08-31 RX ADMIN — FENTANYL CITRATE 50 MCG: 0.05 INJECTION, SOLUTION INTRAMUSCULAR; INTRAVENOUS at 10:50

## 2020-08-31 RX ADMIN — FENTANYL CITRATE 50 MCG: 50 INJECTION, SOLUTION INTRAMUSCULAR; INTRAVENOUS at 09:25

## 2020-08-31 RX ADMIN — PHENYLEPHRINE HYDROCHLORIDE 80 MCG: 10 INJECTION INTRAVENOUS at 08:01

## 2020-08-31 RX ADMIN — LIDOCAINE HYDROCHLORIDE 0.5 ML: 10 INJECTION, SOLUTION EPIDURAL; INFILTRATION; INTRACAUDAL; PERINEURAL at 06:56

## 2020-08-31 RX ADMIN — INSULIN LISPRO 5 UNITS: 100 INJECTION, SOLUTION INTRAVENOUS; SUBCUTANEOUS at 11:06

## 2020-08-31 RX ADMIN — PHENYLEPHRINE HYDROCHLORIDE 80 MCG: 10 INJECTION INTRAVENOUS at 07:49

## 2020-08-31 RX ADMIN — SODIUM CHLORIDE, POTASSIUM CHLORIDE, SODIUM LACTATE AND CALCIUM CHLORIDE: 600; 310; 30; 20 INJECTION, SOLUTION INTRAVENOUS at 10:08

## 2020-08-31 RX ADMIN — NEOSTIGMINE 5 MG: 1 INJECTION INTRAVENOUS at 10:05

## 2020-08-31 RX ADMIN — SODIUM CHLORIDE, POTASSIUM CHLORIDE, SODIUM LACTATE AND CALCIUM CHLORIDE 9 ML/HR: 600; 310; 30; 20 INJECTION, SOLUTION INTRAVENOUS at 06:56

## 2020-08-31 RX ADMIN — ROCURONIUM BROMIDE 50 MG: 10 INJECTION INTRAVENOUS at 07:44

## 2020-08-31 NOTE — ANESTHESIA PREPROCEDURE EVALUATION
Anesthesia Evaluation     Patient summary reviewed and Nursing notes reviewed                Airway   Mallampati: I  TM distance: >3 FB  Neck ROM: full  No difficulty expected  Dental - normal exam     Pulmonary - normal exam   (+) shortness of breath, sleep apnea,   Cardiovascular - normal exam    (+) hypertension, past MI , hyperlipidemia,  carotid artery disease      Neuro/Psych  (+) numbness,     GI/Hepatic/Renal/Endo    (+) morbid obesity, GERD,  liver disease fatty liver disease, diabetes mellitus, thyroid problem hypothyroidism    Musculoskeletal     (+) back pain,   Abdominal  - normal exam    Bowel sounds: normal.   Substance History - negative use     OB/GYN negative ob/gyn ROS         Other   arthritis,    history of cancer (ENDOMETRIAL)                    Anesthesia Plan    ASA 3     general     intravenous induction     Anesthetic plan, all risks, benefits, and alternatives have been provided, discussed and informed consent has been obtained with: patient.    Plan discussed with CRNA.

## 2020-08-31 NOTE — ANESTHESIA PROCEDURE NOTES
Airway  Urgency: elective    Date/Time: 8/31/2020 7:46 AM  Airway not difficult    General Information and Staff    Patient location during procedure: OR  CRNA: Felisa Steel CRNA    Indications and Patient Condition  Indications for airway management: airway protection    Preoxygenated: yes  MILS not maintained throughout  Mask difficulty assessment: 1 - vent by mask    Final Airway Details  Final airway type: endotracheal airway      Successful airway: ETT  Cuffed: yes   Successful intubation technique: direct laryngoscopy  Facilitating devices/methods: intubating stylet  Endotracheal tube insertion site: oral  Blade: Nicolle  Blade size: 3  ETT size (mm): 7.5  Cormack-Lehane Classification: grade I - full view of glottis  Placement verified by: chest auscultation and capnometry   Measured from: lips  ETT/EBT  to lips (cm): 21  Number of attempts at approach: 1  Assessment: lips, teeth, and gum same as pre-op and atraumatic intubation    Additional Comments  Negative epigastric sounds, Breath sound equal bilaterally with symmetric chest rise and fall

## 2020-08-31 NOTE — ANESTHESIA POSTPROCEDURE EVALUATION
Patient: Kathryn Davison    Procedure Summary     Date:  08/31/20 Room / Location:   [facility] [address] [place] [address] [facility]    Anesthesia Start:  0734 Anesthesia Stop:  1028    Procedure:  RADICAL TYPE 1 TOTAL LAPAROSCOPIC HYSTERECTOMY BILATERAL SALPINGOOPHORECTOMY WITH DAVINCI ROBOT, SENTINEL LYMPH NODE DISSECTION (N/A Abdomen) Diagnosis:       Endometrial cancer (CMS/HCC)      (Endometrial cancer (CMS/HCC) [C54.1])    Surgeon:  Wilma Jo MD Provider:  Rj Clifton MD    Anesthesia Type:  general ASA Status:  3          Anesthesia Type: general    Vitals  Vitals Value Taken Time   /77 8/31/2020 10:28 AM   Temp 97.3 °F (36.3 °C) 8/31/2020 10:28 AM   Pulse 62 8/31/2020 10:28 AM   Resp 12 8/31/2020 10:28 AM   SpO2 94 % 8/31/2020 10:28 AM           Post Anesthesia Care and Evaluation    Patient location during evaluation: PACU  Patient participation: complete - patient cannot participate  Level of consciousness: awake  Pain score: 0  Pain management: adequate  Airway patency: patent  Anesthetic complications: No anesthetic complications  PONV Status: none  Cardiovascular status: hemodynamically stable and acceptable  Respiratory status: nonlabored ventilation, acceptable and nasal cannula  Hydration status: acceptable

## 2020-09-08 LAB
CYTO UR: NORMAL
LAB AP CASE REPORT: NORMAL
LAB AP CLINICAL INFORMATION: NORMAL
LAB AP SPECIAL STAINS: NORMAL
PATH REPORT.FINAL DX SPEC: NORMAL
PATH REPORT.GROSS SPEC: NORMAL

## 2020-09-09 ENCOUNTER — TELEPHONE (OUTPATIENT)
Dept: GYNECOLOGIC ONCOLOGY | Facility: CLINIC | Age: 80
End: 2020-09-09

## 2020-09-16 ENCOUNTER — TELEPHONE (OUTPATIENT)
Dept: GYNECOLOGIC ONCOLOGY | Facility: CLINIC | Age: 80
End: 2020-09-16

## 2020-09-16 NOTE — TELEPHONE ENCOUNTER
Pt called.  I returned her call.  She wanted to know if she can keep her appointment for her back epidural on 09/18/2020.  Told her that she can go for this.  She verbalized understanding.

## 2020-09-22 NOTE — PROGRESS NOTES
Kathryn Davison  [id number]  1940      Reason for Visit:  Postoperative evaluation    History of Present Illness:  Patient is a very pleasant 80 y.o. woman who presents for a post operative evaluation status post robotic radical TLH/BSO, sentinel lymph node biopsy performed on 8/31/2020.      Surgery and hospital course were uncomplicated.  Today, patient notes normal bowel and bladder function.  Her pain is well controlled. She has questions about resuming normal activities.  Also notes increased swelling in her lower extremities as well as abdominal distention.  She thinks she has gained about 20 pounds in surgery and she relates this to swelling.     Past Medical History, Past Surgical History, Social History, Family History have been reviewed and are without significant changes except as mentioned.    Review of Systems   All other systems were reviewed and are negative except as mentioned above.    Medications:  The current medication list was reviewed in the EMR    ALLERGIES:    Allergies   Allergen Reactions   • Gabapentin Dizziness   • Betadine [Povidone Iodine] Rash           LMP  (LMP Unknown)        Physical Exam  Constitutional:  Patient is a pleasant woman in no acute distress.  Gastrointestinal: Abdomen is soft and appropriately tender.  There is no mass palpated.  There is no rebound or guarding.  Incision(s) is clean, dry and intact, healing well.   Extremities:  Bilateral lower extremities are non-tender.  Gynecologic:External genitalia are free from lesion. On speculum examination, the vaginal cuff was intact and no lesions were appreciated.  On bimanual examination, no fullness was appreciated.  Uterus, cervix and adnexa were absent.  There was no significant tenderness.  Rectovaginal exam was deferred.      PATHOLOGY:  Final Diagnosis   1. LEFT PELVIC SENTINEL LYMPH NODES:  Two lymph nodes negative for metastatic carcinoma on routine stain and by immunohistochemical stain.   2. RIGHT  PELVIC SENTINEL LYMPH NODE:  Single lymph node negative for metastatic carcinoma on routine stain and by immunohistochemical stain.   3. 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   Underlying myometrium with leiomyomata with degenerative changes including calcification and focal adenomyosis.   Uterine cervix without evidence of dysplasia or neoplasm.  No involvement of lower uterine segment, left and right parametrium, or serosa by adenocarcinoma.   Bilateral fallopian tubes and ovaries without evidence of neoplasm.          ASSESSMENT/PLAN:  Kathryn Davison returns for a post-operative evaluation today.  All pathology reports were discussed with the patient.       Overall, the patient is very pleased with her care.  I recommended continuation of post operative precautions as discussed. She has significant abdominal distention today and lower extremity edema. Encouraged patient to take lasix daily along with potassium supplementation.     Observation for this early endometrial cancer was recommended.    She is to follow-up in 2-3 weeks to reassess fluid status.    Patient was seen and examined with Dr. Trujillo,  resident, who performed portions of the examination and documentation for this patient's care under my direct supervision.  I agree with the above documentation and plan.    Wilma Jo MD  09/23/20  07:45 EDT

## 2020-09-23 ENCOUNTER — OFFICE VISIT (OUTPATIENT)
Dept: GYNECOLOGIC ONCOLOGY | Facility: CLINIC | Age: 80
End: 2020-09-23

## 2020-09-23 VITALS
HEIGHT: 62 IN | SYSTOLIC BLOOD PRESSURE: 198 MMHG | BODY MASS INDEX: 43.34 KG/M2 | HEART RATE: 71 BPM | DIASTOLIC BLOOD PRESSURE: 83 MMHG | OXYGEN SATURATION: 98 % | TEMPERATURE: 98.2 F | RESPIRATION RATE: 20 BRPM

## 2020-09-23 DIAGNOSIS — Z98.890 POST-OPERATIVE STATE: Primary | ICD-10-CM

## 2020-09-23 PROCEDURE — 99024 POSTOP FOLLOW-UP VISIT: CPT | Performed by: OBSTETRICS & GYNECOLOGY

## 2020-10-08 ENCOUNTER — TELEPHONE (OUTPATIENT)
Dept: GYNECOLOGIC ONCOLOGY | Facility: CLINIC | Age: 80
End: 2020-10-08

## 2020-10-08 NOTE — TELEPHONE ENCOUNTER
Phoned patient back. Per note she was to follow up to reassess fluid. Per ARNP, will still need a weight check of some sort.  She can come to clinic tomorrow for this or she can be  Seen by here PCP.

## 2020-10-08 NOTE — TELEPHONE ENCOUNTER
PATIENT HAS AN APPT FOR TOMORROW WITH DR. PAIZ, SHE SAID THE APPT WAS MADE BECAUSE SHE GAINED 20 LBS AFTER SURGERY, BUT SHE HAS LOST THAT NOW AND ISN'T SURE IF SHE STILL NEEDS TO BE SEEN, PLEASE ADVISE?    PT CALL BACK #[phone]

## 2020-10-08 NOTE — PROGRESS NOTES
"Kathryn Davison  [id number]  1940      Reason for Visit:  Postoperative evaluation    History of Present Illness:  Patient is a very pleasant 80 y.o. woman who presents for a post operative evaluation status post robotic radical TLH/BSO, sentinel lymph node biopsy performed on 8/31/2020.      Returns today to assess abdominal distention and fluid status.    Past Medical History, Past Surgical History, Social History, Family History have been reviewed and are without significant changes except as mentioned.    Review of Systems   All other systems were reviewed and are negative except as mentioned above.    Medications:  The current medication list was reviewed in the EMR    ALLERGIES:    Allergies   Allergen Reactions   • Gabapentin Dizziness   • Betadine [Povidone Iodine] Rash           BP (!) 189/81   Pulse 73   Temp 98 °F (36.7 °C) (Temporal)   Resp 18   Ht 157.5 cm (62.01\")   Wt 106 kg (233 lb 8 oz)   LMP  (LMP Unknown)   SpO2 98%   BMI 42.70 kg/m²        Physical Exam  Constitutional:  Patient is a pleasant woman in no acute distress.  Gastrointestinal: Abdomen is soft and appropriately tender.  There is no mass palpated.  There is no rebound or guarding.  Incision(s) is clean, dry and intact, healing well.   Extremities:  Bilateral lower extremities are non-tender.  Healing wound right shin.    Gynecologic: Deferred    PATHOLOGY:  Final Diagnosis   1. LEFT PELVIC SENTINEL LYMPH NODES:  Two lymph nodes negative for metastatic carcinoma on routine stain and by immunohistochemical stain.   2. RIGHT PELVIC SENTINEL LYMPH NODE:  Single lymph node negative for metastatic carcinoma on routine stain and by immunohistochemical stain.   3. 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   Underlying myometrium with leiomyomata with degenerative changes including calcification " and focal adenomyosis.   Uterine cervix without evidence of dysplasia or neoplasm.  No involvement of lower uterine segment, left and right parametrium, or serosa by adenocarcinoma.   Bilateral fallopian tubes and ovaries without evidence of neoplasm.          ASSESSMENT/PLAN:  Kathryn Davison returns for a post-operative evaluation today.  All pathology reports were discussed with the patient.       20 pound weight gain postoperatively and subsequent loss with diuresis.  She took 2 Lasix in the morning and 1 at night Nucofed her weight off.  She is back to baseline.  She complains of pain in her back and was encouraged to pursue physical therapy.  I told her either I could make that referral or her primary care provider.  She is going to consider this.    Follow-up for survivorship in January 2021 as previously scheduled.    Patient was seen and examined with Dr. Trujillo,  resident, who performed portions of the examination and documentation for this patient's care under my direct supervision.  I agree with the above documentation and plan.    Wilma Jo MD  10/09/20  16:12 EDT

## 2020-10-09 ENCOUNTER — OFFICE VISIT (OUTPATIENT)
Dept: GYNECOLOGIC ONCOLOGY | Facility: CLINIC | Age: 80
End: 2020-10-09

## 2020-10-09 VITALS
DIASTOLIC BLOOD PRESSURE: 81 MMHG | TEMPERATURE: 98 F | WEIGHT: 233.5 LBS | RESPIRATION RATE: 18 BRPM | OXYGEN SATURATION: 98 % | HEART RATE: 73 BPM | SYSTOLIC BLOOD PRESSURE: 189 MMHG | BODY MASS INDEX: 42.97 KG/M2 | HEIGHT: 62 IN

## 2020-10-09 DIAGNOSIS — Z98.890 POST-OPERATIVE STATE: Primary | ICD-10-CM

## 2020-10-09 PROCEDURE — 99024 POSTOP FOLLOW-UP VISIT: CPT | Performed by: OBSTETRICS & GYNECOLOGY

## 2020-10-14 LAB
LAB AP CASE REPORT: NORMAL
LAB AP CLINICAL INFORMATION: NORMAL
PATH REPORT.ADDENDUM SPEC: NORMAL
PATH REPORT.FINAL DX SPEC: NORMAL

## 2020-11-11 NOTE — TELEPHONE ENCOUNTER
----- Message from Wilma Jo MD sent at 9/8/2020  5:11 PM EDT -----  Please notify pt of early endometrial cancer- plan for obs  Thanks!  ----- Message -----  From: Lab, Background User  Sent: 9/8/2020   4:16 PM EDT  To: Wilma Jo MD       Otc Regimen: Apply petroleum to the affected areas prn Detail Level: Zone

## 2020-11-24 ENCOUNTER — OFFICE VISIT (OUTPATIENT)
Dept: ENDOCRINOLOGY | Facility: CLINIC | Age: 80
End: 2020-11-24

## 2020-11-24 DIAGNOSIS — E78.00 PURE HYPERCHOLESTEROLEMIA: Primary | ICD-10-CM

## 2020-11-24 DIAGNOSIS — I10 ESSENTIAL HYPERTENSION: ICD-10-CM

## 2020-11-24 DIAGNOSIS — Z79.4 TYPE 2 DIABETES MELLITUS WITHOUT COMPLICATION, WITH LONG-TERM CURRENT USE OF INSULIN (HCC): ICD-10-CM

## 2020-11-24 DIAGNOSIS — E11.9 TYPE 2 DIABETES MELLITUS WITHOUT COMPLICATION, WITH LONG-TERM CURRENT USE OF INSULIN (HCC): ICD-10-CM

## 2020-11-24 PROCEDURE — 99442 PR PHYS/QHP TELEPHONE EVALUATION 11-20 MIN: CPT | Performed by: INTERNAL MEDICINE

## 2020-11-24 NOTE — ASSESSMENT & PLAN NOTE
Hard to control- screening adrenal function without secondary causes of high bp   Can titrate coreg, losartan prn

## 2020-11-24 NOTE — ASSESSMENT & PLAN NOTE
Blood sugar and 90 day average sugar reviewed  a1c up to 7.7%  Will review meds  c peptide 1.1   Continue lantus 32 u and consider moving to bedtime  Continue premeal insulin   Consider sensitizer/ vascular benefit drug (sglt-2 or glp-1)  F/u 3 months scheduled

## 2020-11-24 NOTE — ASSESSMENT & PLAN NOTE
Will see if we can get a copy of lab work from Dr Malave - known vasculopathy on pravachol 80 mg daily

## 2020-11-24 NOTE — PROGRESS NOTES
Kathryn Davison 80 y.o.  CC: Follow-up, Diabetes (Type 2 DM- insulin controlled), Hyperlipidemia, and Hypertension      [place]: Follow-up, Diabetes (Type 2 DM- insulin controlled), Hyperlipidemia, and Hypertension  You have chosen to receive care through a telephone visit. Do you consent to use a telephone visit for your medical care today? Yes    This patient has consented to a telehealth visit via telephone. The visit was scheduled as a routine visit to comply with patient safety concerns in accordance with CDC recommendations.  All vitals recorded within this visit are reported by the patient.  I spent 18  minutes in direct conversation with this patient    Has had surgery - got all cancer  Blood sugars have been doing very well   In interim MD office did recent lab work - Dr Malave   Sugars in interim have been good   a1c 7.7% via PCP   With last ov had some nighttime low sugar but not recently   Is on lantus and humalog   lantus dose reduced to 32 units- sugar low at 3 am   Is dosing lantus at 6:30 pm   Is utd with eye exam- 2 months ago (good results)  Is due for repeat visit in January   No foot pain   bp at Dr Malave office was slightly elevated (added another medication- doxazosin)  Still occasional high blood sugar- checked today and was 147/72  Early today 177/81  Is on low fat diet and pravastatin 80 mg daily   Is having to be careful with salt and diet   If systolic over 150 - does take prn clonidine     Allergies   Allergen Reactions   • Gabapentin Dizziness   • Betadine [Povidone Iodine] Rash       Current Outpatient Medications:   •  acetaminophen (TYLENOL) 325 MG tablet, Take 2 tablets by mouth Every 6 (Six) Hours As Needed for Mild Pain ., Disp: 60 tablet, Rfl: 3  •  albuterol (PROVENTIL HFA;VENTOLIN HFA) 108 (90 Base) MCG/ACT inhaler, Inhale 2 puffs Every 4 (Four) Hours As Needed for Wheezing., Disp: , Rfl:   •  amLODIPine (NORVASC) 5 MG tablet, Take 1 tablet by mouth Daily., Disp: , Rfl:   •   Calcium Citrate-Vitamin D (CALCIUM + D PO), Take 1 tablet by mouth Daily., Disp: , Rfl:   •  carvedilol (COREG) 12.5 MG tablet, Take 1 tablet by mouth 2 (Two) Times a Day With Meals., Disp: , Rfl:   •  CloNIDine (CATAPRES) 0.1 MG tablet, Take 0.1 mg by mouth 2 (Two) Times a Day As Needed for High Blood Pressure., Disp: , Rfl:   •  clopidogrel (PLAVIX) 75 MG tablet, Take 75 mg by mouth Daily., Disp: , Rfl:   •  docusate sodium 100 MG capsule, Take 2 capsules by mouth 2 (Two) Times a Day., Disp: 60 each, Rfl: 3  •  Furosemide (LASIX PO), Take 20 mg by mouth Daily As Needed., Disp: , Rfl:   •  hydroCHLOROthiazide (HYDRODIURIL) 12.5 MG tablet, Take 12.5 mg by mouth Daily., Disp: , Rfl:   •  ibuprofen (ADVIL,MOTRIN) 600 MG tablet, Take 1 tablet by mouth Every 6 (Six) Hours As Needed for Mild Pain ., Disp: 30 tablet, Rfl: 1  •  insulin glargine (LANTUS) 100 UNIT/ML injection, Inject 32 Units under the skin into the appropriate area as directed Every Night., Disp: , Rfl:   •  Insulin Lispro (HUMALOG SC), Inject 10 Units under the skin into the appropriate area as directed 3 (Three) Times a Day Before Meals. Sliding scale according to Blood sugar readings , Disp: , Rfl:   •  ipratropium-albuterol (DUO-NEB) 0.5-2.5 mg/mL nebulizer, Take 3 mL by nebulization Every 6 (Six) Hours As Needed for Wheezing or Shortness of Air., Disp: 360 mL, Rfl:   •  losartan (COZAAR) 50 MG tablet, Take 50 mg by mouth Daily., Disp: , Rfl:   •  montelukast (SINGULAIR) 10 MG tablet, Take 10 mg by mouth Every Night., Disp: , Rfl:   •  Multiple Vitamins-Minerals (MULTIVITAMIN ADULT PO), Take 1 tablet by mouth Daily., Disp: , Rfl:   •  polyethylene glycol (MIRALAX) packet, Take 17 g by mouth As Needed., Disp: , Rfl:   •  potassium chloride (MICRO-K) 10 MEQ CR capsule, potassium chloride ER 10 mEq tablet,extended release  TAKE ONE TABLET BY MOUTH ONCE DAILY ON  THE  DAYS  YOU  TAKE  LASIX  (FUROSEMIDE), Disp: , Rfl:   •  pravastatin (PRAVACHOL) 80 MG  tablet, Take 80 mg by mouth Daily., Disp: , Rfl:   •  SITagliptin (JANUVIA) 100 MG tablet, Take 50 mg by mouth Daily., Disp: , Rfl:   Patient Active Problem List    Diagnosis   • Sleep apnea [G47.30]   • Endometrial cancer (CMS/HCC) [C54.1]   • PMB (postmenopausal bleeding) [N95.0]   • Thickened endometrium [R93.89]   • Anemia [D64.9]   • Heartburn [R12]   • Iron deficiency anemia [D50.9]   • Non Q wave myocardial infarction (CMS/HCC) [I21.4]   • Status post total shoulder replacement, left [Z96.612]   • Right wrist fracture [S62.101A]   • Type 2 diabetes mellitus (CMS/HCC) [E11.9]   • Hypertensive disorder [I10]   • Hypothyroidism [E03.9]   • Morbid obesity with BMI of 40.0-44.9, adult (CMS/HCC) [E66.01, Z68.41]   • Closed fracture of left shoulder [S42.92XA]   • Pre-op exam [Z01.818]   • Gastritis without bleeding [K29.70]   • Abnormal ultrasound of liver [R93.2]   • Gastric polyp [K31.7]   • Dyspnea [R06.00]   • Edema [R60.9]   • Left upper quadrant pain [R10.12]   • Diverticulosis of large intestine without hemorrhage [K57.30]   • Colon polyps [K63.5]   • Vascular ectasia of colon [K63.9]   • Internal hemorrhoids [K64.8]   • External hemorrhoid [K64.4]   • Bilateral pseudophakia [Z96.1]   • Type 2 diabetes mellitus without complication (CMS/HCC) [E11.9]   • Constipation [K59.00]   • Left carotid artery occlusion [I65.22]   • Left carotid artery stenosis [I65.22]   • Abnormal blood chemistry level [R79.9]   • Osteoporosis [M81.0]   • Proliferative diabetic retinopathy associated with type 2 diabetes mellitus (CMS/HCC) [E11.3599]   • Carotid artery occlusion [I65.29]   • Carotid artery stenosis [I65.29]   • Hyperlipidemia [E78.5]   • Lack of energy [R53.83]   • After-cataract [H26.40]     Review of Systems   Constitutional: Positive for unexpected weight change (within a month after surgery gained 20 lbs ). Negative for activity change, appetite change, chills, diaphoresis, fatigue and fever.   HENT: Positive for  sore throat (felt like toothache and radiated to ear- strep neg ). Negative for congestion, dental problem, drooling, ear discharge, ear pain, facial swelling, hearing loss, mouth sores, nosebleeds, postnasal drip, rhinorrhea, sinus pressure, sneezing, tinnitus, trouble swallowing and voice change.         Covid neg    Eyes: Negative for photophobia, pain, discharge, redness, itching and visual disturbance.   Respiratory: Negative for apnea, cough, choking, chest tightness, shortness of breath, wheezing and stridor.    Cardiovascular: Positive for leg swelling (better with furosemide post op ). Negative for chest pain and palpitations.   Gastrointestinal: Negative for abdominal distention, abdominal pain, anal bleeding, blood in stool, constipation, diarrhea, nausea, rectal pain and vomiting.   Endocrine: Negative for cold intolerance, heat intolerance, polydipsia, polyphagia and polyuria.   Genitourinary: Negative for decreased urine volume, difficulty urinating, dysuria, enuresis, flank pain, frequency, genital sores, hematuria and urgency.        Endometrial ca    Musculoskeletal: Positive for arthralgias. Negative for back pain, gait problem, joint swelling, myalgias, neck pain and neck stiffness.   Skin: Negative for color change, pallor, rash and wound.   Allergic/Immunologic: Negative for environmental allergies, food allergies and immunocompromised state.   Neurological: Negative for dizziness, tremors, seizures, syncope, facial asymmetry, speech difficulty, weakness, light-headedness, numbness and headaches.   Hematological: Negative for adenopathy. Does not bruise/bleed easily.   Psychiatric/Behavioral: Negative for agitation, behavioral problems, confusion, decreased concentration, dysphoric mood, hallucinations, self-injury, sleep disturbance and suicidal ideas. The patient is not nervous/anxious and is not hyperactive.      Social History     Socioeconomic History   • Marital status:      Spouse  name: Not on file   • Number of children: Not on file   • Years of education: Not on file   • Highest education level: Not on file   Occupational History   • Occupation: retired   Tobacco Use   • Smoking status: Never Smoker   • Smokeless tobacco: Never Used   Substance and Sexual Activity   • Alcohol use: No   • Drug use: No   • Sexual activity: Defer   Social History Narrative    Right hand dominant     Family History   Problem Relation Age of Onset   • Heart disease Other    • Cancer Other    • Prostate cancer Father    • Stroke Father    • Colon cancer Neg Hx      LMP  (LMP Unknown)   Physical Exam  Results for orders placed or performed during the hospital encounter of 08/31/20   POC Glucose Once    Specimen: Blood   Result Value Ref Range    Glucose 105 70 - 130 mg/dL   POC Glucose Once    Specimen: Blood   Result Value Ref Range    Glucose 224 (H) 70 - 130 mg/dL   Tissue Pathology Exam    Specimen: A: Hosmer Lymph Node; Tissue    B: Hosmer Lymph Node; Tissue    C: Uterus with Cervix, Bilateral Tubes and Ovaries; Tissue   Result Value Ref Range    Case Report       Surgical Pathology Report                         Case: [id number]                                  Authorizing Provider:  Wilma Jo MD      Collected:           08/31/2020 08:39 AM          Ordering Location:     [facility]   Received:            08/31/2020 12:08 PM                                 OR                                                                           Pathologist:           Isaias Aguilar MD                                                           Specimens:   1) - Hosmer Lymph Node, LEFT PELVIC SENTINEL LYMPH NODE                                           2) - Hosmer Lymph Node, RIGHT PELVIC SENTINEL LYMPH NODE                                          3) - Uterus with Cervix, Bilateral Tubes and Ovaries                                       Clinical Information       The working history is  endometrial cancer.        Final Diagnosis       1. LEFT PELVIC SENTINEL LYMPH NODES:  Two lymph nodes negative for metastatic carcinoma on routine stain and by immunohistochemical stain.   2. RIGHT PELVIC SENTINEL LYMPH NODE:  Single lymph node negative for metastatic carcinoma on routine stain and by immunohistochemical stain.   3. 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   Underlying myometrium with leiomyomata with degenerative changes including calcification and focal adenomyosis.   Uterine cervix without evidence of dysplasia or neoplasm.  No involvement of lower uterine segment, left and right parametrium, or serosa by adenocarcinoma.   Bilateral fallopian tubes and ovaries without evidence of neoplasm.     [facility] [place]     UTERUS ENDOMETRIUM TEMPLATE:  SPECIMEN TYPE/ PROCEDURE:  Total hysterectomy with bilateral salpingo-oophorectomy.  LYMPH NODE SAMPLING:  Yes, sentinel nodes.  SPECIMEN INTEGRITY:   Intact  TUMOR SIZE (greatest dimension):  Residual 0.2 cm.  HISTOLOGIC TYPE:  Endometrioid adenocarcinoma.  HISTOLOGIC GRADE:  FIGO grade 2 (previous biopsy [id number]).  MYOMETRIAL INVASION (Present/Not identified):  Not identified.   DEPTH OF INVASION (mm): 0 mm.   MYOMETRIAL THICKNESS (mm): 20 mm.   PERCENTAGE OF MYOMETRIAL INVASION: 0%.  INVOLVEMENT OF CERVICAL STROMA:  No.  EXTENT OF INVOLVEMENT OF OTHER TISSUE/ORGANS:  No involvement of other organs.  UTERINE SEROSA INVOLVEMENT: Not identified.  MARGINS:  Uninvolved by neoplasm.  PELVIC LYMPH NODES (SUBMITTED/NONE): Submitted.  NUMBER OF PELVIC LYMPH NODES EXAMINED: 3.  NUMBER OF PELVIC SENTINEL NODES EXAMINED: 3.  LATERALITY OF PELVIC LYMPH NODES EXAMINED: Bilateral.  NUMBER OF PELVIC LYMPH NODES WITH MACROMETASTASIS: 0.  NUMBER OF PELVIC LYMPH NODES WITH MICROMETASTASIS: 0.  NUMBER OF PELVIC LYMPH NODES WITH ITC’S:  0.  LATERALITY OF PELVIC NODES WITH TUMOR: N/A.  PARA-AORTIC LYMPH NODES (SUBMITTED/NONE): None submitted.  LYMPHVASCULAR INVASION:  Not identified.    MSI TESTING (under age 60):   Loss of MLH1 and PMS2 nuclear expression (per previous biopsy); MLH1 promoter testing pending on previous biopsy  ADDITIONAL PATHOLOGIC FINDINGS:  Leiomyomata, adenomyosis.    ANCILLARY STUDIES:  Keratin stains performed on sentinel lymph nodes, MSI testing on previous biopsy.  AJCC PATHOLOGIC STAGE:  (COMPLETED BY PATHOLOGIST, BASED ONLY ON TISSUE FINDINGS, MORE EXTENSIVE DISEASE MAY NOT BE KNOWN TO THE PATHOLOGIST)  pT=  1a.  pN=    0.  pM=    X.  AJCC PATHOLOGIC STAGE:  IA.    JFJ/dlb         Gross Description       Specimen 1 received in formalin labeled as left pelvic sentinel lymph node and consists of a 3.9 x 2.7 x 1.6 cm unoriented, irregularly shaped portion of yellow-tan lobular fibroadipose tissue. Sectioning and palpation reveals two tan-pink, fleshy sentinel lymph node candidates with a moderate amount of attached fibroadipose tissue which are 2.6 x 2 x 1 cm and 2.5 x 1.9 x 1.3 cm. The remaining unremarkable fibroadipose tissue is retained in formalin without sections submitted. The lymph node candidates are serially sectioned and submitted entirely as follows:  Block Summary: 1A-1C - one lymph node candidate; 1D-1E - one lymph node candidate.    Specimen 2 received in formalin labeled as right pelvic sentinel lymph node and consists of a 3.2 x 2.4 x 1.6 cm unoriented, irregularly shaped portion of yellow-tan lobular fibroadipose tissue. Sectioning and palpation reveals a 2 x 2 x 0.9 cm tan-pink, fleshy sentinel lymph node candidate. The unremarkable fibroadipose tissue removed from the lymph node candidate is retained in formalin without sections submitted. The lymph node is serially sectioned and submitted entirely in blocks 2A-2B.    Specimen 3 received in formalin labeled as uterus with cervix, bilateral fallopian tubes  and ovaries is a 116-gram simple hysterectomy specimen with attached bilateral salpingo-oophorectomy specimens. The bilateral fallopian tubes are focally discontinuous, consistent with prior tubal ligation.The uterus is 8.1 cm from superior to inferior, 5 cm laterally, and 5.4 cm from superior to inferior. The cervix is attached, 3 x 3 cm in diameter, 3.2 cm in length and has a 0.7 cm in diameter round os. The uterine serosa is remarkable for two tan-white bulging areas on the anterior surface which range in greatest dimension from 0.5 to 2 cm. The remaining serosa is minimally hemorrhagic and unremarkable. The endocervical canal is tan, glistening with a grossly unremarkable herringbone appearance. The endometrial canal is 3.6 cm from superior to inferior, 2.8 cm from cornu to cornu, and is remarkable for a raised granular possible mass in the anterior fundus which is 2.6 x 0.7 x 0.4 cm. Adjacent to the possible mass is an ill-defined area of slightly granular endometrium. The presumed mass and granular endometrium have a combined aggregate dimension of 2.6 x 1.8 x 0.4 cm. The posterior endometrium appears grossly unremarkable. Sectioning of the mass reveals no gross evidence of invasion into the underlying myometrium, which averages 2 cm in thickness. The grossly unremarkable endometrium has an average thickness of <0.1 cm and no additional lesions are identified. The myometrium is remarkable for at least five well-circumscribed tan-white bulging nodules located intramurally and subserosally, including underlying the previously described areas of anterior serosal bulging. The nodules range in greatest dimension from 0.4 to 2.6 cm and are focally, mildly calcified on 30% of the cut surfaces. No distinct necrosis or hemorrhage is identified. The remaining myometrium is tan-pink and severely, coarsely trabecular without additional lesions identified.     The right ovary is 4.7 grams and 2.9 x 1.9 x 1.6 cm. The left  ovary is 3.2 grams and 2.5 x 1.8 x 1.4 cm. The external surfaces are unremarkable. Sectioning reveals no discrete masses, lesions or cystic structures. The bilateral fallopian tubes display possible fimbria, have an average aggregate length of 4 cm and average 0.5 cm in diameter. The serosa and cut surfaces of both tubes are unremarkable. Representative sections are submitted as follows:  Block Summary: 3A - anterior cervix; 3B - anterior lower uterine segment; 3C - posterior cervix; 3D - posterior lower uterine segment; 3E - right parametrium; 3F - left parametrium; 3G - full-thickness possible anterior fundic mass; 3H-3J - remainder of fundic possible mass and surrounding granular endometrium, sequentially submitted from fundus to lower uterine segment; 3K posterior full-thickness endomyometrium; 3L - additional sections of posterior endometrium; 3M-3O - sections of subserosal and intramural myometrial nodules; 3P - right ovary; 3Q - left ovary; 3R - right fallopian tube bisected possible fimbria and central cross-sections; 3S - left fallopian tube, bisected, possible fimbria and central cross-sections.     CLB:ecv      Special Stains       MSI testing by immunohistochemistry results:    Loss of nuclear expression of MLH1 and PMS2 (methylation testing for MLH1 promotor is indicated).            Microscopic Description       Sections from specimens 1 and 2 show multiple sections of lymph nodes with fibrous plaques showing evidence of calcification. There is no evidence of metastatic carcinoma on routine stains. Immunohistochemical stains performed include pankeratin on blocks 1A, 1C, 1E, and 2A, and CK8 performed on blocks 1B, 1D, and 2B. These are NEGATIVE for evidence of metastatic carcinoma. All controls stain appropriately including external positive, internal negative and external negative controls as required.      Sections of the cervix show no evidence of dysplasia or neoplasm. Sections of the lower uterine  segment likewise show no evidence of neoplasm. Sections of left and right parametrium show no evidence of neoplasm. There are adhesions present. An area of inflammation is also seen. Sections of the entirely submitted endometrium show areas of simple and complex hyperplasia with crowding of rounded and irregular glands lined by cells with enlarged atypical nuclei. Focally on the posterior aspect there are areas of glandular fusion with enlarged rounded nuclei with nucleoli. There is no evidence of invasion into the underlying myometrium. Sections of the subserosal and intramural lesions show areas of leiomyomata with degenerative change and calcification as well as focal adenomyosis. Sections of the bilateral fallopian tubes and ovaries show no evidence of neoplasm.        Problems Addressed this Visit        Cardiovascular and Mediastinum    Hypertensive disorder     Hard to control- screening adrenal function without secondary causes of high bp   Can titrate coreg, losartan prn            Hyperlipidemia - Primary     Will see if we can get a copy of lab work from Dr Malave - known vasculopathy on pravachol 80 mg daily             Endocrine    Type 2 diabetes mellitus without complication (CMS/HCC)     Blood sugar and 90 day average sugar reviewed  a1c up to 7.7%  Will review meds  c peptide 1.1   Continue lantus 32 u and consider moving to bedtime  Continue premeal insulin   Consider sensitizer/ vascular benefit drug (sglt-2 or glp-1)  F/u 3 months scheduled            Diagnoses       Codes Comments    Pure hypercholesterolemia    -  Primary ICD-10-CM: E78.00  ICD-9-CM: 272.0     Essential hypertension     ICD-10-CM: I10  ICD-9-CM: 401.9     Type 2 diabetes mellitus without complication, with long-term current use of insulin (CMS/HCC)     ICD-10-CM: E11.9, Z79.4  ICD-9-CM: 250.00, V58.67           Grace Duran MD  Signed Grace Duran MD

## 2020-12-31 ENCOUNTER — TRANSCRIBE ORDERS (OUTPATIENT)
Dept: ADMINISTRATIVE | Facility: HOSPITAL | Age: 80
End: 2020-12-31

## 2020-12-31 DIAGNOSIS — Z12.31 VISIT FOR SCREENING MAMMOGRAM: Primary | ICD-10-CM

## 2021-01-18 ENCOUNTER — CLINICAL SUPPORT (OUTPATIENT)
Dept: GYNECOLOGIC ONCOLOGY | Facility: CLINIC | Age: 81
End: 2021-01-18

## 2021-01-18 VITALS
HEART RATE: 73 BPM | DIASTOLIC BLOOD PRESSURE: 74 MMHG | WEIGHT: 244.9 LBS | HEIGHT: 62 IN | OXYGEN SATURATION: 97 % | TEMPERATURE: 97.8 F | RESPIRATION RATE: 18 BRPM | SYSTOLIC BLOOD PRESSURE: 181 MMHG | BODY MASS INDEX: 45.06 KG/M2

## 2021-01-18 DIAGNOSIS — E66.01 MORBID OBESITY WITH BMI OF 40.0-44.9, ADULT (HCC): ICD-10-CM

## 2021-01-18 DIAGNOSIS — C54.1 ENDOMETRIAL CANCER (HCC): Primary | ICD-10-CM

## 2021-01-18 DIAGNOSIS — I10 ESSENTIAL HYPERTENSION: ICD-10-CM

## 2021-01-18 PROBLEM — Z01.818 PRE-OP EXAM: Status: RESOLVED | Noted: 2018-02-01 | Resolved: 2021-01-18

## 2021-01-18 PROCEDURE — 99215 OFFICE O/P EST HI 40 MIN: CPT | Performed by: NURSE PRACTITIONER

## 2021-01-18 NOTE — PROGRESS NOTES
GYN ONCOLOGY CANCER SURVIVORSHIP VISIT    Kathryn Davison  [id number]  1940    Subjective   Chief Complaint: Follow-up (Survivorship)        History of present illness:     Kathryn Davison is a 80 y.o. year old female who is here today for her Cancer Survivorship visit, see oncology history below. She is accompanied by her daughter, Ayala. She reports she is feeling in her usual state of health today and has no gyn complaints. She denies vaginal bleeding, pelvic pain, and changes in bowel or bladder function. She feels well recovered from surgery. She has had some left breast tenderness, history of fibrocystic breasts, scheduled for mammogram soon.        Cancer History:   Oncology/Hematology History   Endometrial cancer (CMS/[facility])   7/24/2020 Procedure    Hysteroscopy, D&C, and MyoSure by Dr. Elizalde for postmenopausal bleeding. Pathology showed sections with adenocarcinoma, enlarged vascular neclei, prominent mitotic activity, and focal areas of complex hyperplasia with atypia. Referred to Gyn Oncology.      8/31/2020 Surgery    Radical Type 1 RTLH/BSO and sentinel lymph node dissection.     Final pathology showed residual 0.2 cm grade 2 endometrioid tumor, non-invasive into the myometrium. No lymphvascular invasion. Nodes negative. MSI testing showed loss of MLH1 and PMS2 (per previous biopsy). 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  Copy of Survivorship Care Plan provided to patient and primary care provider.         The current medication list and allergy list were reviewed and reconciled.     Past Medical History, Past Surgical History, Social History, Family History have been reviewed and are without significant changes except as mentioned.        Review of Systems   Constitutional: Positive for fatigue.   Gastrointestinal: Negative.    Genitourinary: Negative.  Negative  "for pelvic pain and vaginal bleeding.   Musculoskeletal: Positive for arthralgias and gait problem (uses walker out of home for balance).   Psychiatric/Behavioral: The patient is nervous/anxious.        Objective   Physical Exam  Vital Signs: BP (!) 181/74   Pulse 73   Temp 97.8 °F (36.6 °C) (Temporal)   Resp 18   Ht 157.5 cm (62.01\")   Wt 111 kg (244 lb 14.4 oz)   LMP  (LMP Unknown)   SpO2 97%   BMI 44.78 kg/m²    Wt Readings from Last 10 Encounters:   01/18/21 111 kg (244 lb 14.4 oz)   10/09/20 106 kg (233 lb 8 oz)   08/28/20 108 kg (237 lb)   08/12/20 107 kg (235 lb)   08/04/20 107 kg (236 lb)   07/07/20 108 kg (237 lb 2 oz)   06/24/20 107 kg (236 lb)   02/11/19 112 kg (247 lb)   01/07/19 113 kg (250 lb)   12/07/18 111 kg (245 lb)       Vitals:    01/18/21 1255   PainSc: 0-No pain           General Appearance:  alert, cooperative, no apparent distress, appears stated age and obese   Neurologic/Psychiatric: A&O x 3, gait steady with walker assistance, appropriate affect   Abdomen:   Soft, non-tender, non-distended, no organomegaly, Exam limited d/t habitus. and bruising throughout abdomen consistent with insulin injections   Lymph nodes: No cervical, supraclavicular, inguinal adenopathy noted   Extremities: Normal, atraumatic; no clubbing, cyanosis, or edema    Pelvic: External Genitalia  without lesions or skin changes  Vagina  is pink, moist, without lesions.   Vaginal Cuff  Female Vaginal Cuff: smooth, intact and without visible lesions  Uterus  surgically absent, no palpable masses and exam mostly limited d/t adiposity  Ovaries  surgically absent bilaterally  Parametria  smooth  Rectovaginal  Female rectovaginal: deferred     ECOG Performance Status: (1) Restricted in Physically Strenuous Activity, Ambulatory & Able to Do Work of Light Nature      Survivorship Needs Assessment:  PT/Rehab  Health history, treatment course, and current status. The patient is not in need of physical therapy or " rehabilitation services regarding cancer care or treatment at this time.     Behavioral Health  A post-treatment depression screening has been completed. PHQ-9 results show 5-9 (Mild Depression). The patient has not previously established mental health care. A referral is offered, patient will consider.       Procedure Note:  No notes on file         Assessment and Plan:     Diagnoses and all orders for this visit:    1. Endometrial cancer (CMS/HCC) (Primary)    2. Morbid obesity with BMI of 40.0-44.9, adult (CMS/HCC)    3. Essential hypertension        There is no evidence of disease upon today's exam. Plan for every 6 month cancer surveillance for the first 2 years, then yearly. She is understanding to call with any changes in pelvic symptoms or general GYN concerns at any time between regularly scheduled visits.     The patient and I have reviewed her Survivorship Care Plan in detail. We discussed her diagnosis, pathology, histology, all treatments, and ongoing surveillance recommendations. All questions were answered to her satisfaction. She is in agreement with our plan for ongoing surveillance as outlined in her plan. A copy of this document was provided to her at the completion of our visit.  A copy has also been sent to her primary care provider.    Follow-up with PCP regarding HTN.     Pain assessment was performed today as a part of patient’s care. For patients with pain related to surgery, gynecologic malignancy or cancer treatment, the plan is as noted in the assessment/plan.  For patients with pain not related to these issues, they are to seek any further needed care from a more appropriate provider, such as PCP.      This was a 40 minute visit including both face-to-face and non-face-to-face time on the date of service.     Follow Up   Return to clinic in 6 months for ongoing cancer surveillance.      Electronically signed by NANNETTE Reddy on 01/18/21 at 15:10 EST

## 2021-02-16 ENCOUNTER — HOSPITAL ENCOUNTER (OUTPATIENT)
Dept: MAMMOGRAPHY | Facility: HOSPITAL | Age: 81
End: 2021-02-16

## 2021-03-01 ENCOUNTER — OFFICE VISIT (OUTPATIENT)
Dept: ENDOCRINOLOGY | Facility: CLINIC | Age: 81
End: 2021-03-01

## 2021-03-01 VITALS
DIASTOLIC BLOOD PRESSURE: 51 MMHG | BODY MASS INDEX: 41.46 KG/M2 | HEIGHT: 63 IN | SYSTOLIC BLOOD PRESSURE: 143 MMHG | HEART RATE: 58 BPM | WEIGHT: 234 LBS

## 2021-03-01 DIAGNOSIS — E03.9 ACQUIRED HYPOTHYROIDISM: ICD-10-CM

## 2021-03-01 DIAGNOSIS — D64.9 ANEMIA, UNSPECIFIED TYPE: ICD-10-CM

## 2021-03-01 DIAGNOSIS — R06.09 DYSPNEA ON EXERTION: ICD-10-CM

## 2021-03-01 DIAGNOSIS — E11.42 TYPE 2 DIABETES MELLITUS WITH DIABETIC POLYNEUROPATHY, WITHOUT LONG-TERM CURRENT USE OF INSULIN (HCC): Primary | ICD-10-CM

## 2021-03-01 PROCEDURE — 99442 PR PHYS/QHP TELEPHONE EVALUATION 11-20 MIN: CPT | Performed by: INTERNAL MEDICINE

## 2021-03-01 RX ORDER — ESCITALOPRAM OXALATE 10 MG/1
5 TABLET ORAL DAILY
COMMUNITY
Start: 2021-02-04

## 2021-03-01 RX ORDER — INSULIN LISPRO 100 [IU]/ML
INJECTION, SOLUTION INTRAVENOUS; SUBCUTANEOUS
Qty: 30 ML | Refills: 1 | Status: SHIPPED | OUTPATIENT
Start: 2021-03-01 | End: 2021-08-04

## 2021-03-01 NOTE — ASSESSMENT & PLAN NOTE
Blood sugar reviewed and a1c is 7  occ low sugar and we discussed taking 1/2 januvia dose if persistent   Also discussed snack at hs for sugar less than 120 or more active daily  Is utd with eye exam  She states she can feel things normally with feet but has phobia about neuropathy- reassured her she is fine to drive as long as normal sensation   b12 level was normal   F/u 3-4 months   In person

## 2021-03-01 NOTE — PROGRESS NOTES
Kathryn Davison 81 y.o.  CC:Follow-up, Diabetes (Type II, eye exam 6 months ago Dr Jerson Mendez in [place]), Hypertension, and Hyperlipidemia  You have chosen to receive care through a telephone visit. Do you consent to use a telephone visit for your medical care today? Yes      [place]: Follow-up, Diabetes (Type II, eye exam 6 months ago Dr Jerson Mendez in [place]), Hypertension, and Hyperlipidemia    This patient has consented to a telehealth visit via telephone. The visit was scheduled as a routine visit to comply with patient safety concerns in accordance with CDC recommendations.  All vitals recorded within this visit are reported by the patient.  I spent 14 minutes in direct conversation with this patient    Right after the holidays was short of breath and concerned about health- saw Dr Malave (PCP)  Had lab work and an echo   She is on lexapro 10 mg - 1/2 pill daily   She is feeling much better (also added back furosemide)- has lost about 10 lbs   No more shortness of breath   ECHO - normal LV, RV, bilateral dilated atria   Normal MV, sclerotic aortic valve without abnl, TV trace regurg   Normal ao root , normal pericardium, no masses, vegetations  Normal global and lv function, normal diastolic function   Blood sugars are good overall- occ nighttime low (eats a snack at hs)   a1c from Dr Malave was 7  A lot of anxiety- she has stopped driving due to anxiety related to numbness of feet (she is afraid of her foot slipping off the break)  Normal B12, folate  Anemia with low iron saturation   High bun and creatinine - working on getting plenty of fluids    Allergies   Allergen Reactions   • Gabapentin Dizziness   • Betadine [Povidone Iodine] Rash       Current Outpatient Medications:   •  albuterol (PROVENTIL HFA;VENTOLIN HFA) 108 (90 Base) MCG/ACT inhaler, Inhale 2 puffs Every 4 (Four) Hours As Needed for Wheezing., Disp: , Rfl:   •  amLODIPine (NORVASC) 5 MG tablet, Take 1 tablet by mouth Daily.,  Disp: , Rfl:   •  Calcium Citrate-Vitamin D (CALCIUM + D PO), Take 1 tablet by mouth Daily., Disp: , Rfl:   •  carvedilol (COREG) 12.5 MG tablet, Take 1 tablet by mouth 2 (Two) Times a Day With Meals., Disp: , Rfl:   •  CloNIDine (CATAPRES) 0.1 MG tablet, Take 0.1 mg by mouth 2 (Two) Times a Day As Needed for High Blood Pressure., Disp: , Rfl:   •  clopidogrel (PLAVIX) 75 MG tablet, Take 75 mg by mouth Daily., Disp: , Rfl:   •  docusate sodium 100 MG capsule, Take 2 capsules by mouth 2 (Two) Times a Day., Disp: 60 each, Rfl: 3  •  Furosemide (LASIX PO), Take 20 mg by mouth Daily As Needed., Disp: , Rfl:   •  hydroCHLOROthiazide (HYDRODIURIL) 12.5 MG tablet, Take 12.5 mg by mouth Daily., Disp: , Rfl:   •  losartan (COZAAR) 50 MG tablet, Take 50 mg by mouth Daily., Disp: , Rfl:   •  montelukast (SINGULAIR) 10 MG tablet, Take 10 mg by mouth Every Night., Disp: , Rfl:   •  Multiple Vitamins-Minerals (MULTIVITAMIN ADULT PO), Take 1 tablet by mouth Daily., Disp: , Rfl:   •  polyethylene glycol (MIRALAX) packet, Take 17 g by mouth As Needed., Disp: , Rfl:   •  potassium chloride (MICRO-K) 10 MEQ CR capsule, potassium chloride ER 10 mEq tablet,extended release  TAKE ONE TABLET BY MOUTH ONCE DAILY ON  THE  DAYS  YOU  TAKE  LASIX  (FUROSEMIDE), Disp: , Rfl:   •  pravastatin (PRAVACHOL) 80 MG tablet, Take 80 mg by mouth Daily., Disp: , Rfl:   •  SITagliptin (JANUVIA) 100 MG tablet, Take 50 mg by mouth Daily., Disp: , Rfl:   •  escitalopram (LEXAPRO) 10 MG tablet, Take 5 mg by mouth Daily., Disp: , Rfl:   Patient Active Problem List    Diagnosis   • Sleep apnea [G47.30]   • Endometrial cancer (CMS/HCC) [C54.1]   • PMB (postmenopausal bleeding) [N95.0]   • Thickened endometrium [R93.89]   • Anemia [D64.9]   • Heartburn [R12]   • Iron deficiency anemia [D50.9]   • Non Q wave myocardial infarction (CMS/HCC) [I21.4]   • Status post total shoulder replacement, left [Z96.612]   • Right wrist fracture [S62.101A]   • Type 2 diabetes  mellitus (CMS/[facility]) [E11.9]   • Hypertensive disorder [I10]   • Hypothyroidism [E03.9]   • Morbid obesity with BMI of 40.0-44.9, adult (CMS/[facility]) [E66.01, Z68.41]   • Closed fracture of left shoulder [S42.92XA]   • Gastritis without bleeding [K29.70]   • Abnormal ultrasound of liver [R93.2]   • Gastric polyp [K31.7]   • Dyspnea [R06.00]   • Edema [R60.9]   • Left upper quadrant pain [R10.12]   • Diverticulosis of large intestine without hemorrhage [K57.30]   • Colon polyps [K63.5]   • Vascular ectasia of colon [K63.9]   • Internal hemorrhoids [K64.8]   • External hemorrhoid [K64.4]   • Bilateral pseudophakia [Z96.1]   • Type 2 diabetes mellitus without complication (CMS/[facility]) [E11.9]   • Left carotid artery occlusion [I65.22]   • Left carotid artery stenosis [I65.22]   • Abnormal blood chemistry level [R79.9]   • Osteoporosis [M81.0]   • Proliferative diabetic retinopathy associated with type 2 diabetes mellitus (CMS/[facility]) [E11.3599]   • Carotid artery occlusion [I65.29]   • Carotid artery stenosis [I65.29]   • Hyperlipidemia [E78.5]   • Lack of energy [R53.83]   • After-cataract [H26.40]     Review of Systems   Constitutional: Positive for activity change and fatigue. Negative for appetite change and unexpected weight change.   HENT: Negative for congestion and rhinorrhea.    Eyes: Negative for visual disturbance.   Respiratory: Positive for shortness of breath. Negative for cough.    Cardiovascular: Negative for palpitations and leg swelling.   Gastrointestinal: Negative for constipation, diarrhea and nausea.   Genitourinary: Negative for hematuria.   Musculoskeletal: Positive for arthralgias. Negative for back pain, gait problem, joint swelling and myalgias.   Skin: Negative for color change, rash and wound.   Allergic/Immunologic: Negative for environmental allergies, food allergies and immunocompromised state.   Neurological: Positive for numbness. Negative for dizziness, weakness and light-headedness.  "  Psychiatric/Behavioral: Negative for confusion, decreased concentration, dysphoric mood and sleep disturbance. The patient is nervous/anxious.      Social History     Socioeconomic History   • Marital status:      Spouse name: Not on file   • Number of children: Not on file   • Years of education: Not on file   • Highest education level: Not on file   Occupational History   • Occupation: retired   Tobacco Use   • Smoking status: Never Smoker   • Smokeless tobacco: Never Used   Substance and Sexual Activity   • Alcohol use: No   • Drug use: No   • Sexual activity: Defer   Social History Narrative    Right hand dominant     Family History   Problem Relation Age of Onset   • Heart disease Other    • Cancer Other    • Prostate cancer Father    • Stroke Father    • Colon cancer Neg Hx      /51   Pulse 58   Ht 158.8 cm (62.5\")   Wt 106 kg (234 lb)   LMP  (LMP Unknown)   BMI 42.12 kg/m²   Physical Exam  Results for orders placed or performed during the hospital encounter of 08/31/20   POC Glucose Once    Specimen: Blood   Result Value Ref Range    Glucose 105 70 - 130 mg/dL   POC Glucose Once    Specimen: Blood   Result Value Ref Range    Glucose 224 (H) 70 - 130 mg/dL   Tissue Pathology Exam    Specimen: A: [place] Lymph Node; Tissue    B: [place] Lymph Node; Tissue    C: Uterus with Cervix, Bilateral Tubes and Ovaries; Tissue   Result Value Ref Range    Case Report       Surgical Pathology Report                         Case: [id number]                                  Authorizing Provider:  Wilma Jo MD      Collected:           08/31/2020 08:39 AM          Ordering Location:     [facility]   Received:            08/31/2020 12:08 PM                                 OR                                                                           Pathologist:           Isaias Aguilar MD                                                           Specimens:   1) - [place] Lymph " Node, LEFT PELVIC SENTINEL LYMPH NODE                                           2) - [place] Lymph Node, RIGHT PELVIC SENTINEL LYMPH NODE                                          3) - Uterus with Cervix, Bilateral Tubes and Ovaries                                       Clinical Information       The working history is endometrial cancer.        Final Diagnosis       1. LEFT PELVIC SENTINEL LYMPH NODES:  Two lymph nodes negative for metastatic carcinoma on routine stain and by immunohistochemical stain.   2. RIGHT PELVIC SENTINEL LYMPH NODE:  Single lymph node negative for metastatic carcinoma on routine stain and by immunohistochemical stain.   3. 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   Underlying myometrium with leiomyomata with degenerative changes including calcification and focal adenomyosis.   Uterine cervix without evidence of dysplasia or neoplasm.  No involvement of lower uterine segment, left and right parametrium, or serosa by adenocarcinoma.   Bilateral fallopian tubes and ovaries without evidence of neoplasm.     [facility]     UTERUS ENDOMETRIUM TEMPLATE:  SPECIMEN TYPE/ PROCEDURE:  Total hysterectomy with bilateral salpingo-oophorectomy.  LYMPH NODE SAMPLING:  Yes, sentinel nodes.  SPECIMEN INTEGRITY:   Intact  TUMOR SIZE (greatest dimension):  Residual 0.2 cm.  HISTOLOGIC TYPE:  Endometrioid adenocarcinoma.  HISTOLOGIC GRADE:  FIGO grade 2 (previous biopsy [id number]).  MYOMETRIAL INVASION (Present/Not identified):  Not identified.   DEPTH OF INVASION (mm): 0 mm.   MYOMETRIAL THICKNESS (mm): 20 mm.   PERCENTAGE OF MYOMETRIAL INVASION: 0%.  INVOLVEMENT OF CERVICAL STROMA:  No.  EXTENT OF INVOLVEMENT OF OTHER TISSUE/ORGANS:  No involvement of other organs.  UTERINE SEROSA INVOLVEMENT: Not identified.  MARGINS:  Uninvolved by neoplasm.  PELVIC LYMPH NODES  (SUBMITTED/NONE): Submitted.  NUMBER OF PELVIC LYMPH NODES EXAMINED: 3.  NUMBER OF PELVIC SENTINEL NODES EXAMINED: 3.  LATERALITY OF PELVIC LYMPH NODES EXAMINED: Bilateral.  NUMBER OF PELVIC LYMPH NODES WITH MACROMETASTASIS: 0.  NUMBER OF PELVIC LYMPH NODES WITH MICROMETASTASIS: 0.  NUMBER OF PELVIC LYMPH NODES WITH ITC’S: 0.  LATERALITY OF PELVIC NODES WITH TUMOR: N/A.  PARA-AORTIC LYMPH NODES (SUBMITTED/NONE): None submitted.  LYMPHVASCULAR INVASION:  Not identified.    MSI TESTING (under age 60):   Loss of MLH1 and PMS2 nuclear expression (per previous biopsy); MLH1 promoter testing pending on previous biopsy  ADDITIONAL PATHOLOGIC FINDINGS:  Leiomyomata, adenomyosis.    ANCILLARY STUDIES:  Keratin stains performed on sentinel lymph nodes, MSI testing on previous biopsy.  AJCC PATHOLOGIC STAGE:  (COMPLETED BY PATHOLOGIST, BASED ONLY ON TISSUE FINDINGS, MORE EXTENSIVE DISEASE MAY NOT BE KNOWN TO THE PATHOLOGIST)  pT=  1a.  pN=    0.  pM=    X.  AJCC PATHOLOGIC STAGE:  IA.    JFJ/adarsh         Gross Description       Specimen 1 received in formalin labeled as left pelvic sentinel lymph node and consists of a 3.9 x 2.7 x 1.6 cm unoriented, irregularly shaped portion of yellow-tan lobular fibroadipose tissue. Sectioning and palpation reveals two tan-pink, fleshy sentinel lymph node candidates with a moderate amount of attached fibroadipose tissue which are 2.6 x 2 x 1 cm and 2.5 x 1.9 x 1.3 cm. The remaining unremarkable fibroadipose tissue is retained in formalin without sections submitted. The lymph node candidates are serially sectioned and submitted entirely as follows:  Block Summary: 1A-1C - one lymph node candidate; 1D-1E - one lymph node candidate.    Specimen 2 received in formalin labeled as right pelvic sentinel lymph node and consists of a 3.2 x 2.4 x 1.6 cm unoriented, irregularly shaped portion of yellow-tan lobular fibroadipose tissue. Sectioning and palpation reveals a 2 x 2 x 0.9 cm tan-pink, fleshy  sentinel lymph node candidate. The unremarkable fibroadipose tissue removed from the lymph node candidate is retained in formalin without sections submitted. The lymph node is serially sectioned and submitted entirely in blocks 2A-2B.    Specimen 3 received in formalin labeled as uterus with cervix, bilateral fallopian tubes and ovaries is a 116-gram simple hysterectomy specimen with attached bilateral salpingo-oophorectomy specimens. The bilateral fallopian tubes are focally discontinuous, consistent with prior tubal ligation.The uterus is 8.1 cm from superior to inferior, 5 cm laterally, and 5.4 cm from superior to inferior. The cervix is attached, 3 x 3 cm in diameter, 3.2 cm in length and has a 0.7 cm in diameter round os. The uterine serosa is remarkable for two tan-white bulging areas on the anterior surface which range in greatest dimension from 0.5 to 2 cm. The remaining serosa is minimally hemorrhagic and unremarkable. The endocervical canal is tan, glistening with a grossly unremarkable herringbone appearance. The endometrial canal is 3.6 cm from superior to inferior, 2.8 cm from cornu to cornu, and is remarkable for a raised granular possible mass in the anterior fundus which is 2.6 x 0.7 x 0.4 cm. Adjacent to the possible mass is an ill-defined area of slightly granular endometrium. The presumed mass and granular endometrium have a combined aggregate dimension of 2.6 x 1.8 x 0.4 cm. The posterior endometrium appears grossly unremarkable. Sectioning of the mass reveals no gross evidence of invasion into the underlying myometrium, which averages 2 cm in thickness. The grossly unremarkable endometrium has an average thickness of <0.1 cm and no additional lesions are identified. The myometrium is remarkable for at least five well-circumscribed tan-white bulging nodules located intramurally and subserosally, including underlying the previously described areas of anterior serosal bulging. The nodules range in  greatest dimension from 0.4 to 2.6 cm and are focally, mildly calcified on 30% of the cut surfaces. No distinct necrosis or hemorrhage is identified. The remaining myometrium is tan-pink and severely, coarsely trabecular without additional lesions identified.     The right ovary is 4.7 grams and 2.9 x 1.9 x 1.6 cm. The left ovary is 3.2 grams and 2.5 x 1.8 x 1.4 cm. The external surfaces are unremarkable. Sectioning reveals no discrete masses, lesions or cystic structures. The bilateral fallopian tubes display possible fimbria, have an average aggregate length of 4 cm and average 0.5 cm in diameter. The serosa and cut surfaces of both tubes are unremarkable. Representative sections are submitted as follows:  Block Summary: 3A - anterior cervix; 3B - anterior lower uterine segment; 3C - posterior cervix; 3D - posterior lower uterine segment; 3E - right parametrium; 3F - left parametrium; 3G - full-thickness possible anterior fundic mass; 3H-3J - remainder of fundic possible mass and surrounding granular endometrium, sequentially submitted from fundus to lower uterine segment; 3K posterior full-thickness endomyometrium; 3L - additional sections of posterior endometrium; 3M-3O - sections of subserosal and intramural myometrial nodules; 3P - right ovary; 3Q - left ovary; 3R - right fallopian tube bisected possible fimbria and central cross-sections; 3S - left fallopian tube, bisected, possible fimbria and central cross-sections.     CLB:ecv      Special Stains       MSI testing by immunohistochemistry results:    Loss of nuclear expression of MLH1 and PMS2 (methylation testing for MLH1 promotor is indicated).            Microscopic Description       Sections from specimens 1 and 2 show multiple sections of lymph nodes with fibrous plaques showing evidence of calcification. There is no evidence of metastatic carcinoma on routine stains. Immunohistochemical stains performed include pankeratin on blocks 1A, 1C, 1E, and 2A,  and CK8 performed on blocks 1B, 1D, and 2B. These are NEGATIVE for evidence of metastatic carcinoma. All controls stain appropriately including external positive, internal negative and external negative controls as required.      Sections of the cervix show no evidence of dysplasia or neoplasm. Sections of the lower uterine segment likewise show no evidence of neoplasm. Sections of left and right parametrium show no evidence of neoplasm. There are adhesions present. An area of inflammation is also seen. Sections of the entirely submitted endometrium show areas of simple and complex hyperplasia with crowding of rounded and irregular glands lined by cells with enlarged atypical nuclei. Focally on the posterior aspect there are areas of glandular fusion with enlarged rounded nuclei with nucleoli. There is no evidence of invasion into the underlying myometrium. Sections of the subserosal and intramural lesions show areas of leiomyomata with degenerative change and calcification as well as focal adenomyosis. Sections of the bilateral fallopian tubes and ovaries show no evidence of neoplasm.        Problems Addressed this Visit        Other    Type 2 diabetes mellitus with diabetic polyneuropathy, without long-term current use of insulin (CMS/[facility]) - Primary     Blood sugar reviewed and a1c is 7  occ low sugar and we discussed taking 1/2 januvia dose if persistent   Also discussed snack at hs for sugar less than 120 or more active daily  Is utd with eye exam  She states she can feel things normally with feet but has phobia about neuropathy- reassured her she is fine to drive as long as normal sensation   b12 level was normal   F/u 3-4 months   In person         Relevant Medications    Insulin Glargine (LANTUS SOLOSTAR) 100 UNIT/ML injection pen    Insulin Lispro, 1 Unit Dial, (HumaLOG KwikPen) 100 UNIT/ML solution pen-injector    Hypothyroidism     tsh is 5.2- continue current medications          Dyspnea     Assoc with  stopping her lasix - echo reviewed  She lost 10 lbs when she restarted lasix          Anemia     Via outside lab work - is back taking flinstones with iron            Diagnoses       Codes Comments    Type 2 diabetes mellitus with diabetic polyneuropathy, without long-term current use of insulin (CMS/[facility])    -  Primary ICD-10-CM: E11.42  ICD-9-CM: 250.60, 357.2     Dyspnea on exertion     ICD-10-CM: R06.00  ICD-9-CM: 786.09     Acquired hypothyroidism     ICD-10-CM: E03.9  ICD-9-CM: 244.9     Anemia, unspecified type     ICD-10-CM: D64.9  ICD-9-CM: 285.9         Return in about 3 months (around 6/1/2021) for Recheck.    Ruchi Ellis MA  Signed Grace Duran MD

## 2021-03-04 ENCOUNTER — IMMUNIZATION (OUTPATIENT)
Dept: VACCINE CLINIC | Facility: HOSPITAL | Age: 81
End: 2021-03-04

## 2021-03-04 PROCEDURE — 0001A: CPT | Performed by: INTERNAL MEDICINE

## 2021-03-04 PROCEDURE — 91300 HC SARSCOV02 VAC 30MCG/0.3ML IM: CPT | Performed by: INTERNAL MEDICINE

## 2021-03-10 NOTE — PAT
"CALLED CINDY KIRK CRNA REGARDING PT PMH.  NOTIFIED REGARDING PT C/O APPROX 70% BILATERAL CAROTID OCCLUSION.  STATES SHE SEES DR. BARONE EVERY 6 MONTHS FOR THIS.  PT DENIES ANY INTERVENTION AS OF YET.  ASKED IF ANYTHING FURTHER REQUIRED OR NEEDED.  CINDY STATED \"NO.  THANKS, JUST AS LONG AS IT'S NOTED, I THINK IT'S  OK.\"  " top x1  2016  wt 8#

## 2021-03-16 LAB — RENIN PLAS-CCNC: 2.67 NG/ML/HR (ref 0.17–5.38)

## 2021-03-25 ENCOUNTER — IMMUNIZATION (OUTPATIENT)
Dept: VACCINE CLINIC | Facility: HOSPITAL | Age: 81
End: 2021-03-25

## 2021-03-25 PROCEDURE — 91300 HC SARSCOV02 VAC 30MCG/0.3ML IM: CPT | Performed by: INTERNAL MEDICINE

## 2021-03-25 PROCEDURE — 0002A: CPT | Performed by: INTERNAL MEDICINE

## 2021-05-07 ENCOUNTER — HOSPITAL ENCOUNTER (OUTPATIENT)
Dept: MAMMOGRAPHY | Facility: HOSPITAL | Age: 81
Discharge: HOME | End: 2021-05-07
Admitting: INTERNAL MEDICINE

## 2021-05-07 DIAGNOSIS — Z12.31 VISIT FOR SCREENING MAMMOGRAM: ICD-10-CM

## 2021-05-07 PROCEDURE — 77067 SCR MAMMO BI INCL CAD: CPT

## 2021-05-07 PROCEDURE — 77063 BREAST TOMOSYNTHESIS BI: CPT

## 2021-06-15 ENCOUNTER — OFFICE VISIT (OUTPATIENT)
Dept: ENDOCRINOLOGY | Facility: CLINIC | Age: 81
End: 2021-06-15

## 2021-06-15 ENCOUNTER — TELEPHONE (OUTPATIENT)
Dept: ENDOCRINOLOGY | Facility: CLINIC | Age: 81
End: 2021-06-15

## 2021-06-15 VITALS
HEIGHT: 62 IN | DIASTOLIC BLOOD PRESSURE: 62 MMHG | WEIGHT: 243 LBS | OXYGEN SATURATION: 98 % | HEART RATE: 58 BPM | SYSTOLIC BLOOD PRESSURE: 146 MMHG | BODY MASS INDEX: 44.72 KG/M2

## 2021-06-15 DIAGNOSIS — I10 ESSENTIAL HYPERTENSION: ICD-10-CM

## 2021-06-15 DIAGNOSIS — Z79.4 TYPE 2 DIABETES MELLITUS WITH DIABETIC POLYNEUROPATHY, WITH LONG-TERM CURRENT USE OF INSULIN (HCC): Primary | ICD-10-CM

## 2021-06-15 DIAGNOSIS — E11.42 TYPE 2 DIABETES MELLITUS WITH DIABETIC POLYNEUROPATHY, WITH LONG-TERM CURRENT USE OF INSULIN (HCC): Primary | ICD-10-CM

## 2021-06-15 LAB
EXPIRATION DATE: NORMAL
EXPIRATION DATE: NORMAL
GLUCOSE BLDC GLUCOMTR-MCNC: 118 MG/DL (ref 70–130)
HBA1C MFR BLD: 6 %
Lab: NORMAL
Lab: NORMAL

## 2021-06-15 PROCEDURE — 83036 HEMOGLOBIN GLYCOSYLATED A1C: CPT | Performed by: INTERNAL MEDICINE

## 2021-06-15 PROCEDURE — 99214 OFFICE O/P EST MOD 30 MIN: CPT | Performed by: INTERNAL MEDICINE

## 2021-06-15 PROCEDURE — 82947 ASSAY GLUCOSE BLOOD QUANT: CPT | Performed by: INTERNAL MEDICINE

## 2021-06-15 RX ORDER — SPIRONOLACTONE AND HYDROCHLOROTHIAZIDE 25; 25 MG/1; MG/1
1 TABLET ORAL DAILY
Qty: 30 TABLET | Refills: 5 | Status: SHIPPED | OUTPATIENT
Start: 2021-06-15 | End: 2021-08-05 | Stop reason: SDUPTHER

## 2021-06-15 RX ORDER — DOXYCYCLINE HYCLATE 100 MG/1
100 CAPSULE ORAL 2 TIMES DAILY
Qty: 14 CAPSULE | Refills: 0 | Status: SHIPPED | OUTPATIENT
Start: 2021-06-15 | End: 2022-03-09

## 2021-06-15 NOTE — PROGRESS NOTES
Kathryn Davison 81 y.o.  CC:Follow-up, Diabetes (Type II, eye exam fall 2021 Dr Mendez), Hypertension, and Hyperlipidemia      [place]: Follow-up, Diabetes (Type II, eye exam fall 2021 Dr Mendez), Hypertension, and Hyperlipidemia    Blood sugar and 90 day average sugar reviewed  Results for orders placed or performed in visit on 06/15/21   POC Glycosylated Hemoglobin (Hb A1C)    Specimen: Blood   Result Value Ref Range    Hemoglobin A1C 6.0 %    Lot Number [id number]     Expiration Date 02/05/2023    POC Glucose, Blood    Specimen: Blood   Result Value Ref Range    Glucose 118 70 - 130 mg/dL    Lot Number [id number]     Expiration Date 12/15/2021      Average sugar is 120   bp is high/normal - on multiple drugs for high bp   Is on low fat diet and pravachol 80 mg daily   Is on jauvia, lantus and humalog   Discussed options for care  Reduce lantus due to low morning sugars  bp is marginal but improved    Allergies   Allergen Reactions   • Gabapentin Dizziness   • Betadine [Povidone Iodine] Rash       Current Outpatient Medications:   •  albuterol (PROVENTIL HFA;VENTOLIN HFA) 108 (90 Base) MCG/ACT inhaler, Inhale 2 puffs Every 4 (Four) Hours As Needed for Wheezing., Disp: , Rfl:   •  amLODIPine (NORVASC) 5 MG tablet, Take 1 tablet by mouth Daily., Disp: , Rfl:   •  Calcium Citrate-Vitamin D (CALCIUM + D PO), Take 1 tablet by mouth Daily., Disp: , Rfl:   •  carvedilol (COREG) 12.5 MG tablet, Take 1 tablet by mouth 2 (Two) Times a Day With Meals., Disp: , Rfl:   •  CloNIDine (CATAPRES) 0.1 MG tablet, Take 0.1 mg by mouth 2 (Two) Times a Day As Needed for High Blood Pressure., Disp: , Rfl:   •  clopidogrel (PLAVIX) 75 MG tablet, Take 75 mg by mouth Daily., Disp: , Rfl:   •  docusate sodium 100 MG capsule, Take 2 capsules by mouth 2 (Two) Times a Day., Disp: 60 each, Rfl: 3  •  doxycycline (VIBRAMYCIN) 100 MG capsule, Take 1 capsule by mouth 2 (Two) Times a Day., Disp: 14 capsule, Rfl: 0  •  escitalopram (LEXAPRO) 10 MG  tablet, Take 5 mg by mouth Daily., Disp: , Rfl:   •  Furosemide (LASIX PO), Take 20 mg by mouth Daily As Needed., Disp: , Rfl:   •  Insulin Glargine (LANTUS SOLOSTAR) 100 UNIT/ML injection pen, Inject 27 units once per day, Disp: 30 mL, Rfl: 1  •  Insulin Lispro, 1 Unit Dial, (HumaLOG KwikPen) 100 UNIT/ML solution pen-injector, Inject 7 - 10 units TID with meals, Disp: 30 mL, Rfl: 1  •  losartan (COZAAR) 50 MG tablet, Take 50 mg by mouth Daily., Disp: , Rfl:   •  montelukast (SINGULAIR) 10 MG tablet, Take 10 mg by mouth Every Night., Disp: , Rfl:   •  Multiple Vitamins-Minerals (MULTIVITAMIN ADULT PO), Take 1 tablet by mouth Daily., Disp: , Rfl:   •  polyethylene glycol (MIRALAX) packet, Take 17 g by mouth As Needed., Disp: , Rfl:   •  potassium chloride (MICRO-K) 10 MEQ CR capsule, potassium chloride ER 10 mEq tablet,extended release  TAKE ONE TABLET BY MOUTH ONCE DAILY ON  THE  DAYS  YOU  TAKE  LASIX  (FUROSEMIDE), Disp: , Rfl:   •  pravastatin (PRAVACHOL) 80 MG tablet, Take 80 mg by mouth Daily., Disp: , Rfl:   •  spironolactone-hydrochlorothiazide (Aldactazide) 25-25 MG tablet, Take 1 tablet by mouth Daily., Disp: 30 tablet, Rfl: 5  Patient Active Problem List    Diagnosis    • Sleep apnea [G47.30]    • Endometrial cancer (CMS/HCC) [C54.1]    • PMB (postmenopausal bleeding) [N95.0]    • Thickened endometrium [R93.89]    • Anemia [D64.9]    • Heartburn [R12]    • Iron deficiency anemia [D50.9]    • Non Q wave myocardial infarction (CMS/HCC) [I21.4]    • Status post total shoulder replacement, left [Z96.612]    • Right wrist fracture [S62.101A]    • Type 2 diabetes mellitus (CMS/HCC) [E11.9]    • Hypertensive disorder [I10]    • Hypothyroidism [E03.9]    • Morbid obesity with BMI of 40.0-44.9, adult (CMS/HCC) [E66.01, Z68.41]    • Closed fracture of left shoulder [S42.92XA]    • Gastritis without bleeding [K29.70]    • Abnormal ultrasound of liver [R93.2]    • Gastric polyp [K31.7]    • Dyspnea [R06.00]    • Edema  [R60.9]    • Left upper quadrant pain [R10.12]    • Diverticulosis of large intestine without hemorrhage [K57.30]    • Colon polyps [K63.5]    • Vascular ectasia of colon [K63.9]    • Internal hemorrhoids [K64.8]    • External hemorrhoid [K64.4]    • Bilateral pseudophakia [Z96.1]    • Type 2 diabetes mellitus with diabetic polyneuropathy, without long-term current use of insulin (CMS/[facility]) [E11.42]    • Left carotid artery occlusion [I65.22]    • Left carotid artery stenosis [I65.22]    • Abnormal blood chemistry level [R79.9]    • Osteoporosis [M81.0]    • Proliferative diabetic retinopathy associated with type 2 diabetes mellitus (CMS/[facility]) [E11.3599]    • Carotid artery occlusion [I65.29]    • Carotid artery stenosis [I65.29]    • Hyperlipidemia [E78.5]    • Lack of energy [R53.83]    • After-cataract [H26.40]      Review of Systems   Constitutional: Negative for activity change, appetite change and unexpected weight change.   HENT: Negative for congestion and rhinorrhea.    Eyes: Negative for visual disturbance.   Respiratory: Negative for cough and shortness of breath.    Cardiovascular: Negative for palpitations and leg swelling.   Gastrointestinal: Negative for constipation, diarrhea and nausea.   Genitourinary: Negative for hematuria.   Musculoskeletal: Positive for arthralgias. Negative for back pain, gait problem, joint swelling and myalgias.   Skin: Negative for color change, rash and wound.   Allergic/Immunologic: Negative for environmental allergies, food allergies and immunocompromised state.   Neurological: Negative for dizziness, weakness and light-headedness.   Psychiatric/Behavioral: Negative for confusion, decreased concentration, dysphoric mood and sleep disturbance. The patient is not nervous/anxious.      Social History     Socioeconomic History   • Marital status:      Spouse name: Not on file   • Number of children: Not on file   • Years of education: Not on file   • Highest education  "level: Not on file   Tobacco Use   • Smoking status: Never Smoker   • Smokeless tobacco: Never Used   Substance and Sexual Activity   • Alcohol use: No   • Drug use: No   • Sexual activity: Defer     Family History   Problem Relation Age of Onset   • Heart disease Other    • Cancer Other    • Prostate cancer Father    • Stroke Father    • Colon cancer Neg Hx      /62   Pulse 58   Ht 157.5 cm (62\")   Wt 110 kg (243 lb)   LMP  (LMP Unknown)   SpO2 98%   BMI 44.45 kg/m²   Physical Exam  Vitals and nursing note reviewed.   Constitutional:       Appearance: Normal appearance. She is well-developed.   HENT:      Head: Normocephalic and atraumatic.   Eyes:      General: Lids are normal.      Extraocular Movements: Extraocular movements intact.      Conjunctiva/sclera: Conjunctivae normal.      Pupils: Pupils are equal, round, and reactive to light.   Neck:      Thyroid: No thyroid mass or thyromegaly.      Vascular: No carotid bruit.      Trachea: Trachea normal. No tracheal deviation.   Cardiovascular:      Rate and Rhythm: Normal rate and regular rhythm.      Heart sounds: Normal heart sounds. No murmur heard.   No friction rub. No gallop.    Pulmonary:      Effort: Pulmonary effort is normal. No respiratory distress.      Breath sounds: Normal breath sounds. No wheezing.   Musculoskeletal:         General: No deformity. Normal range of motion.      Cervical back: Normal range of motion and neck supple.   Lymphadenopathy:      Cervical: No cervical adenopathy.   Skin:     General: Skin is warm and dry.      Findings: No erythema or rash.      Nails: There is no clubbing.   Neurological:      Mental Status: She is alert and oriented to person, place, and time.      Cranial Nerves: No cranial nerve deficit.      Deep Tendon Reflexes: Reflexes are normal and symmetric. Reflexes normal.   Psychiatric:         Speech: Speech normal.         Behavior: Behavior normal.         Thought Content: Thought content " normal.         Judgment: Judgment normal.       Results for orders placed or performed in visit on 06/15/21   POC Glycosylated Hemoglobin (Hb A1C)    Specimen: Blood   Result Value Ref Range    Hemoglobin A1C 6.0 %    Lot Number [id number]     Expiration Date 02/05/2023    POC Glucose, Blood    Specimen: Blood   Result Value Ref Range    Glucose 118 70 - 130 mg/dL    Lot Number [id number]     Expiration Date 12/15/2021      Diagnoses and all orders for this visit:    1. Type 2 diabetes mellitus with diabetic polyneuropathy, with long-term current use of insulin (CMS/[facility]) (Primary)  Assessment & Plan:  Blood sugar and 90 day average sugar reviewed  Results for orders placed or performed in visit on 06/15/21   POC Glycosylated Hemoglobin (Hb A1C)    Specimen: Blood   Result Value Ref Range    Hemoglobin A1C 6.0 %    Lot Number [id number]     Expiration Date 02/05/2023    POC Glucose, Blood    Specimen: Blood   Result Value Ref Range    Glucose 118 70 - 130 mg/dL    Lot Number [id number]     Expiration Date 12/15/2021      Low morning sugars- reduce lantus to 24 units  Stop humalog  Add ozempic 0.25 mg weekly  Stop januvia   email sugars with update 1 week  Gretchen 2 sensor sample provided today   F/u 3 months     Orders:  -     POC Glycosylated Hemoglobin (Hb A1C)  -     POC Glucose, Blood    2. Essential hypertension  Assessment & Plan:  With systolic elevation  Stop hctz   Add aldactazide 25/25 daily - can go to 1/2 dose if issues  Continue catapress, coreg, norvasc, and lasix  Also monitor potassium closely on supplement with above changes        Other orders  -     spironolactone-hydrochlorothiazide (Aldactazide) 25-25 MG tablet; Take 1 tablet by mouth Daily.  Dispense: 30 tablet; Refill: 5  repeat bmp via pcp in 2-4 weeks   Return in about 3 months (around 9/15/2021) for Recheck.    Grace Duran MD  Signed Grace Duran MD

## 2021-06-15 NOTE — TELEPHONE ENCOUNTER
Pt called to see if you were going to call something in for her eye  She  Uses [facility] in [facility]

## 2021-06-16 NOTE — ASSESSMENT & PLAN NOTE
With systolic elevation  Stop hctz   Add aldactazide 25/25 daily - can go to 1/2 dose if issues  Continue catapress, coreg, norvasc, and lasix  Also monitor potassium closely on supplement with above changes

## 2021-06-16 NOTE — ASSESSMENT & PLAN NOTE
Blood sugar and 90 day average sugar reviewed  Results for orders placed or performed in visit on 06/15/21   POC Glycosylated Hemoglobin (Hb A1C)    Specimen: Blood   Result Value Ref Range    Hemoglobin A1C 6.0 %    Lot Number [id number]     Expiration Date 02/05/2023    POC Glucose, Blood    Specimen: Blood   Result Value Ref Range    Glucose 118 70 - 130 mg/dL    Lot Number [id number]     Expiration Date 12/15/2021      Low morning sugars- reduce lantus to 24 units  Stop humalog  Add ozempic 0.25 mg weekly  Stop januvia   email sugars with update 1 week  Gretchen 2 sensor sample provided today   F/u 3 months

## 2021-06-21 NOTE — TELEPHONE ENCOUNTER
Pt states she really likes using the Freestyle sensor and would like the rx sent to [facility]  She was advised if it is not covered by pharmacy benefits then she will need to call [employer] to generate an CMN to be sent here to check and see if it is covered by DME  She voiced understanding and all questions were answered

## 2021-06-21 NOTE — TELEPHONE ENCOUNTER
Patient started the freestyle diana 2, and she just had some questions. Please give her a call when able.

## 2021-06-21 NOTE — TELEPHONE ENCOUNTER
Pt called back stating she spoke with [employer] and was advised for us to fax a prescription for sensors to  and they thought it will be covered

## 2021-07-12 ENCOUNTER — TELEPHONE (OUTPATIENT)
Dept: ENDOCRINOLOGY | Facility: CLINIC | Age: 81
End: 2021-07-12

## 2021-07-12 NOTE — TELEPHONE ENCOUNTER
Patient stated that we ordered a freestyle diana and for her insurance to cover, she has to be taking insulin at least 3 times a day. Please advise.

## 2021-07-13 NOTE — TELEPHONE ENCOUNTER
Pt states she was told by [employer] she has to be on 4 insulin injections a day before medicare will cover diana sensors.  At the last ov the humalog was stopped so therefore she only takes one injection per day and she will not qualify for the diana  She was advised that in the future if things change and she goes back on humalog we can always reapply for coverage with [employer] for the diana sensors  Pt voiced understanding

## 2021-08-04 ENCOUNTER — TELEPHONE (OUTPATIENT)
Dept: ENDOCRINOLOGY | Facility: CLINIC | Age: 81
End: 2021-08-04

## 2021-08-04 ENCOUNTER — OFFICE VISIT (OUTPATIENT)
Dept: GYNECOLOGIC ONCOLOGY | Facility: CLINIC | Age: 81
End: 2021-08-04

## 2021-08-04 VITALS
HEART RATE: 66 BPM | OXYGEN SATURATION: 97 % | WEIGHT: 232 LBS | SYSTOLIC BLOOD PRESSURE: 127 MMHG | BODY MASS INDEX: 42.69 KG/M2 | TEMPERATURE: 98 F | HEIGHT: 62 IN | RESPIRATION RATE: 17 BRPM | DIASTOLIC BLOOD PRESSURE: 57 MMHG

## 2021-08-04 DIAGNOSIS — C54.1 ENDOMETRIAL CANCER (HCC): Primary | ICD-10-CM

## 2021-08-04 PROCEDURE — 99213 OFFICE O/P EST LOW 20 MIN: CPT | Performed by: OBSTETRICS & GYNECOLOGY

## 2021-08-04 NOTE — PROGRESS NOTES
Kathryn Davison  [id number]  1940      Reason for Visit: endometrial cancer surveillance     History of Present Illness:  Patient is a very pleasant 81 y.o. woman who presents for endometrial cancer surveillance. She has no complaints today except for side effects from starting Ozempic in June including diarrhea, mild nausea, and decreased appetite. Patient denies vaginal bleeding, pelvic pain, dysuria, constipation, weight changes. Also denies anxiety of depression. She states her mobility is limited due to back pain but that she tries to stay active with the help of a walker. She recently went on a beach trip with family. She is reports she is trying to lose weight but is not following a specific diet plan. She is unable to exercise due to back pain. She plans to follow up soon with PCP to discuss Ozempic side effects.    Oncology/Hematology History   Endometrial cancer (CMS/HCC)   7/24/2020 Procedure    Hysteroscopy, D&C, and MyoSure by Dr. Elizalde for postmenopausal bleeding. Pathology showed sections with adenocarcinoma, enlarged vascular neclei, prominent mitotic activity, and focal areas of complex hyperplasia with atypia. Referred to Gyn Oncology.      8/31/2020 Surgery    Radical Type 1 RTLH/BSO and sentinel lymph node dissection.     Final pathology showed residual 0.2 cm grade 2 endometrioid tumor, non-invasive into the myometrium. No lymphvascular invasion. Nodes negative. MSI testing showed loss of MLH1 and PMS2 (per previous biopsy). 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  Copy of Survivorship Care Plan provided to patient and primary care provider.       Past Medical History, Past Surgical History, Social History, Family History have been reviewed and are without significant changes except as mentioned.        Review of Systems   All other systems were reviewed and  "are negative except as mentioned above.    Medications:  The current medication list was reviewed in the EMR    ALLERGIES:    Allergies   Allergen Reactions   • Gabapentin Dizziness   • Betadine [Povidone Iodine] Rash           /57   Pulse 66   Temp 98 °F (36.7 °C) (Temporal)   Resp 17   Ht 157.5 cm (62.01\")   Wt 105 kg (232 lb)   LMP  (LMP Unknown)   SpO2 97%   BMI 42.42 kg/m²   ECOG score: 0           Physical Exam  Constitutional:  Patient is a pleasant woman in no acute distress.  CVS: Regular rate and rhythm; no murmurs, rubs, or gallops  Resp: Lungs CTABL, no increased work of breathing  Gastrointestinal: Abdomen is soft and appropriately tender.  There is no mass palpated.  There is no rebound or guarding.   Extremities:  Bilateral lower extremities are non-tender.  Gynecologic:External genitalia are free from lesion. On speculum examination, the vaginal cuff was intact and no lesions were appreciated.  On bimanual examination, no fullness was appreciated.  Uterus, cervix and adnexa were absent.  There was no significant tenderness.  Rectovaginal exam was deferred.        ASSESSMENT/PLAN:  Kathryn Davison is a 81 y.o. with a history of a stage 1A endometrial cancer s/p R-TLH/BSO and SLND 8/31/2020.      Encounter Diagnosis   Name Primary?   • Endometrial cancer (CMS/HCC) Yes     She is currently without clinical evidence of disease. Signs of recurrent disease, such as vaginal bleeding, persistent abdominopelvic pain, urinary or bowel changes, and shortness of breath were reviewed.  She was advised to follow up immediately if she develops any of the above symptoms. She will follow-up in 6 months.    Health maintenance: She was reminded to maintain a healthy lifestyle with a well balanced diet, calcium and vitamin D for osteoporosis prevention, and exercise as well as continue with recommended health and cancer screening guidelines.     Pain assessment was performed today as a part of " patient’s care.  For patients with pain related to surgery, gynecologic malignancy or cancer treatment, the plan is as noted in the assessment/plan.  For patients with pain not related to these issues, they are to seek any further needed care from a more appropriate provider, such as PCP.      Patient was seen and examined with Dr. Keller,  resident, who performed portions of the examination and documentation for this patient's care under my direct supervision.  I agree with the above documentation and plan.    Wilma Jo MD  08/05/21  11:53 EDT

## 2021-08-04 NOTE — PROGRESS NOTES
GYN ONCOLOGY CANCER SURVIVORSHIP VISIT    Kathryn Davison  [id number]  1940    Subjective   Chief Complaint: No chief complaint on file.        History of present illness:     Kathryn Davison is a 81 y.o. year old female who is here today for her Cancer Survivorship visit, see oncology history below.     Today, ***       Cancer History:   Oncology/Hematology History   Endometrial cancer (CMS/HCC)   7/24/2020 Procedure    Hysteroscopy, D&C, and MyoSure by Dr. Elizalde for postmenopausal bleeding. Pathology showed sections with adenocarcinoma, enlarged vascular neclei, prominent mitotic activity, and focal areas of complex hyperplasia with atypia. Referred to Gyn Oncology.      8/31/2020 Surgery    Radical Type 1 RTLH/BSO and sentinel lymph node dissection.     Final pathology showed residual 0.2 cm grade 2 endometrioid tumor, non-invasive into the myometrium. No lymphvascular invasion. Nodes negative. MSI testing showed loss of MLH1 and PMS2 (per previous biopsy). 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  Copy of Survivorship Care Plan provided to patient and primary care provider.         The current medication list and allergy list were reviewed and reconciled.     Past Medical History, Past Surgical History, Social History, Family History have been reviewed and are without significant changes except as mentioned.        Review of Systems   Constitutional: Positive for fatigue.   Gastrointestinal: Negative.    Genitourinary: Negative.  Negative for pelvic pain and vaginal bleeding.   Musculoskeletal: Positive for arthralgias and gait problem (uses walker out of home for balance).   Psychiatric/Behavioral: The patient is nervous/anxious.        Objective   Physical Exam  Vital Signs: LMP  (LMP Unknown)    Wt Readings from Last 10 Encounters:   06/15/21 110 kg (243 lb)   03/01/21 106  kg (234 lb)   01/18/21 111 kg (244 lb 14.4 oz)   10/09/20 106 kg (233 lb 8 oz)   08/28/20 108 kg (237 lb)   08/12/20 107 kg (235 lb)   08/04/20 107 kg (236 lb)   07/07/20 108 kg (237 lb 2 oz)   06/24/20 107 kg (236 lb)   02/11/19 112 kg (247 lb)       There were no vitals filed for this visit.        General Appearance:  alert, cooperative, no apparent distress, appears stated age and obese   Neurologic/Psychiatric: A&O x 3, gait steady with walker assistance, appropriate affect   Abdomen:   Soft, non-tender, non-distended, no organomegaly, Exam limited d/t habitus. and bruising throughout abdomen consistent with insulin injections   Lymph nodes: No cervical, supraclavicular, inguinal adenopathy noted   Extremities: Normal, atraumatic; no clubbing, cyanosis, or edema    Pelvic: External Genitalia  without lesions or skin changes  Vagina  is pink, moist, without lesions.   Vaginal Cuff  Female Vaginal Cuff: smooth, intact and without visible lesions  Uterus  surgically absent, no palpable masses and exam mostly limited d/t adiposity  Ovaries  surgically absent bilaterally  Parametria  smooth  Rectovaginal  Female rectovaginal: deferred     ECOG Performance Status: (1) Restricted in Physically Strenuous Activity, Ambulatory & Able to Do Work of Light Nature      Survivorship Needs Assessment:  PT/Rehab  Health history, treatment course, and current status. The patient is not in need of physical therapy or rehabilitation services regarding cancer care or treatment at this time.     Behavioral Health  A post-treatment depression screening has been completed. PHQ-9 results show 5-9 (Mild Depression). The patient has not previously established mental health care. A referral is offered, patient will consider.       Procedure Note:  No notes on file         Assessment and Plan:     Diagnoses and all orders for this visit:    1. Endometrial cancer (CMS/HCC) (Primary)        There is no evidence of disease upon today's exam.  Plan for every 6 month cancer surveillance for the first 2 years, then yearly. She is understanding to call with any changes in pelvic symptoms or general GYN concerns at any time between regularly scheduled visits.     The patient and I have reviewed her Survivorship Care Plan in detail. We discussed her diagnosis, pathology, histology, all treatments, and ongoing surveillance recommendations. All questions were answered to her satisfaction. She is in agreement with our plan for ongoing surveillance as outlined in her plan. A copy of this document was provided to her at the completion of our visit.  A copy has also been sent to her primary care provider.    Follow-up with PCP regarding HTN.     Pain assessment was performed today as a part of patient’s care. For patients with pain related to surgery, gynecologic malignancy or cancer treatment, the plan is as noted in the assessment/plan.  For patients with pain not related to these issues, they are to seek any further needed care from a more appropriate provider, such as PCP.      This was a 40 minute visit including both face-to-face and non-face-to-face time on the date of service.     Follow Up   Return to clinic in 6 months for ongoing cancer surveillance.      Electronically signed by Wilma Jo MD on 08/04/21 at 08:16 EDT

## 2021-08-04 NOTE — TELEPHONE ENCOUNTER
PATIENT IS TAKING OZEMPIC. SHE STATES SHE IS HAVING ISSUES WITH DIARRHEA AND WOULD LIKE TO DISCUSS CONCERNS. PHONE NUMBER [phone]

## 2021-08-05 RX ORDER — SPIRONOLACTONE AND HYDROCHLOROTHIAZIDE 25; 25 MG/1; MG/1
1 TABLET ORAL DAILY
Qty: 90 TABLET | Refills: 1 | Status: SHIPPED | OUTPATIENT
Start: 2021-08-05 | End: 2022-02-13

## 2021-08-05 NOTE — TELEPHONE ENCOUNTER
Pt states she has been on ozempic .25 since the middle of June and really states it is very effective in blood sugar control , however every week about 2-3 days after she takes it she gets explosive diarrhea and has it about 3-4 times that day and then doesn't happen again until after the next dosage  Please advise

## 2021-08-05 NOTE — TELEPHONE ENCOUNTER
Pt was advised with Dr Duran recommendations and pt voiced understanding and will call back if not helping

## 2021-08-05 NOTE — TELEPHONE ENCOUNTER
I would use immodium if it is doing well otherwise  She may reduce to every other week  Usually it takes about 4-6 weeks to get used to the medication and her symptoms should improve  Thanks, anna

## 2021-08-17 ENCOUNTER — TRANSCRIBE ORDERS (OUTPATIENT)
Dept: ADMINISTRATIVE | Facility: HOSPITAL | Age: 81
End: 2021-08-17

## 2021-08-17 DIAGNOSIS — M54.50 LOW BACK PAIN, UNSPECIFIED BACK PAIN LATERALITY, UNSPECIFIED CHRONICITY, UNSPECIFIED WHETHER SCIATICA PRESENT: ICD-10-CM

## 2021-08-17 DIAGNOSIS — M54.32 SCIATICA OF LEFT SIDE: ICD-10-CM

## 2021-08-17 DIAGNOSIS — M47.817 LUMBOSACRAL SPONDYLOSIS WITHOUT MYELOPATHY: ICD-10-CM

## 2021-08-17 DIAGNOSIS — M48.061 SPINAL STENOSIS, LUMBAR REGION, WITHOUT NEUROGENIC CLAUDICATION: Primary | ICD-10-CM

## 2021-09-09 ENCOUNTER — HOSPITAL ENCOUNTER (OUTPATIENT)
Dept: MRI IMAGING | Facility: HOSPITAL | Age: 81
Discharge: HOME | End: 2021-09-09
Admitting: ORTHOPAEDIC SURGERY

## 2021-09-09 DIAGNOSIS — M47.817 LUMBOSACRAL SPONDYLOSIS WITHOUT MYELOPATHY: ICD-10-CM

## 2021-09-09 DIAGNOSIS — M54.32 SCIATICA OF LEFT SIDE: ICD-10-CM

## 2021-09-09 DIAGNOSIS — M48.061 SPINAL STENOSIS, LUMBAR REGION, WITHOUT NEUROGENIC CLAUDICATION: ICD-10-CM

## 2021-09-09 DIAGNOSIS — M54.50 LOW BACK PAIN, UNSPECIFIED BACK PAIN LATERALITY, UNSPECIFIED CHRONICITY, UNSPECIFIED WHETHER SCIATICA PRESENT: ICD-10-CM

## 2021-09-09 PROCEDURE — 72148 MRI LUMBAR SPINE W/O DYE: CPT

## 2021-09-27 ENCOUNTER — TRANSCRIBE ORDERS (OUTPATIENT)
Dept: LAB | Facility: HOSPITAL | Age: 81
End: 2021-09-27

## 2021-09-27 ENCOUNTER — LAB (OUTPATIENT)
Dept: LAB | Facility: HOSPITAL | Age: 81
End: 2021-09-27

## 2021-09-27 DIAGNOSIS — Z20.822 COVID-19 RULED OUT: Primary | ICD-10-CM

## 2021-09-27 DIAGNOSIS — Z20.822 COVID-19 RULED OUT: ICD-10-CM

## 2021-09-27 PROCEDURE — U0004 COV-19 TEST NON-CDC HGH THRU: HCPCS

## 2021-09-27 PROCEDURE — C9803 HOPD COVID-19 SPEC COLLECT: HCPCS

## 2021-09-27 PROCEDURE — U0005 INFEC AGEN DETEC AMPLI PROBE: HCPCS

## 2021-09-28 LAB — SARS-COV-2 RNA NOSE QL NAA+PROBE: NOT DETECTED

## 2021-11-15 RX ORDER — INSULIN GLARGINE 100 [IU]/ML
INJECTION, SOLUTION SUBCUTANEOUS
Qty: 30 ML | Refills: 1 | Status: SHIPPED | OUTPATIENT
Start: 2021-11-15 | End: 2021-12-29 | Stop reason: SDUPTHER

## 2021-11-17 ENCOUNTER — TELEPHONE (OUTPATIENT)
Dept: ENDOCRINOLOGY | Facility: CLINIC | Age: 81
End: 2021-11-17

## 2021-11-17 NOTE — TELEPHONE ENCOUNTER
"PT CALLED REQUESTING TO CONSULT ON HER BS. SHE STATED SHE IS TAKING OZEMPIC AND SHE IS \"NOT FEELING WELL\". PLEASE REACH OUT TO PT AT EARLIEST CONVENIENCE. THANK YOU  "

## 2021-11-18 ENCOUNTER — HOSPITAL ENCOUNTER (EMERGENCY)
Facility: HOSPITAL | Age: 81
Discharge: HOME | End: 2021-11-18
Attending: EMERGENCY MEDICINE | Admitting: EMERGENCY MEDICINE

## 2021-11-18 ENCOUNTER — TELEPHONE (OUTPATIENT)
Dept: ENDOCRINOLOGY | Facility: CLINIC | Age: 81
End: 2021-11-18

## 2021-11-18 ENCOUNTER — APPOINTMENT (OUTPATIENT)
Dept: CT IMAGING | Facility: HOSPITAL | Age: 81
End: 2021-11-18

## 2021-11-18 ENCOUNTER — APPOINTMENT (OUTPATIENT)
Dept: GENERAL RADIOLOGY | Facility: HOSPITAL | Age: 81
End: 2021-11-18

## 2021-11-18 VITALS
SYSTOLIC BLOOD PRESSURE: 143 MMHG | OXYGEN SATURATION: 94 % | HEART RATE: 69 BPM | DIASTOLIC BLOOD PRESSURE: 68 MMHG | HEIGHT: 63 IN | WEIGHT: 222.8 LBS | RESPIRATION RATE: 18 BRPM | TEMPERATURE: 98.2 F | BODY MASS INDEX: 39.48 KG/M2

## 2021-11-18 DIAGNOSIS — N17.9 AKI (ACUTE KIDNEY INJURY) (HCC): ICD-10-CM

## 2021-11-18 DIAGNOSIS — R53.1 WEAKNESS: ICD-10-CM

## 2021-11-18 DIAGNOSIS — E87.1 HYPONATREMIA: ICD-10-CM

## 2021-11-18 DIAGNOSIS — N39.0 ACUTE UTI: Primary | ICD-10-CM

## 2021-11-18 LAB
ALBUMIN SERPL-MCNC: 3.3 G/DL (ref 3.5–5.2)
ALBUMIN/GLOB SERPL: 1 G/DL
ALP SERPL-CCNC: 134 U/L (ref 39–117)
ALT SERPL W P-5'-P-CCNC: 13 U/L (ref 1–33)
ANION GAP SERPL CALCULATED.3IONS-SCNC: 9.9 MMOL/L (ref 5–15)
AST SERPL-CCNC: 15 U/L (ref 1–32)
BACTERIA UR QL AUTO: ABNORMAL /HPF
BASOPHILS # BLD AUTO: 0.08 10*3/MM3 (ref 0–0.2)
BASOPHILS NFR BLD AUTO: 0.9 % (ref 0–1.5)
BILIRUB SERPL-MCNC: 0.4 MG/DL (ref 0–1.2)
BILIRUB UR QL STRIP: NEGATIVE
BUN SERPL-MCNC: 24 MG/DL (ref 8–23)
BUN/CREAT SERPL: 17.9 (ref 7–25)
CALCIUM SPEC-SCNC: 9.3 MG/DL (ref 8.6–10.5)
CHLORIDE SERPL-SCNC: 92 MMOL/L (ref 98–107)
CLARITY UR: CLEAR
CO2 SERPL-SCNC: 28.1 MMOL/L (ref 22–29)
COLOR UR: YELLOW
CREAT SERPL-MCNC: 1.34 MG/DL (ref 0.57–1)
DEPRECATED RDW RBC AUTO: 41.7 FL (ref 37–54)
EOSINOPHIL # BLD AUTO: 1.02 10*3/MM3 (ref 0–0.4)
EOSINOPHIL NFR BLD AUTO: 11.3 % (ref 0.3–6.2)
ERYTHROCYTE [DISTWIDTH] IN BLOOD BY AUTOMATED COUNT: 13.2 % (ref 12.3–15.4)
FLUAV RNA RESP QL NAA+PROBE: NOT DETECTED
FLUBV RNA RESP QL NAA+PROBE: NOT DETECTED
GFR SERPL CREATININE-BSD FRML MDRD: 38 ML/MIN/1.73
GLOBULIN UR ELPH-MCNC: 3.4 GM/DL
GLUCOSE SERPL-MCNC: 197 MG/DL (ref 65–99)
GLUCOSE UR STRIP-MCNC: NEGATIVE MG/DL
HBA1C MFR BLD: 9.3 % (ref 4.8–5.6)
HCT VFR BLD AUTO: 34.1 % (ref 34–46.6)
HGB BLD-MCNC: 11.1 G/DL (ref 12–15.9)
HGB UR QL STRIP.AUTO: NEGATIVE
HYALINE CASTS UR QL AUTO: ABNORMAL /LPF
IMM GRANULOCYTES # BLD AUTO: 0.06 10*3/MM3 (ref 0–0.05)
IMM GRANULOCYTES NFR BLD AUTO: 0.7 % (ref 0–0.5)
KETONES UR QL STRIP: NEGATIVE
LEUKOCYTE ESTERASE UR QL STRIP.AUTO: ABNORMAL
LIPASE SERPL-CCNC: 30 U/L (ref 13–60)
LYMPHOCYTES # BLD AUTO: 1.58 10*3/MM3 (ref 0.7–3.1)
LYMPHOCYTES NFR BLD AUTO: 17.5 % (ref 19.6–45.3)
MAGNESIUM SERPL-MCNC: 1.9 MG/DL (ref 1.6–2.4)
MCH RBC QN AUTO: 28.1 PG (ref 26.6–33)
MCHC RBC AUTO-ENTMCNC: 32.6 G/DL (ref 31.5–35.7)
MCV RBC AUTO: 86.3 FL (ref 79–97)
MONOCYTES # BLD AUTO: 0.93 10*3/MM3 (ref 0.1–0.9)
MONOCYTES NFR BLD AUTO: 10.3 % (ref 5–12)
NEUTROPHILS NFR BLD AUTO: 5.34 10*3/MM3 (ref 1.7–7)
NEUTROPHILS NFR BLD AUTO: 59.3 % (ref 42.7–76)
NITRITE UR QL STRIP: POSITIVE
NRBC BLD AUTO-RTO: 0 /100 WBC (ref 0–0.2)
PH UR STRIP.AUTO: 5.5 [PH] (ref 5–8)
PLATELET # BLD AUTO: 309 10*3/MM3 (ref 140–450)
PMV BLD AUTO: 9.4 FL (ref 6–12)
POTASSIUM SERPL-SCNC: 5.2 MMOL/L (ref 3.5–5.2)
PROT SERPL-MCNC: 6.7 G/DL (ref 6–8.5)
PROT UR QL STRIP: NEGATIVE
RBC # BLD AUTO: 3.95 10*6/MM3 (ref 3.77–5.28)
RBC # UR STRIP: ABNORMAL /HPF
REF LAB TEST METHOD: ABNORMAL
SARS-COV-2 RNA RESP QL NAA+PROBE: NOT DETECTED
SODIUM SERPL-SCNC: 130 MMOL/L (ref 136–145)
SP GR UR STRIP: 1.01 (ref 1–1.03)
SQUAMOUS #/AREA URNS HPF: ABNORMAL /HPF
TROPONIN T SERPL-MCNC: <0.01 NG/ML (ref 0–0.03)
UROBILINOGEN UR QL STRIP: ABNORMAL
WBC # UR STRIP: ABNORMAL /HPF
WBC NRBC COR # BLD: 9.01 10*3/MM3 (ref 3.4–10.8)

## 2021-11-18 PROCEDURE — 25010000002 CEFTRIAXONE SODIUM-DEXTROSE 1-3.74 GM-%(50ML) RECONSTITUTED SOLUTION: Performed by: PHYSICIAN ASSISTANT

## 2021-11-18 PROCEDURE — 87636 SARSCOV2 & INF A&B AMP PRB: CPT | Performed by: PHYSICIAN ASSISTANT

## 2021-11-18 PROCEDURE — 83690 ASSAY OF LIPASE: CPT | Performed by: PHYSICIAN ASSISTANT

## 2021-11-18 PROCEDURE — 83735 ASSAY OF MAGNESIUM: CPT | Performed by: PHYSICIAN ASSISTANT

## 2021-11-18 PROCEDURE — 81001 URINALYSIS AUTO W/SCOPE: CPT | Performed by: PHYSICIAN ASSISTANT

## 2021-11-18 PROCEDURE — 71045 X-RAY EXAM CHEST 1 VIEW: CPT

## 2021-11-18 PROCEDURE — 84484 ASSAY OF TROPONIN QUANT: CPT | Performed by: PHYSICIAN ASSISTANT

## 2021-11-18 PROCEDURE — 85025 COMPLETE CBC W/AUTO DIFF WBC: CPT | Performed by: PHYSICIAN ASSISTANT

## 2021-11-18 PROCEDURE — 87086 URINE CULTURE/COLONY COUNT: CPT | Performed by: PHYSICIAN ASSISTANT

## 2021-11-18 PROCEDURE — 25010000002 ONDANSETRON PER 1 MG: Performed by: PHYSICIAN ASSISTANT

## 2021-11-18 PROCEDURE — 80053 COMPREHEN METABOLIC PANEL: CPT | Performed by: PHYSICIAN ASSISTANT

## 2021-11-18 PROCEDURE — 83036 HEMOGLOBIN GLYCOSYLATED A1C: CPT | Performed by: PHYSICIAN ASSISTANT

## 2021-11-18 PROCEDURE — 87077 CULTURE AEROBIC IDENTIFY: CPT | Performed by: PHYSICIAN ASSISTANT

## 2021-11-18 PROCEDURE — 96365 THER/PROPH/DIAG IV INF INIT: CPT

## 2021-11-18 PROCEDURE — 96375 TX/PRO/DX INJ NEW DRUG ADDON: CPT

## 2021-11-18 PROCEDURE — 93005 ELECTROCARDIOGRAM TRACING: CPT | Performed by: PHYSICIAN ASSISTANT

## 2021-11-18 PROCEDURE — 99284 EMERGENCY DEPT VISIT MOD MDM: CPT

## 2021-11-18 PROCEDURE — 87186 SC STD MICRODIL/AGAR DIL: CPT | Performed by: PHYSICIAN ASSISTANT

## 2021-11-18 PROCEDURE — 70450 CT HEAD/BRAIN W/O DYE: CPT

## 2021-11-18 RX ORDER — CEFTRIAXONE 1 G/50ML
1 INJECTION, SOLUTION INTRAVENOUS ONCE
Status: COMPLETED | OUTPATIENT
Start: 2021-11-18 | End: 2021-11-18

## 2021-11-18 RX ORDER — ONDANSETRON 4 MG/1
4 TABLET, ORALLY DISINTEGRATING ORAL EVERY 8 HOURS PRN
Qty: 12 TABLET | Refills: 0 | Status: SHIPPED | OUTPATIENT
Start: 2021-11-18 | End: 2023-01-07 | Stop reason: SDUPTHER

## 2021-11-18 RX ORDER — ONDANSETRON 2 MG/ML
4 INJECTION INTRAMUSCULAR; INTRAVENOUS ONCE
Status: COMPLETED | OUTPATIENT
Start: 2021-11-18 | End: 2021-11-18

## 2021-11-18 RX ORDER — CEFDINIR 300 MG/1
300 CAPSULE ORAL 2 TIMES DAILY
Qty: 14 CAPSULE | Refills: 0 | Status: SHIPPED | OUTPATIENT
Start: 2021-11-18 | End: 2021-11-25

## 2021-11-18 RX ORDER — MECLIZINE HYDROCHLORIDE 25 MG/1
25 TABLET ORAL ONCE
Status: COMPLETED | OUTPATIENT
Start: 2021-11-18 | End: 2021-11-18

## 2021-11-18 RX ADMIN — SODIUM CHLORIDE 1000 ML: 9 INJECTION, SOLUTION INTRAVENOUS at 18:00

## 2021-11-18 RX ADMIN — CEFTRIAXONE 1 G: 1 INJECTION, SOLUTION INTRAVENOUS at 20:20

## 2021-11-18 RX ADMIN — ONDANSETRON HYDROCHLORIDE 4 MG: 2 INJECTION, SOLUTION INTRAMUSCULAR; INTRAVENOUS at 17:55

## 2021-11-18 RX ADMIN — MECLIZINE HYDROCHLORIDE 25 MG: 25 TABLET ORAL at 17:56

## 2021-11-18 NOTE — TELEPHONE ENCOUNTER
See the next message from her daughter that was received today  She is currently in there ER and will update us tomorrow

## 2021-11-18 NOTE — TELEPHONE ENCOUNTER
Adwoa states her mother has not been feeling well and they are currently in the ED at [facility] for an evaluation for dizziness and other things  She also states she has had diarrhea since starting ozempic and her blood sugars are erratic - going up and down with no apparent reason  Adwoa was advised we had discussed the ozempic with the patient by phone in August and told her to call back in 4-6 weeks if not better after decreasing to every other week and adding Immodium  Also advised her the pt canceled the OV that was scheduled for a FU  She is interested in scheduling a video visit for her mother and will ask the ED to do an A1C so it will be available  She ask that I call her tomorrow to get something scheduled and see the outcome of the ED visit

## 2021-11-18 NOTE — TELEPHONE ENCOUNTER
"PT'S DAUGHTER CALLED STATING SHE IS TAKING THIS PT TO THE ER DUE TO \"BALANCE ISSUES AND POSSIBLY BEING DEHYDRATED\". PT'S DAUGHTER REQUESTED A CALL BACK TO DISCUSS BS's AND DIARRHEA ASSOCIATED W/ TAKING OZEMPIC. SHE ALSO STATED PT'S BS RUNS AROUND 300 IN THE MORN. PLEASE CONFER W/ PROVIDER AND REACH OUT TO PT'S DAUGHTER.  "

## 2021-11-18 NOTE — ED PROVIDER NOTES
Subjective   History of Present Illness   Patient is a 89-year-old female with multiple comorbidities presenting to the ER with multiple complaints including nausea, increased vertigo and dizziness over the last couple of weeks and dysuria.  Patient's daughter is at bedside and states that the patient normally stays with her sister, patient's other daughter.  However, since she has had more trouble recently she came to visit and help out.  He states that they have a family member who is a healthcare provider and advised them to bring her to the ER as she could have a UTI or be dehydrated.  Patient's daughter is also concerned about a breakthrough Covid case as the patient has been vaccinated.  She states that she would like for the patient to have a generalized work-up.  Patient states that she does have known carotid stenosis that is monitored by her cardiologist and has ultrasounds performed every 6 months.  She denies any slurred speech, vision changes, fever, chest pain, unilateral weakness, or additional symptoms or complaints at this time.      Review of Systems   Constitutional: Positive for fatigue.   Gastrointestinal: Positive for nausea.   Genitourinary: Positive for dysuria.   Neurological: Positive for dizziness and weakness.   All other systems reviewed and are negative.      Past Medical History:   Diagnosis Date   • Abnormal ECG    • Anemia    • Back pain    • Bleeding nose 02/2020   • Body piercing     BOTH EARS   • Bronchitis    • Bulging lumbar disc    • Carotid artery stenosis     BILATERAL-FOLLOWING WITH DR. WINCHESTER.  SEES EVERY 6 MONTHS.  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   • Fatty liver 06/26/2017   • Fracture  "    RIGHT ANKLE AND LEFT ARM    • Frequency of urination    • GERD (gastroesophageal reflux disease)    • High cholesterol    • History of nuclear stress test 2016    \"IT WAS OK\"   • Hypertension    • Lumbosacral disc disease    • Macular degeneration    • Osteoarthritis    • Osteoporosis    • Sciatica    • Seasonal allergies    • Sleep apnea     CPAP HS - TO BRING IN DOS   • Swelling of both lower extremities    • Teeth missing     back teeth   • Thyroid activity decreased    • Wears glasses     FOR DISTANCE/and reading glasses       Allergies   Allergen Reactions   • Gabapentin Dizziness   • Betadine [Povidone Iodine] Rash       Past Surgical History:   Procedure Laterality Date   • ANKLE ARTHROSCOPY Right    • BREAST SURGERY Left     CYST REMOVED   • CARDIAC CATHETERIZATION      NO STENT   • CATARACT EXTRACTION Bilateral     2014 and 2015   • COLONOSCOPY  12/10/2012   • COLONOSCOPY N/A 2/22/2017    Procedure: COLONOSCOPY with hot and cold snare polypectomies, resolution clip placement, cold biopsy polypectomy;  Surgeon: Mg Awan MD;  Location: [facility] ENDOSCOPY;  Service:    • D & C HYSTEROSCOPY MYOSURE N/A 7/24/2020    Procedure: HYSTEROSCOPY, DILATION AND CURETTAGE , MYOSURE;  Surgeon: Fidel Elizalde MD;  Location: [facility] OR;  Service: Obstetrics/Gynecology;  Laterality: N/A;   • DILATION AND CURETTAGE, DIAGNOSTIC / THERAPEUTIC      X4   • ENDOSCOPY N/A 7/20/2017    Procedure: ESOPHAGOGASTRODUODENOSCOPY with biopsies and cold biopsy polypectomy;  Surgeon: Mg Awan MD;  Location: [facility] ENDOSCOPY;  Service:    • FRACTURE SURGERY Right     ANKLE   • ORIF WRIST FRACTURE Right 2/2/2018    Procedure: WRIST RIGHT OPEN REDUCTION INTERNAL FIXATION WITH VOLAR PLATE;  Surgeon: Phu Meneses MD;  Location: [facility] OR;  Service:    • TOTAL LAPAROSCOPIC HYSTERECTOMY SALPINGO OOPHORECTOMY N/A 8/31/2020    Procedure: RADICAL TYPE 1 TOTAL LAPAROSCOPIC HYSTERECTOMY BILATERAL SALPINGOOPHORECTOMY WITH DAVINCI " ROBOT, SENTINEL LYMPH NODE DISSECTION;  Surgeon: Wilma Jo MD;  Location: [facility];  Service: [facility];  Laterality: N/A;   • TOTAL SHOULDER ARTHROPLASTY W/ DISTAL CLAVICLE EXCISION Left 2/2/2018    Procedure: SHOULDER TOTAL LEFT  REVERSE ARTHROPLASTY WITH TUBEROSITY RECONSTRUCTION;  Surgeon: Phu Meneses MD;  Location: [facility] OR;  Service:    • TUBAL ABDOMINAL LIGATION  1980?   • UPPER GASTROINTESTINAL ENDOSCOPY  07/20/2017       Family History   Problem Relation Age of Onset   • Heart disease Other    • Cancer Other    • Prostate cancer Father    • Stroke Father    • Colon cancer Neg Hx        Social History     Socioeconomic History   • Marital status:    Tobacco Use   • Smoking status: Never Smoker   • Smokeless tobacco: Never Used   Substance and Sexual Activity   • Alcohol use: No   • Drug use: No   • Sexual activity: Defer           Objective   Physical Exam  Vitals and nursing note reviewed.   Constitutional:       General: She is not in acute distress.     Appearance: She is not toxic-appearing.   HENT:      Head: Normocephalic and atraumatic.      Right Ear: External ear normal.      Left Ear: External ear normal.      Nose: Nose normal.   Eyes:      Extraocular Movements: Extraocular movements intact.      Conjunctiva/sclera: Conjunctivae normal.   Cardiovascular:      Rate and Rhythm: Normal rate.      Heart sounds: Normal heart sounds.   Pulmonary:      Effort: Pulmonary effort is normal.      Breath sounds: Normal breath sounds.   Abdominal:      General: There is no distension.      Palpations: Abdomen is soft.      Tenderness: There is no abdominal tenderness. There is no guarding or rebound.   Musculoskeletal:         General: Normal range of motion.      Cervical back: Normal range of motion and neck supple.   Skin:     General: Skin is warm and dry.      Capillary Refill: Capillary refill takes less than 2 seconds.   Neurological:      General: No focal deficit present.       Mental Status: She is alert and oriented to person, place, and time.      Cranial Nerves: No cranial nerve deficit.      Sensory: No sensory deficit.      Motor: No weakness.      Coordination: Coordination normal.      Gait: Gait normal.   Psychiatric:         Mood and Affect: Mood normal.         Behavior: Behavior normal.         Procedures           ED Course  ED Course as of 11/18/21 2112   Thu Nov 18, 2021   1838 EKG interpreted by me.  Sinus rhythm.  Rate of 68.  No ST segment or T wave changes.  Right axis deviation.  Abnormal EKG [CG]   2110 Leukocytes, UA(!): Moderate (2+) [AP]   2110 Nitrite, UA(!): Positive [AP]   2110 WBC, UA(!): 6-12 [AP]   2110 Bacteria, UA(!): 4+ [AP]   2110 Squamous Epithelial Cells, UA: 0-2 [AP]   2110 COVID19: Not Detected [AP]   2110 Influenza A PCR: Not Detected [AP]   2110 Influenza B PCR: Not Detected [AP]   2110 Lipase: 30 [AP]   2110 Magnesium: 1.9 [AP]   2110 Troponin T: <0.010 [AP]   2110 WBC: 9.01 [AP]   2110 RBC: 3.95 [AP]   2110 Hemoglobin(!): 11.1 [AP]   2110 Hematocrit: 34.1 [AP]   2110 Platelets: 309 [AP]   2110 Hemoglobin A1C(!): 9.30 [AP]   2110 Glucose(!): 197 [AP]   2110 BUN(!): 24 [AP]   2110 Creatinine(!): 1.34 [AP]   2110 Sodium(!): 130 [AP]   2110 Potassium: 5.2 [AP]   2110 Chloride(!): 92 [AP]   2110 CO2: 28.1 [AP]   2110 Calcium: 9.3 [AP]   2110 Total Protein: 6.7 [AP]   2110 Albumin(!): 3.30 [AP]   2110 ALT (SGPT): 13 [AP]   2110 AST (SGOT): 15 [AP]   2110 Alkaline Phosphatase(!): 134 [AP]   2110 Total Bilirubin: 0.4 [AP]   2110 BUN/Creatinine Ratio: 17.9 [AP]   2110 Anion Gap: 9.9 [AP]   2111 Narrative & Impression  FINAL REPORT     TECHNIQUE:  Multiple axial CT images were performed from the foramen magnum  to the vertex. This study was performed with techniques to keep  radiation doses as low as reasonably achievable (ALARA).  Individualized dose reduction techniques using automated  exposure control or adjustment of mA and/or kV according to  the  patient's size were employed.     CLINICAL HISTORY:  increasing vertigo over the last couple weeks     FINDINGS:  There is mild generalized cerebral atrophy.  There is decreased  attenuation in the white matter, consistent with mild small  vessel chronic ischemic change.  The ventricles are enlarged.  There is no evidence of edema or hemorrhage.  No masses are  identified.  No extra-axial fluid is seen.  The paranasal  sinuses are unremarkable.     IMPRESSION:  Atrophy and chronic changes without acute process.     Authenticated by Nicolas Navarrete MD on 11/18/2021 06:35:27 PM          Specimen Collected: 11/18/21 18:35         [AP]      ED Course User Index  [AP] Laquita Napoles, SHANITA  [CG] Carlos Alberto Mcdermott, DO                                           MDM   Patient was evaluated in the ER for generalized weakness.  Vitals are within normal limits.  Labs remarkable for elevated creatinine, hyponatremia, hypochloremia, hemoglobin of 11.  Hemoglobin A1c was obtained per request of the patient's daughter.  It is 9.3.  Patient has been following with endocrinology to try to get blood glucose optimized.  Urinalysis is indicative of a UTI with positive nitrites, 4+ bacteria, pyuria, 0-2 squamous cells.  Urine culture was sent.  Patient was given 1 g IV Rocephin in the ER.  She was sent home with prescription for cefdinir.  She was advised to follow-up with her PCP as soon as possible for reevaluation.  Precautions were given for return to the ER for any new or worsening symptoms.    Final diagnoses:   Acute UTI   Hyponatremia   Weakness   CELIA (acute kidney injury) (HCC)       ED Disposition  ED Disposition     ED Disposition Condition Comment    Discharge Stable           Sunitha Malave MD  [address] [facility] [address]  [phone]    Schedule an appointment as soon as possible for a visit   for re-evaluation of today's complaint    [facility]  [address]  [facility] [id number] [phone]  Go to   As needed, If symptoms worsen         Medication List      New Prescriptions    cefdinir 300 MG capsule  Commonly known as: OMNICEF  Take 1 capsule by mouth 2 (Two) Times a Day for 7 days.     ondansetron ODT 4 MG disintegrating tablet  Commonly known as: ZOFRAN-ODT  Place 1 tablet on the tongue Every 8 (Eight) Hours As Needed for Nausea or Vomiting.           Where to Get Your Medications      These medications were sent to [facility] [address] [place] [address] - [address] [place] [address] [phone]  - [phone]  [address] [facility] [address]    Phone: [phone]   · cefdinir 300 MG capsule  · ondansetron ODT 4 MG disintegrating tablet          Laquita Napoles, PAJudieC  11/18/21 [id number]

## 2021-11-19 RX ORDER — SEMAGLUTIDE 1.34 MG/ML
0.25 INJECTION, SOLUTION SUBCUTANEOUS WEEKLY
Qty: 1.5 ML | Refills: 2 | Status: SHIPPED | OUTPATIENT
Start: 2021-11-19 | End: 2021-12-29 | Stop reason: SDUPTHER

## 2021-11-19 NOTE — TELEPHONE ENCOUNTER
Daughter Concepción states she took the patient to the ER last night for unbalanced, dizziness and some confusion.  She was diagnosed with UTI and dehydration.  SHe ask for an A1C to be done as well because her blood sugars have been elevated  A1C was 9.3  She is faxing blood sugars today for review and has scheduled video visit with [facility] on 12-29-21 for review  She states the diarrhea is better now and she has increased the ozempic back to once per week and the pt added back humalog on her own.  She did have steroid injections in October as well  She needs rx for ozempic and if there is any recommendations about blood sugars to call [facility] (she lives in [place])

## 2021-11-20 LAB — BACTERIA SPEC AEROBE CULT: ABNORMAL

## 2021-11-22 NOTE — TELEPHONE ENCOUNTER
Please call and set up appt if they are willing  a1c in the emergency room was 9.3 % (up from 6% in June at last ov)  Thanks, vipul

## 2021-12-29 ENCOUNTER — TELEMEDICINE (OUTPATIENT)
Dept: ENDOCRINOLOGY | Facility: CLINIC | Age: 81
End: 2021-12-29

## 2021-12-29 DIAGNOSIS — Z79.4 TYPE 2 DIABETES MELLITUS WITH DIABETIC POLYNEUROPATHY, WITH LONG-TERM CURRENT USE OF INSULIN (HCC): Primary | ICD-10-CM

## 2021-12-29 DIAGNOSIS — N28.9 RENAL INSUFFICIENCY: ICD-10-CM

## 2021-12-29 DIAGNOSIS — E11.42 TYPE 2 DIABETES MELLITUS WITH DIABETIC POLYNEUROPATHY, WITH LONG-TERM CURRENT USE OF INSULIN (HCC): Primary | ICD-10-CM

## 2021-12-29 DIAGNOSIS — E03.9 ACQUIRED HYPOTHYROIDISM: ICD-10-CM

## 2021-12-29 PROBLEM — Z85.42 HISTORY OF MALIGNANT NEOPLASM OF UTERINE BODY: Status: ACTIVE | Noted: 2021-08-23

## 2021-12-29 PROCEDURE — 99214 OFFICE O/P EST MOD 30 MIN: CPT | Performed by: INTERNAL MEDICINE

## 2021-12-29 RX ORDER — INSULIN GLARGINE 100 [IU]/ML
INJECTION, SOLUTION SUBCUTANEOUS
Qty: 30 ML | Refills: 1 | Status: SHIPPED | OUTPATIENT
Start: 2021-12-29 | End: 2023-03-24

## 2021-12-29 RX ORDER — GLIMEPIRIDE 2 MG/1
2 TABLET ORAL EVERY MORNING
Qty: 30 TABLET | Refills: 11 | Status: SHIPPED | OUTPATIENT
Start: 2021-12-29 | End: 2022-11-10

## 2021-12-29 RX ORDER — SEMAGLUTIDE 1.34 MG/ML
0.5 INJECTION, SOLUTION SUBCUTANEOUS WEEKLY
Qty: 1.5 ML | Refills: 2 | Status: SHIPPED | OUTPATIENT
Start: 2021-12-29 | End: 2022-06-10

## 2021-12-29 NOTE — PROGRESS NOTES
Kathryn Davison 81 y.o.  CC: Follow-up and Diabetes (uncontrolled Type 2 diabetes )      [employer] [place]  [facility] [place]: Follow-up and Diabetes (uncontrolled Type 2 diabetes )  You have chosen to receive care through a telehealth visit.  Do you consent to use a video/audio connection for your medical care today? Yes  This was an audio and video enabled telemedicine encounter conducted with patient consent and in compliance with patient concerns about safety during pandemic    Higher sugars   Has had UTI and dehydration  Fasting sugars 115-118  As low as 86 in the morning  Higher in the afternoon   Food is not as disciplined  She is taking ozempic and lantus  Has had URI - just checked last week     Allergies   Allergen Reactions   • Gabapentin Dizziness   • Betadine [Povidone Iodine] Rash       Current Outpatient Medications:   •  albuterol (PROVENTIL HFA;VENTOLIN HFA) 108 (90 Base) MCG/ACT inhaler, Inhale 2 puffs Every 4 (Four) Hours As Needed for Wheezing., Disp: , Rfl:   •  amLODIPine (NORVASC) 5 MG tablet, Take 1 tablet by mouth Daily., Disp: , Rfl:   •  Calcium Citrate-Vitamin D (CALCIUM + D PO), Take 1 tablet by mouth Daily., Disp: , Rfl:   •  carvedilol (COREG) 12.5 MG tablet, Take 1 tablet by mouth 2 (Two) Times a Day With Meals., Disp: , Rfl:   •  CloNIDine (CATAPRES) 0.1 MG tablet, Take 0.1 mg by mouth 2 (Two) Times a Day As Needed for High Blood Pressure., Disp: , Rfl:   •  clopidogrel (PLAVIX) 75 MG tablet, Take 75 mg by mouth Daily., Disp: , Rfl:   •  Continuous Blood Gluc Sensor (FreeStyle Gretchen 2 Sensor) misc, 1 each Every 14 (Fourteen) Days., Disp: 2 each, Rfl: 5  •  docusate sodium 100 MG capsule, Take 2 capsules by mouth 2 (Two) Times a Day., Disp: 60 each, Rfl: 3  •  doxycycline (VIBRAMYCIN) 100 MG capsule, Take 1 capsule by mouth 2 (Two) Times a Day., Disp: 14 capsule, Rfl: 0  •  escitalopram (LEXAPRO) 10 MG tablet, Take 5 mg by mouth Daily., Disp: , Rfl:   •  Furosemide (LASIX PO), Take 20 mg by mouth Daily As  Needed., Disp: , Rfl:   •  glimepiride (Amaryl) 2 MG tablet, Take 1 tablet by mouth Every Morning., Disp: 30 tablet, Rfl: 11  •  Insulin Glargine (Lantus SoloStar) 100 UNIT/ML injection pen, INJECT 27 UNITS SUBCUTANEOUSLY ONCE DAILY, Disp: 30 mL, Rfl: 1  •  losartan (COZAAR) 50 MG tablet, Take 50 mg by mouth Daily., Disp: , Rfl:   •  montelukast (SINGULAIR) 10 MG tablet, Take 10 mg by mouth Every Night., Disp: , Rfl:   •  Multiple Vitamins-Minerals (MULTIVITAMIN ADULT PO), Take 1 tablet by mouth Daily., Disp: , Rfl:   •  ondansetron ODT (ZOFRAN-ODT) 4 MG disintegrating tablet, Place 1 tablet on the tongue Every 8 (Eight) Hours As Needed for Nausea or Vomiting., Disp: 12 tablet, Rfl: 0  •  polyethylene glycol (MIRALAX) packet, Take 17 g by mouth As Needed., Disp: , Rfl:   •  potassium chloride (MICRO-K) 10 MEQ CR capsule, potassium chloride ER 10 mEq tablet,extended release  TAKE ONE TABLET BY MOUTH ONCE DAILY ON  THE  DAYS  YOU  TAKE  LASIX  (FUROSEMIDE), Disp: , Rfl:   •  pravastatin (PRAVACHOL) 80 MG tablet, Take 80 mg by mouth Daily., Disp: , Rfl:   •  Semaglutide,0.25 or 0.5MG/DOS, (Ozempic, 0.25 or 0.5 MG/DOSE,) 2 MG/1.5ML solution pen-injector, Inject 0.5 mg under the skin into the appropriate area as directed 1 (One) Time Per Week., Disp: 1.5 mL, Rfl: 2  •  spironolactone-hydrochlorothiazide (Aldactazide) 25-25 MG tablet, Take 1 tablet by mouth Daily., Disp: 90 tablet, Rfl: 1  Patient Active Problem List    Diagnosis    • Renal insufficiency [N28.9]    • 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]    • Sleep apnea [G47.30]    • Endometrial cancer (HCC) [C54.1]    • PMB (postmenopausal bleeding) [N95.0]    • Thickened endometrium [R93.89]    • Anemia [D64.9]    • Heartburn [R12]    • Iron deficiency anemia [D50.9]    • Non Q wave myocardial infarction (HCC) [I21.4]    • Status post total shoulder replacement, left [Z96.612]    • Right wrist fracture [S62.101A]    • Type 2 diabetes mellitus (HCC)  [E11.9]    • Hypertensive disorder [I10]    • Hypothyroidism [E03.9]    • Morbid obesity with BMI of 40.0-44.9, adult (HCC) [E66.01, Z68.41]    • Closed fracture of left shoulder [S42.92XA]    • Gastritis without bleeding [K29.70]    • Abnormal ultrasound of liver [R93.2]    • Gastric polyp [K31.7]    • Dyspnea [R06.00]    • Edema [R60.9]    • Left upper quadrant pain [R10.12]    • Diverticulosis of large intestine without hemorrhage [K57.30]    • Colon polyps [K63.5]    • Vascular ectasia of colon [K55.20]    • Internal hemorrhoids [K64.8]    • External hemorrhoid [K64.4]    • Bilateral pseudophakia [Z96.1]    • Type 2 diabetes mellitus with diabetic polyneuropathy, without long-term current use of insulin ([facility]) [E11.42]    • Left carotid artery occlusion [I65.22]    • Left carotid artery stenosis [I65.22]    • Abnormal blood chemistry level [R79.9]    • Osteoporosis [M81.0]    • Proliferative diabetic retinopathy associated with type 2 diabetes mellitus (HCC) [E11.3599]    • Carotid artery occlusion [I65.29]    • Carotid artery stenosis [I65.29]    • Hyperlipidemia [E78.5]    • Lack of energy [R53.83]    • After-cataract [H26.40]      Review of Systems   Constitutional: Negative for activity change, appetite change and unexpected weight change.   HENT: Negative for congestion and rhinorrhea.    Eyes: Negative for visual disturbance.   Respiratory: Positive for cough. Negative for shortness of breath.    Cardiovascular: Positive for leg swelling. Negative for palpitations.   Gastrointestinal: Negative for constipation, diarrhea and nausea.   Genitourinary: Negative for hematuria.   Musculoskeletal: Positive for arthralgias. Negative for back pain, gait problem, joint swelling and myalgias.   Skin: Negative for color change, rash and wound.   Allergic/Immunologic: Negative for environmental allergies, food allergies and immunocompromised state.   Neurological: Negative for dizziness, weakness and light-headedness.    Psychiatric/Behavioral: Negative for confusion, decreased concentration, dysphoric mood and sleep disturbance. The patient is not nervous/anxious.      Social History     Socioeconomic History   • Marital status:    Tobacco Use   • Smoking status: Never Smoker   • Smokeless tobacco: Never Used   Substance and Sexual Activity   • Alcohol use: No   • Drug use: No   • Sexual activity: Defer     Family History   Problem Relation Age of Onset   • Heart disease Other    • Cancer Other    • Prostate cancer Father    • Stroke Father    • Colon cancer Neg Hx      LMP  (LMP Unknown)   Physical Exam  Constitutional:       Appearance: Normal appearance.   Eyes:      Extraocular Movements: Extraocular movements intact.      Pupils: Pupils are equal, round, and reactive to light.   Pulmonary:      Effort: Pulmonary effort is normal. No respiratory distress.   Neurological:      General: No focal deficit present.      Mental Status: She is alert and oriented to person, place, and time.   Psychiatric:         Mood and Affect: Mood normal.         Behavior: Behavior normal.       Results for orders placed or performed during the hospital encounter of 11/18/21   COVID-19 and FLU A/B PCR - Swab, Nasopharynx    Specimen: Nasopharynx; Swab   Result Value Ref Range    COVID19 Not Detected Not Detected - Ref. Range    Influenza A PCR Not Detected Not Detected    Influenza B PCR Not Detected Not Detected   Urine Culture - Urine, Urine, Clean Catch    Specimen: Urine, Clean Catch   Result Value Ref Range    Urine Culture >100,000 CFU/mL Escherichia coli (A)        Susceptibility    Escherichia coli - KAM     Ampicillin >=32 Resistant ug/ml     Ampicillin + Sulbactam 16 Intermediate ug/ml     Cefazolin <=4 Susceptible ug/ml     Cefepime <=1 Susceptible ug/ml     Ceftazidime <=1 Susceptible ug/ml     Ceftriaxone <=1 Susceptible ug/ml     Gentamicin <=1 Susceptible ug/ml     Levofloxacin <=0.12 Susceptible ug/ml     Nitrofurantoin  <=16 Susceptible ug/ml     Piperacillin + Tazobactam <=4 Susceptible ug/ml     Tetracycline >=16 Resistant ug/ml     Trimethoprim + Sulfamethoxazole <=20 Susceptible ug/ml   Comprehensive Metabolic Panel    Specimen: Blood   Result Value Ref Range    Glucose 197 (H) 65 - 99 mg/dL    BUN 24 (H) 8 - 23 mg/dL    Creatinine 1.34 (H) 0.57 - 1.00 mg/dL    Sodium 130 (L) 136 - 145 mmol/L    Potassium 5.2 3.5 - 5.2 mmol/L    Chloride 92 (L) 98 - 107 mmol/L    CO2 28.1 22.0 - 29.0 mmol/L    Calcium 9.3 8.6 - 10.5 mg/dL    Total Protein 6.7 6.0 - 8.5 g/dL    Albumin 3.30 (L) 3.50 - 5.20 g/dL    ALT (SGPT) 13 1 - 33 U/L    AST (SGOT) 15 1 - 32 U/L    Alkaline Phosphatase 134 (H) 39 - 117 U/L    Total Bilirubin 0.4 0.0 - 1.2 mg/dL    eGFR Non African Amer 38 (L) >60 mL/min/1.73    Globulin 3.4 gm/dL    A/G Ratio 1.0 g/dL    BUN/Creatinine Ratio 17.9 7.0 - 25.0    Anion Gap 9.9 5.0 - 15.0 mmol/L   Lipase    Specimen: Blood   Result Value Ref Range    Lipase 30 13 - 60 U/L   Urinalysis With Culture If Indicated - Urine, Clean Catch    Specimen: Urine, Clean Catch   Result Value Ref Range    Color, UA Yellow Yellow, Straw    Appearance, UA Clear Clear    pH, UA 5.5 5.0 - 8.0    Specific Gravity, UA 1.008 1.005 - 1.030    Glucose, UA Negative Negative    Ketones, UA Negative Negative    Bilirubin, UA Negative Negative    Blood, UA Negative Negative    Protein, UA Negative Negative    Leuk Esterase, UA Moderate (2+) (A) Negative    Nitrite, UA Positive (A) Negative    Urobilinogen, UA 0.2 E.U./dL 0.2 - 1.0 E.U./dL   Troponin    Specimen: Blood   Result Value Ref Range    Troponin T <0.010 0.000 - 0.030 ng/mL   Magnesium    Specimen: Blood   Result Value Ref Range    Magnesium 1.9 1.6 - 2.4 mg/dL   CBC Auto Differential    Specimen: Blood   Result Value Ref Range    WBC 9.01 3.40 - 10.80 10*3/mm3    RBC 3.95 3.77 - 5.28 10*6/mm3    Hemoglobin 11.1 (L) 12.0 - 15.9 g/dL    Hematocrit 34.1 34.0 - 46.6 %    MCV 86.3 79.0 - 97.0 fL    MCH  28.1 26.6 - 33.0 pg    MCHC 32.6 31.5 - 35.7 g/dL    RDW 13.2 12.3 - 15.4 %    RDW-SD 41.7 37.0 - 54.0 fl    MPV 9.4 6.0 - 12.0 fL    Platelets 309 140 - 450 10*3/mm3    Neutrophil % 59.3 42.7 - 76.0 %    Lymphocyte % 17.5 (L) 19.6 - 45.3 %    Monocyte % 10.3 5.0 - 12.0 %    Eosinophil % 11.3 (H) 0.3 - 6.2 %    Basophil % 0.9 0.0 - 1.5 %    Immature Grans % 0.7 (H) 0.0 - 0.5 %    Neutrophils, Absolute 5.34 1.70 - 7.00 10*3/mm3    Lymphocytes, Absolute 1.58 0.70 - 3.10 10*3/mm3    Monocytes, Absolute 0.93 (H) 0.10 - 0.90 10*3/mm3    Eosinophils, Absolute 1.02 (H) 0.00 - 0.40 10*3/mm3    Basophils, Absolute 0.08 0.00 - 0.20 10*3/mm3    Immature Grans, Absolute 0.06 (H) 0.00 - 0.05 10*3/mm3    nRBC 0.0 0.0 - 0.2 /100 WBC   Hemoglobin A1c    Specimen: Blood   Result Value Ref Range    Hemoglobin A1C 9.30 (H) 4.80 - 5.60 %   Urinalysis, Microscopic Only - Urine, Clean Catch    Specimen: Urine, Clean Catch   Result Value Ref Range    RBC, UA None Seen None Seen /HPF    WBC, UA 6-12 (A) None Seen /HPF    Bacteria, UA 4+ (A) None Seen /HPF    Squamous Epithelial Cells, UA 0-2 None Seen, 0-2 /HPF    Hyaline Casts, UA None Seen None Seen /LPF    Methodology Manual Light Microscopy      Diagnoses and all orders for this visit:    1. Type 2 diabetes mellitus with diabetic polyneuropathy, with long-term current use of insulin (HCC) (Primary)  Assessment & Plan:  Reviewed higher average sugar in November  Doing well on current dose ozempic  Has raised lantus  Will raise ozempic to 0.5 mg weekly   Call if diarrhea or other GI intolerance  Add amaryl 2 mg daily   Lower lantus to 30 u and avoid sugar below 80      2. Acquired hypothyroidism  Assessment & Plan:  Continue supplement  Update at next ov       3. Renal insufficiency  Assessment & Plan:  Had lower function- taking spironolactone/hctz and had vomiting and diarrhea at the time  Caution with use of ozempic and diuretics when ill reviewed, holding these medications discussed  if she is unable to take in normal po intake  She has had swelling off diuretic and has resumed  Multifactorial nature of swelling discussed as well as taking minimum dose needed of diuretic (1/2 daily? 1 every 2-3 days?) to control her symptoms       Other orders  -     Insulin Glargine (Lantus SoloStar) 100 UNIT/ML injection pen; INJECT 27 UNITS SUBCUTANEOUSLY ONCE DAILY  Dispense: 30 mL; Refill: 1  -     Semaglutide,0.25 or 0.5MG/DOS, (Ozempic, 0.25 or 0.5 MG/DOSE,) 2 MG/1.5ML solution pen-injector; Inject 0.5 mg under the skin into the appropriate area as directed 1 (One) Time Per Week.  Dispense: 1.5 mL; Refill: 2  -     glimepiride (Amaryl) 2 MG tablet; Take 1 tablet by mouth Every Morning.  Dispense: 30 tablet; Refill: 11  Return in about 3 months (around 3/29/2022) for Recheck.    Grace Duran MD  Signed Grace Duran MD

## 2021-12-29 NOTE — ASSESSMENT & PLAN NOTE
Reviewed higher average sugar in November  Doing well on current dose ozempic  Has raised lantus  Will raise ozempic to 0.5 mg weekly   Call if diarrhea or other GI intolerance  Add amaryl 2 mg daily   Lower lantus to 30 u and avoid sugar below 80

## 2021-12-29 NOTE — ASSESSMENT & PLAN NOTE
Had lower function- taking spironolactone/hctz and had vomiting and diarrhea at the time  Caution with use of ozempic and diuretics when ill reviewed, holding these medications discussed if she is unable to take in normal po intake  She has had swelling off diuretic and has resumed  Multifactorial nature of swelling discussed as well as taking minimum dose needed of diuretic (1/2 daily? 1 every 2-3 days?) to control her symptoms

## 2021-12-29 NOTE — TELEPHONE ENCOUNTER
I called patient and let her know results per Dr. Jo's message. She v/u.    She states one of her incisions is draining I advised she keep that one covered. Uncover the rest that are not draining. She states they are itching I advised stop using tape and put some hydrocortisone on the itchy parts but not the actual wound. She will keep her scheduled post op.    Humira Pregnancy And Lactation Text: This medication is Pregnancy Category B and is considered safe during pregnancy. It is unknown if this medication is excreted in breast milk.

## 2022-02-13 RX ORDER — SPIRONOLACTONE AND HYDROCHLOROTHIAZIDE 25; 25 MG/1; MG/1
TABLET ORAL
Qty: 90 TABLET | Refills: 0 | Status: SHIPPED | OUTPATIENT
Start: 2022-02-13 | End: 2022-07-25

## 2022-03-09 ENCOUNTER — OFFICE VISIT (OUTPATIENT)
Dept: GYNECOLOGIC ONCOLOGY | Facility: CLINIC | Age: 82
End: 2022-03-09

## 2022-03-09 VITALS
HEART RATE: 62 BPM | WEIGHT: 224.8 LBS | BODY MASS INDEX: 39.83 KG/M2 | HEIGHT: 63 IN | OXYGEN SATURATION: 98 % | TEMPERATURE: 96.9 F | SYSTOLIC BLOOD PRESSURE: 139 MMHG | DIASTOLIC BLOOD PRESSURE: 65 MMHG | RESPIRATION RATE: 19 BRPM

## 2022-03-09 DIAGNOSIS — N39.41 URGE URINARY INCONTINENCE: ICD-10-CM

## 2022-03-09 DIAGNOSIS — Z85.42 HISTORY OF ENDOMETRIAL CANCER: Primary | ICD-10-CM

## 2022-03-09 PROBLEM — N95.0 PMB (POSTMENOPAUSAL BLEEDING): Status: RESOLVED | Noted: 2020-06-24 | Resolved: 2022-03-09

## 2022-03-09 PROBLEM — R93.89 THICKENED ENDOMETRIUM: Status: RESOLVED | Noted: 2020-06-24 | Resolved: 2022-03-09

## 2022-03-09 PROCEDURE — 99213 OFFICE O/P EST LOW 20 MIN: CPT | Performed by: NURSE PRACTITIONER

## 2022-03-09 RX ORDER — OXYBUTYNIN CHLORIDE 5 MG/1
5 TABLET, EXTENDED RELEASE ORAL DAILY
Qty: 30 TABLET | Refills: 5 | Status: SHIPPED | OUTPATIENT
Start: 2022-03-09 | End: 2022-09-08 | Stop reason: SDUPTHER

## 2022-03-09 NOTE — PROGRESS NOTES
GYN ONCOLOGY CANCER SURVEILLANCE FOLLOW-UP    Kathryn Davison  [id number]  1940    Subjective   Chief Complaint: Uterine Cancer and Bladder Problem (Frequency/urge/leaking)        History of present illness:     Kathryn Davison is a 82 y.o. year old female who is here today for ongoing surveillance of Endometrial Cancer, see Cancer History. She is 1.5 years out from completion of treatment with surgery alone. Upon arrival today she c/o worsening bladder frequency, urgency, and leaking. She reports having to empty every 2 hours during the day, increased nocturia interrupting her sleep, and bladder leaking when trying to make it to the bathroom at night. Otherwise, she is feeling generally well today. She denies vaginal bleeding, pelvic pain, concerning lesions, or changes in bowel function.           Cancer History:   Oncology/Hematology History   Endometrial cancer (HCC)   7/24/2020 Procedure    Hysteroscopy, D&C, and MyoSure by Dr. Elizalde for postmenopausal bleeding. Pathology showed sections with adenocarcinoma, enlarged vascular neclei, prominent mitotic activity, and focal areas of complex hyperplasia with atypia. Referred to Gyn Oncology.      8/31/2020 Surgery    Radical Type 1 RTLH/BSO and sentinel lymph node dissection.     Final pathology showed residual 0.2 cm grade 2 endometrioid tumor, non-invasive into the myometrium. No lymphvascular invasion. Nodes negative. MSI testing showed loss of MLH1 and PMS2 (per previous biopsy). 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  Copy of Survivorship Care Plan provided to patient and primary care provider.           The current medication list and allergy list were reviewed and reconciled.     Past Medical History, Past Surgical History, Social History, Family History have been reviewed and are without significant changes except as  "mentioned.      Review of Systems   Constitutional: Negative.    Gastrointestinal: Negative.    Genitourinary: Positive for enuresis, frequency and urgency.         Objective   Physical Exam  Vital Signs: /65   Pulse 62   Temp 96.9 °F (36.1 °C) (Temporal)   Resp 19   Ht 160 cm (62.99\")   Wt 102 kg (224 lb 12.8 oz)   LMP  (LMP Unknown)   SpO2 98%   BMI 39.83 kg/m²   Vitals:    03/09/22 1030   PainSc: 0-No pain           General Appearance:  alert, cooperative, no apparent distress, appears stated age and obese by BMI criteria   Neurologic/Psychiatric: A&O x 3, gait steady, appropriate affect   HEENT:  Normocephalic, without obvious abnormality, mucous membranes moist   Abdomen:   Soft, non-tender, non-distended, no organomegaly and Exam limited d/t habitus.   Lymph nodes: No cervical, supraclavicular, inguinal adenopathy noted   Pelvic: External Genitalia  atrophic, without lesions  Vagina  is pale, atrophic.   Vaginal Cuff  Female Vaginal Cuff: smooth, intact and without visible lesions  Uterus  surgically absent, no palpable masses and exam limited d/t adiposity  Ovaries  surgically absent bilaterally  Parametria  smooth  Rectovaginal  Female rectovaginal: deferred     ECOG score: 0             PHQ-9 Total Score: 0           Assessment and Plan:    Diagnoses and all orders for this visit:    1. History of endometrial cancer (Primary)    2. Urge urinary incontinence  -     oxybutynin XL (DITROPAN-XL) 5 MG 24 hr tablet; Take 1 tablet by mouth Daily.  Dispense: 30 tablet; Refill: 5        There is no evidence of disease upon today's exam. Plan for one more 3 month visit to reach the 2 year rianna, then yearly. She is understanding to call with any changes in pelvic symptoms or general GYN concerns at any time between regularly scheduled visits.     Patient and I discussed her bladder changes, consistent with urge UI/overactive bladder. We discussed medical management options and patient is interested in " trial of oxybutynin. We reviewed medication instructions, risks, benefits, and potential side effects. Dose can be increased as needed if tolerating well but symptoms persistent. She v/u. New Rx sent to pharmacy.     Pain assessment was performed today as a part of patient’s care.  For patients with pain related to surgery, gynecologic malignancy or cancer treatment, the plan is as noted in the assessment/plan.  For patients with pain not related to these issues, they are to seek any further needed care from a more appropriate provider, such as PCP.      Follow-up:     Return to clinic in 6 months for ongoing cancer surveillance and medication follow-up.      Electronically signed by NANNETTE Reddy on 03/09/22 at 11:04 EST

## 2022-03-30 ENCOUNTER — OFFICE VISIT (OUTPATIENT)
Dept: ENDOCRINOLOGY | Facility: CLINIC | Age: 82
End: 2022-03-30

## 2022-03-30 VITALS
SYSTOLIC BLOOD PRESSURE: 128 MMHG | OXYGEN SATURATION: 98 % | HEART RATE: 67 BPM | BODY MASS INDEX: 38.8 KG/M2 | WEIGHT: 219 LBS | DIASTOLIC BLOOD PRESSURE: 78 MMHG | HEIGHT: 63 IN

## 2022-03-30 DIAGNOSIS — Z79.4 TYPE 2 DIABETES MELLITUS WITH DIABETIC POLYNEUROPATHY, WITH LONG-TERM CURRENT USE OF INSULIN: Primary | ICD-10-CM

## 2022-03-30 DIAGNOSIS — E78.00 PURE HYPERCHOLESTEROLEMIA: ICD-10-CM

## 2022-03-30 DIAGNOSIS — E11.42 TYPE 2 DIABETES MELLITUS WITH DIABETIC POLYNEUROPATHY, WITH LONG-TERM CURRENT USE OF INSULIN: Primary | ICD-10-CM

## 2022-03-30 DIAGNOSIS — N28.9 RENAL INSUFFICIENCY: ICD-10-CM

## 2022-03-30 LAB
EXPIRATION DATE: ABNORMAL
EXPIRATION DATE: NORMAL
GLUCOSE BLDC GLUCOMTR-MCNC: 145 MG/DL (ref 70–130)
HBA1C MFR BLD: 7.9 %
Lab: ABNORMAL
Lab: NORMAL

## 2022-03-30 PROCEDURE — 83036 HEMOGLOBIN GLYCOSYLATED A1C: CPT | Performed by: INTERNAL MEDICINE

## 2022-03-30 PROCEDURE — 3051F HG A1C>EQUAL 7.0%<8.0%: CPT | Performed by: INTERNAL MEDICINE

## 2022-03-30 PROCEDURE — 82947 ASSAY GLUCOSE BLOOD QUANT: CPT | Performed by: INTERNAL MEDICINE

## 2022-03-30 PROCEDURE — 99214 OFFICE O/P EST MOD 30 MIN: CPT | Performed by: INTERNAL MEDICINE

## 2022-03-30 NOTE — PROGRESS NOTES
Kathryn Davison 82 y.o.  CC: Diabetes (Type II) and Hyperlipidemia      [place]: Diabetes (Type II) and Hyperlipidemia    Blood sugar and 90 day average sugar reviewed  Results for orders placed or performed in visit on 03/30/22   POC Glucose, Blood    Specimen: Blood   Result Value Ref Range    Glucose 145 (A) 70 - 130 mg/dL    Lot Number [id number]     Expiration Date 07/14/22    POC Glycosylated Hemoglobin (Hb A1C)    Specimen: Blood   Result Value Ref Range    Hemoglobin A1C 7.9 %    Lot Number [id number]     Expiration Date 10/12/23      Better blood sugar control currently   Low fasting sugars - discussed reducing lantus to 24 units  Has lost about 5 lbs with ozempic  Harder to see with left eye - like a fog   Continued adjustment of lantus discussed with reduction by 2 units every 2-3 days until morning sugar is always over 80   Goal no sugar below 80 discussed  She has f/u with Dr Malave in April and will have blood tests then   She has to have one of her family drive her here and would like telehealth every other visit  She is active on [employer], discussed downloading zoom to her device and logging in with the [employer] liseth    Allergies   Allergen Reactions   • Gabapentin Dizziness   • Betadine [Povidone Iodine] Rash       Current Outpatient Medications:   •  albuterol (PROVENTIL HFA;VENTOLIN HFA) 108 (90 Base) MCG/ACT inhaler, Inhale 2 puffs Every 4 (Four) Hours As Needed for Wheezing., Disp: , Rfl:   •  amLODIPine (NORVASC) 5 MG tablet, Take 1 tablet by mouth Daily., Disp: , Rfl:   •  Calcium Citrate-Vitamin D (CALCIUM + D PO), Take 1 tablet by mouth Daily., Disp: , Rfl:   •  carvedilol (COREG) 12.5 MG tablet, Take 1 tablet by mouth 2 (Two) Times a Day With Meals., Disp: , Rfl:   •  clopidogrel (PLAVIX) 75 MG tablet, Take 75 mg by mouth Daily., Disp: , Rfl:   •  Continuous Blood Gluc Sensor (FreeStyle Gretchen 2 Sensor) misc, 1 each Every 14 (Fourteen) Days., Disp: 2 each, Rfl: 5  •  docusate sodium 100 MG  capsule, Take 2 capsules by mouth 2 (Two) Times a Day., Disp: 60 each, Rfl: 3  •  escitalopram (LEXAPRO) 10 MG tablet, Take 5 mg by mouth Daily., Disp: , Rfl:   •  Furosemide (LASIX PO), Take 20 mg by mouth Daily As Needed., Disp: , Rfl:   •  glimepiride (Amaryl) 2 MG tablet, Take 1 tablet by mouth Every Morning., Disp: 30 tablet, Rfl: 11  •  Insulin Glargine (Lantus SoloStar) 100 UNIT/ML injection pen, INJECT 27 UNITS SUBCUTANEOUSLY ONCE DAILY, Disp: 30 mL, Rfl: 1  •  losartan (COZAAR) 50 MG tablet, Take 50 mg by mouth Daily., Disp: , Rfl:   •  montelukast (SINGULAIR) 10 MG tablet, Take 10 mg by mouth Every Night., Disp: , Rfl:   •  Multiple Vitamins-Minerals (MULTIVITAMIN ADULT PO), Take 1 tablet by mouth Daily., Disp: , Rfl:   •  ondansetron ODT (ZOFRAN-ODT) 4 MG disintegrating tablet, Place 1 tablet on the tongue Every 8 (Eight) Hours As Needed for Nausea or Vomiting., Disp: 12 tablet, Rfl: 0  •  oxybutynin XL (DITROPAN-XL) 5 MG 24 hr tablet, Take 1 tablet by mouth Daily., Disp: 30 tablet, Rfl: 5  •  polyethylene glycol (MIRALAX) packet, Take 17 g by mouth As Needed., Disp: , Rfl:   •  potassium chloride (MICRO-K) 10 MEQ CR capsule, potassium chloride ER 10 mEq tablet,extended release  TAKE ONE TABLET BY MOUTH ONCE DAILY ON  THE  DAYS  YOU  TAKE  LASIX  (FUROSEMIDE), Disp: , Rfl:   •  pravastatin (PRAVACHOL) 80 MG tablet, Take 80 mg by mouth Daily., Disp: , Rfl:   •  Semaglutide,0.25 or 0.5MG/DOS, (Ozempic, 0.25 or 0.5 MG/DOSE,) 2 MG/1.5ML solution pen-injector, Inject 0.5 mg under the skin into the appropriate area as directed 1 (One) Time Per Week., Disp: 1.5 mL, Rfl: 2  •  spironolactone-hydrochlorothiazide (ALDACTAZIDE) 25-25 MG tablet, Take 1 tablet by mouth once daily, Disp: 90 tablet, Rfl: 0  •  CloNIDine (CATAPRES) 0.1 MG tablet, Take 0.1 mg by mouth 2 (Two) Times a Day As Needed for High Blood Pressure., Disp: , Rfl:   Patient Active Problem List    Diagnosis    • Renal insufficiency [N28.9]    •  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]    • Sleep apnea [G47.30]    • Endometrial cancer (HCC) [C54.1]    • Anemia [D64.9]    • Heartburn [R12]    • Iron deficiency anemia [D50.9]    • Non Q wave myocardial infarction (HCC) [I21.4]    • Status post total shoulder replacement, left [Z96.612]    • Right wrist fracture [S62.101A]    • Type 2 diabetes mellitus (HCC) [E11.9]    • Hypertensive disorder [I10]    • Hypothyroidism [E03.9]    • Morbid obesity with BMI of 40.0-44.9, adult (HCC) [E66.01, Z68.41]    • Closed fracture of left shoulder [S42.92XA]    • Gastritis without bleeding [K29.70]    • Abnormal ultrasound of liver [R93.2]    • Gastric polyp [K31.7]    • Dyspnea [R06.00]    • Edema [R60.9]    • Left upper quadrant pain [R10.12]    • Diverticulosis of large intestine without hemorrhage [K57.30]    • Colon polyps [K63.5]    • Vascular ectasia of colon [K55.20]    • Internal hemorrhoids [K64.8]    • External hemorrhoid [K64.4]    • Bilateral pseudophakia [Z96.1]    • Type 2 diabetes mellitus with diabetic polyneuropathy, without long-term current use of insulin (HCC) [E11.42]    • Left carotid artery occlusion [I65.22]    • Left carotid artery stenosis [I65.22]    • Abnormal blood chemistry level [R79.9]    • Osteoporosis [M81.0]    • Proliferative diabetic retinopathy associated with type 2 diabetes mellitus (HCC) [E11.3599]    • Carotid artery occlusion [I65.29]    • Carotid artery stenosis [I65.29]    • Hyperlipidemia [E78.5]    • Lack of energy [R53.83]    • After-cataract [H26.40]      Review of Systems   Constitutional: Positive for activity change and unexpected weight change (has lost 5 lbs ).   HENT: Negative for congestion and rhinorrhea.    Eyes: Negative for discharge and visual disturbance.   Respiratory: Negative for cough and shortness of breath.    Cardiovascular: Positive for leg swelling. Negative for palpitations.   Gastrointestinal: Negative for abdominal distention and  "diarrhea.   Genitourinary: Negative for dysuria.   Musculoskeletal: Positive for arthralgias and gait problem.   Skin: Negative for color change and wound.   Neurological: Negative for dizziness and light-headedness.   Psychiatric/Behavioral: Negative for agitation. The patient is not nervous/anxious.      Social History     Socioeconomic History   • Marital status:    Tobacco Use   • Smoking status: Never Smoker   • Smokeless tobacco: Never Used   Vaping Use   • Vaping Use: Never used   Substance and Sexual Activity   • Alcohol use: No   • Drug use: No   • Sexual activity: Defer     Family History   Problem Relation Age of Onset   • Heart disease Other    • Cancer Other    • Prostate cancer Father    • Stroke Father    • Colon cancer Neg Hx      /78 (BP Location: Left arm, Patient Position: Sitting, Cuff Size: Adult)   Pulse 67   Ht 160 cm (63\")   Wt 99.3 kg (219 lb)   LMP  (LMP Unknown)   SpO2 98%   BMI 38.79 kg/m²   Physical Exam  Constitutional:       Appearance: Normal appearance.   HENT:      Head: Normocephalic and atraumatic.   Eyes:      Extraocular Movements: Extraocular movements intact.      Pupils: Pupils are equal, round, and reactive to light.   Cardiovascular:      Rate and Rhythm: Normal rate and regular rhythm.      Pulses: Normal pulses.      Heart sounds: No murmur heard.  Pulmonary:      Effort: Pulmonary effort is normal.      Breath sounds: Normal breath sounds.   Skin:     General: Skin is warm and dry.   Neurological:      General: No focal deficit present.      Mental Status: She is alert and oriented to person, place, and time.      Gait: Gait abnormal.      Deep Tendon Reflexes: Reflexes abnormal.   Psychiatric:         Mood and Affect: Mood normal.       Results for orders placed or performed in visit on 03/30/22   POC Glucose, Blood    Specimen: Blood   Result Value Ref Range    Glucose 145 (A) 70 - 130 mg/dL    Lot Number [id number]     Expiration Date 07/14/22  "   POC Glycosylated Hemoglobin (Hb A1C)    Specimen: Blood   Result Value Ref Range    Hemoglobin A1C 7.9 %    Lot Number [id number]     Expiration Date 10/12/23      Diagnoses and all orders for this visit:    1. Type 2 diabetes mellitus with diabetic polyneuropathy, with long-term current use of insulin (HCC) (Primary)  Assessment & Plan:  With recent fasting hypoglycemia  Is being more careful with diet  Reviewed a1c and commended improvement  Results for orders placed or performed in visit on 03/30/22   POC Glucose, Blood    Specimen: Blood   Result Value Ref Range    Glucose 145 (A) 70 - 130 mg/dL    Lot Number [id number]     Expiration Date 07/14/22    POC Glycosylated Hemoglobin (Hb A1C)    Specimen: Blood   Result Value Ref Range    Hemoglobin A1C 7.9 %    Lot Number [id number]     Expiration Date 10/12/23      Doing well with semaglutide   Concern about hypoglycemia in the mornings- will reduce lantus to 24 units now and by 2 units every other day until consistent fasting sugar over 80   Will have kidney function testing via her PCP - we may need to discontinue amaryl and change to prandin 1 mg tid ac if kidney function is worse  Would like to titrate ozempic but due to low sugars would delay this until next check   Clouding left eye due to cataract surgery - encouraged her to allow laser for correction   No foot lesion   Ur alb due   F/u 3-4 months via telehealth     Orders:  -     POC Glucose, Blood  -     POC Glycosylated Hemoglobin (Hb A1C)  -     Cancel: Comprehensive Metabolic Panel    2. Pure hypercholesterolemia  Assessment & Plan:  Taking pravachol 80 mg daily   Due for ov with PCP - will have lipid testing at that time     Orders:  -     Cancel: Lipid Panel    3. Renal insufficiency  Assessment & Plan:  Needs updated cmp       Other orders  -     Cancel: TSH  Return in about 3 months (around 6/30/2022) for Video visit.    Grace Duran MD  Signed Grace Duran MD

## 2022-03-30 NOTE — ASSESSMENT & PLAN NOTE
With recent fasting hypoglycemia  Is being more careful with diet  Reviewed a1c and commended improvement  Results for orders placed or performed in visit on 03/30/22   POC Glucose, Blood    Specimen: Blood   Result Value Ref Range    Glucose 145 (A) 70 - 130 mg/dL    Lot Number [id number]     Expiration Date 07/14/22    POC Glycosylated Hemoglobin (Hb A1C)    Specimen: Blood   Result Value Ref Range    Hemoglobin A1C 7.9 %    Lot Number [id number]     Expiration Date 10/12/23      Doing well with semaglutide   Concern about hypoglycemia in the mornings- will reduce lantus to 24 units now and by 2 units every other day until consistent fasting sugar over 80   Will have kidney function testing via her PCP - we may need to discontinue amaryl and change to prandin 1 mg tid ac if kidney function is worse  Would like to titrate ozempic but due to low sugars would delay this until next check   Clouding left eye due to cataract surgery - encouraged her to allow laser for correction   No foot lesion   Ur alb due   F/u 3-4 months via telehealth

## 2022-06-10 RX ORDER — SEMAGLUTIDE 1.34 MG/ML
INJECTION, SOLUTION SUBCUTANEOUS
Qty: 6 ML | Refills: 0 | Status: SHIPPED | OUTPATIENT
Start: 2022-06-10 | End: 2022-10-28

## 2022-07-14 ENCOUNTER — TELEPHONE (OUTPATIENT)
Dept: ENDOCRINOLOGY | Facility: CLINIC | Age: 82
End: 2022-07-14

## 2022-07-14 ENCOUNTER — TELEMEDICINE (OUTPATIENT)
Dept: ENDOCRINOLOGY | Facility: CLINIC | Age: 82
End: 2022-07-14

## 2022-07-14 DIAGNOSIS — E11.42 TYPE 2 DIABETES MELLITUS WITH DIABETIC POLYNEUROPATHY, WITH LONG-TERM CURRENT USE OF INSULIN: Primary | ICD-10-CM

## 2022-07-14 DIAGNOSIS — I10 PRIMARY HYPERTENSION: ICD-10-CM

## 2022-07-14 DIAGNOSIS — Z79.4 TYPE 2 DIABETES MELLITUS WITH DIABETIC POLYNEUROPATHY, WITH LONG-TERM CURRENT USE OF INSULIN: Primary | ICD-10-CM

## 2022-07-14 DIAGNOSIS — E11.42 TYPE 2 DIABETES MELLITUS WITH DIABETIC POLYNEUROPATHY, WITHOUT LONG-TERM CURRENT USE OF INSULIN: ICD-10-CM

## 2022-07-14 DIAGNOSIS — E03.9 ACQUIRED HYPOTHYROIDISM: ICD-10-CM

## 2022-07-14 PROCEDURE — 99214 OFFICE O/P EST MOD 30 MIN: CPT | Performed by: INTERNAL MEDICINE

## 2022-07-14 RX ORDER — MUPIROCIN CALCIUM 20 MG/G
1 CREAM TOPICAL 3 TIMES DAILY
Qty: 30 G | Refills: 1 | Status: SHIPPED | OUTPATIENT
Start: 2022-07-14 | End: 2022-09-08

## 2022-07-14 NOTE — PROGRESS NOTES
Kathryn Davison 82 y.o.  CC: Diabetes (Type 2 with polyneuropathy on insulin currently ) and Hyperlipidemia      [place]: Diabetes (Type 2 with polyneuropathy on insulin currently ) and Hyperlipidemia    This was an audio and video enabled telemedicine encounter conducted with patient consent and in compliance with patient concerns about safety during pandemic   audio portion conducted via daughter's number  Last a1c was improved and she had lost weight with ozempic  She is taking amaryl, lantus and ozempic  Fasting sugar   Evening sugar slightly higher- 190s  She got covid booster shot  June 13, 2022  Has not had a recent a1c (sees Dr Malave 8/22)  bp this morning 120/60   Has had a couple of blood sugars that are below 80  Usually assoc with eating less   Is utd with eye exam (due in 6 months) but left eye having a film  No chest pain or pressure  Has blisters on toes from sunburn- mupirocin sent  She does not need refills  Discussed next appointment     Allergies   Allergen Reactions   • Gabapentin Dizziness   • Betadine [Povidone Iodine] Rash       Current Outpatient Medications:   •  albuterol (PROVENTIL HFA;VENTOLIN HFA) 108 (90 Base) MCG/ACT inhaler, Inhale 2 puffs Every 4 (Four) Hours As Needed for Wheezing., Disp: , Rfl:   •  amLODIPine (NORVASC) 5 MG tablet, Take 1 tablet by mouth Daily., Disp: , Rfl:   •  Calcium Citrate-Vitamin D (CALCIUM + D PO), Take 1 tablet by mouth Daily., Disp: , Rfl:   •  carvedilol (COREG) 12.5 MG tablet, Take 1 tablet by mouth 2 (Two) Times a Day With Meals., Disp: , Rfl:   •  CloNIDine (CATAPRES) 0.1 MG tablet, Take 0.1 mg by mouth 2 (Two) Times a Day As Needed for High Blood Pressure., Disp: , Rfl:   •  clopidogrel (PLAVIX) 75 MG tablet, Take 75 mg by mouth Daily., Disp: , Rfl:   •  Continuous Blood Gluc Sensor (FreeStyle Gretchen 2 Sensor) misc, 1 each Every 14 (Fourteen) Days., Disp: 2 each, Rfl: 5  •  docusate sodium 100 MG capsule, Take 2 capsules by mouth 2 (Two)  Times a Day., Disp: 60 each, Rfl: 3  •  escitalopram (LEXAPRO) 10 MG tablet, Take 5 mg by mouth Daily., Disp: , Rfl:   •  Furosemide (LASIX PO), Take 20 mg by mouth Daily As Needed., Disp: , Rfl:   •  glimepiride (Amaryl) 2 MG tablet, Take 1 tablet by mouth Every Morning., Disp: 30 tablet, Rfl: 11  •  Insulin Glargine (Lantus SoloStar) 100 UNIT/ML injection pen, INJECT 27 UNITS SUBCUTANEOUSLY ONCE DAILY, Disp: 30 mL, Rfl: 1  •  losartan (COZAAR) 50 MG tablet, Take 50 mg by mouth Daily., Disp: , Rfl:   •  montelukast (SINGULAIR) 10 MG tablet, Take 10 mg by mouth Every Night., Disp: , Rfl:   •  Multiple Vitamins-Minerals (MULTIVITAMIN ADULT PO), Take 1 tablet by mouth Daily., Disp: , Rfl:   •  mupirocin (BACTROBAN) 2 % cream, Apply 1 application topically to the appropriate area as directed 3 (Three) Times a Day., Disp: 30 g, Rfl: 1  •  ondansetron ODT (ZOFRAN-ODT) 4 MG disintegrating tablet, Place 1 tablet on the tongue Every 8 (Eight) Hours As Needed for Nausea or Vomiting., Disp: 12 tablet, Rfl: 0  •  oxybutynin XL (DITROPAN-XL) 5 MG 24 hr tablet, Take 1 tablet by mouth Daily., Disp: 30 tablet, Rfl: 5  •  Ozempic, 0.25 or 0.5 MG/DOSE, 2 MG/1.5ML solution pen-injector, INJECT 0.5MG SUBCUTANEOUSLY INTO APPROPRIATE AREA AS DIRECTED ONCE WEEKLY, Disp: 6 mL, Rfl: 0  •  polyethylene glycol (MIRALAX) packet, Take 17 g by mouth As Needed., Disp: , Rfl:   •  potassium chloride (MICRO-K) 10 MEQ CR capsule, potassium chloride ER 10 mEq tablet,extended release  TAKE ONE TABLET BY MOUTH ONCE DAILY ON  THE  DAYS  YOU  TAKE  LASIX  (FUROSEMIDE), Disp: , Rfl:   •  pravastatin (PRAVACHOL) 80 MG tablet, Take 80 mg by mouth Daily., Disp: , Rfl:   •  spironolactone-hydrochlorothiazide (ALDACTAZIDE) 25-25 MG tablet, Take 1 tablet by mouth once daily, Disp: 90 tablet, Rfl: 0  Patient Active Problem List    Diagnosis    • Renal insufficiency [N28.9]    • 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]    • Sleep apnea [G47.30]     • Endometrial cancer (HCC) [C54.1]    • Anemia [D64.9]    • Heartburn [R12]    • Iron deficiency anemia [D50.9]    • Non Q wave myocardial infarction (HCC) [I21.4]    • Status post total shoulder replacement, left [Z96.612]    • Right wrist fracture [S62.101A]    • Type 2 diabetes mellitus (HCC) [E11.9]    • Hypertensive disorder [I10]    • Hypothyroidism [E03.9]    • Morbid obesity with BMI of 40.0-44.9, adult (HCC) [E66.01, Z68.41]    • Closed fracture of left shoulder [S42.92XA]    • Gastritis without bleeding [K29.70]    • Abnormal ultrasound of liver [R93.2]    • Gastric polyp [K31.7]    • Dyspnea [R06.00]    • Edema [R60.9]    • Left upper quadrant pain [R10.12]    • Diverticulosis of large intestine without hemorrhage [K57.30]    • Colon polyps [K63.5]    • Vascular ectasia of colon [K55.20]    • Internal hemorrhoids [K64.8]    • External hemorrhoid [K64.4]    • Bilateral pseudophakia [Z96.1]    • Type 2 diabetes mellitus with diabetic polyneuropathy, without long-term current use of insulin ([facility]) [E11.42]    • Left carotid artery occlusion [I65.22]    • Left carotid artery stenosis [I65.22]    • Abnormal blood chemistry level [R79.9]    • Osteoporosis [M81.0]    • Proliferative diabetic retinopathy associated with type 2 diabetes mellitus ([facility]) [E11.3599]    • Carotid artery occlusion [I65.29]    • Carotid artery stenosis [I65.29]    • Hyperlipidemia [E78.5]    • Lack of energy [R53.83]    • After-cataract [H26.40]      Review of Systems   Constitutional: Negative for activity change (not driving ), appetite change and unexpected weight change.   HENT: Negative for congestion and rhinorrhea.    Eyes: Negative for visual disturbance.   Respiratory: Negative for cough and shortness of breath.    Cardiovascular: Negative for palpitations and leg swelling.   Gastrointestinal: Negative for constipation, diarrhea and nausea.   Genitourinary: Negative for hematuria.   Musculoskeletal: Negative for arthralgias,  back pain, gait problem, joint swelling and myalgias.   Skin: Negative for color change, rash and wound.   Allergic/Immunologic: Negative for environmental allergies, food allergies and immunocompromised state.   Neurological: Negative for dizziness, weakness and light-headedness.   Psychiatric/Behavioral: Negative for confusion, decreased concentration, dysphoric mood and sleep disturbance. The patient is not nervous/anxious.      Social History     Socioeconomic History   • Marital status:    Tobacco Use   • Smoking status: Never Smoker   • Smokeless tobacco: Never Used   Vaping Use   • Vaping Use: Never used   Substance and Sexual Activity   • Alcohol use: No   • Drug use: No   • Sexual activity: Defer     Family History   Problem Relation Age of Onset   • Heart disease Other    • Cancer Other    • Prostate cancer Father    • Stroke Father    • Colon cancer Neg Hx      LMP  (LMP Unknown)   Physical Exam  Constitutional:       Appearance: Normal appearance.   Eyes:      Pupils: Pupils are equal, round, and reactive to light.   Pulmonary:      Effort: Pulmonary effort is normal. No respiratory distress.   Neurological:      General: No focal deficit present.      Mental Status: She is alert.   Psychiatric:         Mood and Affect: Mood normal.       Results for orders placed or performed in visit on 03/30/22   POC Glucose, Blood    Specimen: Blood   Result Value Ref Range    Glucose 145 (A) 70 - 130 mg/dL    Lot Number [id number]     Expiration Date 07/14/22    POC Glycosylated Hemoglobin (Hb A1C)    Specimen: Blood   Result Value Ref Range    Hemoglobin A1C 7.9 %    Lot Number [id number]     Expiration Date 10/12/23      Diagnoses and all orders for this visit:    1. Type 2 diabetes mellitus with diabetic polyneuropathy, with long-term current use of insulin (HCC) (Primary)  Assessment & Plan:  Blood sugar and 90 day average sugar reviewed  Results for orders placed or performed in visit on 03/30/22   POC  Glucose, Blood    Specimen: Blood   Result Value Ref Range    Glucose 145 (A) 70 - 130 mg/dL    Lot Number [id number]     Expiration Date 07/14/22    POC Glycosylated Hemoglobin (Hb A1C)    Specimen: Blood   Result Value Ref Range    Hemoglobin A1C 7.9 %    Lot Number [id number]     Expiration Date 10/12/23      Average sugar was higher- no recent average but she does have upcoming appt with Dr Ananda lomas with eye exam  Neuropathy is stable  F/u 3-4 months       2. Primary hypertension  Assessment & Plan:  bp is good   Continue monitoring and medication      3. Acquired hypothyroidism  Assessment & Plan:  Doing well - update tsh at next visit       4. Type 2 diabetes mellitus with diabetic polyneuropathy, without long-term current use of insulin (HCC)    Other orders  -     mupirocin (BACTROBAN) 2 % cream; Apply 1 application topically to the appropriate area as directed 3 (Three) Times a Day.  Dispense: 30 g; Refill: 1  Return in about 4 months (around 11/14/2022) for Recheck.    Grace Duran MD  Signed Grace Duran MD

## 2022-07-14 NOTE — TELEPHONE ENCOUNTER
Pt called to see if we received the email from her -she sent it to Dr Duran regarding her blood sugars      pts number  [phone]

## 2022-07-14 NOTE — ASSESSMENT & PLAN NOTE
Blood sugar and 90 day average sugar reviewed  Results for orders placed or performed in visit on 03/30/22   POC Glucose, Blood    Specimen: Blood   Result Value Ref Range    Glucose 145 (A) 70 - 130 mg/dL    Lot Number [id number]     Expiration Date 07/14/22    POC Glycosylated Hemoglobin (Hb A1C)    Specimen: Blood   Result Value Ref Range    Hemoglobin A1C 7.9 %    Lot Number [id number]     Expiration Date 10/12/23      Average sugar was higher- no recent average but she does have upcoming appt with Dr Ananda lomas with eye exam  Neuropathy is stable  F/u 3-4 months

## 2022-07-25 RX ORDER — SPIRONOLACTONE AND HYDROCHLOROTHIAZIDE 25; 25 MG/1; MG/1
TABLET ORAL
Qty: 90 TABLET | Refills: 1 | Status: SHIPPED | OUTPATIENT
Start: 2022-07-25

## 2022-09-08 ENCOUNTER — OFFICE VISIT (OUTPATIENT)
Dept: GYNECOLOGIC ONCOLOGY | Facility: CLINIC | Age: 82
End: 2022-09-08

## 2022-09-08 VITALS
OXYGEN SATURATION: 99 % | BODY MASS INDEX: 39.77 KG/M2 | SYSTOLIC BLOOD PRESSURE: 137 MMHG | DIASTOLIC BLOOD PRESSURE: 54 MMHG | TEMPERATURE: 97.4 F | WEIGHT: 224.5 LBS | RESPIRATION RATE: 14 BRPM | HEART RATE: 66 BPM

## 2022-09-08 DIAGNOSIS — N39.41 URGE URINARY INCONTINENCE: ICD-10-CM

## 2022-09-08 DIAGNOSIS — Z85.42 HISTORY OF ENDOMETRIAL CANCER: Primary | ICD-10-CM

## 2022-09-08 PROCEDURE — 99213 OFFICE O/P EST LOW 20 MIN: CPT | Performed by: NURSE PRACTITIONER

## 2022-09-08 RX ORDER — OXYBUTYNIN CHLORIDE 5 MG/1
5 TABLET, EXTENDED RELEASE ORAL DAILY
Qty: 90 TABLET | Refills: 4 | Status: SHIPPED | OUTPATIENT
Start: 2022-09-08

## 2022-09-08 RX ORDER — AMLODIPINE BESYLATE 10 MG/1
TABLET ORAL
COMMUNITY

## 2022-09-08 RX ORDER — DOXAZOSIN 2 MG/1
TABLET ORAL
COMMUNITY
Start: 2022-07-06

## 2022-09-08 RX ORDER — LEVOTHYROXINE SODIUM 100 UG/1
TABLET ORAL
COMMUNITY
Start: 2022-08-28

## 2022-09-08 NOTE — PROGRESS NOTES
GYN ONCOLOGY CANCER SURVEILLANCE FOLLOW-UP    Kathryn Davison  [id number]  1940    Subjective   Chief Complaint: Uterine Cancer and Follow-up (Bladder medication)        History of present illness:     Kathryn Davison is a 82 y.o. year old female who is here today for ongoing surveillance of Endometrial Cancer, see Cancer History. She is now 2 years out from completion of treatment with surgery alone. At last visit she was prescribed oxybutynin XL 5 mg PO daily for management of urge urinary incontinence. Patient is very pleased with treatment and requests 90-day refills. She reports she is feeling very well today and has no complaints. She denies vaginal bleeding, pelvic pain, and changes in bowel or bladder function.            Cancer History:   Oncology/Hematology History   Endometrial cancer (HCC)   7/24/2020 Procedure    Hysteroscopy, D&C, and MyoSure by Dr. Elizalde for postmenopausal bleeding. Pathology showed sections with adenocarcinoma, enlarged vascular neclei, prominent mitotic activity, and focal areas of complex hyperplasia with atypia. Referred to Gyn Oncology.      8/31/2020 Surgery    Radical Type 1 RTLH/BSO and sentinel lymph node dissection.     Final pathology showed residual 0.2 cm grade 2 endometrioid tumor, non-invasive into the myometrium. No lymphvascular invasion. Nodes negative. MSI testing showed loss of MLH1 and PMS2 (per previous biopsy). 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  Copy of Survivorship Care Plan provided to patient and primary care provider.           The current medication list and allergy list were reviewed and reconciled.     Past Medical History, Past Surgical History, Social History, Family History have been reviewed and are without significant changes except as mentioned.      Review of Systems   Constitutional: Negative.     Gastrointestinal: Negative.    Genitourinary: Negative.    Musculoskeletal: Positive for gait problem (uses walker for ambulatory aid).         Objective   Physical Exam  Vital Signs: /54   Pulse 66   Temp 97.4 °F (36.3 °C)   Resp 14   Wt 102 kg (224 lb 8 oz)   LMP  (LMP Unknown)   SpO2 99%   BMI 39.77 kg/m²    Wt Readings from Last 3 Encounters:   09/08/22 1128 102 kg (224 lb 8 oz)   03/30/22 1254 99.3 kg (219 lb)   03/09/22 1030 102 kg (224 lb 12.8 oz)       Vitals:    09/08/22 1128   PainSc: 0-No pain           General Appearance:  alert, cooperative, no apparent distress, appears stated age and obese by BMI criteria   Neurologic/Psychiatric: A&O x 3, gait steady with use of walker, appropriate affect   HEENT:  Normocephalic, without obvious abnormality, mucous membranes moist   Abdomen:   Soft, non-tender, non-distended, no organomegaly and Exam limited d/t habitus.   Lymph nodes: No cervical, supraclavicular, inguinal adenopathy noted   Pelvic: External Genitalia  atrophic, without lesions  Vagina  is pale, atrophic.   Vaginal Cuff  Female Vaginal Cuff: smooth, intact and without visible lesions  Uterus  surgically absent, no palpable masses and exam limited d/t adiposity  Ovaries  surgically absent bilaterally  Parametria  smooth  Rectovaginal  Female rectovaginal: deferred     ECOG score: 1                    Assessment and Plan:    Diagnoses and all orders for this visit:    History of endometrial cancer    Urge urinary incontinence  -     oxybutynin XL (DITROPAN-XL) 5 MG 24 hr tablet; Take 1 tablet by mouth Daily.          There is no evidence of disease upon today's exam. She is now 2 years out from completion of treatment with surgery alone, doing well, and may go to yearly visits. She is understanding to call with any changes in pelvic symptoms or general GYN concerns at any time between regularly scheduled visits.     Patient is very happy with oxybutynin for management of UI/OAB, desires  to continue. No bothersome side effects. 90-day refills provided per patient request x 1 year.     Pain assessment was performed today as a part of patient’s care.  For patients with pain related to surgery, gynecologic malignancy or cancer treatment, the plan is as noted in the assessment/plan.  For patients with pain not related to these issues, they are to seek any further needed care from a more appropriate provider, such as PCP.      Follow-up:     Return to clinic in 1 year for ongoing cancer surveillance.     Electronically signed by NANNETTE Reddy on 09/08/22 at 12:24 EDT

## 2022-10-12 ENCOUNTER — TRANSCRIBE ORDERS (OUTPATIENT)
Dept: ADMINISTRATIVE | Facility: HOSPITAL | Age: 82
End: 2022-10-12

## 2022-10-12 DIAGNOSIS — N63.20 MASS OF LEFT BREAST, UNSPECIFIED QUADRANT: Primary | ICD-10-CM

## 2022-10-24 ENCOUNTER — TRANSCRIBE ORDERS (OUTPATIENT)
Dept: ADMINISTRATIVE | Facility: HOSPITAL | Age: 82
End: 2022-10-24

## 2022-10-24 DIAGNOSIS — N63.20 MASS OF LEFT BREAST, UNSPECIFIED QUADRANT: Primary | ICD-10-CM

## 2022-10-28 RX ORDER — SEMAGLUTIDE 1.34 MG/ML
INJECTION, SOLUTION SUBCUTANEOUS
Qty: 4.5 ML | Refills: 1 | Status: SHIPPED | OUTPATIENT
Start: 2022-10-28 | End: 2022-11-10 | Stop reason: SDUPTHER

## 2022-11-07 ENCOUNTER — TRANSCRIBE ORDERS (OUTPATIENT)
Dept: ADMINISTRATIVE | Facility: HOSPITAL | Age: 82
End: 2022-11-07

## 2022-11-10 ENCOUNTER — OFFICE VISIT (OUTPATIENT)
Dept: ENDOCRINOLOGY | Facility: CLINIC | Age: 82
End: 2022-11-10

## 2022-11-10 VITALS
OXYGEN SATURATION: 97 % | DIASTOLIC BLOOD PRESSURE: 60 MMHG | HEIGHT: 63 IN | WEIGHT: 225.4 LBS | HEART RATE: 66 BPM | BODY MASS INDEX: 39.94 KG/M2 | SYSTOLIC BLOOD PRESSURE: 118 MMHG

## 2022-11-10 DIAGNOSIS — Z79.4 TYPE 2 DIABETES MELLITUS WITH DIABETIC POLYNEUROPATHY, WITH LONG-TERM CURRENT USE OF INSULIN: Primary | ICD-10-CM

## 2022-11-10 DIAGNOSIS — E03.9 ACQUIRED HYPOTHYROIDISM: ICD-10-CM

## 2022-11-10 DIAGNOSIS — R40.0 DAYTIME SOMNOLENCE: ICD-10-CM

## 2022-11-10 DIAGNOSIS — E11.42 TYPE 2 DIABETES MELLITUS WITH DIABETIC POLYNEUROPATHY, WITH LONG-TERM CURRENT USE OF INSULIN: Primary | ICD-10-CM

## 2022-11-10 DIAGNOSIS — E78.00 PURE HYPERCHOLESTEROLEMIA: ICD-10-CM

## 2022-11-10 PROBLEM — E55.9 VITAMIN D DEFICIENCY: Status: ACTIVE | Noted: 2022-10-12

## 2022-11-10 LAB
EXPIRATION DATE: ABNORMAL
EXPIRATION DATE: NORMAL
GLUCOSE BLDC GLUCOMTR-MCNC: 284 MG/DL (ref 70–130)
HBA1C MFR BLD: 8.1 %
Lab: ABNORMAL
Lab: NORMAL

## 2022-11-10 PROCEDURE — 83036 HEMOGLOBIN GLYCOSYLATED A1C: CPT | Performed by: INTERNAL MEDICINE

## 2022-11-10 PROCEDURE — 82947 ASSAY GLUCOSE BLOOD QUANT: CPT | Performed by: INTERNAL MEDICINE

## 2022-11-10 PROCEDURE — 3052F HG A1C>EQUAL 8.0%<EQUAL 9.0%: CPT | Performed by: INTERNAL MEDICINE

## 2022-11-10 PROCEDURE — 99214 OFFICE O/P EST MOD 30 MIN: CPT | Performed by: INTERNAL MEDICINE

## 2022-11-10 RX ORDER — SEMAGLUTIDE 1.34 MG/ML
0.5 INJECTION, SOLUTION SUBCUTANEOUS WEEKLY
Qty: 4.5 ML | Refills: 1 | Status: SHIPPED | OUTPATIENT
Start: 2022-11-10

## 2022-11-10 NOTE — ASSESSMENT & PLAN NOTE
High blood sugar and high current sugar c/w uncontrolled diabetes with hyperglycemia  Titrate ozempic  Normal fasting sugars  High pp / evening sugars  Goal discussed  Up to date with eye exam  No foot lesion   Ur alb neg  F/u 3-4 months  Outside lab work reviewed with a1c via PCP 7.8%

## 2022-11-10 NOTE — PROGRESS NOTES
Kathryn Davison 82 y.o.  [facility]:Follow-up, Diabetes (Type II, eye exam October 22 Dr Bernstein/), Hyperlipidemia, and Hypothyroidism      [place]: Follow-up, Diabetes (Type II, eye exam October 22 Dr Bernstein/), Hyperlipidemia, and Hypothyroidism    Cc is hip and back pain- sees ortho   Exacerbation currently - is less mobile  Blood sugar and 90 day average sugar reviewed  Results for orders placed or performed in visit on 11/10/22   POC Glycosylated Hemoglobin (Hb A1C)    Specimen: Blood   Result Value Ref Range    Hemoglobin A1C 8.1 %    Lot Number [id number]     Expiration Date 07/04/2024    POC Glucose, Blood    Specimen: Blood   Result Value Ref Range    Glucose 284 (A) 70 - 130 mg/dL    Lot Number [id number]     Expiration Date 05/24/2023      High sugar - just ate  High average sugar c/w average sugar 180   Is taking lantus and ozempic  Discussed titration of ozempic   Recent lab work from Dr Malave  High lft - due to fatty liver   Daytime somnolence- uses cpap but no f/u with sleep medicine     Allergies   Allergen Reactions   • Gabapentin Dizziness   • Betadine [Povidone Iodine] Rash       Current Outpatient Medications:   •  Semaglutide,0.25 or 0.5MG/DOS, (Ozempic, 0.25 or 0.5 MG/DOSE,) 2 MG/1.5ML solution pen-injector, Inject 0.5 mg under the skin into the appropriate area as directed 1 (One) Time Per Week., Disp: 4.5 mL, Rfl: 1  •  albuterol (PROVENTIL HFA;VENTOLIN HFA) 108 (90 Base) MCG/ACT inhaler, Inhale 2 puffs Every 4 (Four) Hours As Needed for Wheezing., Disp: , Rfl:   •  amLODIPine (NORVASC) 10 MG tablet, amlodipine 10 mg tablet  Take 1 tablet by mouth once daily for 90 days, Disp: , Rfl:   •  Calcium Citrate-Vitamin D (CALCIUM + D PO), Take 1 tablet by mouth Daily., Disp: , Rfl:   •  carvedilol (COREG) 12.5 MG tablet, Take 1 tablet by mouth 2 (Two) Times a Day With Meals., Disp: , Rfl:   •  CloNIDine (CATAPRES) 0.1 MG tablet, Take 0.1 mg by mouth 2 (Two) Times a Day As Needed for High Blood Pressure.,  Disp: , Rfl:   •  clopidogrel (PLAVIX) 75 MG tablet, Take 75 mg by mouth Daily., Disp: , Rfl:   •  Continuous Blood Gluc Sensor (FreeStyle Gretchen 2 Sensor) misc, 1 each Every 14 (Fourteen) Days., Disp: 2 each, Rfl: 5  •  docusate sodium 100 MG capsule, Take 2 capsules by mouth 2 (Two) Times a Day., Disp: 60 each, Rfl: 3  •  doxazosin (CARDURA) 2 MG tablet, , Disp: , Rfl:   •  escitalopram (LEXAPRO) 10 MG tablet, Take 5 mg by mouth Daily., Disp: , Rfl:   •  Euthyrox 100 MCG tablet, , Disp: , Rfl:   •  Furosemide (LASIX PO), Take 20 mg by mouth Daily As Needed., Disp: , Rfl:   •  Insulin Glargine (Lantus SoloStar) 100 UNIT/ML injection pen, INJECT 27 UNITS SUBCUTANEOUSLY ONCE DAILY, Disp: 30 mL, Rfl: 1  •  losartan (COZAAR) 50 MG tablet, Take 50 mg by mouth Daily., Disp: , Rfl:   •  montelukast (SINGULAIR) 10 MG tablet, Take 10 mg by mouth Every Night., Disp: , Rfl:   •  Multiple Vitamins-Minerals (MULTIVITAMIN ADULT PO), Take 1 tablet by mouth Daily., Disp: , Rfl:   •  ondansetron ODT (ZOFRAN-ODT) 4 MG disintegrating tablet, Place 1 tablet on the tongue Every 8 (Eight) Hours As Needed for Nausea or Vomiting., Disp: 12 tablet, Rfl: 0  •  oxybutynin XL (DITROPAN-XL) 5 MG 24 hr tablet, Take 1 tablet by mouth Daily., Disp: 90 tablet, Rfl: 4  •  polyethylene glycol (MIRALAX) packet, Take 17 g by mouth As Needed., Disp: , Rfl:   •  potassium chloride (MICRO-K) 10 MEQ CR capsule, potassium chloride ER 10 mEq tablet,extended release  TAKE ONE TABLET BY MOUTH ONCE DAILY ON  THE  DAYS  YOU  TAKE  LASIX  (FUROSEMIDE), Disp: , Rfl:   •  pravastatin (PRAVACHOL) 80 MG tablet, Take 80 mg by mouth Daily., Disp: , Rfl:   •  spironolactone-hydrochlorothiazide (ALDACTAZIDE) 25-25 MG tablet, Take 1 tablet by mouth once daily, Disp: 90 tablet, Rfl: 1  Patient Active Problem List    Diagnosis    • Daytime somnolence [R40.0]    • Vitamin D deficiency [E55.9]    • Renal insufficiency [N28.9]    • History of malignant neoplasm of uterine body  [Z85.42]    • Sleep apnea [G47.30]    • Endometrial cancer (HCC) [C54.1]    • Anemia [D64.9]    • Heartburn [R12]    • Iron deficiency anemia [D50.9]    • Non Q wave myocardial infarction (HCC) [I21.4]    • Status post total shoulder replacement, left [Z96.612]    • Right wrist fracture [S62.101A]    • Type 2 diabetes mellitus (HCC) [E11.9]    • Hypertensive disorder [I10]    • Hypothyroidism [E03.9]    • Morbid obesity with BMI of 40.0-44.9, adult (HCC) [E66.01, Z68.41]    • Closed fracture of left shoulder [S42.92XA]    • Gastritis without bleeding [K29.70]    • Abnormal ultrasound of liver [R93.2]    • Gastric polyp [K31.7]    • Dyspnea [R06.00]    • Edema [R60.9]    • Left upper quadrant pain [R10.12]    • Diverticulosis of large intestine without hemorrhage [K57.30]    • Colon polyps [K63.5]    • Vascular ectasia of colon [K55.20]    • Internal hemorrhoids [K64.8]    • External hemorrhoid [K64.4]    • Bilateral pseudophakia [Z96.1]    • Type 2 diabetes mellitus with diabetic polyneuropathy, without long-term current use of insulin (HCC) [E11.42]    • Left carotid artery occlusion [I65.22]    • Left carotid artery stenosis [I65.22]    • Abnormal blood chemistry level [R79.9]    • Osteoporosis [M81.0]    • Proliferative diabetic retinopathy associated with type 2 diabetes mellitus (HCC) [E11.3599]    • Carotid artery occlusion [I65.29]    • Carotid artery stenosis [I65.29]    • Hyperlipidemia [E78.5]    • Lack of energy [R53.83]    • After-cataract [H26.40]      Review of Systems   Constitutional: Positive for activity change and fatigue. Negative for appetite change and unexpected weight change.   HENT: Negative for congestion and rhinorrhea.    Eyes: Negative for visual disturbance.   Respiratory: Negative for cough and shortness of breath.    Cardiovascular: Negative for palpitations and leg swelling.   Gastrointestinal: Negative for constipation, diarrhea and nausea.   Genitourinary: Negative for hematuria.  "  Musculoskeletal: Positive for arthralgias, back pain and gait problem. Negative for joint swelling and myalgias.   Skin: Negative for color change, rash and wound.   Allergic/Immunologic: Negative for environmental allergies, food allergies and immunocompromised state.   Neurological: Negative for dizziness, weakness and light-headedness.   Psychiatric/Behavioral: Negative for confusion, decreased concentration, dysphoric mood and sleep disturbance. The patient is not nervous/anxious.      Social History     Socioeconomic History   • Marital status:    Tobacco Use   • Smoking status: Never   • Smokeless tobacco: Never   Vaping Use   • Vaping Use: Never used   Substance and Sexual Activity   • Alcohol use: No   • Drug use: No   • Sexual activity: Defer     Family History   Problem Relation Age of Onset   • Heart disease Other    • Cancer Other    • Prostate cancer Father    • Stroke Father    • Colon cancer Neg Hx      /60   Pulse 66   Ht 158.8 cm (62.5\")   Wt 102 kg (225 lb 6.4 oz)   LMP  (LMP Unknown)   SpO2 97%   BMI 40.57 kg/m²   Physical Exam  Vitals and nursing note reviewed.   Constitutional:       Appearance: Normal appearance. She is well-developed.   HENT:      Head: Normocephalic and atraumatic.   Eyes:      General: Lids are normal.      Extraocular Movements: Extraocular movements intact.      Conjunctiva/sclera: Conjunctivae normal.      Pupils: Pupils are equal, round, and reactive to light.   Neck:      Thyroid: No thyroid mass or thyromegaly.      Vascular: No carotid bruit.      Trachea: Trachea normal. No tracheal deviation.   Cardiovascular:      Rate and Rhythm: Normal rate and regular rhythm.      Heart sounds: Normal heart sounds. No murmur heard.    No friction rub. No gallop.   Pulmonary:      Effort: Pulmonary effort is normal. No respiratory distress.      Breath sounds: Normal breath sounds. No wheezing.   Musculoskeletal:         General: No deformity. Normal range " of motion.      Cervical back: Normal range of motion and neck supple.   Lymphadenopathy:      Cervical: No cervical adenopathy.   Skin:     General: Skin is warm and dry.      Findings: No erythema or rash.      Nails: There is no clubbing.   Neurological:      Mental Status: She is alert and oriented to person, place, and time.      Cranial Nerves: No cranial nerve deficit.      Gait: Gait abnormal (using walker).      Deep Tendon Reflexes: Reflexes abnormal.   Psychiatric:         Speech: Speech normal.         Behavior: Behavior normal.         Thought Content: Thought content normal.         Judgment: Judgment normal.       Results for orders placed or performed in visit on 11/10/22   POC Glycosylated Hemoglobin (Hb A1C)    Specimen: Blood   Result Value Ref Range    Hemoglobin A1C 8.1 %    Lot Number [id number]     Expiration Date 07/04/2024    POC Glucose, Blood    Specimen: Blood   Result Value Ref Range    Glucose 284 (A) 70 - 130 mg/dL    Lot Number [id number]     Expiration Date 05/24/2023      Diagnoses and all orders for this visit:    1. Type 2 diabetes mellitus with diabetic polyneuropathy, with long-term current use of insulin (HCC) (Primary)  Assessment & Plan:  High blood sugar and high current sugar c/w uncontrolled diabetes with hyperglycemia  Titrate ozempic  Normal fasting sugars  High pp / evening sugars  Goal discussed  Up to date with eye exam  No foot lesion   Ur alb neg  F/u 3-4 months  Outside lab work reviewed with a1c via PCP 7.8%    Orders:  -     POC Glycosylated Hemoglobin (Hb A1C)  -     POC Glucose, Blood    2. Daytime somnolence  Assessment & Plan:  Refer to sleep medicine  Local physician left - having symptoms with daytime somnolence     Orders:  -     Ambulatory Referral to Sleep Medicine    3. Acquired hypothyroidism  Assessment & Plan:  TSH via pcp normal   Continue euthyrox 100 mcg daily       4. Pure hypercholesterolemia  Assessment & Plan:   - taking crestor - may  need increase in dosing       Other orders  -     Semaglutide,0.25 or 0.5MG/DOS, (Ozempic, 0.25 or 0.5 MG/DOSE,) 2 MG/1.5ML solution pen-injector; Inject 0.5 mg under the skin into the appropriate area as directed 1 (One) Time Per Week.  Dispense: 4.5 mL; Refill: 1  Return in about 3 months (around 2/10/2023) for Video visit.    Grace Duran MD  Signed Grace Duran MD

## 2022-11-16 ENCOUNTER — TRANSCRIBE ORDERS (OUTPATIENT)
Dept: ADMINISTRATIVE | Facility: HOSPITAL | Age: 82
End: 2022-11-16

## 2022-11-16 DIAGNOSIS — N63.0 MASS OF BREAST, UNSPECIFIED LATERALITY: ICD-10-CM

## 2022-11-16 DIAGNOSIS — N63.20 MASS OF LEFT BREAST, UNSPECIFIED QUADRANT: ICD-10-CM

## 2022-11-17 ENCOUNTER — TRANSCRIBE ORDERS (OUTPATIENT)
Dept: ADMINISTRATIVE | Facility: HOSPITAL | Age: 82
End: 2022-11-17

## 2022-11-17 DIAGNOSIS — N63.25 BREAST LUMP ON LEFT SIDE AT 3 O'CLOCK POSITION: Primary | ICD-10-CM

## 2022-11-18 ENCOUNTER — TRANSCRIBE ORDERS (OUTPATIENT)
Dept: ADMINISTRATIVE | Facility: HOSPITAL | Age: 82
End: 2022-11-18

## 2022-11-18 DIAGNOSIS — N63.20 MASS OF LEFT BREAST, UNSPECIFIED QUADRANT: Primary | ICD-10-CM

## 2022-11-29 ENCOUNTER — TRANSCRIBE ORDERS (OUTPATIENT)
Dept: ADMINISTRATIVE | Facility: HOSPITAL | Age: 82
End: 2022-11-29

## 2022-11-29 DIAGNOSIS — N63.21 UNSPECIFIED LUMP IN THE LEFT BREAST, UPPER OUTER QUADRANT: Primary | ICD-10-CM

## 2022-12-08 ENCOUNTER — TELEPHONE (OUTPATIENT)
Dept: ENDOCRINOLOGY | Facility: CLINIC | Age: 82
End: 2022-12-08

## 2023-01-07 ENCOUNTER — TELEMEDICINE (OUTPATIENT)
Dept: FAMILY MEDICINE CLINIC | Facility: TELEHEALTH | Age: 83
End: 2023-01-07
Payer: MEDICARE

## 2023-01-07 DIAGNOSIS — U07.1 COVID-19: Primary | ICD-10-CM

## 2023-01-07 PROCEDURE — 99213 OFFICE O/P EST LOW 20 MIN: CPT | Performed by: NURSE PRACTITIONER

## 2023-01-07 RX ORDER — ONDANSETRON 4 MG/1
4 TABLET, ORALLY DISINTEGRATING ORAL EVERY 8 HOURS PRN
Qty: 12 TABLET | Refills: 0 | Status: SHIPPED | OUTPATIENT
Start: 2023-01-07

## 2023-01-07 RX ORDER — GUAIFENESIN AND DEXTROMETHORPHAN HYDROBROMIDE 600; 30 MG/1; MG/1
1 TABLET, EXTENDED RELEASE ORAL EVERY 12 HOURS SCHEDULED
Qty: 20 TABLET | Refills: 0 | Status: SHIPPED | OUTPATIENT
Start: 2023-01-07

## 2023-01-07 NOTE — PATIENT INSTRUCTIONS
10 Things You Can Do to Manage Your COVID-19 Symptoms at Home  If you have possible or confirmed COVID-19  Stay home except to get medical care.  Monitor your symptoms carefully. If your symptoms get worse, call your healthcare provider immediately.  Get rest and stay hydrated.  If you have a medical appointment, call the healthcare provider ahead of time and tell them that you have or may have COVID-19.  For medical emergencies, call 911 and notify the dispatch personnel that you have or may have COVID-19.  Cover your cough and sneezes with a tissue or use the inside of your elbow.  Wash your hands often with soap and water for at least 20 seconds or clean your hands with an alcohol-based hand  that contains at least 60% alcohol.  As much as possible, stay in a specific room and away from other people in your home. Also, you should use a separate bathroom, if available. If you need to be around other people in or outside of the home, wear a mask.  Avoid sharing personal items with other people in your household, like dishes, towels, and bedding.  Clean all surfaces that are touched often, like counters, tabletops, and doorknobs. Use household cleaning sprays or wipes according to the label instructions.  cdc.gov/coronavirus  07/16/2021  This information is not intended to replace advice given to you by your health care provider. Make sure you discuss any questions you have with your health care provider.  Document Revised: 11/02/2022 Document Reviewed: 11/02/2022  [employer] Patient Education © 2022 [employer]

## 2023-01-07 NOTE — PROGRESS NOTES
CHIEF COMPLAINT  Chief Complaint   Patient presents with   • Cough     Symptoms started 2 days ago   • Fatigue   • Generalized Body Aches   • Headache   • Sore Throat   • Covid-19 Home Monitoring Video Visit         HPI  Kathryn Davison is a 82 y.o. female  presents with complaint of testing positive for Covid 19 today with symptoms present for 2 days. She has cough, headache, sore throat, fatigue and generalized body aches. She reports no fever. She has support at home with her daughter and son in law.     Review of Systems   Constitutional: Positive for activity change and fatigue. Negative for fever. Appetite change: decreased.   HENT: Positive for postnasal drip, rhinorrhea, sneezing and sore throat.    Respiratory: Positive for cough. Negative for shortness of breath, wheezing and stridor.    Cardiovascular: Negative.    Gastrointestinal: Negative.    Musculoskeletal: Positive for myalgias.   Skin: Negative.    Neurological: Positive for headaches.   Hematological: Negative.    Psychiatric/Behavioral: Negative.        Past Medical History:   Diagnosis Date   • Abnormal ECG    • Anemia    • Back pain    • Bleeding nose 02/2020   • Body piercing     BOTH EARS   • Bronchitis    • Bulging lumbar disc    • Carotid artery stenosis     BILATERAL-FOLLOWING WITH DR. WINCHESTER.  SEES EVERY 6 MONTHS.  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   • Fatty liver 06/26/2017   • Fracture     RIGHT ANKLE AND LEFT ARM    • Frequency of urination    • GERD (gastroesophageal reflux disease)    • High cholesterol    • History of nuclear stress test 2016    \"IT WAS OK\"   • Hyperparathyroidism (HCC)    • Hypertension    • Lumbosacral disc disease    •  Macular degeneration    • Osteoarthritis    • Osteoporosis    • Sciatica    • Seasonal allergies    • Sleep apnea     CPAP HS - TO BRING IN DOS   • Swelling of both lower extremities    • Teeth missing     back teeth   • Thyroid activity decreased    • Type 2 diabetes mellitus (HCC)    • Wears glasses     FOR DISTANCE/and reading glasses       Family History   Problem Relation Age of Onset   • Heart disease Other    • Cancer Other    • Prostate cancer Father    • Stroke Father    • Colon cancer Neg Hx        Social History     Socioeconomic History   • Marital status:    Tobacco Use   • Smoking status: Never   • Smokeless tobacco: Never   Vaping Use   • Vaping Use: Never used   Substance and Sexual Activity   • Alcohol use: No   • Drug use: No   • Sexual activity: Defer         LMP  (LMP Unknown)     PHYSICAL EXAM  Physical Exam   Constitutional: She is oriented to person, place, and time. She appears well-developed and well-nourished. She has a sickly appearance. She appears ill. No distress.   HENT:   Head: Normocephalic and atraumatic.   Right Ear: Hearing normal.   Left Ear: Hearing normal.   Nose: Rhinorrhea present.   Mouth/Throat: Mouth/Lips are normal.  Pulmonary/Chest: Effort normal.  No respiratory distress.  Neurological: She is alert and oriented to person, place, and time.   Psychiatric: She has a normal mood and affect.   Vitals reviewed.      Results for orders placed or performed in visit on 11/10/22   POC Glycosylated Hemoglobin (Hb A1C)    Specimen: Blood   Result Value Ref Range    Hemoglobin A1C 8.1 %    Lot Number [id number]     Expiration Date 07/04/2024    POC Glucose, Blood    Specimen: Blood   Result Value Ref Range    Glucose 284 (A) 70 - 130 mg/dL    Lot Number [id number]     Expiration Date 05/24/2023        Diagnoses and all orders for this visit:    1. COVID-19 (Primary)  -     Molnupiravir (LAGEVRIO) 200 MG capsule; Take 4 capsules by mouth Every 12 (Twelve) Hours for 5 days.   Dispense: 40 capsule; Refill: 0  -     QUESTIONNAIRE SERIES  -     ondansetron ODT (ZOFRAN-ODT) 4 MG disintegrating tablet; Place 1 tablet on the tongue Every 8 (Eight) Hours As Needed for Nausea or Vomiting.  Dispense: 12 tablet; Refill: 0  -     guaifenesin-dextromethorphan (MUCINEX DM)  MG tablet sustained-release 12 hour tablet; Take 1 tablet by mouth Every 12 (Twelve) Hours.  Dispense: 20 tablet; Refill: 0    Rest and fluids.   Check oxygen saturation, pulse and temperature every 4 hours. If oxygen sat fall below 90% go to the ED for evaluation.   Use Albuterol HFA every 4-6 hours     Permission given for Molnupiravir.   Fact sheet provided via email.   Discussed Side effects and possible allergic side effects.         The use of a video visit has been reviewed with the patient and verbal informd consent has een obtained. Myself and Kathryn Davison participated in this visit. The patient is located in [address] [place], [place] [address] [place] [address]. I am located in [facility]. I spent 20 minutes in the patient's chart for this visit.           Ekta Harmon, APRN  01/07/2023  11:58 EST

## 2023-01-16 ENCOUNTER — HOSPITAL ENCOUNTER (OUTPATIENT)
Dept: MAMMOGRAPHY | Facility: HOSPITAL | Age: 83
Discharge: HOME | End: 2023-01-16
Payer: MEDICARE

## 2023-01-16 ENCOUNTER — HOSPITAL ENCOUNTER (OUTPATIENT)
Dept: ULTRASOUND IMAGING | Facility: HOSPITAL | Age: 83
Discharge: HOME | End: 2023-01-16
Payer: MEDICARE

## 2023-01-16 DIAGNOSIS — N63.21 UNSPECIFIED LUMP IN THE LEFT BREAST, UPPER OUTER QUADRANT: ICD-10-CM

## 2023-01-16 DIAGNOSIS — N63.25 BREAST LUMP ON LEFT SIDE AT 3 O'CLOCK POSITION: ICD-10-CM

## 2023-01-16 PROCEDURE — G0279 TOMOSYNTHESIS, MAMMO: HCPCS

## 2023-01-16 PROCEDURE — 77066 DX MAMMO INCL CAD BI: CPT

## 2023-01-31 ENCOUNTER — HOSPITAL ENCOUNTER (OUTPATIENT)
Dept: ULTRASOUND IMAGING | Facility: HOSPITAL | Age: 83
Discharge: HOME | End: 2023-01-31
Admitting: INTERNAL MEDICINE
Payer: MEDICARE

## 2023-01-31 DIAGNOSIS — N63.25 BREAST LUMP ON LEFT SIDE AT 3 O'CLOCK POSITION: ICD-10-CM

## 2023-01-31 DIAGNOSIS — R92.8 ABNORMAL MAMMOGRAM OF RIGHT BREAST: ICD-10-CM

## 2023-01-31 PROCEDURE — 76642 ULTRASOUND BREAST LIMITED: CPT

## 2023-02-10 ENCOUNTER — TRANSCRIBE ORDERS (OUTPATIENT)
Dept: ADMINISTRATIVE | Facility: HOSPITAL | Age: 83
End: 2023-02-10
Payer: MEDICARE

## 2023-02-10 DIAGNOSIS — R92.8 OTHER ABNORMAL AND INCONCLUSIVE FINDINGS ON DIAGNOSTIC IMAGING OF BREAST: ICD-10-CM

## 2023-03-24 RX ORDER — INSULIN GLARGINE 100 [IU]/ML
INJECTION, SOLUTION SUBCUTANEOUS
Qty: 30 ML | Refills: 1 | Status: SHIPPED | OUTPATIENT
Start: 2023-03-24

## 2023-04-13 ENCOUNTER — OFFICE VISIT (OUTPATIENT)
Dept: ENDOCRINOLOGY | Facility: CLINIC | Age: 83
End: 2023-04-13
Payer: MEDICARE

## 2023-04-13 VITALS
WEIGHT: 223 LBS | HEIGHT: 63 IN | BODY MASS INDEX: 39.51 KG/M2 | DIASTOLIC BLOOD PRESSURE: 60 MMHG | HEART RATE: 68 BPM | OXYGEN SATURATION: 98 % | SYSTOLIC BLOOD PRESSURE: 108 MMHG

## 2023-04-13 DIAGNOSIS — E11.42 TYPE 2 DIABETES MELLITUS WITH DIABETIC POLYNEUROPATHY, WITH LONG-TERM CURRENT USE OF INSULIN: Primary | ICD-10-CM

## 2023-04-13 DIAGNOSIS — E78.00 PURE HYPERCHOLESTEROLEMIA: ICD-10-CM

## 2023-04-13 DIAGNOSIS — E03.9 ACQUIRED HYPOTHYROIDISM: ICD-10-CM

## 2023-04-13 DIAGNOSIS — Z79.4 TYPE 2 DIABETES MELLITUS WITH DIABETIC POLYNEUROPATHY, WITH LONG-TERM CURRENT USE OF INSULIN: Primary | ICD-10-CM

## 2023-04-13 LAB
ALBUMIN UR-MCNC: 5.4 MG/DL
CREAT UR-MCNC: 51.5 MG/DL
EXPIRATION DATE: ABNORMAL
EXPIRATION DATE: NORMAL
GLUCOSE BLDC GLUCOMTR-MCNC: 176 MG/DL (ref 70–130)
HBA1C MFR BLD: 8.6 %
Lab: ABNORMAL
Lab: NORMAL
MICROALBUMIN/CREAT UR: 104.9 MG/G

## 2023-04-13 PROCEDURE — 99214 OFFICE O/P EST MOD 30 MIN: CPT | Performed by: INTERNAL MEDICINE

## 2023-04-13 PROCEDURE — 80061 LIPID PANEL: CPT | Performed by: INTERNAL MEDICINE

## 2023-04-13 PROCEDURE — 80053 COMPREHEN METABOLIC PANEL: CPT | Performed by: INTERNAL MEDICINE

## 2023-04-13 PROCEDURE — 3074F SYST BP LT 130 MM HG: CPT | Performed by: INTERNAL MEDICINE

## 2023-04-13 PROCEDURE — 3078F DIAST BP <80 MM HG: CPT | Performed by: INTERNAL MEDICINE

## 2023-04-13 PROCEDURE — 83036 HEMOGLOBIN GLYCOSYLATED A1C: CPT | Performed by: INTERNAL MEDICINE

## 2023-04-13 PROCEDURE — 84443 ASSAY THYROID STIM HORMONE: CPT | Performed by: INTERNAL MEDICINE

## 2023-04-13 PROCEDURE — 82043 UR ALBUMIN QUANTITATIVE: CPT | Performed by: INTERNAL MEDICINE

## 2023-04-13 PROCEDURE — 84439 ASSAY OF FREE THYROXINE: CPT | Performed by: INTERNAL MEDICINE

## 2023-04-13 PROCEDURE — 36415 COLL VENOUS BLD VENIPUNCTURE: CPT | Performed by: INTERNAL MEDICINE

## 2023-04-13 PROCEDURE — 3052F HG A1C>EQUAL 8.0%<EQUAL 9.0%: CPT | Performed by: INTERNAL MEDICINE

## 2023-04-13 PROCEDURE — 82947 ASSAY GLUCOSE BLOOD QUANT: CPT | Performed by: INTERNAL MEDICINE

## 2023-04-13 PROCEDURE — 1160F RVW MEDS BY RX/DR IN RCRD: CPT | Performed by: INTERNAL MEDICINE

## 2023-04-13 PROCEDURE — 82570 ASSAY OF URINE CREATININE: CPT | Performed by: INTERNAL MEDICINE

## 2023-04-13 PROCEDURE — 1159F MED LIST DOCD IN RCRD: CPT | Performed by: INTERNAL MEDICINE

## 2023-04-13 RX ORDER — SEMAGLUTIDE 1.34 MG/ML
0.5 INJECTION, SOLUTION SUBCUTANEOUS WEEKLY
Qty: 1.5 ML | Refills: 3 | Status: SHIPPED | OUTPATIENT
Start: 2023-04-13

## 2023-04-13 NOTE — PROGRESS NOTES
Kathryn Davison 83 y.o.  CC:Follow-up, Diabetes (Type II, eye exam 10/22), Hyperlipidemia, and Hypothyroidism      [place]: Follow-up, Diabetes (Type II, eye exam 10/22), Hyperlipidemia, and Hypothyroidism    Blood sugar and 90 day average sugar reviewed  Results for orders placed or performed in visit on 04/13/23   POC Glycosylated Hemoglobin (Hb A1C)    Specimen: Blood   Result Value Ref Range    Hemoglobin A1C 8.6 %    Lot Number [id number]     Expiration Date 11/14/2024    POC Glucose, Blood    Specimen: Blood   Result Value Ref Range    Glucose 176 (A) 70 - 130 mg/dL    Lot Number [id number]     Expiration Date 10/28/2023      Average sugar is 195- taking semaglutide, lantus   Is eating low fat diet taking pravachol 80 mg daily   Is taking synthroid 100 mcg daily  bp is low / normal on multiple medications   Higher sugars since covid - hard time getting sugars down  Is taking ozempic 0.25 mg weekly     Allergies   Allergen Reactions   • Gabapentin Dizziness   • Betadine [Povidone Iodine] Rash       Current Outpatient Medications:   •  albuterol (PROVENTIL HFA;VENTOLIN HFA) 108 (90 Base) MCG/ACT inhaler, Inhale 2 puffs Every 4 (Four) Hours As Needed for Wheezing., Disp: , Rfl:   •  amLODIPine (NORVASC) 10 MG tablet, amlodipine 10 mg tablet  Take 1 tablet by mouth once daily for 90 days, Disp: , Rfl:   •  Calcium Citrate-Vitamin D (CALCIUM + D PO), Take 1 tablet by mouth Daily., Disp: , Rfl:   •  carvedilol (COREG) 12.5 MG tablet, Take 1 tablet by mouth 2 (Two) Times a Day With Meals., Disp: , Rfl:   •  clopidogrel (PLAVIX) 75 MG tablet, Take 75 mg by mouth Daily., Disp: , Rfl:   •  Continuous Blood Gluc Sensor (FreeStyle Gretchen 2 Sensor) misc, 1 each Every 14 (Fourteen) Days., Disp: 2 each, Rfl: 5  •  docusate sodium 100 MG capsule, Take 2 capsules by mouth 2 (Two) Times a Day., Disp: 60 each, Rfl: 3  •  doxazosin (CARDURA) 2 MG tablet, , Disp: , Rfl:   •  escitalopram (LEXAPRO) 10 MG tablet, Take 5 mg by mouth  Daily., Disp: , Rfl:   •  Euthyrox 100 MCG tablet, , Disp: , Rfl:   •  Furosemide (LASIX PO), Take 20 mg by mouth Daily As Needed., Disp: , Rfl:   •  Lantus SoloStar 100 UNIT/ML injection pen, INJECT 27 UNITS SUBCUTANEOUSLY ONCE DAILY, Disp: 30 mL, Rfl: 1  •  losartan (COZAAR) 50 MG tablet, Take 50 mg by mouth Daily., Disp: , Rfl:   •  montelukast (SINGULAIR) 10 MG tablet, Take 10 mg by mouth Every Night., Disp: , Rfl:   •  Multiple Vitamins-Minerals (MULTIVITAMIN ADULT PO), Take 1 tablet by mouth Daily., Disp: , Rfl:   •  oxybutynin XL (DITROPAN-XL) 5 MG 24 hr tablet, Take 1 tablet by mouth Daily., Disp: 90 tablet, Rfl: 4  •  polyethylene glycol (MIRALAX) packet, Take 17 g by mouth As Needed., Disp: , Rfl:   •  potassium chloride (MICRO-K) 10 MEQ CR capsule, potassium chloride ER 10 mEq tablet,extended release  TAKE ONE TABLET BY MOUTH ONCE DAILY ON  THE  DAYS  YOU  TAKE  LASIX  (FUROSEMIDE), Disp: , Rfl:   •  pravastatin (PRAVACHOL) 80 MG tablet, Take 80 mg by mouth Daily., Disp: , Rfl:   •  Semaglutide,0.25 or 0.5MG/DOS, (Ozempic, 0.25 or 0.5 MG/DOSE,) 2 MG/1.5ML solution pen-injector, Inject 0.5 mg under the skin into the appropriate area as directed 1 (One) Time Per Week., Disp: 1.5 mL, Rfl: 3  •  spironolactone-hydrochlorothiazide (ALDACTAZIDE) 25-25 MG tablet, Take 1 tablet by mouth once daily, Disp: 90 tablet, Rfl: 1  Patient Active Problem List    Diagnosis    • Daytime somnolence [R40.0]    • Vitamin D deficiency [E55.9]    • Renal insufficiency [N28.9]    • 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]    • Sleep apnea [G47.30]    • Endometrial cancer [C54.1]    • Anemia [D64.9]    • Heartburn [R12]    • Iron deficiency anemia [D50.9]    • Non Q wave myocardial infarction [I21.4]    • Status post total shoulder replacement, left [Z96.612]    • Right wrist fracture [S62.101A]    • Type 2 diabetes mellitus [E11.9]    • Hypertensive disorder [I10]    • Hypothyroidism [E03.9]    • Morbid obesity with  BMI of 40.0-44.9, adult [E66.01, Z68.41]    • Closed fracture of left shoulder [S42.92XA]    • Gastritis without bleeding [K29.70]    • Abnormal ultrasound of liver [R93.2]    • Gastric polyp [K31.7]    • Dyspnea [R06.00]    • Edema [R60.9]    • Left upper quadrant pain [R10.12]    • Diverticulosis of large intestine without hemorrhage [K57.30]    • Colon polyps [K63.5]    • Vascular ectasia of colon [K55.20]    • Internal hemorrhoids [K64.8]    • External hemorrhoid [K64.4]    • Bilateral pseudophakia [Z96.1]    • Type 2 diabetes mellitus with diabetic polyneuropathy, without long-term current use of insulin [E11.42]    • Left carotid artery occlusion [I65.22]    • Left carotid artery stenosis [I65.22]    • Abnormal blood chemistry level [R79.9]    • Osteoporosis [M81.0]    • Proliferative diabetic retinopathy associated with type 2 diabetes mellitus [E11.3599]    • Carotid artery occlusion [I65.29]    • Carotid artery stenosis [I65.29]    • Hyperlipidemia [E78.5]    • Lack of energy [R53.83]    • After-cataract [H26.40]      Review of Systems   Constitutional: Negative for activity change, appetite change and unexpected weight change.   HENT: Negative for congestion and rhinorrhea.    Eyes: Negative for visual disturbance.   Respiratory: Negative for cough and shortness of breath.    Cardiovascular: Negative for palpitations and leg swelling.   Gastrointestinal: Negative for constipation, diarrhea and nausea.   Genitourinary: Negative for hematuria.   Musculoskeletal: Negative for arthralgias, back pain, gait problem, joint swelling and myalgias.   Skin: Negative for color change, rash and wound.   Allergic/Immunologic: Negative for environmental allergies, food allergies and immunocompromised state.   Neurological: Negative for dizziness, weakness and light-headedness.   Psychiatric/Behavioral: Negative for confusion, decreased concentration, dysphoric mood and sleep disturbance. The patient is not  "nervous/anxious.      Social History     Socioeconomic History   • Marital status:    Tobacco Use   • Smoking status: Never   • Smokeless tobacco: Never   Vaping Use   • Vaping Use: Never used   Substance and Sexual Activity   • Alcohol use: No   • Drug use: No   • Sexual activity: Defer     Family History   Problem Relation Age of Onset   • Heart disease Other    • Cancer Other    • Prostate cancer Father    • Stroke Father    • Colon cancer Neg Hx      /60   Pulse 68   Ht 158.8 cm (62.5\")   Wt 101 kg (223 lb)   LMP  (LMP Unknown)   SpO2 98%   BMI 40.14 kg/m²   Physical Exam  Vitals and nursing note reviewed.   Constitutional:       Appearance: Normal appearance. She is well-developed.   HENT:      Head: Normocephalic and atraumatic.   Eyes:      General: Lids are normal.      Extraocular Movements: Extraocular movements intact.      Conjunctiva/sclera: Conjunctivae normal.      Pupils: Pupils are equal, round, and reactive to light.   Neck:      Thyroid: No thyroid mass or thyromegaly.      Vascular: No carotid bruit.      Trachea: Trachea normal. No tracheal deviation.   Cardiovascular:      Rate and Rhythm: Normal rate and regular rhythm.      Pulses: Normal pulses.      Heart sounds: Normal heart sounds. No murmur heard.    No friction rub. No gallop.   Pulmonary:      Effort: Pulmonary effort is normal. No respiratory distress.      Breath sounds: Normal breath sounds. No wheezing.   Musculoskeletal:         General: No deformity. Normal range of motion.      Cervical back: Normal range of motion and neck supple.   Lymphadenopathy:      Cervical: No cervical adenopathy.   Skin:     General: Skin is warm and dry.      Findings: No erythema or rash.      Nails: There is no clubbing.   Neurological:      General: No focal deficit present.      Mental Status: She is alert and oriented to person, place, and time.      Cranial Nerves: No cranial nerve deficit.      Deep Tendon Reflexes: Reflexes " are normal and symmetric. Reflexes normal.   Psychiatric:         Mood and Affect: Mood normal.         Speech: Speech normal.         Behavior: Behavior normal.         Thought Content: Thought content normal.         Judgment: Judgment normal.       Results for orders placed or performed in visit on 04/13/23   POC Glycosylated Hemoglobin (Hb A1C)    Specimen: Blood   Result Value Ref Range    Hemoglobin A1C 8.6 %    Lot Number [id number]     Expiration Date 11/14/2024    POC Glucose, Blood    Specimen: Blood   Result Value Ref Range    Glucose 176 (A) 70 - 130 mg/dL    Lot Number [id number]     Expiration Date 10/28/2023      Diagnoses and all orders for this visit:    1. Type 2 diabetes mellitus with diabetic polyneuropathy, with long-term current use of insulin (Primary)  Assessment & Plan:  Blood sugar and 90 day average sugar reviewed  Results for orders placed or performed in visit on 04/13/23   POC Glycosylated Hemoglobin (Hb A1C)    Specimen: Blood   Result Value Ref Range    Hemoglobin A1C 8.6 %    Lot Number [id number]     Expiration Date 11/14/2024    POC Glucose, Blood    Specimen: Blood   Result Value Ref Range    Glucose 176 (A) 70 - 130 mg/dL    Lot Number [id number]     Expiration Date 10/28/2023      Average sugar is 195  Titrate ozempic to 0.5 mg weekly   Continue lantus   Consider adding glucotrol   Is utd with eye exam  No foot lesion   Ur alb due   Does have venous stasis (mild) with edema (mild)    Orders:  -     POC Glycosylated Hemoglobin (Hb A1C)  -     POC Glucose, Blood  -     Comprehensive Metabolic Panel; Future  -     Microalbumin / Creatinine Urine Ratio - Urine, Clean Catch; Future  -     Lipid Panel; Future  -     TSH; Future  -     T4, Free; Future  -     Comprehensive Metabolic Panel  -     Microalbumin / Creatinine Urine Ratio - Urine, Clean Catch  -     Lipid Panel  -     TSH  -     T4, Free    2. Acquired hypothyroidism  Assessment & Plan:  Update tfts- taking levothyroxine 100  mcg daily       3. Pure hypercholesterolemia  Assessment & Plan:  Taking pravachol 80 mg daily   Check flp       Other orders  -     Semaglutide,0.25 or 0.5MG/DOS, (Ozempic, 0.25 or 0.5 MG/DOSE,) 2 MG/1.5ML solution pen-injector; Inject 0.5 mg under the skin into the appropriate area as directed 1 (One) Time Per Week.  Dispense: 1.5 mL; Refill: 3  Return in about 3 months (around 7/13/2023) for Recheck.    Grace Duran MD  Signed Grace Duran MD

## 2023-04-13 NOTE — ASSESSMENT & PLAN NOTE
Blood sugar and 90 day average sugar reviewed  Results for orders placed or performed in visit on 04/13/23   POC Glycosylated Hemoglobin (Hb A1C)    Specimen: Blood   Result Value Ref Range    Hemoglobin A1C 8.6 %    Lot Number [id number]     Expiration Date 11/14/2024    POC Glucose, Blood    Specimen: Blood   Result Value Ref Range    Glucose 176 (A) 70 - 130 mg/dL    Lot Number [id number]     Expiration Date 10/28/2023      Average sugar is 195  Titrate ozempic to 0.5 mg weekly   Continue lantus   Consider adding glucotrol   Is utd with eye exam  No foot lesion   Ur alb due   Does have venous stasis (mild) with edema (mild)

## 2023-04-14 LAB
ALBUMIN SERPL-MCNC: 4 G/DL (ref 3.5–5.2)
ALBUMIN/GLOB SERPL: 1.4 G/DL
ALP SERPL-CCNC: 110 U/L (ref 39–117)
ALT SERPL W P-5'-P-CCNC: 20 U/L (ref 1–33)
ANION GAP SERPL CALCULATED.3IONS-SCNC: 10.6 MMOL/L (ref 5–15)
AST SERPL-CCNC: 22 U/L (ref 1–32)
BILIRUB SERPL-MCNC: 0.3 MG/DL (ref 0–1.2)
BUN SERPL-MCNC: 17 MG/DL (ref 8–23)
BUN/CREAT SERPL: 14.5 (ref 7–25)
CALCIUM SPEC-SCNC: 9.6 MG/DL (ref 8.6–10.5)
CHLORIDE SERPL-SCNC: 100 MMOL/L (ref 98–107)
CHOLEST SERPL-MCNC: 191 MG/DL (ref 0–200)
CO2 SERPL-SCNC: 29.4 MMOL/L (ref 22–29)
CREAT SERPL-MCNC: 1.17 MG/DL (ref 0.57–1)
EGFRCR SERPLBLD CKD-EPI 2021: 46.4 ML/MIN/1.73
GLOBULIN UR ELPH-MCNC: 2.9 GM/DL
GLUCOSE SERPL-MCNC: 186 MG/DL (ref 65–99)
HDLC SERPL-MCNC: 53 MG/DL (ref 40–60)
LDLC SERPL CALC-MCNC: 112 MG/DL (ref 0–100)
LDLC/HDLC SERPL: 2.05 {RATIO}
POTASSIUM SERPL-SCNC: 4.3 MMOL/L (ref 3.5–5.2)
PROT SERPL-MCNC: 6.9 G/DL (ref 6–8.5)
SODIUM SERPL-SCNC: 140 MMOL/L (ref 136–145)
T4 FREE SERPL-MCNC: 1.5 NG/DL (ref 0.93–1.7)
TRIGL SERPL-MCNC: 146 MG/DL (ref 0–150)
TSH SERPL DL<=0.05 MIU/L-ACNC: 1.31 UIU/ML (ref 0.27–4.2)
VLDLC SERPL-MCNC: 26 MG/DL (ref 5–40)

## 2023-06-01 RX ORDER — SPIRONOLACTONE AND HYDROCHLOROTHIAZIDE 25; 25 MG/1; MG/1
TABLET ORAL
Qty: 90 TABLET | Refills: 1 | Status: SHIPPED | OUTPATIENT
Start: 2023-06-01

## 2023-06-01 NOTE — TELEPHONE ENCOUNTER
Rx Refill Note    Requested Prescriptions     Pending Prescriptions Disp Refills   • spironolactone-hydrochlorothiazide (ALDACTAZIDE) 25-25 MG tablet [Pharmacy Med Name: Spironolactone-HCTZ 25-25 MG Oral Tablet] 90 tablet 0     Sig: Take 1 tablet by mouth once daily        Last office visit with prescribing clinician: 4/13/2023       Next office visit with prescribing clinician: 8/11/2023     {    Risa Bailey MA  06/01/23, 16:56 EDT

## 2023-09-08 RX ORDER — INSULIN GLARGINE 100 [IU]/ML
INJECTION, SOLUTION SUBCUTANEOUS
Qty: 3 ML | Refills: 0 | Status: SHIPPED | OUTPATIENT
Start: 2023-09-08

## 2023-09-08 NOTE — TELEPHONE ENCOUNTER
Rx Refill Note    Requested Prescriptions     Pending Prescriptions Disp Refills    Lantus SoloStar 100 UNIT/ML injection pen [Pharmacy Med Name: Lantus SoloStar 100 UNIT/ML Subcutaneous Solution Pen-injector] 30 mL 0     Sig: INJECT 27 UNITS SUBCUTANEOUSLY ONCE DAILY        Last office visit with prescribing clinician: 4/13/2023     Pt canceled OV on 8/11/23 and did not reschedule    Next office visit with prescribing clinician: Visit date not found   {

## 2023-09-22 RX ORDER — SEMAGLUTIDE 0.68 MG/ML
0.5 INJECTION, SOLUTION SUBCUTANEOUS WEEKLY
Qty: 3 ML | Refills: 1 | Status: SHIPPED | OUTPATIENT
Start: 2023-09-22

## 2023-09-22 NOTE — TELEPHONE ENCOUNTER
Rx Refill Note  Requested Prescriptions     Pending Prescriptions Disp Refills    Ozempic, 0.25 or 0.5 MG/DOSE, 2 MG/3ML solution pen-injector [Pharmacy Med Name: Ozempic (0.25 or 0.5 MG/DOSE) 2 MG/3ML Subcutaneous Solution Pen-injector] 3 mL 0     Sig: INJECT 0.5MG SUBCUTANEOUSLY ONCE A WEEK TO APPROPRIATE AREA AS DIRECTED        Last office visit with prescribing clinician: 4/13/2023      Next office visit with prescribing clinician: Visit date not found       Tamra Schmitz ([place])  09/22/23, 13:11 EDT     Noted needs appt

## 2023-10-02 ENCOUNTER — HOSPITAL ENCOUNTER (OUTPATIENT)
Dept: MAMMOGRAPHY | Facility: HOSPITAL | Age: 83
Discharge: HOME | End: 2023-10-02
Payer: MEDICARE

## 2023-10-02 ENCOUNTER — HOSPITAL ENCOUNTER (OUTPATIENT)
Dept: ULTRASOUND IMAGING | Facility: HOSPITAL | Age: 83
Discharge: HOME | End: 2023-10-02
Payer: MEDICARE

## 2023-10-02 DIAGNOSIS — R92.8 OTHER ABNORMAL AND INCONCLUSIVE FINDINGS ON DIAGNOSTIC IMAGING OF BREAST: ICD-10-CM

## 2023-10-02 DIAGNOSIS — R92.8 ABNORMAL MAMMOGRAM OF RIGHT BREAST: ICD-10-CM

## 2023-10-02 PROCEDURE — 76642 ULTRASOUND BREAST LIMITED: CPT

## 2023-10-02 PROCEDURE — 77065 DX MAMMO INCL CAD UNI: CPT

## 2023-10-02 PROCEDURE — G0279 TOMOSYNTHESIS, MAMMO: HCPCS

## 2023-10-05 ENCOUNTER — OFFICE VISIT (OUTPATIENT)
Dept: GYNECOLOGIC ONCOLOGY | Facility: CLINIC | Age: 83
End: 2023-10-05
Payer: MEDICARE

## 2023-10-05 VITALS
OXYGEN SATURATION: 98 % | RESPIRATION RATE: 17 BRPM | BODY MASS INDEX: 38.5 KG/M2 | HEIGHT: 63 IN | TEMPERATURE: 96.9 F | HEART RATE: 68 BPM | WEIGHT: 217.3 LBS | SYSTOLIC BLOOD PRESSURE: 145 MMHG | DIASTOLIC BLOOD PRESSURE: 68 MMHG

## 2023-10-05 DIAGNOSIS — Z85.42 HISTORY OF ENDOMETRIAL CANCER: Primary | ICD-10-CM

## 2023-10-05 NOTE — PROGRESS NOTES
GYN ONCOLOGY CANCER SURVEILLANCE FOLLOW-UP    Kathryn Davison  [id number]  1940    Subjective   Chief Complaint: Uterine Cancer (No gyn complaints)        History of present illness:     Kathryn Davison is a 83 y.o. year old female who is here today for ongoing surveillance of Endometrial Cancer, see Cancer History. She is now over 3 years out from completion of treatment with surgery alone. She continues oxybutynin XL 5 mg PO daily for management of urge urinary incontinence. She is in her usual state of health today and has no gyn complaints. She is accompanied by her daughter. She denies vaginal bleeding, pelvic pain, and changes in bowel or bladder function.            Cancer History:   Oncology/Hematology History   Endometrial cancer   7/24/2020 Procedure    Hysteroscopy, D&C, and MyoSure by Dr. Elizalde for postmenopausal bleeding. Pathology showed sections with adenocarcinoma, enlarged vascular neclei, prominent mitotic activity, and focal areas of complex hyperplasia with atypia. Referred to Gyn Oncology.      8/31/2020 Surgery    Radical Type 1 RTLH/BSO and sentinel lymph node dissection.     Final pathology showed residual 0.2 cm grade 2 endometrioid tumor, non-invasive into the myometrium. No lymphvascular invasion. Nodes negative. MSI testing showed loss of MLH1 and PMS2 (per previous biopsy). 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  Copy of Survivorship Care Plan provided to patient and primary care provider.           The current medication list and allergy list were reviewed and reconciled.     Past Medical History, Past Surgical History, Social History, Family History have been reviewed and are without significant changes except as mentioned.      Review of Systems   Constitutional: Negative.    Gastrointestinal: Negative.    Genitourinary: Negative.   "  Musculoskeletal:  Positive for arthralgias, back pain (chronic) and gait problem (walker for ambulatory aid).   Psychiatric/Behavioral: Negative.           Objective   Physical Exam  Vital Signs: /68   Pulse 68   Temp 96.9 °F (36.1 °C) (Temporal)   Resp 17   Ht 158.8 cm (62.52\")   Wt 98.6 kg (217 lb 4.8 oz)   LMP  (LMP Unknown)   SpO2 98%   BMI 39.09 kg/m²    Wt Readings from Last 3 Encounters:   10/05/23 1405 98.6 kg (217 lb 4.8 oz)   04/13/23 1315 101 kg (223 lb)   11/10/22 1514 102 kg (225 lb 6.4 oz)       Vitals:    10/05/23 1405   PainSc:   5   PainLoc: Back           General Appearance:  alert, cooperative, no apparent distress, appears stated age and obese by BMI criteria   Neurologic/Psych: A&O x 3, gait steady with use of walker, appropriate affect   HEENT:  Normocephalic, without obvious abnormality, mucous membranes moist   Abdomen:   Soft, non-tender, non-distended, no organomegaly and Exam limited d/t habitus.   Lymph nodes: No cervical, supraclavicular, inguinal adenopathy noted   Pelvic: External Genitalia  atrophic, without lesions  Vagina  is pale, atrophic.   Vaginal Cuff  Female Vaginal Cuff: smooth, intact and without visible lesions  Uterus  surgically absent, no palpable masses and exam limited d/t adiposity  Ovaries  surgically absent bilaterally  Parametria  smooth  Rectovaginal  Female rectovaginal: deferred     ECOG score: 1                    Assessment and Plan:    Diagnoses and all orders for this visit:    History of endometrial cancer      There is no evidence of disease upon today's exam. Continue yearly visits until the 5 year rianna. She is understanding to call with any changes in pelvic symptoms or general GYN concerns at any time between regularly scheduled visits.     Pain assessment was performed today as a part of patient’s care.  For patients with pain related to surgery, gynecologic malignancy or cancer treatment, the plan is as noted in the assessment/plan.  " For patients with pain not related to these issues, they are to seek any further needed care from a more appropriate provider, such as PCP.      Follow-up:     Return to clinic in 1 year for ongoing cancer surveillance.     Electronically signed by NANNETTE Reddy on 10/05/23 at 14:36 EDT

## 2023-11-14 ENCOUNTER — APPOINTMENT (OUTPATIENT)
Dept: GENERAL RADIOLOGY | Facility: HOSPITAL | Age: 83
DRG: 282 | End: 2023-11-14
Payer: MEDICARE

## 2023-11-14 ENCOUNTER — HOSPITAL ENCOUNTER (INPATIENT)
Facility: HOSPITAL | Age: 83
LOS: 3 days | Discharge: ANOTHER HOSPITAL | DRG: 282 | End: 2023-11-18
Attending: STUDENT IN AN ORGANIZED HEALTH CARE EDUCATION/TRAINING PROGRAM | Admitting: STUDENT IN AN ORGANIZED HEALTH CARE EDUCATION/TRAINING PROGRAM
Payer: MEDICARE

## 2023-11-14 DIAGNOSIS — I21.4 NSTEMI (NON-ST ELEVATED MYOCARDIAL INFARCTION): Primary | ICD-10-CM

## 2023-11-14 LAB
ALBUMIN SERPL-MCNC: 3.9 G/DL (ref 3.5–5.2)
ALBUMIN/GLOB SERPL: 1.1 G/DL
ALP SERPL-CCNC: 139 U/L (ref 39–117)
ALT SERPL W P-5'-P-CCNC: 20 U/L (ref 1–33)
ANION GAP SERPL CALCULATED.3IONS-SCNC: 10.1 MMOL/L (ref 5–15)
AST SERPL-CCNC: 46 U/L (ref 1–32)
BASOPHILS # BLD AUTO: 0.05 10*3/MM3 (ref 0–0.2)
BASOPHILS NFR BLD AUTO: 0.5 % (ref 0–1.5)
BILIRUB SERPL-MCNC: 0.5 MG/DL (ref 0–1.2)
BILIRUB UR QL STRIP: NEGATIVE
BUN SERPL-MCNC: 18 MG/DL (ref 8–23)
BUN/CREAT SERPL: 15.4 (ref 7–25)
CALCIUM SPEC-SCNC: 9.6 MG/DL (ref 8.6–10.5)
CHLORIDE SERPL-SCNC: 99 MMOL/L (ref 98–107)
CLARITY UR: CLEAR
CO2 SERPL-SCNC: 28.9 MMOL/L (ref 22–29)
COLOR UR: YELLOW
CREAT SERPL-MCNC: 1.17 MG/DL (ref 0.57–1)
DEPRECATED RDW RBC AUTO: 41.4 FL (ref 37–54)
EGFRCR SERPLBLD CKD-EPI 2021: 46.4 ML/MIN/1.73
EOSINOPHIL # BLD AUTO: 0.08 10*3/MM3 (ref 0–0.4)
EOSINOPHIL NFR BLD AUTO: 0.8 % (ref 0.3–6.2)
ERYTHROCYTE [DISTWIDTH] IN BLOOD BY AUTOMATED COUNT: 13.2 % (ref 12.3–15.4)
GEN 5 2HR TROPONIN T REFLEX: 753 NG/L
GLOBULIN UR ELPH-MCNC: 3.7 GM/DL
GLUCOSE SERPL-MCNC: 263 MG/DL (ref 65–99)
GLUCOSE UR STRIP-MCNC: ABNORMAL MG/DL
HCT VFR BLD AUTO: 41 % (ref 34–46.6)
HGB BLD-MCNC: 13.2 G/DL (ref 12–15.9)
HGB UR QL STRIP.AUTO: NEGATIVE
HOLD SPECIMEN: NORMAL
HOLD SPECIMEN: NORMAL
IMM GRANULOCYTES # BLD AUTO: 0.03 10*3/MM3 (ref 0–0.05)
IMM GRANULOCYTES NFR BLD AUTO: 0.3 % (ref 0–0.5)
KETONES UR QL STRIP: NEGATIVE
LEUKOCYTE ESTERASE UR QL STRIP.AUTO: NEGATIVE
LYMPHOCYTES # BLD AUTO: 1.71 10*3/MM3 (ref 0.7–3.1)
LYMPHOCYTES NFR BLD AUTO: 17.6 % (ref 19.6–45.3)
MAGNESIUM SERPL-MCNC: 2 MG/DL (ref 1.6–2.4)
MCH RBC QN AUTO: 27.6 PG (ref 26.6–33)
MCHC RBC AUTO-ENTMCNC: 32.2 G/DL (ref 31.5–35.7)
MCV RBC AUTO: 85.6 FL (ref 79–97)
MONOCYTES # BLD AUTO: 0.81 10*3/MM3 (ref 0.1–0.9)
MONOCYTES NFR BLD AUTO: 8.3 % (ref 5–12)
NEUTROPHILS NFR BLD AUTO: 7.04 10*3/MM3 (ref 1.7–7)
NEUTROPHILS NFR BLD AUTO: 72.5 % (ref 42.7–76)
NITRITE UR QL STRIP: NEGATIVE
NRBC BLD AUTO-RTO: 0 /100 WBC (ref 0–0.2)
PH UR STRIP.AUTO: 7 [PH] (ref 5–8)
PLATELET # BLD AUTO: 280 10*3/MM3 (ref 140–450)
PMV BLD AUTO: 10 FL (ref 6–12)
POTASSIUM SERPL-SCNC: 4.4 MMOL/L (ref 3.5–5.2)
PROT SERPL-MCNC: 7.6 G/DL (ref 6–8.5)
PROT UR QL STRIP: NEGATIVE
RBC # BLD AUTO: 4.79 10*6/MM3 (ref 3.77–5.28)
SODIUM SERPL-SCNC: 138 MMOL/L (ref 136–145)
SP GR UR STRIP: 1.01 (ref 1–1.03)
TROPONIN T DELTA: 27 NG/L
TROPONIN T SERPL HS-MCNC: 726 NG/L
TROPONIN T SERPL HS-MCNC: 763 NG/L
UROBILINOGEN UR QL STRIP: ABNORMAL
WBC NRBC COR # BLD: 9.72 10*3/MM3 (ref 3.4–10.8)
WHOLE BLOOD HOLD COAG: NORMAL
WHOLE BLOOD HOLD SPECIMEN: NORMAL

## 2023-11-14 PROCEDURE — 99285 EMERGENCY DEPT VISIT HI MDM: CPT

## 2023-11-14 PROCEDURE — 25010000002 ENOXAPARIN PER 10 MG: Performed by: STUDENT IN AN ORGANIZED HEALTH CARE EDUCATION/TRAINING PROGRAM

## 2023-11-14 PROCEDURE — 93005 ELECTROCARDIOGRAM TRACING: CPT

## 2023-11-14 PROCEDURE — 36415 COLL VENOUS BLD VENIPUNCTURE: CPT

## 2023-11-14 PROCEDURE — 71045 X-RAY EXAM CHEST 1 VIEW: CPT

## 2023-11-14 PROCEDURE — 84484 ASSAY OF TROPONIN QUANT: CPT

## 2023-11-14 PROCEDURE — 81003 URINALYSIS AUTO W/O SCOPE: CPT

## 2023-11-14 PROCEDURE — 85025 COMPLETE CBC W/AUTO DIFF WBC: CPT

## 2023-11-14 PROCEDURE — 93005 ELECTROCARDIOGRAM TRACING: CPT | Performed by: STUDENT IN AN ORGANIZED HEALTH CARE EDUCATION/TRAINING PROGRAM

## 2023-11-14 PROCEDURE — 83735 ASSAY OF MAGNESIUM: CPT

## 2023-11-14 PROCEDURE — 80053 COMPREHEN METABOLIC PANEL: CPT

## 2023-11-14 PROCEDURE — 84484 ASSAY OF TROPONIN QUANT: CPT | Performed by: STUDENT IN AN ORGANIZED HEALTH CARE EDUCATION/TRAINING PROGRAM

## 2023-11-14 PROCEDURE — G0378 HOSPITAL OBSERVATION PER HR: HCPCS

## 2023-11-14 RX ORDER — SODIUM CHLORIDE 0.9 % (FLUSH) 0.9 %
10 SYRINGE (ML) INJECTION AS NEEDED
Status: DISCONTINUED | OUTPATIENT
Start: 2023-11-14 | End: 2023-11-19 | Stop reason: HOSPADM

## 2023-11-14 RX ORDER — SODIUM CHLORIDE 0.9 % (FLUSH) 0.9 %
10 SYRINGE (ML) INJECTION EVERY 12 HOURS SCHEDULED
Status: DISCONTINUED | OUTPATIENT
Start: 2023-11-15 | End: 2023-11-19 | Stop reason: HOSPADM

## 2023-11-14 RX ORDER — ENOXAPARIN SODIUM 100 MG/ML
1 INJECTION SUBCUTANEOUS ONCE
Status: COMPLETED | OUTPATIENT
Start: 2023-11-14 | End: 2023-11-14

## 2023-11-14 RX ORDER — ENOXAPARIN SODIUM 100 MG/ML
1 INJECTION SUBCUTANEOUS EVERY 12 HOURS
Status: DISCONTINUED | OUTPATIENT
Start: 2023-11-15 | End: 2023-11-15

## 2023-11-14 RX ORDER — ACETAMINOPHEN 325 MG/1
650 TABLET ORAL EVERY 4 HOURS PRN
Status: DISCONTINUED | OUTPATIENT
Start: 2023-11-14 | End: 2023-11-19 | Stop reason: HOSPADM

## 2023-11-14 RX ORDER — BISACODYL 10 MG
10 SUPPOSITORY, RECTAL RECTAL DAILY PRN
Status: DISCONTINUED | OUTPATIENT
Start: 2023-11-14 | End: 2023-11-19 | Stop reason: HOSPADM

## 2023-11-14 RX ORDER — NITROGLYCERIN 0.4 MG/1
0.4 TABLET SUBLINGUAL
Status: DISCONTINUED | OUTPATIENT
Start: 2023-11-14 | End: 2023-11-19 | Stop reason: HOSPADM

## 2023-11-14 RX ORDER — INSULIN ASPART 100 [IU]/ML
2-9 INJECTION, SOLUTION INTRAVENOUS; SUBCUTANEOUS
Status: DISCONTINUED | OUTPATIENT
Start: 2023-11-15 | End: 2023-11-19 | Stop reason: HOSPADM

## 2023-11-14 RX ORDER — NICOTINE POLACRILEX 4 MG
15 LOZENGE BUCCAL
Status: DISCONTINUED | OUTPATIENT
Start: 2023-11-14 | End: 2023-11-19 | Stop reason: HOSPADM

## 2023-11-14 RX ORDER — SODIUM CHLORIDE 9 MG/ML
40 INJECTION, SOLUTION INTRAVENOUS AS NEEDED
Status: DISCONTINUED | OUTPATIENT
Start: 2023-11-14 | End: 2023-11-19 | Stop reason: HOSPADM

## 2023-11-14 RX ORDER — ACETAMINOPHEN 160 MG/5ML
650 SOLUTION ORAL EVERY 4 HOURS PRN
Status: DISCONTINUED | OUTPATIENT
Start: 2023-11-14 | End: 2023-11-19 | Stop reason: HOSPADM

## 2023-11-14 RX ORDER — ACETAMINOPHEN 650 MG/1
650 SUPPOSITORY RECTAL EVERY 4 HOURS PRN
Status: DISCONTINUED | OUTPATIENT
Start: 2023-11-14 | End: 2023-11-19 | Stop reason: HOSPADM

## 2023-11-14 RX ORDER — DEXTROSE MONOHYDRATE 25 G/50ML
25 INJECTION, SOLUTION INTRAVENOUS
Status: DISCONTINUED | OUTPATIENT
Start: 2023-11-14 | End: 2023-11-19 | Stop reason: HOSPADM

## 2023-11-14 RX ORDER — AMOXICILLIN 250 MG
2 CAPSULE ORAL 2 TIMES DAILY
Status: DISCONTINUED | OUTPATIENT
Start: 2023-11-15 | End: 2023-11-19 | Stop reason: HOSPADM

## 2023-11-14 RX ORDER — BISACODYL 5 MG/1
5 TABLET, DELAYED RELEASE ORAL DAILY PRN
Status: DISCONTINUED | OUTPATIENT
Start: 2023-11-14 | End: 2023-11-19 | Stop reason: HOSPADM

## 2023-11-14 RX ORDER — ONDANSETRON 2 MG/ML
4 INJECTION INTRAMUSCULAR; INTRAVENOUS EVERY 6 HOURS PRN
Status: DISCONTINUED | OUTPATIENT
Start: 2023-11-14 | End: 2023-11-19 | Stop reason: HOSPADM

## 2023-11-14 RX ORDER — POLYETHYLENE GLYCOL 3350 17 G/17G
17 POWDER, FOR SOLUTION ORAL DAILY PRN
Status: DISCONTINUED | OUTPATIENT
Start: 2023-11-14 | End: 2023-11-19 | Stop reason: HOSPADM

## 2023-11-14 RX ADMIN — ENOXAPARIN SODIUM 100 MG: 100 INJECTION SUBCUTANEOUS at 20:34

## 2023-11-14 NOTE — Clinical Note
Allergies reviewed.  H&P note has been confirmed for the patient. Procedural consent has been signed.  Blood consent hs been signed. Staff has reviewed the patient's labs.  Labs have been reviewed and show abnormalities. Physician is aware of labs.

## 2023-11-14 NOTE — Clinical Note
Hemostasis started on the right radial artery. R-Band was used in achieving hemostasis. Radial compression device applied to vessel. Hemostasis achieved successfully. Closure device additional comment: 13 cc @ [id number]

## 2023-11-15 ENCOUNTER — APPOINTMENT (OUTPATIENT)
Dept: CARDIOLOGY | Facility: HOSPITAL | Age: 83
DRG: 282 | End: 2023-11-15
Payer: MEDICARE

## 2023-11-15 PROBLEM — I21.4 ACUTE MYOCARDIAL INFARCTION, SUBENDOCARDIAL INFARCTION, INITIAL EPISODE OF CARE: Status: ACTIVE | Noted: 2023-11-15

## 2023-11-15 LAB
ANION GAP SERPL CALCULATED.3IONS-SCNC: 7.9 MMOL/L (ref 5–15)
APTT PPP: 56.9 SECONDS (ref 70–100)
BASOPHILS # BLD AUTO: 0.07 10*3/MM3 (ref 0–0.2)
BASOPHILS NFR BLD AUTO: 0.9 % (ref 0–1.5)
BH CV ECHO MEAS - AO MAX PG: 6.7 MMHG
BH CV ECHO MEAS - AO MEAN PG: 4 MMHG
BH CV ECHO MEAS - AO ROOT DIAM: 2.7 CM
BH CV ECHO MEAS - AO V2 MAX: 129 CM/SEC
BH CV ECHO MEAS - AO V2 VTI: 26.9 CM
BH CV ECHO MEAS - AVA(I,D): 1.54 CM2
BH CV ECHO MEAS - EDV(CUBED): 110.6 ML
BH CV ECHO MEAS - EDV(MOD-SP2): 82 ML
BH CV ECHO MEAS - EDV(MOD-SP4): 94.3 ML
BH CV ECHO MEAS - EF(MOD-BP): 45.4 %
BH CV ECHO MEAS - EF(MOD-SP2): 56.1 %
BH CV ECHO MEAS - EF(MOD-SP4): 42.8 %
BH CV ECHO MEAS - EF_3D-VOL: 46 %
BH CV ECHO MEAS - ESV(CUBED): 48.2 ML
BH CV ECHO MEAS - ESV(MOD-SP2): 36 ML
BH CV ECHO MEAS - ESV(MOD-SP4): 53.9 ML
BH CV ECHO MEAS - FS: 24.2 %
BH CV ECHO MEAS - IVS/LVPW: 0.85 CM
BH CV ECHO MEAS - IVSD: 0.91 CM
BH CV ECHO MEAS - LA DIMENSION: 3.8 CM
BH CV ECHO MEAS - LAT PEAK E' VEL: 5.2 CM/SEC
BH CV ECHO MEAS - LV MASS(C)D: 167.9 GRAMS
BH CV ECHO MEAS - LV MAX PG: 2.5 MMHG
BH CV ECHO MEAS - LV MEAN PG: 1 MMHG
BH CV ECHO MEAS - LV V1 MAX: 79.2 CM/SEC
BH CV ECHO MEAS - LV V1 VTI: 17.2 CM
BH CV ECHO MEAS - LVIDD: 4.8 CM
BH CV ECHO MEAS - LVIDS: 3.6 CM
BH CV ECHO MEAS - LVOT AREA: 2.41 CM2
BH CV ECHO MEAS - LVOT DIAM: 1.75 CM
BH CV ECHO MEAS - LVPWD: 1.07 CM
BH CV ECHO MEAS - MED PEAK E' VEL: 4.6 CM/SEC
BH CV ECHO MEAS - MV A MAX VEL: 109 CM/SEC
BH CV ECHO MEAS - MV DEC SLOPE: 243 CM/SEC2
BH CV ECHO MEAS - MV DEC TIME: 0.28 SEC
BH CV ECHO MEAS - MV E MAX VEL: 66.7 CM/SEC
BH CV ECHO MEAS - MV E/A: 0.61
BH CV ECHO MEAS - MV MAX PG: 5.7 MMHG
BH CV ECHO MEAS - MV MEAN PG: 2 MMHG
BH CV ECHO MEAS - MV V2 VTI: 21.8 CM
BH CV ECHO MEAS - MVA(VTI): 1.9 CM2
BH CV ECHO MEAS - PA ACC TIME: 0.11 SEC
BH CV ECHO MEAS - PA V2 MAX: 96.1 CM/SEC
BH CV ECHO MEAS - RV MAX PG: 1.57 MMHG
BH CV ECHO MEAS - RV V1 MAX: 62.7 CM/SEC
BH CV ECHO MEAS - RV V1 VTI: 13 CM
BH CV ECHO MEAS - SV(LVOT): 41.4 ML
BH CV ECHO MEAS - SV(MOD-SP2): 46 ML
BH CV ECHO MEAS - SV(MOD-SP4): 40.4 ML
BH CV ECHO MEAS - TAPSE (>1.6): 1.7 CM
BH CV ECHO MEASUREMENTS AVERAGE E/E' RATIO: 13.61
BH CV XLRA - TDI S': 7.6 CM/SEC
BUN SERPL-MCNC: 16 MG/DL (ref 8–23)
BUN/CREAT SERPL: 14.2 (ref 7–25)
CALCIUM SPEC-SCNC: 9.1 MG/DL (ref 8.6–10.5)
CHLORIDE SERPL-SCNC: 101 MMOL/L (ref 98–107)
CO2 SERPL-SCNC: 28.1 MMOL/L (ref 22–29)
CREAT SERPL-MCNC: 1.13 MG/DL (ref 0.57–1)
DEPRECATED RDW RBC AUTO: 40.7 FL (ref 37–54)
EGFRCR SERPLBLD CKD-EPI 2021: 48.4 ML/MIN/1.73
EOSINOPHIL # BLD AUTO: 0.09 10*3/MM3 (ref 0–0.4)
EOSINOPHIL NFR BLD AUTO: 1.1 % (ref 0.3–6.2)
ERYTHROCYTE [DISTWIDTH] IN BLOOD BY AUTOMATED COUNT: 13.2 % (ref 12.3–15.4)
GLUCOSE BLDC GLUCOMTR-MCNC: 111 MG/DL (ref 70–130)
GLUCOSE BLDC GLUCOMTR-MCNC: 113 MG/DL (ref 70–130)
GLUCOSE BLDC GLUCOMTR-MCNC: 132 MG/DL (ref 70–130)
GLUCOSE BLDC GLUCOMTR-MCNC: 229 MG/DL (ref 70–130)
GLUCOSE SERPL-MCNC: 161 MG/DL (ref 65–99)
HBA1C MFR BLD: 8.5 % (ref 4.8–5.6)
HCT VFR BLD AUTO: 37.9 % (ref 34–46.6)
HGB BLD-MCNC: 12.1 G/DL (ref 12–15.9)
IMM GRANULOCYTES # BLD AUTO: 0.03 10*3/MM3 (ref 0–0.05)
IMM GRANULOCYTES NFR BLD AUTO: 0.4 % (ref 0–0.5)
INR PPP: 1.09 (ref 0.9–1.1)
LEFT ATRIUM VOLUME INDEX: 22.8 ML/M2
LYMPHOCYTES # BLD AUTO: 2.15 10*3/MM3 (ref 0.7–3.1)
LYMPHOCYTES NFR BLD AUTO: 26.2 % (ref 19.6–45.3)
MCH RBC QN AUTO: 26.8 PG (ref 26.6–33)
MCHC RBC AUTO-ENTMCNC: 31.9 G/DL (ref 31.5–35.7)
MCV RBC AUTO: 84 FL (ref 79–97)
MONOCYTES # BLD AUTO: 0.84 10*3/MM3 (ref 0.1–0.9)
MONOCYTES NFR BLD AUTO: 10.2 % (ref 5–12)
NEUTROPHILS NFR BLD AUTO: 5.04 10*3/MM3 (ref 1.7–7)
NEUTROPHILS NFR BLD AUTO: 61.2 % (ref 42.7–76)
NRBC BLD AUTO-RTO: 0 /100 WBC (ref 0–0.2)
PLATELET # BLD AUTO: 260 10*3/MM3 (ref 140–450)
PMV BLD AUTO: 10.2 FL (ref 6–12)
POTASSIUM SERPL-SCNC: 3.7 MMOL/L (ref 3.5–5.2)
PROTHROMBIN TIME: 14.6 SECONDS (ref 12.3–15.1)
RBC # BLD AUTO: 4.51 10*6/MM3 (ref 3.77–5.28)
SODIUM SERPL-SCNC: 137 MMOL/L (ref 136–145)
TROPONIN T SERPL HS-MCNC: 850 NG/L
UFH PPP CHRO-ACNC: 0.62 IU/ML (ref 0.3–0.7)
UFH PPP CHRO-ACNC: >1.1 IU/ML (ref 0.3–0.7)
WBC NRBC COR # BLD: 8.22 10*3/MM3 (ref 3.4–10.8)

## 2023-11-15 PROCEDURE — 25010000002 MIDAZOLAM PER 1MG: Performed by: INTERNAL MEDICINE

## 2023-11-15 PROCEDURE — 93306 TTE W/DOPPLER COMPLETE: CPT | Performed by: INTERNAL MEDICINE

## 2023-11-15 PROCEDURE — 82948 REAGENT STRIP/BLOOD GLUCOSE: CPT

## 2023-11-15 PROCEDURE — 85025 COMPLETE CBC W/AUTO DIFF WBC: CPT | Performed by: STUDENT IN AN ORGANIZED HEALTH CARE EDUCATION/TRAINING PROGRAM

## 2023-11-15 PROCEDURE — 63710000001 INSULIN ASPART PER 5 UNITS: Performed by: INTERNAL MEDICINE

## 2023-11-15 PROCEDURE — 85520 HEPARIN ASSAY: CPT | Performed by: NURSE PRACTITIONER

## 2023-11-15 PROCEDURE — 93306 TTE W/DOPPLER COMPLETE: CPT

## 2023-11-15 PROCEDURE — B2111ZZ FLUOROSCOPY OF MULTIPLE CORONARY ARTERIES USING LOW OSMOLAR CONTRAST: ICD-10-PCS | Performed by: INTERNAL MEDICINE

## 2023-11-15 PROCEDURE — 25010000002 HEPARIN (PORCINE) PER 1000 UNITS: Performed by: INTERNAL MEDICINE

## 2023-11-15 PROCEDURE — 83036 HEMOGLOBIN GLYCOSYLATED A1C: CPT | Performed by: STUDENT IN AN ORGANIZED HEALTH CARE EDUCATION/TRAINING PROGRAM

## 2023-11-15 PROCEDURE — 25510000001 IOPAMIDOL PER 1 ML: Performed by: INTERNAL MEDICINE

## 2023-11-15 PROCEDURE — 4A023N7 MEASUREMENT OF CARDIAC SAMPLING AND PRESSURE, LEFT HEART, PERCUTANEOUS APPROACH: ICD-10-PCS | Performed by: INTERNAL MEDICINE

## 2023-11-15 PROCEDURE — 84484 ASSAY OF TROPONIN QUANT: CPT | Performed by: STUDENT IN AN ORGANIZED HEALTH CARE EDUCATION/TRAINING PROGRAM

## 2023-11-15 PROCEDURE — 25010000002 LIDOCAINE 1 % SOLUTION: Performed by: INTERNAL MEDICINE

## 2023-11-15 PROCEDURE — 25810000003 SODIUM CHLORIDE 0.9 % SOLUTION: Performed by: INTERNAL MEDICINE

## 2023-11-15 PROCEDURE — 25010000002 ENOXAPARIN PER 10 MG: Performed by: STUDENT IN AN ORGANIZED HEALTH CARE EDUCATION/TRAINING PROGRAM

## 2023-11-15 PROCEDURE — 93458 L HRT ARTERY/VENTRICLE ANGIO: CPT | Performed by: INTERNAL MEDICINE

## 2023-11-15 PROCEDURE — 25010000002 FENTANYL CITRATE (PF) 50 MCG/ML SOLUTION: Performed by: INTERNAL MEDICINE

## 2023-11-15 PROCEDURE — 85520 HEPARIN ASSAY: CPT | Performed by: INTERNAL MEDICINE

## 2023-11-15 PROCEDURE — C1894 INTRO/SHEATH, NON-LASER: HCPCS | Performed by: INTERNAL MEDICINE

## 2023-11-15 PROCEDURE — 80048 BASIC METABOLIC PNL TOTAL CA: CPT | Performed by: STUDENT IN AN ORGANIZED HEALTH CARE EDUCATION/TRAINING PROGRAM

## 2023-11-15 PROCEDURE — C1769 GUIDE WIRE: HCPCS | Performed by: INTERNAL MEDICINE

## 2023-11-15 PROCEDURE — 85730 THROMBOPLASTIN TIME PARTIAL: CPT | Performed by: NURSE PRACTITIONER

## 2023-11-15 PROCEDURE — 99152 MOD SED SAME PHYS/QHP 5/>YRS: CPT | Performed by: INTERNAL MEDICINE

## 2023-11-15 PROCEDURE — 25010000002 SULFUR HEXAFLUORIDE MICROSPH 60.7-25 MG RECONSTITUTED SUSPENSION: Performed by: INTERNAL MEDICINE

## 2023-11-15 PROCEDURE — 85610 PROTHROMBIN TIME: CPT | Performed by: NURSE PRACTITIONER

## 2023-11-15 RX ORDER — HEPARIN SODIUM 10000 [USP'U]/100ML
10.5 INJECTION, SOLUTION INTRAVENOUS
Status: DISCONTINUED | OUTPATIENT
Start: 2023-11-16 | End: 2023-11-16

## 2023-11-15 RX ORDER — LIDOCAINE HYDROCHLORIDE 10 MG/ML
INJECTION, SOLUTION INFILTRATION; PERINEURAL
Status: DISCONTINUED | OUTPATIENT
Start: 2023-11-15 | End: 2023-11-15 | Stop reason: HOSPADM

## 2023-11-15 RX ORDER — FENTANYL CITRATE 50 UG/ML
INJECTION, SOLUTION INTRAMUSCULAR; INTRAVENOUS
Status: DISCONTINUED | OUTPATIENT
Start: 2023-11-15 | End: 2023-11-15 | Stop reason: HOSPADM

## 2023-11-15 RX ORDER — SODIUM CHLORIDE 9 MG/ML
3 INJECTION, SOLUTION INTRAVENOUS CONTINUOUS
Status: ACTIVE | OUTPATIENT
Start: 2023-11-15 | End: 2023-11-15

## 2023-11-15 RX ORDER — VERAPAMIL HYDROCHLORIDE 2.5 MG/ML
INJECTION, SOLUTION INTRAVENOUS
Status: DISCONTINUED | OUTPATIENT
Start: 2023-11-15 | End: 2023-11-15 | Stop reason: HOSPADM

## 2023-11-15 RX ORDER — MIDAZOLAM HYDROCHLORIDE 2 MG/2ML
INJECTION, SOLUTION INTRAMUSCULAR; INTRAVENOUS
Status: DISCONTINUED | OUTPATIENT
Start: 2023-11-15 | End: 2023-11-15 | Stop reason: HOSPADM

## 2023-11-15 RX ORDER — ENOXAPARIN SODIUM 100 MG/ML
1 INJECTION SUBCUTANEOUS EVERY 12 HOURS
Status: DISCONTINUED | OUTPATIENT
Start: 2023-11-15 | End: 2023-11-15

## 2023-11-15 RX ORDER — HEPARIN SODIUM 10000 [USP'U]/100ML
10.5 INJECTION, SOLUTION INTRAVENOUS
Status: DISCONTINUED | OUTPATIENT
Start: 2023-11-15 | End: 2023-11-15

## 2023-11-15 RX ORDER — ACETAMINOPHEN 325 MG/1
650 TABLET ORAL EVERY 4 HOURS PRN
Status: CANCELLED | OUTPATIENT
Start: 2023-11-15

## 2023-11-15 RX ORDER — SODIUM CHLORIDE 9 MG/ML
100 INJECTION, SOLUTION INTRAVENOUS CONTINUOUS
Status: DISCONTINUED | OUTPATIENT
Start: 2023-11-16 | End: 2023-11-19 | Stop reason: HOSPADM

## 2023-11-15 RX ORDER — NITROGLYCERIN 0.4 MG/1
0.4 TABLET SUBLINGUAL
Status: CANCELLED | OUTPATIENT
Start: 2023-11-15

## 2023-11-15 RX ORDER — HEPARIN SODIUM 1000 [USP'U]/ML
INJECTION, SOLUTION INTRAVENOUS; SUBCUTANEOUS
Status: DISCONTINUED | OUTPATIENT
Start: 2023-11-15 | End: 2023-11-15 | Stop reason: HOSPADM

## 2023-11-15 RX ADMIN — SODIUM CHLORIDE 3 ML/KG/HR: 9 INJECTION, SOLUTION INTRAVENOUS at 17:55

## 2023-11-15 RX ADMIN — Medication 10 ML: at 00:28

## 2023-11-15 RX ADMIN — Medication 10 ML: at 21:17

## 2023-11-15 RX ADMIN — Medication 10 ML: at 08:15

## 2023-11-15 RX ADMIN — INSULIN ASPART 4 UNITS: 100 INJECTION, SOLUTION INTRAVENOUS; SUBCUTANEOUS at 21:16

## 2023-11-15 RX ADMIN — SULFUR HEXAFLUORIDE 7 ML: KIT at 10:17

## 2023-11-15 RX ADMIN — DOCUSATE SODIUM 50MG AND SENNOSIDES 8.6MG 2 TABLET: 8.6; 5 TABLET, FILM COATED ORAL at 21:15

## 2023-11-15 RX ADMIN — ENOXAPARIN SODIUM 100 MG: 100 INJECTION SUBCUTANEOUS at 08:15

## 2023-11-15 NOTE — H&P
[facility]   HISTORY AND PHYSICAL      Name:  Kathryn Davison   Age:  83 y.o.  Sex:  female  :  1940  MRN:  [id number]   Visit Number:  [id number]  Admission Date:  2023  Date Of Service:  23  Primary Care Physician:  Sunitha Malave MD    Chief Complaint:     Dizziness, epigastric discomfort    History Of Presenting Illness:      Kathryn Davison is an 83-year-old woman with past medical history of type 2 diabetes, coronary artery disease, history of endometrial cancer, fatty liver, hypothyroidism, dyslipidemia, on Ozempic, TERESA, carotid artery stenosis, osteoporosis, diabetic retinopathy.  Presented to [facility] ED on 2023 with concern for generalized weakness, dizziness and epigastric discomfort with intermittent diaphoresis which started about 2 weeks prior to presentation.  Denied any fevers or chills, chest pain, shortness of air, vomiting or diarrhea.  Says she was given a scopolamine patch by her PCP, but symptoms did not improve.    ED Summary: Afebrile, vital signs stable on room air.  EKG showed ST elevations in inferior leads, no dynamic changes.  Troponin 763, trended down 726.  Creatinine 1.17, blood glucose 263.  CBC unremarkable.  CXR unremarkable.  ED spoke with cardiology-Dr. Abdi, suspect patient had a cardiac event about a week ago her symptoms started, recommended therapeutic Lovenox which she was provided.    Review Of Systems:    All systems were reviewed and negative except as mentioned in history of presenting illness, assessment and plan.    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    Past Surgical History: Patient  has a past surgical history that includes Tubal ligation (1980?); Colonoscopy (12/10/2012); Ankle arthroscopy (Right); Colonoscopy (N/A, 02/22/2017); Fracture surgery (Right); Esophagogastroduodenoscopy (N/A, 07/20/2017); Upper gastrointestinal endoscopy (07/20/2017); Cataract extraction (Bilateral); Breast surgery (Left); Dilation and curettage, diagnostic / therapeutic; Cardiac catheterization; Total shoulder arthroplasty w/ distal clavicle excision (Left, 02/02/2018); ORIF wrist fracture (Right, 02/02/2018); d & c hysteroscopy myosure (N/A, 07/24/2020); total laparoscopic hysterectomy salpingo oophorectomy (N/A, 08/31/2020); and Breast biopsy.    Social History: Patient  reports that she has never smoked. She has never used smokeless tobacco. She reports that she does not drink alcohol and does not use drugs.    Family History:  Patient's family history has been reviewed and found to be noncontributory.     Allergies:      Gabapentin and Betadine [povidone iodine]    Home Medications:    Prior to Admission Medications       Prescriptions Last Dose Informant Patient Reported? Taking?    albuterol (PROVENTIL HFA;VENTOLIN HFA) 108 (90 Base) MCG/ACT inhaler  Self Yes No    Inhale 2 puffs Every 4 (Four) Hours As Needed for Wheezing.    amLODIPine (NORVASC) 10 MG tablet   Yes No    amlodipine 10 mg tablet   Take 1 tablet by mouth once daily for 90 days    Calcium Citrate-Vitamin D (CALCIUM + D PO)  Self Yes No    Take 1 tablet by mouth  Daily.    carbidopa-levodopa (SINEMET)  MG per tablet   Yes No    Take 1 tablet 3 times a day by oral route for 30 days.    carvedilol (COREG) 12.5 MG tablet  Self No No    Take 1 tablet by mouth 2 (Two) Times a Day With Meals.    clopidogrel (PLAVIX) 75 MG tablet  Self Yes No    Take 1 tablet by mouth Daily.    Continuous Blood Gluc Sensor (FreeStyle Gretchen 2 Sensor) misc   No No    1 each Every 14 (Fourteen) Days.    docusate sodium 100 MG capsule   No No    Take 2 capsules by mouth 2 (Two) Times a Day.    doxazosin (CARDURA) 2 MG tablet   Yes No    escitalopram (LEXAPRO) 10 MG tablet   Yes No    Take 0.5 tablets by mouth Daily.    Euthyrox 100 MCG tablet   Yes No    Furosemide (LASIX PO)  Self Yes No    Take 20 mg by mouth Daily As Needed.    Lantus SoloStar 100 UNIT/ML injection pen   No No    INJECT 27 UNITS SUBCUTANEOUSLY ONCE DAILY    losartan (COZAAR) 50 MG tablet  Self Yes No    Take 1 tablet by mouth Daily.    montelukast (SINGULAIR) 10 MG tablet  Self Yes No    Take 1 tablet by mouth Every Night.    Multiple Vitamins-Minerals (MULTIVITAMIN ADULT PO)   Yes No    Take 1 tablet by mouth Daily.    oxybutynin XL (DITROPAN-XL) 5 MG 24 hr tablet   No No    Take 1 tablet by mouth Daily.    polyethylene glycol (MIRALAX) packet  Self Yes No    Take 17 g by mouth As Needed.    potassium chloride (MICRO-K) 10 MEQ CR capsule   Yes No    potassium chloride ER 10 mEq tablet,extended release   TAKE ONE TABLET BY MOUTH ONCE DAILY ON  THE  DAYS  YOU  TAKE  LASIX  (FUROSEMIDE)    pravastatin (PRAVACHOL) 80 MG tablet  Self Yes No    Take 1 tablet by mouth Daily.    Semaglutide,0.25 or 0.5MG/DOS, (Ozempic, 0.25 or 0.5 MG/DOSE,) 2 MG/3ML solution pen-injector   No No    Inject 0.5 mg under the skin into the appropriate area as directed 1 (One) Time Per Week. NEEDS APPT FOR REFILL    spironolactone-hydrochlorothiazide (ALDACTAZIDE) 25-25 MG tablet   No No    Take 1 tablet by mouth once daily          ED  "Medications:    Medications   sodium chloride 0.9 % flush 10 mL (has no administration in time range)   Enoxaparin Sodium (LOVENOX) syringe 100 mg (100 mg Subcutaneous Given 11/14/23 2034)     Vital Signs:  Temp:  [98.3 °F (36.8 °C)] 98.3 °F (36.8 °C)  Heart Rate:  [70-80] 77  Resp:  [16] 16  BP: (120-187)/(65-97) 138/65        11/14/23  1837   Weight: 96.2 kg (212 lb)     Body mass index is 38.78 kg/m².    Physical Exam:     Most recent vital Signs: /65   Pulse 77   Temp 98.3 °F (36.8 °C) (Oral)   Resp 16   Ht 157.5 cm (62\")   Wt 96.2 kg (212 lb)   LMP  (LMP Unknown)   SpO2 97%   BMI 38.78 kg/m²     Physical Exam  Constitutional:       General: She is not in acute distress.     Appearance: She is not ill-appearing or toxic-appearing.   HENT:      Mouth/Throat:      Mouth: Mucous membranes are moist.   Eyes:      Extraocular Movements: Extraocular movements intact.   Cardiovascular:      Rate and Rhythm: Normal rate and regular rhythm.      Pulses: Normal pulses.      Heart sounds: Normal heart sounds.   Pulmonary:      Effort: Pulmonary effort is normal.      Breath sounds: Normal breath sounds.   Abdominal:      Palpations: Abdomen is soft.      Tenderness: There is no abdominal tenderness.   Musculoskeletal:      Right lower leg: No edema.      Left lower leg: No edema.   Skin:     General: Skin is warm.      Capillary Refill: Capillary refill takes less than 2 seconds.   Neurological:      General: No focal deficit present.      Mental Status: She is alert and oriented to person, place, and time.   Psychiatric:         Mood and Affect: Mood normal.         Thought Content: Thought content normal.         Laboratory data:    I have reviewed the labs done in the emergency room.    Results from last 7 days   Lab Units 11/14/23  1920   SODIUM mmol/L 138   POTASSIUM mmol/L 4.4   CHLORIDE mmol/L 99   CO2 mmol/L 28.9   BUN mg/dL 18   CREATININE mg/dL 1.17*   CALCIUM mg/dL 9.6   BILIRUBIN mg/dL 0.5   ALK " PHOS U/L 139*   ALT (SGPT) U/L 20   AST (SGOT) U/L 46*   GLUCOSE mg/dL 263*     Results from last 7 days   Lab Units 11/14/23 1920   WBC 10*3/mm3 9.72   HEMOGLOBIN g/dL 13.2   HEMATOCRIT % 41.0   PLATELETS 10*3/mm3 280         Results from last 7 days   Lab Units 11/14/23 2117 11/14/23 1920   HSTROP T ng/L 726* 763*                     Results from last 7 days   Lab Units 11/14/23 2120   COLOR UA  Yellow   GLUCOSE UA  100 mg/dL (Trace)*   KETONES UA  Negative   BLOOD UA  Negative   LEUKOCYTES UA  Negative   PH, URINE  7.0   BILIRUBIN UA  Negative   UROBILINOGEN UA  0.2 E.U./dL       Pain Management Panel  More data exists         Latest Ref Rng & Units 4/13/2023 8/21/2020   Pain Management Panel   Creatinine, Urine mg/dL 51.5  89.7        EKG:      EKG showed ST elevations in inferior leads, no dynamic changes.    Radiology:    XR Chest 1 View    Result Date: 11/14/2023  PROCEDURE: XR CHEST 1 VW-  HISTORY: Weak/Dizzy/AMS triage protocol, hypertension  COMPARISON: 11/18/2021  FINDINGS:  Portable view of the chest demonstrates the lungs to be grossly clear. There is no evidence of effusion, pneumothorax or other significant pleural disease. The mediastinum is unremarkable.  The heart size is normal.      Unremarkable portable chest.    This report was signed and finalized on 11/14/2023 9:25 PM by Jan Hinojosa MD.       Assessment/Plan:    Observation telemetry admission 11/14/2023 with NSTEMI, suspected recent cardiac event.    At time of admission resting comfortably in bed, pleasantly conversational.    Attempted to contact family via phone, no answer.    NSTEMI  Cardiology consultation, recommendations appreciated.  Therapeutic Lovenox.  N.p.o. midnight.    Patient does have inferior ST elevations on EKG, no dynamic changes.  Dr. Abdi suspects she had a cardiac event about 2 weeks prior to presentation when her symptoms started.    Chronic:  type 2 diabetes, coronary artery disease, history of endometrial  cancer, fatty liver, hypothyroidism, dyslipidemia, on Ozempic, TERESA, carotid artery stenosis, osteoporosis, diabetic retinopathy.      Hold home diabetic medications.  Provide subcutaneous insulin protocol.  Continue other home medications.    Risk Assessment: High  DVT Prophylaxis: Therapeutic Lovenox  Code Status: DNR/DNI  Diet: N.p.o.    Advance Care Planning   ACP discussion was held with the patient during this visit. Patient does not have an advance directive, information provided.           Brian Joseph Kerley, DO  11/14/23  23:22 EST    Dictated utilizing Dragon dictation.

## 2023-11-15 NOTE — NURSING NOTE
2ml was removed at 1800 and at 1801 2ml was placed back in TR band. Site started to leak some blood, so pressure was reapplied and provider notified.

## 2023-11-15 NOTE — H&P (VIEW-ONLY)
"     [facility] Cardiology Consult Note    Kathryn Davison  1940  [id number]  11/15/23    Requesting Physician: Jose C Young MD    Chief Complaint: Abdominal pain and dizziness    History of Present Illness:   Mrs. Kathryn Davison is a 83 y.o. female who is being seen by Cardiology for evaluation of elevated troponin level.  The patient has a past medical history significant for type 2 diabetes mellitus, hyperlipidemia, hypertension, and carotid artery disease.  She does not have any significant past cardiac history.  She presented to the [facility] for evaluation of dizziness and abdominal pain.  The patient reports her abdominal pain and dizziness began approximately 2 weeks ago.  She describes her abdominal pain as a \"gas-like\" sensation.  She denies any associated chest pain or chest discomfort.  In addition to her abdominal pain, the patient has been experiencing frequent episodes of dizziness and lightheadedness, which have occurred primarily when going from a seated to standing position.  No syncopal episodes.  Given her ongoing abdominal pain and dizziness, she presented to the emergency department for evaluation.    Upon initial evaluation in the emergency department, the patient was hemodynamically stable.  An initial ECG revealed mild ST elevation in the inferior and anterolateral leads.  She did have some evidence of Q wave formation in lead III.  Initial labs were significant for a high-sensitivity troponin being elevated at 463.  On recheck, her troponin was downward trending at 726.  On the emergency department, the patient continued to deny chest pain, and reported her abdominal pain had resolved.  Given a suspected missed MI occurring approximately 2 weeks prior to presentation with a downward trending troponin, the patient was given Lovenox and admitted by the hospitalist service.  Overnight, the patient's troponin was trended, and has been " upward trending this morning going to 753, and subsequently 850.  Cardiology has been consulted for further recommendations.    Prior Cardiac Testin. Last Coronary Angio: None  2. Prior Stress Testing: None  3. Last Echo: 2018   1.  Normal left ventricular systolic function   2.  Mild left atrial enlargement   3.  Mild MR   4.  Mild TR  4. Prior Holter Monitor: None    Review of Systems:   Review of Systems   Constitutional:  Negative for activity change, appetite change, chills, diaphoresis, fatigue, fever, unexpected weight gain and unexpected weight loss.   Eyes:  Negative for blurred vision and double vision.   Respiratory:  Negative for cough, chest tightness, shortness of breath and wheezing.    Cardiovascular:  Negative for chest pain, palpitations and leg swelling.   Gastrointestinal:  Positive for abdominal pain. Negative for anal bleeding, blood in stool and GERD.   Endocrine: Negative for cold intolerance and heat intolerance.   Genitourinary:  Negative for hematuria.   Neurological:  Positive for dizziness and light-headedness. Negative for syncope and weakness.   Hematological:  Does not bruise/bleed easily.   Psychiatric/Behavioral:  Negative for depressed mood and stress. The patient is not nervous/anxious.        Past Medical History:   Past Medical History:   Diagnosis Date    Abnormal ECG     Anemia     Back pain     Bleeding nose 2020    Body piercing     BOTH EARS    Bronchitis     Bulging lumbar disc     Carotid artery stenosis     BILATERAL-FOLLOWING WITH DR. WINCHESTER.  SEES EVERY 6 MONTHS.  STATES ABOUT 70% OCCLUSIION    Cataract, bilateral     Colon polyp 12/10/2012    Diabetes     Diabetic retinopathy     Diabetic retinopathy     Disease of thyroid gland     hypothyroid    Diverticulosis     Endometrial cancer 2020    Endometrioid adenocarcinoma of uterus 2020    Fatty liver 2017    Fracture     RIGHT ANKLE AND LEFT ARM     Frequency of urination     GERD  "(gastroesophageal reflux disease)     High cholesterol     History of nuclear stress test 2016    \"IT WAS OK\"    Hyperparathyroidism     Hypertension     Lumbosacral disc disease     Macular degeneration     Osteoarthritis     Osteoporosis     Sciatica     Seasonal allergies     Sleep apnea     CPAP HS - TO BRING IN DOS    Swelling of both lower extremities     Teeth missing     back teeth    Thyroid activity decreased     Type 2 diabetes mellitus     Wears glasses     FOR DISTANCE/and reading glasses       Past Surgical History:   Past Surgical History:   Procedure Laterality Date    ANKLE ARTHROSCOPY Right     BREAST BIOPSY      BREAST SURGERY Left     CYST REMOVED    CARDIAC CATHETERIZATION      NO STENT    CATARACT EXTRACTION Bilateral     2014 and 2015    COLONOSCOPY  12/10/2012    COLONOSCOPY N/A 02/22/2017    Procedure: COLONOSCOPY with hot and cold snare polypectomies, resolution clip placement, cold biopsy polypectomy;  Surgeon: Mg Awan MD;  Location: [facility] ENDOSCOPY;  Service:     D & C HYSTEROSCOPY MYOSURE N/A 07/24/2020    Procedure: HYSTEROSCOPY, DILATION AND CURETTAGE , MYOSURE;  Surgeon: Fidel Elizalde MD;  Location: [facility] OR;  Service: Obstetrics/Gynecology;  Laterality: N/A;    DILATION AND CURETTAGE, DIAGNOSTIC / THERAPEUTIC      X4    ENDOSCOPY N/A 07/20/2017    Procedure: ESOPHAGOGASTRODUODENOSCOPY with biopsies and cold biopsy polypectomy;  Surgeon: Mg Awan MD;  Location: [facility] ENDOSCOPY;  Service:     FRACTURE SURGERY Right     ANKLE    ORIF WRIST FRACTURE Right 02/02/2018    Procedure: WRIST RIGHT OPEN REDUCTION INTERNAL FIXATION WITH VOLAR PLATE;  Surgeon: Phu Meneses MD;  Location: [facility] OR;  Service:     TOTAL LAPAROSCOPIC HYSTERECTOMY SALPINGO OOPHORECTOMY N/A 08/31/2020    Procedure: RADICAL TYPE 1 TOTAL LAPAROSCOPIC HYSTERECTOMY BILATERAL SALPINGOOPHORECTOMY WITH DAVINCI ROBOT, SENTINEL LYMPH NODE DISSECTION;  Surgeon: Wilma Jo MD;  Location: [facility]" [facility];  Service: [facility];  Laterality: N/A;    TOTAL SHOULDER ARTHROPLASTY W/ DISTAL CLAVICLE EXCISION Left 02/02/2018    Procedure: SHOULDER TOTAL LEFT  REVERSE ARTHROPLASTY WITH TUBEROSITY RECONSTRUCTION;  Surgeon: Phu Meneses MD;  Location: [facility] OR;  Service:     TUBAL ABDOMINAL LIGATION  1980?    UPPER GASTROINTESTINAL ENDOSCOPY  07/20/2017       Family History:   Family History   Problem Relation Age of Onset    Heart disease Other     Cancer Other     Prostate cancer Father     Stroke Father     Colon cancer Neg Hx        Social History:   Social History     Socioeconomic History    Marital status:    Tobacco Use    Smoking status: Never    Smokeless tobacco: Never   Vaping Use    Vaping Use: Never used   Substance and Sexual Activity    Alcohol use: No    Drug use: No    Sexual activity: Defer       Medications:     Current Facility-Administered Medications:     acetaminophen (TYLENOL) tablet 650 mg, 650 mg, Oral, Q4H PRN **OR** acetaminophen (TYLENOL) 160 MG/5ML oral solution 650 mg, 650 mg, Oral, Q4H PRN **OR** acetaminophen (TYLENOL) suppository 650 mg, 650 mg, Rectal, Q4H PRN, Kerley, Brian Joseph, DO    sennosides-docusate (PERICOLACE) 8.6-50 MG per tablet 2 tablet, 2 tablet, Oral, BID **AND** polyethylene glycol (MIRALAX) packet 17 g, 17 g, Oral, Daily PRN **AND** bisacodyl (DULCOLAX) EC tablet 5 mg, 5 mg, Oral, Daily PRN **AND** bisacodyl (DULCOLAX) suppository 10 mg, 10 mg, Rectal, Daily PRN, Kerley, Brian Joseph, DO    Calcium Replacement - Follow Nurse / BPA Driven Protocol, , Does not apply, PRN, Kerley, Brian Joseph, DO    dextrose (D50W) (25 g/50 mL) IV injection 25 g, 25 g, Intravenous, Q15 Min PRN, Kerley, Brian Joseph, DO    dextrose (GLUTOSE) oral gel 15 g, 15 g, Oral, Q15 Min PRN, Kerley, Brian Joseph, DO    Enoxaparin Sodium (LOVENOX) syringe 90 mg, 1 mg/kg, Subcutaneous, Q12H, Kerley, Brian Joseph, DO    glucagon (GLUCAGEN) injection 1 mg, 1 mg, Intramuscular, Q15 Min  PRN, Kerley, Brian Joseph,     Insulin Aspart (novoLOG) injection 2-9 Units, 2-9 Units, Subcutaneous, 4x Daily AC & at Bedtime, Kerley, Brian Joseph,     Magnesium Standard Dose Replacement - Follow Nurse / BPA Driven Protocol, , Does not apply, PRN, Kerley, Brian Joseph,     nitroglycerin (NITROSTAT) SL tablet 0.4 mg, 0.4 mg, Sublingual, Q5 Min PRN, Kerley, Brian Joseph,     ondansetron (ZOFRAN) injection 4 mg, 4 mg, Intravenous, Q6H PRN, Kerley, Brian Joseph,     Pharmacy to Dose enoxaparin (LOVENOX), , Does not apply, Continuous PRN, Kerley, Brian Joseph,     Phosphorus Replacement - Follow Nurse / BPA Driven Protocol, , Does not apply, PRN, Kerley, Brian Joseph,     Potassium Replacement - Follow Nurse / BPA Driven Protocol, , Does not apply, PRN, Kerley, Brian Joseph,     sodium chloride 0.9 % flush 10 mL, 10 mL, Intravenous, PRN, Kerley, Brian Joseph,     sodium chloride 0.9 % flush 10 mL, 10 mL, Intravenous, Q12H, Kerley, Brian Joseph, , 10 mL at 11/15/23 0815    sodium chloride 0.9 % flush 10 mL, 10 mL, Intravenous, PRN, Kerley, Brian Joseph,     sodium chloride 0.9 % infusion 40 mL, 40 mL, Intravenous, PRN, Kerley, Brian Joseph, DO    Allergies:   Allergies   Allergen Reactions    Gabapentin Dizziness    Betadine [Povidone Iodine] Rash       Physical Exam:  Vital Signs:   Vitals:    11/15/23 1114 11/15/23 1117 11/15/23 1119 11/15/23 1130   BP: 120/61  Comment: orthostatics- lying 119/64  Comment: ortho's Comment: ortho's 106/60  Comment: ortho's   BP Location:       Patient Position: Lying Sitting Standing Standing   Pulse: 73 78 80 79   Resp:       Temp:       TempSrc:       SpO2:   96% 95%   Weight:       Height:           Physical Exam  Vitals and nursing note reviewed.   Constitutional:       General: She is not in acute distress.     Appearance: She is well-developed. She is obese. She is not diaphoretic.   HENT:      Head: Normocephalic and atraumatic.   Eyes:      General: No  scleral icterus.     Pupils: Pupils are equal, round, and reactive to light.   Neck:      Trachea: No tracheal deviation.   Cardiovascular:      Rate and Rhythm: Normal rate and regular rhythm.      Heart sounds: Normal heart sounds. No murmur heard.     No friction rub. No gallop.      Comments: Normal JVD.  Pulmonary:      Effort: Pulmonary effort is normal. No respiratory distress.      Breath sounds: Normal breath sounds. No stridor. No wheezing or rales.   Chest:      Chest wall: No tenderness.   Abdominal:      General: Bowel sounds are normal. There is no distension.      Palpations: Abdomen is soft.      Tenderness: There is no abdominal tenderness. There is no guarding or rebound.   Musculoskeletal:         General: No swelling. Normal range of motion.      Cervical back: Neck supple. No tenderness.   Lymphadenopathy:      Cervical: No cervical adenopathy.   Skin:     General: Skin is warm and dry.      Findings: No erythema.   Neurological:      General: No focal deficit present.      Mental Status: She is alert and oriented to person, place, and time.   Psychiatric:         Mood and Affect: Mood normal.         Behavior: Behavior normal.         Results Review:   Results from last 7 days   Lab Units 11/15/23  0548 11/14/23 1920   SODIUM mmol/L 137 138   POTASSIUM mmol/L 3.7 4.4   CHLORIDE mmol/L 101 99   CO2 mmol/L 28.1 28.9   BUN mg/dL 16 18   CREATININE mg/dL 1.13* 1.17*   CALCIUM mg/dL 9.1 9.6   BILIRUBIN mg/dL  --  0.5   ALK PHOS U/L  --  139*   ALT (SGPT) U/L  --  20   AST (SGOT) U/L  --  46*   GLUCOSE mg/dL 161* 263*     Results from last 7 days   Lab Units 11/15/23  0548 11/14/23  2314 11/14/23  2117   HSTROP T ng/L 850* 753* 726*     Results from last 7 days   Lab Units 11/15/23  0548 11/14/23 1920   WBC 10*3/mm3 8.22 9.72   HEMOGLOBIN g/dL 12.1 13.2   HEMATOCRIT % 37.9 41.0   PLATELETS 10*3/mm3 260 280         Results from last 7 days   Lab Units 11/14/23 1920   MAGNESIUM mg/dL 2.0           I  personally viewed and interpreted the patient's EKG/Telemetry data     Assessment:  1.  Suspected recent STEMI  2.  Hypertension  3.  Hyperlipidemia  4.  Type 2 diabetes mellitus  5.  Dizziness  6.  Abdominal pain    Plan:  1.  Suspect recent STEMI  --Onset of abdominal pain approximately 2 weeks ago, potentially anginal equivalent  -- ECG on presentation shows evidence of mild ST elevation in the inferior and anterolateral leads with developing Q waves in lead III  -- Initial troponin elevated at 763, downward trending on recheck at 726 again suspicions for MI over the past 1-2 weeks  --Echocardiogram shows akinesis of the LV apex with LVEF approximately 40-45%.  Suspect LAD disease, cannot rule out stress-induced cardiomyopathy  -- Given troponin again upward trending this morning with fluctuating abdominal pain, discussed the risks and benefits of coronary angiography with online PCI as indicated.  After further discussion with her family, the patient has opted to proceed with coronary angiography to definitively evaluate her coronary anatomy  -- Hold additional Lovenox  -- Chronically on Plavix as outpatient, will continue  -- Add aspirin 81 mg daily  -- Continue statin      **The patient has agreed for reversal of her DNR status during coronary angiography**    Thank you for allowing me to participate in the care of your patient. Please do not hesitate to contact me with additional questions or concerns.     DOLLY Abdi MD  Interventional Cardiology   11/15/23  12:14 EST

## 2023-11-15 NOTE — SIGNIFICANT NOTE
Dr. Kerley notified of critical lab result:  HS Troponin 753 with Troponin T Delta 27 (2154: HS Troponin 726 and 1959: HS Troponin 763).  No new orders.

## 2023-11-15 NOTE — PAYOR COMM NOTE
"To:  [place]   From: Anastasiya Gaviria RN  Phone: [phone]  Fax: [phone]  NPI: [id number]  TIN: [id number]  Member ID: [id number]  MRN: [id number]    Kathryn Davison (83 y.o. Female)       Date of Birth   1940    Social Security Number       Address   [address] [place] [address]    Home Phone   [phone]    MRN   [id number]       Sikhism   Non-Roman Catholic    Marital Status                               Admission Date   11/14/23    Admission Type   Emergency    Admitting Provider   Kerley, Brian Joseph, DO    Attending Provider   Douglas Dick MD    Department, Room/Bed   [facility] TELEMETRY 3, 312/1       Discharge Date       Discharge Disposition       Discharge Destination                                 Attending Provider: Douglas Dick MD    Allergies: Gabapentin, Betadine [Povidone Iodine]    Isolation: None   Infection: None   Code Status: No CPR    Ht: 157.5 cm (62\")   Wt: 93.9 kg (207 lb)    Admission Cmt: None   Principal Problem: NSTEMI (non-ST elevated myocardial infarction) [I21.4]                   Active Insurance as of 11/14/2023       Primary Coverage       Payor Plan Insurance Group Employer/Plan Group    MEDICARE MEDICARE A & B        Payor Plan Address Payor Plan Phone Number Payor Plan Fax Number Effective Dates    [address] [phone]  2/1/2005 - None Entered    [facility] [id number]         Subscriber Name Subscriber Birth Date Member ID       RENKATHRYN AVIS 1940 [id number]               Secondary Coverage       Payor Plan Insurance Group Employer/Plan Group    [facility] SUPP [id number]       Payor Plan Address Payor Plan Phone Number Payor Plan Fax Number Effective Dates    [address]   2/1/2005 - None Entered    [facility] [id number]         Subscriber Name Subscriber Birth Date Member ID       ELDA DAVISONADALID ALBARRAN 1940 [id number]               Tertiary Coverage       Payor Plan Insurance Group " Employer/Plan Group    [place]        Payor Plan Address Payor Plan Phone Number Payor Plan Fax Number Effective Dates    [address] [phone]  2017 - None Entered    [facility] [place] [address]         Subscriber Name Subscriber Birth Date Member ID       KATHRYN DAVISON 1940 [id number]                     Emergency Contacts        (Rel.) Home Phone Work Phone Mobile Phone    Ayala Crenshaw (Daughter) [phone] -- --    SaidaAdwoa (Daughter) -- -- [phone]                 History & Physical        Kerley, Brian Jose C, DO at 23 2322            [facility]   HISTORY AND PHYSICAL      Name:  Kathryn Davison   Age:  83 y.o.  Sex:  female  :  1940  MRN:  [id number]   Visit Number:  [id number]  Admission Date:  2023  Date Of Service:  23  Primary Care Physician:  Sunitha Malave MD    Chief Complaint:     Dizziness, epigastric discomfort    History Of Presenting Illness:      Kathryn Davison is an 83-year-old woman with past medical history of type 2 diabetes, coronary artery disease, history of endometrial cancer, fatty liver, hypothyroidism, dyslipidemia, on Ozempic, TERESA, carotid artery stenosis, osteoporosis, diabetic retinopathy.  Presented to [facility] ED on 2023 with concern for generalized weakness, dizziness and epigastric discomfort with intermittent diaphoresis which started about 2 weeks prior to presentation.  Denied any fevers or chills, chest pain, shortness of air, vomiting or diarrhea.  Says she was given a scopolamine patch by her PCP, but symptoms did not improve.    ED Summary: Afebrile, vital signs stable on room air.  EKG showed ST elevations in inferior leads, no dynamic changes.  Troponin 763, trended down 726.  Creatinine 1.17, blood glucose 263.  CBC unremarkable.  CXR unremarkable.  ED spoke with cardiology-Dr. Abdi, suspect patient had a cardiac event about a week ago her  symptoms started, recommended therapeutic Lovenox which she was provided.    Review Of Systems:    All systems were reviewed and negative except as mentioned in history of presenting illness, assessment and plan.    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    Past Surgical History: Patient  has a past surgical history that includes Tubal ligation (1980?); Colonoscopy (12/10/2012); Ankle arthroscopy (Right); Colonoscopy (N/A, 02/22/2017); Fracture surgery (Right); Esophagogastroduodenoscopy (N/A, 07/20/2017); Upper gastrointestinal endoscopy (07/20/2017); Cataract extraction (Bilateral); Breast surgery (Left); Dilation and curettage, diagnostic / therapeutic; Cardiac catheterization; Total shoulder arthroplasty w/ distal clavicle excision (Left, 02/02/2018); ORIF wrist fracture (Right, 02/02/2018); d & c hysteroscopy myosure (N/A, 07/24/2020); total laparoscopic hysterectomy salpingo oophorectomy (N/A, 08/31/2020); and Breast biopsy.    Social History: Patient  reports that she has never smoked. She has never used smokeless tobacco. She reports that she does not drink alcohol and does not use drugs.    Family  History:  Patient's family history has been reviewed and found to be noncontributory.     Allergies:      Gabapentin and Betadine [povidone iodine]    Home Medications:    Prior to Admission Medications       Prescriptions Last Dose Informant Patient Reported? Taking?    albuterol (PROVENTIL HFA;VENTOLIN HFA) 108 (90 Base) MCG/ACT inhaler  Self Yes No    Inhale 2 puffs Every 4 (Four) Hours As Needed for Wheezing.    amLODIPine (NORVASC) 10 MG tablet   Yes No    amlodipine 10 mg tablet   Take 1 tablet by mouth once daily for 90 days    Calcium Citrate-Vitamin D (CALCIUM + D PO)  Self Yes No    Take 1 tablet by mouth Daily.    carbidopa-levodopa (SINEMET)  MG per tablet   Yes No    Take 1 tablet 3 times a day by oral route for 30 days.    carvedilol (COREG) 12.5 MG tablet  Self No No    Take 1 tablet by mouth 2 (Two) Times a Day With Meals.    clopidogrel (PLAVIX) 75 MG tablet  Self Yes No    Take 1 tablet by mouth Daily.    Continuous Blood Gluc Sensor (FreeStyle Gretchen 2 Sensor) misc   No No    1 each Every 14 (Fourteen) Days.    docusate sodium 100 MG capsule   No No    Take 2 capsules by mouth 2 (Two) Times a Day.    doxazosin (CARDURA) 2 MG tablet   Yes No    escitalopram (LEXAPRO) 10 MG tablet   Yes No    Take 0.5 tablets by mouth Daily.    Euthyrox 100 MCG tablet   Yes No    Furosemide (LASIX PO)  Self Yes No    Take 20 mg by mouth Daily As Needed.    Lantus SoloStar 100 UNIT/ML injection pen   No No    INJECT 27 UNITS SUBCUTANEOUSLY ONCE DAILY    losartan (COZAAR) 50 MG tablet  Self Yes No    Take 1 tablet by mouth Daily.    montelukast (SINGULAIR) 10 MG tablet  Self Yes No    Take 1 tablet by mouth Every Night.    Multiple Vitamins-Minerals (MULTIVITAMIN ADULT PO)   Yes No    Take 1 tablet by mouth Daily.    oxybutynin XL (DITROPAN-XL) 5 MG 24 hr tablet   No No    Take 1 tablet by mouth Daily.    polyethylene glycol (MIRALAX) packet  Self Yes No    Take 17 g by mouth As Needed.    potassium chloride  "(MICRO-K) 10 MEQ CR capsule   Yes No    potassium chloride ER 10 mEq tablet,extended release   TAKE ONE TABLET BY MOUTH ONCE DAILY ON  THE  DAYS  YOU  TAKE  LASIX  (FUROSEMIDE)    pravastatin (PRAVACHOL) 80 MG tablet  Self Yes No    Take 1 tablet by mouth Daily.    Semaglutide,0.25 or 0.5MG/DOS, (Ozempic, 0.25 or 0.5 MG/DOSE,) 2 MG/3ML solution pen-injector   No No    Inject 0.5 mg under the skin into the appropriate area as directed 1 (One) Time Per Week. NEEDS APPT FOR REFILL    spironolactone-hydrochlorothiazide (ALDACTAZIDE) 25-25 MG tablet   No No    Take 1 tablet by mouth once daily          ED Medications:    Medications   sodium chloride 0.9 % flush 10 mL (has no administration in time range)   Enoxaparin Sodium (LOVENOX) syringe 100 mg (100 mg Subcutaneous Given 11/14/23 2034)     Vital Signs:  Temp:  [98.3 °F (36.8 °C)] 98.3 °F (36.8 °C)  Heart Rate:  [70-80] 77  Resp:  [16] 16  BP: (120-187)/(65-97) 138/65        11/14/23 1837   Weight: 96.2 kg (212 lb)     Body mass index is 38.78 kg/m².    Physical Exam:     Most recent vital Signs: /65   Pulse 77   Temp 98.3 °F (36.8 °C) (Oral)   Resp 16   Ht 157.5 cm (62\")   Wt 96.2 kg (212 lb)   LMP  (LMP Unknown)   SpO2 97%   BMI 38.78 kg/m²     Physical Exam  Constitutional:       General: She is not in acute distress.     Appearance: She is not ill-appearing or toxic-appearing.   HENT:      Mouth/Throat:      Mouth: Mucous membranes are moist.   Eyes:      Extraocular Movements: Extraocular movements intact.   Cardiovascular:      Rate and Rhythm: Normal rate and regular rhythm.      Pulses: Normal pulses.      Heart sounds: Normal heart sounds.   Pulmonary:      Effort: Pulmonary effort is normal.      Breath sounds: Normal breath sounds.   Abdominal:      Palpations: Abdomen is soft.      Tenderness: There is no abdominal tenderness.   Musculoskeletal:      Right lower leg: No edema.      Left lower leg: No edema.   Skin:     General: Skin is " warm.      Capillary Refill: Capillary refill takes less than 2 seconds.   Neurological:      General: No focal deficit present.      Mental Status: She is alert and oriented to person, place, and time.   Psychiatric:         Mood and Affect: Mood normal.         Thought Content: Thought content normal.         Laboratory data:    I have reviewed the labs done in the emergency room.    Results from last 7 days   Lab Units 11/14/23  1920   SODIUM mmol/L 138   POTASSIUM mmol/L 4.4   CHLORIDE mmol/L 99   CO2 mmol/L 28.9   BUN mg/dL 18   CREATININE mg/dL 1.17*   CALCIUM mg/dL 9.6   BILIRUBIN mg/dL 0.5   ALK PHOS U/L 139*   ALT (SGPT) U/L 20   AST (SGOT) U/L 46*   GLUCOSE mg/dL 263*     Results from last 7 days   Lab Units 11/14/23  1920   WBC 10*3/mm3 9.72   HEMOGLOBIN g/dL 13.2   HEMATOCRIT % 41.0   PLATELETS 10*3/mm3 280         Results from last 7 days   Lab Units 11/14/23 2117 11/14/23 1920   HSTROP T ng/L 726* 763*                     Results from last 7 days   Lab Units 11/14/23  2120   COLOR UA  Yellow   GLUCOSE UA  100 mg/dL (Trace)*   KETONES UA  Negative   BLOOD UA  Negative   LEUKOCYTES UA  Negative   PH, URINE  7.0   BILIRUBIN UA  Negative   UROBILINOGEN UA  0.2 E.U./dL       Pain Management Panel  More data exists         Latest Ref Rng & Units 4/13/2023 8/21/2020   Pain Management Panel   Creatinine, Urine mg/dL 51.5  89.7        EKG:      EKG showed ST elevations in inferior leads, no dynamic changes.    Radiology:    XR Chest 1 View    Result Date: 11/14/2023  PROCEDURE: XR CHEST 1 VW-  HISTORY: Weak/Dizzy/AMS triage protocol, hypertension  COMPARISON: 11/18/2021  FINDINGS:  Portable view of the chest demonstrates the lungs to be grossly clear. There is no evidence of effusion, pneumothorax or other significant pleural disease. The mediastinum is unremarkable.  The heart size is normal.      Unremarkable portable chest.    This report was signed and finalized on 11/14/2023 9:25 PM by Jan Hinojosa MD.        Assessment/Plan:    Observation telemetry admission 11/14/2023 with NSTEMI, suspected recent cardiac event.    At time of admission resting comfortably in bed, pleasantly conversational.    Attempted to contact family via phone, no answer.    NSTEMI  Cardiology consultation, recommendations appreciated.  Therapeutic Lovenox.  N.p.o. midnight.    Patient does have inferior ST elevations on EKG, no dynamic changes.  Dr. Abdi suspects she had a cardiac event about 2 weeks prior to presentation when her symptoms started.    Chronic:  type 2 diabetes, coronary artery disease, history of endometrial cancer, fatty liver, hypothyroidism, dyslipidemia, on Ozempic, TERESA, carotid artery stenosis, osteoporosis, diabetic retinopathy.      Hold home diabetic medications.  Provide subcutaneous insulin protocol.  Continue other home medications.    Risk Assessment: High  DVT Prophylaxis: Therapeutic Lovenox  Code Status: DNR/DNI  Diet: N.p.o.    Advance Care Planning  ACP discussion was held with the patient during this visit. Patient does not have an advance directive, information provided.           Brian Joseph Kerley, DO  11/14/23  23:22 EST    Dictated utilizing Dragon dictation.    Electronically signed by Kerley, Brian Joseph, DO at 11/15/23 0236       Vital Signs (last day)       Date/Time Temp Temp src Pulse Resp BP Patient Position SpO2    11/15/23 1130 -- -- 79 -- 106/60 Standing 95    11/15/23 1119 -- -- 80 -- -- Standing 96    11/15/23 1117 -- -- 78 -- 119/64 Sitting --    11/15/23 1114 -- -- 73 -- 120/61 Lying --    11/15/23 1014 -- -- -- -- 126/74 -- --    11/15/23 0737 98.3 (36.8) Oral 76 16 116/53 Lying 96    11/15/23 0353 98.1 (36.7) Axillary 74 18 126/74 Lying --    11/15/23 0323 -- -- 67 -- 122/68 Lying 94    11/15/23 0223 -- -- 69 -- 129/70 Lying 94    11/15/23 0153 -- -- 69 -- 119/72 Lying 94    11/15/23 0123 -- -- 72 -- 129/76 Lying 95    11/14/23 2358 97.7 (36.5) Oral -- 20 144/73 Lying --    11/14/23  2330 -- -- 73 16 133/63 -- 97    11/14/23 23:16:03 -- -- 77 16 138/65 -- 97    11/14/23 2301 -- -- 73 -- -- -- 97    11/14/23 2300 -- -- -- -- 187/85 -- --    11/14/23 2230 -- -- 70 -- 161/80 -- 96    11/14/23 2200 -- -- 70 -- 152/86 -- 98    11/14/23 2128 -- -- 77 -- 120/97 Standing --    11/14/23 2126 -- -- 77 -- 133/70 Sitting --    11/14/23 2125 -- -- 73 -- 138/71 Lying --    11/14/23 2100 -- -- 70 16 144/71 -- 96    11/14/23 2000 -- -- 76 -- 124/95 -- 98    11/14/23 1837 98.3 (36.8) Oral 80 16 149/88 Sitting 97          Current Facility-Administered Medications   Medication Dose Route Frequency Provider Last Rate Last Admin    acetaminophen (TYLENOL) tablet 650 mg  650 mg Oral Q4H PRN Kerley, Brian Joseph, DO        Or    acetaminophen (TYLENOL) 160 MG/5ML oral solution 650 mg  650 mg Oral Q4H PRN Kerley, Brian Joseph, DO        Or    acetaminophen (TYLENOL) suppository 650 mg  650 mg Rectal Q4H PRN Kerley, Brian Joseph, DO        sennosides-docusate (PERICOLACE) 8.6-50 MG per tablet 2 tablet  2 tablet Oral BID Kerley, Brian Joseph, DO        And    polyethylene glycol (MIRALAX) packet 17 g  17 g Oral Daily PRN Kerley, Brian Joseph, DO        And    bisacodyl (DULCOLAX) EC tablet 5 mg  5 mg Oral Daily PRN Kerley, Brian Joseph, DO        And    bisacodyl (DULCOLAX) suppository 10 mg  10 mg Rectal Daily PRN Kerley, Brian Joseph, DO        Calcium Replacement - Follow Nurse / BPA Driven Protocol   Does not apply PRN Kerley, Brian Joseph, DO        dextrose (D50W) (25 g/50 mL) IV injection 25 g  25 g Intravenous Q15 Min PRN Kerley, Mukund Jose C, DO        dextrose (GLUTOSE) oral gel 15 g  15 g Oral Q15 Min PRN Kerley, Brian Joseph,         glucagon (GLUCAGEN) injection 1 mg  1 mg Intramuscular Q15 Min PRN Kerley, Brian Joseph,         Insulin Aspart (novoLOG) injection 2-9 Units  2-9 Units Subcutaneous 4x Daily AC & at Bedtime Kerley, Brian Joseph, DO        Magnesium Standard Dose Replacement - Follow Nurse /  BPA Driven Protocol   Does not apply PRN Kerley, Brian Joseph, DO        nitroglycerin (NITROSTAT) SL tablet 0.4 mg  0.4 mg Sublingual Q5 Min PRN Kerley, Brian Joseph,         ondansetron (ZOFRAN) injection 4 mg  4 mg Intravenous Q6H PRN Kerley, Brian Joseph, DO        Phosphorus Replacement - Follow Nurse / BPA Driven Protocol   Does not apply PRN Kerley, Brian Joseph,         Potassium Replacement - Follow Nurse / BPA Driven Protocol   Does not apply PRN Kerley, Brian Joseph, DO        sodium chloride 0.9 % flush 10 mL  10 mL Intravenous PRN Kerley, Brian Joseph,         sodium chloride 0.9 % flush 10 mL  10 mL Intravenous Q12H Kerley, Brian Joseph,    10 mL at 11/15/23 0815    sodium chloride 0.9 % flush 10 mL  10 mL Intravenous PRN Kerley, Brian Joseph,         sodium chloride 0.9 % infusion 40 mL  40 mL Intravenous PRN Kerley, Brian Joseph,          Lab Results (last 24 hours)       Procedure Component Value Units Date/Time    POC Glucose Once [[id number]]  (Normal) Collected: 11/15/23 1159    Specimen: Blood Updated: 11/15/23 1205     Glucose 111 mg/dL      Comment: Serial Number: [id number]:  [id number]       POC Glucose Once [[id number]]  (Abnormal) Collected: 11/15/23 0740    Specimen: Blood Updated: 11/15/23 0747     Glucose 132 mg/dL      Comment: Serial Number: [id number]:  [id number]       High Sensitivity Troponin T [[id number]]  (Abnormal) Collected: 11/15/23 0548    Specimen: Blood Updated: 11/15/23 0655     HS Troponin T 850 ng/L     Narrative:      High Sensitive Troponin T Reference Range:  <14.0 ng/L- Negative Female for AMI  <22.0 ng/L- Negative Male for AMI  >=14 - Abnormal Female indicating possible myocardial injury.  >=22 - Abnormal Male indicating possible myocardial injury.   Clinicians would have to utilize clinical acumen, EKG, Troponin, and serial changes to determine if it is an Acute Myocardial Infarction or myocardial injury due to an underlying chronic  condition.         Basic Metabolic Panel [[id number]]  (Abnormal) Collected: 11/15/23 0548    Specimen: Blood Updated: 11/15/23 0641     Glucose 161 mg/dL      BUN 16 mg/dL      Creatinine 1.13 mg/dL      Sodium 137 mmol/L      Potassium 3.7 mmol/L      Chloride 101 mmol/L      CO2 28.1 mmol/L      Calcium 9.1 mg/dL      BUN/Creatinine Ratio 14.2     Anion Gap 7.9 mmol/L      eGFR 48.4 mL/min/1.73     Narrative:      GFR Normal >60  Chronic Kidney Disease <60  Kidney Failure <15    The GFR formula is only valid for adults with stable renal function between ages 18 and 70.    Hemoglobin A1c [[id number]]  (Abnormal) Collected: 11/15/23 0548    Specimen: Blood Updated: 11/15/23 0622     Hemoglobin A1C 8.50 %     Narrative:      Hemoglobin A1C Ranges:    Increased Risk for Diabetes  5.7% to 6.4%  Diabetes                     >= 6.5%  Diabetic Goal                < 7.0%    CBC Auto Differential [[id number]]  (Normal) Collected: 11/15/23 0548    Specimen: Blood Updated: 11/15/23 0615     WBC 8.22 10*3/mm3      RBC 4.51 10*6/mm3      Hemoglobin 12.1 g/dL      Hematocrit 37.9 %      MCV 84.0 fL      MCH 26.8 pg      MCHC 31.9 g/dL      RDW 13.2 %      RDW-SD 40.7 fl      MPV 10.2 fL      Platelets 260 10*3/mm3      Neutrophil % 61.2 %      Lymphocyte % 26.2 %      Monocyte % 10.2 %      Eosinophil % 1.1 %      Basophil % 0.9 %      Immature Grans % 0.4 %      Neutrophils, Absolute 5.04 10*3/mm3      Lymphocytes, Absolute 2.15 10*3/mm3      Monocytes, Absolute 0.84 10*3/mm3      Eosinophils, Absolute 0.09 10*3/mm3      Basophils, Absolute 0.07 10*3/mm3      Immature Grans, Absolute 0.03 10*3/mm3      nRBC 0.0 /100 WBC     High Sensitivity Troponin T 2Hr [[id number]]  (Abnormal) Collected: 11/14/23 2314    Specimen: Blood Updated: 11/14/23 2357     HS Troponin T 753 ng/L      Troponin T Delta 27 ng/L     Narrative:      High Sensitive Troponin T Reference Range:  <14.0 ng/L- Negative Female for AMI  <22.0 ng/L- Negative Male for  AMI  >=14 - Abnormal Female indicating possible myocardial injury.  >=22 - Abnormal Male indicating possible myocardial injury.   Clinicians would have to utilize clinical acumen, EKG, Troponin, and serial changes to determine if it is an Acute Myocardial Infarction or myocardial injury due to an underlying chronic condition.         Single High Sensitivity Troponin T [[id number]]  (Abnormal) Collected: 11/14/23 2117    Specimen: Blood from Arm, Left Updated: 11/14/23 2154     HS Troponin T 726 ng/L     Narrative:      High Sensitive Troponin T Reference Range:  <14.0 ng/L- Negative Female for AMI  <22.0 ng/L- Negative Male for AMI  >=14 - Abnormal Female indicating possible myocardial injury.  >=22 - Abnormal Male indicating possible myocardial injury.   Clinicians would have to utilize clinical acumen, EKG, Troponin, and serial changes to determine if it is an Acute Myocardial Infarction or myocardial injury due to an underlying chronic condition.         Urinalysis With Microscopic If Indicated (No Culture) - Urine, Clean Catch [[id number]]  (Abnormal) Collected: 11/14/23 2120    Specimen: Urine, Clean Catch Updated: 11/14/23 2129     Color, UA Yellow     Appearance, UA Clear     pH, UA 7.0     Specific Gravity, UA 1.007     Glucose,  mg/dL (Trace)     Ketones, UA Negative     Bilirubin, UA Negative     Blood, UA Negative     Protein, UA Negative     Leuk Esterase, UA Negative     Nitrite, UA Negative     Urobilinogen, UA 0.2 E.U./dL    Narrative:      Urine microscopic not indicated.    [place] Draw [[id number]] Collected: 11/14/23 1920    Specimen: Blood Updated: 11/14/23 2030    Narrative:      The following orders were created for panel order [place] Draw.  Procedure                               Abnormality         Status                     ---------                               -----------         ------                     Green Top (Gel)[[id number]]                                  Final result                Lavender Top[[id number]]                                     Final result               Gold Top - SST[[id number]]                                   Final result               Light Blue Top[[id number]]                                   Final result                 Please view results for these tests on the individual orders.    Green Top (Gel) [[id number]] Collected: 11/14/23 1920    Specimen: Blood Updated: 11/14/23 2030     Extra Tube Hold for add-ons.     Comment: Auto resulted.       Lavender Top [[id number]] Collected: 11/14/23 1920    Specimen: Blood Updated: 11/14/23 2030     Extra Tube hold for add-on     Comment: Auto resulted       Gold Top - SST [[id number]] Collected: 11/14/23 1920    Specimen: Blood Updated: 11/14/23 2030     Extra Tube Hold for add-ons.     Comment: Auto resulted.       Light Blue Top [[id number]] Collected: 11/14/23 1920    Specimen: Blood Updated: 11/14/23 2030     Extra Tube Hold for add-ons.     Comment: Auto resulted       Single High Sensitivity Troponin T [[id number]]  (Abnormal) Collected: 11/14/23 1920    Specimen: Blood Updated: 11/14/23 1959     HS Troponin T 763 ng/L     Narrative:      High Sensitive Troponin T Reference Range:  <14.0 ng/L- Negative Female for AMI  <22.0 ng/L- Negative Male for AMI  >=14 - Abnormal Female indicating possible myocardial injury.  >=22 - Abnormal Male indicating possible myocardial injury.   Clinicians would have to utilize clinical acumen, EKG, Troponin, and serial changes to determine if it is an Acute Myocardial Infarction or myocardial injury due to an underlying chronic condition.         Comprehensive Metabolic Panel [[id number]]  (Abnormal) Collected: 11/14/23 1920    Specimen: Blood Updated: 11/14/23 1946     Glucose 263 mg/dL      Comment: Glucose >180, Hemoglobin A1C recommended.        BUN 18 mg/dL      Creatinine 1.17 mg/dL      Sodium 138 mmol/L      Potassium 4.4 mmol/L      Chloride 99 mmol/L      CO2 28.9 mmol/L      Calcium  9.6 mg/dL      Total Protein 7.6 g/dL      Albumin 3.9 g/dL      ALT (SGPT) 20 U/L      AST (SGOT) 46 U/L      Alkaline Phosphatase 139 U/L      Total Bilirubin 0.5 mg/dL      Globulin 3.7 gm/dL      A/G Ratio 1.1 g/dL      BUN/Creatinine Ratio 15.4     Anion Gap 10.1 mmol/L      eGFR 46.4 mL/min/1.73     Narrative:      GFR Normal >60  Chronic Kidney Disease <60  Kidney Failure <15    The GFR formula is only valid for adults with stable renal function between ages 18 and 70.    Magnesium [[id number]]  (Normal) Collected: 11/14/23 1920    Specimen: Blood Updated: 11/14/23 1946     Magnesium 2.0 mg/dL     CBC & Differential [[id number]]  (Abnormal) Collected: 11/14/23 1920    Specimen: Blood Updated: 11/14/23 1928    Narrative:      The following orders were created for panel order CBC & Differential.  Procedure                               Abnormality         Status                     ---------                               -----------         ------                     CBC Auto Differential[[id number]]        Abnormal            Final result                 Please view results for these tests on the individual orders.    CBC Auto Differential [[id number]]  (Abnormal) Collected: 11/14/23 1920    Specimen: Blood Updated: 11/14/23 1928     WBC 9.72 10*3/mm3      RBC 4.79 10*6/mm3      Hemoglobin 13.2 g/dL      Hematocrit 41.0 %      MCV 85.6 fL      MCH 27.6 pg      MCHC 32.2 g/dL      RDW 13.2 %      RDW-SD 41.4 fl      MPV 10.0 fL      Platelets 280 10*3/mm3      Neutrophil % 72.5 %      Lymphocyte % 17.6 %      Monocyte % 8.3 %      Eosinophil % 0.8 %      Basophil % 0.5 %      Immature Grans % 0.3 %      Neutrophils, Absolute 7.04 10*3/mm3      Lymphocytes, Absolute 1.71 10*3/mm3      Monocytes, Absolute 0.81 10*3/mm3      Eosinophils, Absolute 0.08 10*3/mm3      Basophils, Absolute 0.05 10*3/mm3      Immature Grans, Absolute 0.03 10*3/mm3      nRBC 0.0 /100 WBC           Imaging Results (Last 24 Hours)        Procedure Component Value Units Date/Time    XR Chest 1 View [[id number]] Collected: 11/14/23 2125     Updated: 11/14/23 2127    Narrative:      PROCEDURE: XR CHEST 1 VW-     HISTORY: Weak/Dizzy/AMS triage protocol, hypertension     COMPARISON: 11/18/2021     FINDINGS:  Portable view of the chest demonstrates the lungs to be  grossly clear. There is no evidence of effusion, pneumothorax or other  significant pleural disease. The mediastinum is unremarkable.     The heart size is normal.       Impression:      Unremarkable portable chest.            This report was signed and finalized on 11/14/2023 9:25 PM by Jan Hinojosa MD.             Orders (last 24 hrs)        Start     Ordered    11/15/23 2100  Enoxaparin Sodium (LOVENOX) syringe 90 mg  Every 12 Hours,   Status:  Discontinued         11/15/23 0821    11/15/23 1341  Inpatient Admission  Once         11/15/23 1341    11/15/23 1233  No Heparin or Lovenox 24 Hours Before Scheduled Cardiac Procedure  Per Order Details        Comments: No Heparin or Lovenox 24 Hours Before Scheduled Cardiac Procedure    11/15/23 1232    11/15/23 1233  Please Call and Notify Pharmacy of Order to Hold Heparin or Lovenox 24 Hours Before Scheduled Cardiac Procedure  Once        Comments: Please Call and Notify Pharmacy of Order to Hold Heparin or Lovenox 24 Hours Before Scheduled Cardiac Procedure    11/15/23 1232    11/15/23 1232  Case Request Cath Lab: Coronary angiography  Once         11/15/23 1232    11/15/23 1206  POC Glucose Once  PROCEDURE ONCE        Comments: Complete no more than 45 minutes prior to patient eating      11/15/23 1159    11/15/23 1115  Sulfur Hexafluoride Microsph (LUMASON) 60.7-25 MG IV reconstituted suspension reconstituted suspension 7 mL  Once in Imaging         11/15/23 1016    11/15/23 0900  Enoxaparin Sodium (LOVENOX) syringe 100 mg  Every 12 Hours,   Status:  Discontinued         11/14/23 1626    11/15/23 0800  Oral Care  2 Times Daily       11/14/23  2354    11/15/23 0748  POC Glucose Once  PROCEDURE ONCE        Comments: Complete no more than 45 minutes prior to patient eating      11/15/23 0740    11/15/23 0730  Insulin Aspart (novoLOG) injection 2-9 Units  4 Times Daily Before Meals & Nightly         11/14/23 2342    11/15/23 0716  OT Consult: Eval & Treat  Once         11/15/23 0715    11/15/23 0715  Adult Transthoracic Echo Complete W/ Cont if Necessary Per Protocol  Once         11/15/23 0715    11/15/23 0715  Orthostatic Blood Pressure  Once         11/15/23 0715    11/15/23 0715  PT Consult: Eval & Treat Functional Mobility Below Baseline  Once         11/15/23 0715    11/15/23 0700  POC Glucose 4x Daily Before Meals & at Bedtime  4 Times Daily Before Meals & at Bedtime      Comments: Complete no more than 45 minutes prior to patient eating      11/14/23 2342    11/15/23 0600  Basic Metabolic Panel  Daily       11/14/23 2354    11/15/23 0600  CBC Auto Differential  Daily       11/14/23 2354    11/15/23 0600  High Sensitivity Troponin T  Morning Draw         11/14/23 2357    11/15/23 0600  Hemoglobin A1c  Morning Draw         11/14/23 2342    11/15/23 0459  Critical Care  Once        Comments: This order was created via procedure documentation    11/15/23 0458    11/15/23 0234  Code Status and Medical Interventions:  Continuous         11/15/23 0233    11/15/23 0234  Inpatient Advance Care Planning Consult  Once        Comments: She would like DNR/DNI   Provider:  (Not yet assigned)    11/15/23 0234    11/15/23 0045  sodium chloride 0.9 % flush 10 mL  Every 12 Hours Scheduled         11/14/23 2354    11/15/23 0045  sennosides-docusate (PERICOLACE) 8.6-50 MG per tablet 2 tablet  2 Times Daily        See Hyperspace for full Linked Orders Report.    11/14/23 2354    11/15/23 0001  NPO Diet NPO Type: Sips with Meds  Diet Effective Midnight,   Status:  Canceled         11/14/23 2132    11/15/23 0001  NPO Diet NPO Type: Strict NPO  Diet Effective Midnight          11/14/23 2354    11/15/23 0000  Vital Signs  Every 4 Hours       11/14/23 2354 11/14/23 2355  Intake & Output  Every Shift       11/14/23 2354 11/14/23 2355  Weigh Patient  Once         11/14/23 2354 11/14/23 2355  Insert Peripheral IV  Once         11/14/23 2354 11/14/23 2355  Saline Lock & Maintain IV Access  Continuous,   Status:  Canceled         11/14/23 2354 11/14/23 2355  VTE Prophylaxis Not Indicated: Other: Patient Currently Anticoagulated / Receiving Prophylaxis  Once         11/14/23 2354 11/14/23 2355  Continuous Cardiac Monitoring  Continuous        Comments: Follow Standing Orders As Outlined in Process Instructions (Open Order Report to View Full Instructions)    11/14/23 2354 11/14/23 2355  Telemetry - Maintain IV Access  Continuous         11/14/23 2354 11/14/23 2355  Telemetry - Place Orders & Notify Provider of Results When Patient Experiences Acute Chest Pain, Dysrhythmia or Respiratory Distress  Until Discontinued         11/14/23 2354 11/14/23 2355  May Be Off Telemetry for Tests  Continuous         11/14/23 2354 11/14/23 2355  Pulse Oximetry, Continuous  Continuous         11/14/23 2354 11/14/23 2355  Notify Provider (With Default Parameters)  Until Discontinued         11/14/23 2354 11/14/23 2355  Inpatient Cardiology Consult  Once        Specialty:  Cardiology  Provider:  Favio Abdi MD    11/14/23 2354 11/14/23 2354  sodium chloride 0.9 % flush 10 mL  As Needed         11/14/23 2354 11/14/23 2354  sodium chloride 0.9 % infusion 40 mL  As Needed         11/14/23 2354 11/14/23 2354  acetaminophen (TYLENOL) tablet 650 mg  Every 4 Hours PRN        See Hyperspace for full Linked Orders Report.    11/14/23 2354 11/14/23 2354  acetaminophen (TYLENOL) 160 MG/5ML oral solution 650 mg  Every 4 Hours PRN        See Hyperspace for full Linked Orders Report.    11/14/23 2354 11/14/23 2354  acetaminophen (TYLENOL) suppository 650 mg  Every 4  Hours PRN        See Hyperspace for full Linked Orders Report.    11/14/23 2354    11/14/23 2354  polyethylene glycol (MIRALAX) packet 17 g  Daily PRN        See Hyperspace for full Linked Orders Report.    11/14/23 2354    11/14/23 2354  bisacodyl (DULCOLAX) EC tablet 5 mg  Daily PRN        See Hyperspace for full Linked Orders Report.    11/14/23 2354    11/14/23 2354  bisacodyl (DULCOLAX) suppository 10 mg  Daily PRN        See Hyperspace for full Linked Orders Report.    11/14/23 2354    11/14/23 2354  ondansetron (ZOFRAN) injection 4 mg  Every 6 Hours PRN         11/14/23 2354    11/14/23 2354  nitroglycerin (NITROSTAT) SL tablet 0.4 mg  Every 5 Minutes PRN         11/14/23 2354    11/14/23 2354  Potassium Replacement - Follow Nurse / BPA Driven Protocol  As Needed         11/14/23 2354    11/14/23 2354  Magnesium Standard Dose Replacement - Follow Nurse / BPA Driven Protocol  As Needed         11/14/23 2354    11/14/23 2354  Phosphorus Replacement - Follow Nurse / BPA Driven Protocol  As Needed         11/14/23 2354    11/14/23 2354  Calcium Replacement - Follow Nurse / BPA Driven Protocol  As Needed         11/14/23 2354 11/14/23 2352  Pharmacy to Dose enoxaparin (LOVENOX)  Continuous PRN,   Status:  Discontinued         11/14/23 2352    11/14/23 2342  dextrose (GLUTOSE) oral gel 15 g  Every 15 Minutes PRN         11/14/23 2342    11/14/23 2342  dextrose (D50W) (25 g/50 mL) IV injection 25 g  Every 15 Minutes PRN         11/14/23 2342    11/14/23 2342  glucagon (GLUCAGEN) injection 1 mg  Every 15 Minutes PRN         11/14/23 2342    11/14/23 2317  High Sensitivity Troponin T 2Hr  PROCEDURE ONCE         11/14/23 2154 11/14/23 2237  Initiate Observation Status  Once         11/14/23 2237 11/14/23 2120  Single High Sensitivity Troponin T  Once         11/14/23 1956 11/14/23 2120  High Sensitivity Troponin T 2Hr  PROCEDURE ONCE,   Status:  Canceled         11/14/23 1959 11/14/23 2100  ECG 12 Lead  Chest Pain  Once         11/14/23 2059 11/14/23 2025  Enoxaparin Sodium (LOVENOX) syringe 100 mg  Once         11/14/23 2009 11/14/23 1955  ECG 12 Lead Chest Pain  Once         11/14/23 1954 11/14/23 1904  NPO Diet NPO Type: Strict NPO  Diet Effective Now,   Status:  Canceled         11/14/23 1903    11/14/23 1904  Undress & Gown  Once         11/14/23 1903    11/14/23 1904  Cardiac Monitoring  Continuous,   Status:  Canceled        Comments: Follow Standing Orders As Outlined in Process Instructions (Open Order Report to View Full Instructions)    11/14/23 1903    11/14/23 1904  Continuous Pulse Oximetry  Continuous,   Status:  Canceled         11/14/23 1903 11/14/23 1904  Vital Signs  Per Hospital Policy         11/14/23 1903    11/14/23 1904  Orthostatic Blood Pressure  Once         11/14/23 1903    11/14/23 1904  Fall Precautions  Continuous         11/14/23 1903    11/14/23 1904  XR Chest 1 View  1 Time Imaging         11/14/23 1903    11/14/23 1904  ECG 12 Lead ED Triage Standing Order; Weak / Dizzy / AMS  Once         11/14/23 1903    11/14/23 1904  POC Glucose Once  Once        Comments: Complete no more than 45 minutes prior to patient eating      11/14/23 1903    11/14/23 1904  Insert Peripheral IV  Once         11/14/23 1903 11/14/23 1904  [facility] Draw  Once         11/14/23 1903 11/14/23 1904  CBC & Differential  Once         11/14/23 1903    11/14/23 1904  Comprehensive Metabolic Panel  Once         11/14/23 1903    11/14/23 1904  Single High Sensitivity Troponin T  Once         11/14/23 1903    11/14/23 1904  Magnesium  Once         11/14/23 1903    11/14/23 1904  Urinalysis With Microscopic If Indicated (No Culture) - Urine, Clean Catch  Once         11/14/23 1903    11/14/23 1904  Green Top (Gel)  PROCEDURE ONCE         11/14/23 1903    11/14/23 1904  Lavender Top  PROCEDURE ONCE         11/14/23 1903    11/14/23 1904  Gold Top - SST  PROCEDURE ONCE         11/14/23 1903    11/14/23  190  Light Blue Top  PROCEDURE ONCE         23 1903    23  CBC Auto Differential  PROCEDURE ONCE         23 1903    11/14/23 1903  sodium chloride 0.9 % flush 10 mL  As Needed         23    Unscheduled  Oxygen Therapy- Nasal Cannula; Titrate 1-6 LPM Per SpO2; 90 - 95%  Continuous PRN       23    Unscheduled  Up With Assistance  As Needed       23 2354    Unscheduled  Follow Hypoglycemia Standing Orders For Blood Glucose <70 & Notify Provider of Treatment  As Needed      Comments: Follow Hypoglycemia Orders As Outlined in Process Instructions (Open Order Report to View Full Instructions)  Notify Provider Any Time Hypoglycemia Treatment is Administered    23 2342    --  SCANNED - TELEMETRY           23 0000    --  SCANNED - TELEMETRY           23 0000    Signed and Held  sodium chloride 0.9 % infusion  Continuous         Signed and Held    Signed and Held  Obtain Informed Consent  Once         Signed and Held    Signed and Held  Verify On Day of Procedure: No Heparin or Lovenox Administration 24 Hours Before Cardiac Procedure  Once        Comments: Verify On Day of Procedure: No Heparin or Lovenox Administration 24 Hours Before Cardiac Procedure    Signed and Held                     Physician Progress Notes (last 24 hours)        Sigrid May APRN at 11/15/23 0838              [facility]    PROGRESS NOTE    Name:  Kathryn Davison   Age:  83 y.o.  Sex:  female  :  1940  MRN:  [id number]   Visit Number:  [id number]  Admission Date:  2023  Date Of Service:  11/15/23  Primary Care Physician:  Sunitha Malave MD     LOS: 0 days :    Chief Complaint:      Dizziness    Subjective:    Patient seen and examined at bedside this morning.  She states she has been quite dizzy when standing for the past 2 weeks.  States she checked her blood pressure often when this happened at home and noted her top number  drop dramatically.  Reports increased fatigue.  Has not been using walker much to go to the kitchen for fear of dizziness.  Reports some epigastric pain yesterday.    Hospital Course:    Kathryn Davison is an 83-year-old woman with past medical history of type 2 diabetes, coronary artery disease, history of endometrial cancer, fatty liver, hypothyroidism, dyslipidemia, on Ozempic, TERESA, carotid artery stenosis, osteoporosis, diabetic retinopathy.  Presented to [facility] ED on 11/14/2023 with concern for generalized weakness, dizziness and epigastric discomfort with intermittent diaphoresis which started about 2 weeks prior to presentation.  Denied any fevers or chills, chest pain, shortness of air, vomiting or diarrhea.  Says she was given a scopolamine patch by her PCP, but symptoms did not improve.     ED Summary: Afebrile, vital signs stable on room air.  EKG showed ST elevations in inferior leads, no dynamic changes.  Troponin 763, trended down 726.  Creatinine 1.17, blood glucose 263.  CBC unremarkable.  CXR unremarkable.  ED spoke with cardiology-Dr. Abdi, suspect patient had a cardiac event about a week ago her symptoms started, recommended therapeutic Lovenox which she was provided.    Review of Systems:     All systems were reviewed and negative except as mentioned in subjective, assessment and plan.    Vital Signs:    Temp:  [97.7 °F (36.5 °C)-98.3 °F (36.8 °C)] 98.3 °F (36.8 °C)  Heart Rate:  [67-80] 76  Resp:  [16-20] 16  BP: (116-187)/(53-97) 116/53    Intake and output:    I/O last 3 completed shifts:  In: -   Out: 900 [Urine:900]  No intake/output data recorded.    Physical Examination:    General Appearance:  Alert and cooperative, pleasant elderly female, no acute distress on exam   Head:  Atraumatic and normocephalic.   Eyes: Conjunctivae and sclerae normal, no icterus. No pallor.   Throat: No oral lesions, no thrush, oral mucosa moist.   Neck: Supple, trachea midline   Lungs:   Breath sounds heard  "bilaterally equally.  No wheezing or crackles. Unlabored on room air   Heart:  Normal S1 and S2, no murmur, no gallop, no rub. No JVD.   Abdomen:   Normal bowel sounds, no masses, no organomegaly. Soft, nontender, nondistended, no rebound tenderness.   Extremities: Supple, no edema, no cyanosis, no clubbing.   Skin: No bleeding or rash.   Neurologic: Alert and oriented x 3. No facial asymmetry. Moves all four limbs. No tremors. Generalized weakness is present.     Laboratory results:    Results from last 7 days   Lab Units 11/15/23  0548 11/14/23  1920   SODIUM mmol/L 137 138   POTASSIUM mmol/L 3.7 4.4   CHLORIDE mmol/L 101 99   CO2 mmol/L 28.1 28.9   BUN mg/dL 16 18   CREATININE mg/dL 1.13* 1.17*   CALCIUM mg/dL 9.1 9.6   BILIRUBIN mg/dL  --  0.5   ALK PHOS U/L  --  139*   ALT (SGPT) U/L  --  20   AST (SGOT) U/L  --  46*   GLUCOSE mg/dL 161* 263*     Results from last 7 days   Lab Units 11/15/23  0548 11/14/23  1920   WBC 10*3/mm3 8.22 9.72   HEMOGLOBIN g/dL 12.1 13.2   HEMATOCRIT % 37.9 41.0   PLATELETS 10*3/mm3 260 280         Results from last 7 days   Lab Units 11/15/23  0548 11/14/23  2314 11/14/23  2117   HSTROP T ng/L 850* 753* 726*         No results for input(s): \"PHART\", \"QLT5IBC\", \"PO2ART\", \"IVS3LRC\", \"BASEEXCESS\" in the last 8760 hours.   I have reviewed the patient's laboratory results.    Radiology results:    XR Chest 1 View    Result Date: 11/14/2023  PROCEDURE: XR CHEST 1 VW-  HISTORY: Weak/Dizzy/AMS triage protocol, hypertension  COMPARISON: 11/18/2021  FINDINGS:  Portable view of the chest demonstrates the lungs to be grossly clear. There is no evidence of effusion, pneumothorax or other significant pleural disease. The mediastinum is unremarkable.  The heart size is normal.      Impression: Unremarkable portable chest.    This report was signed and finalized on 11/14/2023 9:25 PM by Jan Hinojosa MD.     I have reviewed the patient's radiology reports.    Medication Review:     I have reviewed " "the patient's active and prn medications.     Problem List:      NSTEMI (non-ST elevated myocardial infarction)      Assessment:    NSTEMI  Dizziness  Type 2 Diabetes Mellitus  CAD  Hypothyroidism  Carotid artery stenosis  TERESA    Plan:    NSTEMI/ Dizziness  -Troponins elevated and trending up  -Dr. Abdi (Cardiology) consulted on admission--appreciate recommendations  -Therapeutic Lovenox  -Currently NPO, likely will get cath today  -More complaints of persistent dizziness over past 2 weeks.    -Will check orthostatics  -Echo ordered  -PT/OT evaluations    Chronic  -Sliding scale coverage for now  -A1c-8  -On Plavix for carotid stenosis    DVT Prophylaxis: Therapeutic Lovenox  Code Status: DNR/ DNI  Diet: NPO  Discharge Plan: Pending    NANNETTE Liang  11/15/23  08:38 EST    Dictated utilizing Dragon dictation.      Electronically signed by Sigrid May APRN at 11/15/23 0845          Consult Notes (last 24 hours)        Favio Abdi MD at 11/15/23 1213        Consult Orders    1. Inpatient Cardiology Consult [[id number]] ordered by Kerley, Brian Joseph, DO at 11/14/23 2341                       Cardiology Consult Note    Kathryn Davison  1940  [id number]  11/15/23    Requesting Physician: Jose C Young MD    Chief Complaint: Abdominal pain and dizziness    History of Present Illness:   Mrs. Kathryn Davison is a 83 y.o. female who is being seen by Cardiology for evaluation of elevated troponin level.  The patient has a past medical history significant for type 2 diabetes mellitus, hyperlipidemia, hypertension, and carotid artery disease.  She does not have any significant past cardiac history.  She presented to the [facility] for evaluation of dizziness and abdominal pain.  The patient reports her abdominal pain and dizziness began approximately 2 weeks ago.  She describes her abdominal pain as a \"gas-like\" sensation.  She denies any " associated chest pain or chest discomfort.  In addition to her abdominal pain, the patient has been experiencing frequent episodes of dizziness and lightheadedness, which have occurred primarily when going from a seated to standing position.  No syncopal episodes.  Given her ongoing abdominal pain and dizziness, she presented to the emergency department for evaluation.    Upon initial evaluation in the emergency department, the patient was hemodynamically stable.  An initial ECG revealed mild ST elevation in the inferior and anterolateral leads.  She did have some evidence of Q wave formation in lead III.  Initial labs were significant for a high-sensitivity troponin being elevated at 463.  On recheck, her troponin was downward trending at 726.  On the emergency department, the patient continued to deny chest pain, and reported her abdominal pain had resolved.  Given a suspected missed MI occurring approximately 2 weeks prior to presentation with a downward trending troponin, the patient was given Lovenox and admitted by the hospitalist service.  Overnight, the patient's troponin was trended, and has been upward trending this morning going to 753, and subsequently 850.  Cardiology has been consulted for further recommendations.    Prior Cardiac Testin. Last Coronary Angio: None  2. Prior Stress Testing: None  3. Last Echo:    1.  Normal left ventricular systolic function   2.  Mild left atrial enlargement   3.  Mild MR   4.  Mild TR  4. Prior Holter Monitor: None    Review of Systems:   Review of Systems   Constitutional:  Negative for activity change, appetite change, chills, diaphoresis, fatigue, fever, unexpected weight gain and unexpected weight loss.   Eyes:  Negative for blurred vision and double vision.   Respiratory:  Negative for cough, chest tightness, shortness of breath and wheezing.    Cardiovascular:  Negative for chest pain, palpitations and leg swelling.   Gastrointestinal:  Positive for  "abdominal pain. Negative for anal bleeding, blood in stool and GERD.   Endocrine: Negative for cold intolerance and heat intolerance.   Genitourinary:  Negative for hematuria.   Neurological:  Positive for dizziness and light-headedness. Negative for syncope and weakness.   Hematological:  Does not bruise/bleed easily.   Psychiatric/Behavioral:  Negative for depressed mood and stress. The patient is not nervous/anxious.        Past Medical History:   Past Medical History:   Diagnosis Date    Abnormal ECG     Anemia     Back pain     Bleeding nose 02/2020    Body piercing     BOTH EARS    Bronchitis     Bulging lumbar disc     Carotid artery stenosis     BILATERAL-FOLLOWING WITH DR. WINCHESTER.  SEES EVERY 6 MONTHS.  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    Fatty liver 06/26/2017    Fracture     RIGHT ANKLE AND LEFT ARM     Frequency of urination     GERD (gastroesophageal reflux disease)     High cholesterol     History of nuclear stress test 2016    \"IT WAS OK\"    Hyperparathyroidism     Hypertension     Lumbosacral disc disease     Macular degeneration     Osteoarthritis     Osteoporosis     Sciatica     Seasonal allergies     Sleep apnea     CPAP HS - TO BRING IN DOS    Swelling of both lower extremities     Teeth missing     back teeth    Thyroid activity decreased     Type 2 diabetes mellitus     Wears glasses     FOR DISTANCE/and reading glasses       Past Surgical History:   Past Surgical History:   Procedure Laterality Date    ANKLE ARTHROSCOPY Right     BREAST BIOPSY      BREAST SURGERY Left     CYST REMOVED    CARDIAC CATHETERIZATION      NO STENT    CATARACT EXTRACTION Bilateral     2014 and 2015    COLONOSCOPY  12/10/2012    COLONOSCOPY N/A 02/22/2017    " Procedure: COLONOSCOPY with hot and cold snare polypectomies, resolution clip placement, cold biopsy polypectomy;  Surgeon: Mg Awan MD;  Location: [facility] ENDOSCOPY;  Service:     D & C HYSTEROSCOPY MYOSURE N/A 07/24/2020    Procedure: HYSTEROSCOPY, DILATION AND CURETTAGE , MYOSURE;  Surgeon: Fidel Elizalde MD;  Location: [facility] OR;  Service: Obstetrics/Gynecology;  Laterality: N/A;    DILATION AND CURETTAGE, DIAGNOSTIC / THERAPEUTIC      X4    ENDOSCOPY N/A 07/20/2017    Procedure: ESOPHAGOGASTRODUODENOSCOPY with biopsies and cold biopsy polypectomy;  Surgeon: Mg Awan MD;  Location: [facility] ENDOSCOPY;  Service:     FRACTURE SURGERY Right     ANKLE    ORIF WRIST FRACTURE Right 02/02/2018    Procedure: WRIST RIGHT OPEN REDUCTION INTERNAL FIXATION WITH VOLAR PLATE;  Surgeon: Phu Meneses MD;  Location: [facility] OR;  Service:     TOTAL LAPAROSCOPIC HYSTERECTOMY SALPINGO OOPHORECTOMY N/A 08/31/2020    Procedure: RADICAL TYPE 1 TOTAL LAPAROSCOPIC HYSTERECTOMY BILATERAL SALPINGOOPHORECTOMY WITH DAVINCI ROBOT, SENTINEL LYMPH NODE DISSECTION;  Surgeon: Wilma Jo MD;  Location: [facility] OR;  Service: DaVinci;  Laterality: N/A;    TOTAL SHOULDER ARTHROPLASTY W/ DISTAL CLAVICLE EXCISION Left 02/02/2018    Procedure: SHOULDER TOTAL LEFT  REVERSE ARTHROPLASTY WITH TUBEROSITY RECONSTRUCTION;  Surgeon: Phu Meneses MD;  Location: [facility] OR;  Service:     TUBAL ABDOMINAL LIGATION  1980?    UPPER GASTROINTESTINAL ENDOSCOPY  07/20/2017       Family History:   Family History   Problem Relation Age of Onset    Heart disease Other     Cancer Other     Prostate cancer Father     Stroke Father     Colon cancer Neg Hx        Social History:   Social History     Socioeconomic History    Marital status:    Tobacco Use    Smoking status: Never    Smokeless tobacco: Never   Vaping Use    Vaping Use: Never used   Substance and Sexual Activity    Alcohol use: No    Drug use: No    Sexual activity: Defer        Medications:     Current Facility-Administered Medications:     acetaminophen (TYLENOL) tablet 650 mg, 650 mg, Oral, Q4H PRN **OR** acetaminophen (TYLENOL) 160 MG/5ML oral solution 650 mg, 650 mg, Oral, Q4H PRN **OR** acetaminophen (TYLENOL) suppository 650 mg, 650 mg, Rectal, Q4H PRN, Kerley, Brian Joseph, DO    sennosides-docusate (PERICOLACE) 8.6-50 MG per tablet 2 tablet, 2 tablet, Oral, BID **AND** polyethylene glycol (MIRALAX) packet 17 g, 17 g, Oral, Daily PRN **AND** bisacodyl (DULCOLAX) EC tablet 5 mg, 5 mg, Oral, Daily PRN **AND** bisacodyl (DULCOLAX) suppository 10 mg, 10 mg, Rectal, Daily PRN, Kerley, Brian Joseph, DO    Calcium Replacement - Follow Nurse / BPA Driven Protocol, , Does not apply, PRN, Kerley, Brian Joseph, DO    dextrose (D50W) (25 g/50 mL) IV injection 25 g, 25 g, Intravenous, Q15 Min PRN, Kerley, Brian Joseph, DO    dextrose (GLUTOSE) oral gel 15 g, 15 g, Oral, Q15 Min PRN, Kerley, Brian Joseph, DO    Enoxaparin Sodium (LOVENOX) syringe 90 mg, 1 mg/kg, Subcutaneous, Q12H, Kerley, Brian Joseph, DO    glucagon (GLUCAGEN) injection 1 mg, 1 mg, Intramuscular, Q15 Min PRN, Kerley, Brian Joseph, DO    Insulin Aspart (novoLOG) injection 2-9 Units, 2-9 Units, Subcutaneous, 4x Daily AC & at Bedtime, Kerley, Brian Joseph, DO    Magnesium Standard Dose Replacement - Follow Nurse / BPA Driven Protocol, , Does not apply, PRN, Kerley, Brian Joseph, DO    nitroglycerin (NITROSTAT) SL tablet 0.4 mg, 0.4 mg, Sublingual, Q5 Min PRN, Kerley, Brian Joseph, DO    ondansetron (ZOFRAN) injection 4 mg, 4 mg, Intravenous, Q6H PRN, Kerley, Brian Joseph,     Pharmacy to Dose enoxaparin (LOVENOX), , Does not apply, Continuous PRN, Kerley, Brian Joseph, DO    Phosphorus Replacement - Follow Nurse / BPA Driven Protocol, , Does not apply, PRN, Kerley, Brian Joseph, DO    Potassium Replacement - Follow Nurse / BPA Driven Protocol, , Does not apply, PRN, Kerley, Brian Joseph, DO    sodium chloride 0.9 % flush  10 mL, 10 mL, Intravenous, PRN, Kerley, Brian Joseph,     sodium chloride 0.9 % flush 10 mL, 10 mL, Intravenous, Q12H, Kerley, Brian Joseph, DO, 10 mL at 11/15/23 0815    sodium chloride 0.9 % flush 10 mL, 10 mL, Intravenous, PRN, Kerley, Brian Joseph,     sodium chloride 0.9 % infusion 40 mL, 40 mL, Intravenous, PRN, Kerley, Brian Joseph, DO    Allergies:   Allergies   Allergen Reactions    Gabapentin Dizziness    Betadine [Povidone Iodine] Rash       Physical Exam:  Vital Signs:   Vitals:    11/15/23 1114 11/15/23 1117 11/15/23 1119 11/15/23 1130   BP: 120/61  Comment: orthostatics- lying 119/64  Comment: ortho's Comment: ortho's 106/60  Comment: ortho's   BP Location:       Patient Position: Lying Sitting Standing Standing   Pulse: 73 78 80 79   Resp:       Temp:       TempSrc:       SpO2:   96% 95%   Weight:       Height:           Physical Exam  Vitals and nursing note reviewed.   Constitutional:       General: She is not in acute distress.     Appearance: She is well-developed. She is obese. She is not diaphoretic.   HENT:      Head: Normocephalic and atraumatic.   Eyes:      General: No scleral icterus.     Pupils: Pupils are equal, round, and reactive to light.   Neck:      Trachea: No tracheal deviation.   Cardiovascular:      Rate and Rhythm: Normal rate and regular rhythm.      Heart sounds: Normal heart sounds. No murmur heard.     No friction rub. No gallop.      Comments: Normal JVD.  Pulmonary:      Effort: Pulmonary effort is normal. No respiratory distress.      Breath sounds: Normal breath sounds. No stridor. No wheezing or rales.   Chest:      Chest wall: No tenderness.   Abdominal:      General: Bowel sounds are normal. There is no distension.      Palpations: Abdomen is soft.      Tenderness: There is no abdominal tenderness. There is no guarding or rebound.   Musculoskeletal:         General: No swelling. Normal range of motion.      Cervical back: Neck supple. No tenderness.    Lymphadenopathy:      Cervical: No cervical adenopathy.   Skin:     General: Skin is warm and dry.      Findings: No erythema.   Neurological:      General: No focal deficit present.      Mental Status: She is alert and oriented to person, place, and time.   Psychiatric:         Mood and Affect: Mood normal.         Behavior: Behavior normal.         Results Review:   Results from last 7 days   Lab Units 11/15/23  0548 11/14/23  1920   SODIUM mmol/L 137 138   POTASSIUM mmol/L 3.7 4.4   CHLORIDE mmol/L 101 99   CO2 mmol/L 28.1 28.9   BUN mg/dL 16 18   CREATININE mg/dL 1.13* 1.17*   CALCIUM mg/dL 9.1 9.6   BILIRUBIN mg/dL  --  0.5   ALK PHOS U/L  --  139*   ALT (SGPT) U/L  --  20   AST (SGOT) U/L  --  46*   GLUCOSE mg/dL 161* 263*     Results from last 7 days   Lab Units 11/15/23  0548 11/14/23  2314 11/14/23  2117   HSTROP T ng/L 850* 753* 726*     Results from last 7 days   Lab Units 11/15/23  0548 11/14/23  1920   WBC 10*3/mm3 8.22 9.72   HEMOGLOBIN g/dL 12.1 13.2   HEMATOCRIT % 37.9 41.0   PLATELETS 10*3/mm3 260 280         Results from last 7 days   Lab Units 11/14/23  1920   MAGNESIUM mg/dL 2.0           I personally viewed and interpreted the patient's EKG/Telemetry data     Assessment:  1.  Suspected recent STEMI  2.  Hypertension  3.  Hyperlipidemia  4.  Type 2 diabetes mellitus  5.  Dizziness  6.  Abdominal pain    Plan:  1.  Suspect recent STEMI  --Onset of abdominal pain approximately 2 weeks ago, potentially anginal equivalent  -- ECG on presentation shows evidence of mild ST elevation in the inferior and anterolateral leads with developing Q waves in lead III  -- Initial troponin elevated at 763, downward trending on recheck at 726 again suspicions for MI over the past 1-2 weeks  --Echocardiogram shows akinesis of the LV apex with LVEF approximately 40-45%.  Suspect LAD disease, cannot rule out stress-induced cardiomyopathy  -- Given troponin again upward trending this morning with fluctuating  abdominal pain, discussed the risks and benefits of coronary angiography with online PCI as indicated.  After further discussion with her family, the patient has opted to proceed with coronary angiography to definitively evaluate her coronary anatomy  -- Hold additional Lovenox  -- Chronically on Plavix as outpatient, will continue  -- Add aspirin 81 mg daily  -- Continue statin      **The patient has agreed for reversal of her DNR status during coronary angiography**    Thank you for allowing me to participate in the care of your patient. Please do not hesitate to contact me with additional questions or concerns.     DOLLY Abdi MD  Interventional Cardiology   11/15/23  12:14 EST     Electronically signed by Favio Abdi MD at 11/15/23 2625

## 2023-11-15 NOTE — PLAN OF CARE
Goal Outcome Evaluation:  Plan of Care Reviewed With: patient        Progress: no change  Outcome Evaluation: Patient's VSS, Ox4. Pt's daughters have been at bedsidde all day. Pt agreed for procedure and went down for heart cath. Pt didn't have any interventions, provider stated transfer to  is pending when bed is available there. Pt needs further cardiac interventions.Pt denied pain and SOB. Pt's TR band started to leak after 2 ml removed for the 3rd time, so 2ml was reinserted back in, provider notified and cardialogist notified.Plan of cre ongoing. Pending transfer to  hospital.

## 2023-11-15 NOTE — NURSING NOTE
Dr. Kerley notified as provider on call for patient in rm [id number] (Davison). Provider stated to notify Dr. Abdi as he requested a heparin drip 2 hrs post removal of radial pressure device. Dr. Abdi is aware of bleeding to the site, and 2ml was reinserted into band. Pulses are great =2 to the right radial site, no numbness, tingling or discoloration, no hematomas present, and no loss of sensation to the extremity.

## 2023-11-15 NOTE — PROGRESS NOTES
[facility]    Name:  Kathryn Davison   Age:  83 y.o.  Sex:  female  :  1940  MRN:  [id number]   Visit Number:  [id number]  Admission Date:  2023  Date Of Service:  11/15/23  Primary Care Physician:  Sunitha Malave MD     LOS: 0 days :    Chief Complaint:      Dizziness    Subjective:    Patient seen and examined at bedside this morning.  She states she has been quite dizzy when standing for the past 2 weeks.  States she checked her blood pressure often when this happened at home and noted her top number drop dramatically.  Reports increased fatigue.  Has not been using walker much to go to the kitchen for fear of dizziness.  Reports some epigastric pain yesterday.    Hospital Course:    Kathryn Davison is an 83-year-old woman with past medical history of type 2 diabetes, coronary artery disease, history of endometrial cancer, fatty liver, hypothyroidism, dyslipidemia, on Ozempic, TERESA, carotid artery stenosis, osteoporosis, diabetic retinopathy.  Presented to [facility] ED on 2023 with concern for generalized weakness, dizziness and epigastric discomfort with intermittent diaphoresis which started about 2 weeks prior to presentation.  Denied any fevers or chills, chest pain, shortness of air, vomiting or diarrhea.  Says she was given a scopolamine patch by her PCP, but symptoms did not improve.     ED Summary: Afebrile, vital signs stable on room air.  EKG showed ST elevations in inferior leads, no dynamic changes.  Troponin 763, trended down 726.  Creatinine 1.17, blood glucose 263.  CBC unremarkable.  CXR unremarkable.  ED spoke with cardiology-Dr. Abdi, suspect patient had a cardiac event about a week ago her symptoms started, recommended therapeutic Lovenox which she was provided.    Review of Systems:     All systems were reviewed and negative except as mentioned in subjective, assessment and plan.    Vital Signs:    Temp:  [97.7 °F (36.5  "°C)-98.3 °F (36.8 °C)] 98.3 °F (36.8 °C)  Heart Rate:  [67-80] 76  Resp:  [16-20] 16  BP: (116-187)/(53-97) 116/53    Intake and output:    I/O last 3 completed shifts:  In: -   Out: 900 [Urine:900]  No intake/output data recorded.    Physical Examination:    General Appearance:  Alert and cooperative, pleasant elderly female, no acute distress on exam   Head:  Atraumatic and normocephalic.   Eyes: Conjunctivae and sclerae normal, no icterus. No pallor.   Throat: No oral lesions, no thrush, oral mucosa moist.   Neck: Supple, trachea midline   Lungs:   Breath sounds heard bilaterally equally.  No wheezing or crackles. Unlabored on room air   Heart:  Normal S1 and S2, no murmur, no gallop, no rub. No JVD.   Abdomen:   Normal bowel sounds, no masses, no organomegaly. Soft, nontender, nondistended, no rebound tenderness.   Extremities: Supple, no edema, no cyanosis, no clubbing.   Skin: No bleeding or rash.   Neurologic: Alert and oriented x 3. No facial asymmetry. Moves all four limbs. No tremors. Generalized weakness is present.     Laboratory results:    Results from last 7 days   Lab Units 11/15/23  0548 11/14/23  1920   SODIUM mmol/L 137 138   POTASSIUM mmol/L 3.7 4.4   CHLORIDE mmol/L 101 99   CO2 mmol/L 28.1 28.9   BUN mg/dL 16 18   CREATININE mg/dL 1.13* 1.17*   CALCIUM mg/dL 9.1 9.6   BILIRUBIN mg/dL  --  0.5   ALK PHOS U/L  --  139*   ALT (SGPT) U/L  --  20   AST (SGOT) U/L  --  46*   GLUCOSE mg/dL 161* 263*     Results from last 7 days   Lab Units 11/15/23  0548 11/14/23  1920   WBC 10*3/mm3 8.22 9.72   HEMOGLOBIN g/dL 12.1 13.2   HEMATOCRIT % 37.9 41.0   PLATELETS 10*3/mm3 260 280         Results from last 7 days   Lab Units 11/15/23  0548 11/14/23  2314 11/14/23 2117   HSTROP T ng/L 850* 753* 726*         No results for input(s): \"PHART\", \"JLM2TIN\", \"PO2ART\", \"WPU2XDT\", \"BASEEXCESS\" in the last 8760 hours.   I have reviewed the patient's laboratory results.    Radiology results:    XR Chest 1 " View    Result Date: 11/14/2023  PROCEDURE: XR CHEST 1 VW-  HISTORY: Weak/Dizzy/AMS triage protocol, hypertension  COMPARISON: 11/18/2021  FINDINGS:  Portable view of the chest demonstrates the lungs to be grossly clear. There is no evidence of effusion, pneumothorax or other significant pleural disease. The mediastinum is unremarkable.  The heart size is normal.      Impression: Unremarkable portable chest.    This report was signed and finalized on 11/14/2023 9:25 PM by Jan Hinojosa MD.     I have reviewed the patient's radiology reports.    Medication Review:     I have reviewed the patient's active and prn medications.     Problem List:      NSTEMI (non-ST elevated myocardial infarction)      Assessment:    NSTEMI  Dizziness  Type 2 Diabetes Mellitus  CAD  Hypothyroidism  Carotid artery stenosis  TERESA    Plan:    NSTEMI/ Dizziness  -Troponins elevated and trending up  -Dr. Abdi (Cardiology) consulted on admission--appreciate recommendations  -Therapeutic Lovenox  -Currently NPO, likely will get cath today  -More complaints of persistent dizziness over past 2 weeks.    -Will check orthostatics  -Echo ordered  -PT/OT evaluations    Chronic  -Sliding scale coverage for now  -A1c-8  -On Plavix for carotid stenosis    DVT Prophylaxis: Therapeutic Lovenox  Code Status: DNR/ DNI  Diet: NPO  Discharge Plan: Pending    Sigrid May, APRN  11/15/23  08:38 EST    Dictated utilizing Dragon dictation.

## 2023-11-15 NOTE — ED PROVIDER NOTES
Subjective:  History of Present Illness:    Patient is a 89-year-old female with history of CAD, denies stents, diabetes, endometrial cancer, hyperlipidemia, obesity, fatty liver, hypertension, presents today with vertiginous symptoms and epigastric discomfort.  Reports nausea and intermittent diaphoresis for the last week prior to arrival.  States that she was given scopolamine patch by her PCP but symptoms did not improve.  Now presents to our emergency department for evaluation.  She denies any preceding fever or chills.  She denies chest pain at this time but does endorse mild epigastric discomfort.  Is unable to quantify for how long her epigastric discomfort is been ongoing.  Denies any shortness of breath.      Nurses Notes reviewed and agree, including vitals, allergies, social history and prior medical history.     REVIEW OF SYSTEMS: All systems reviewed and not pertinent unless noted.  Review of Systems   Constitutional:  Positive for activity change. Negative for appetite change, chills, fatigue and fever.   HENT:  Negative for rhinorrhea, sinus pressure and sinus pain.    Eyes:  Negative for discharge and itching.   Respiratory:  Negative for cough and shortness of breath.    Cardiovascular:  Negative for chest pain and leg swelling.   Gastrointestinal:  Positive for abdominal pain and nausea. Negative for abdominal distention, diarrhea and vomiting.   Endocrine: Negative for cold intolerance and heat intolerance.   Genitourinary:  Negative for decreased urine volume, difficulty urinating, flank pain, frequency, urgency, vaginal bleeding, vaginal discharge and vaginal pain.   Musculoskeletal:  Negative for gait problem, neck pain and neck stiffness.   Skin:  Negative for color change.   Allergic/Immunologic: Negative for environmental allergies.   Neurological:  Positive for dizziness. Negative for seizures, syncope, facial asymmetry and speech difficulty.   Psychiatric/Behavioral:  Negative for  "self-injury and suicidal ideas.        Past Medical History:   Diagnosis Date    Abnormal ECG     Anemia     Back pain     Bleeding nose 02/2020    Body piercing     BOTH EARS    Bronchitis     Bulging lumbar disc     Carotid artery stenosis     BILATERAL-FOLLOWING WITH DR. WINCHESTER.  SEES EVERY 6 MONTHS.  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    Fatty liver 06/26/2017    Fracture     RIGHT ANKLE AND LEFT ARM     Frequency of urination     GERD (gastroesophageal reflux disease)     High cholesterol     History of nuclear stress test 2016    \"IT WAS OK\"    Hyperparathyroidism     Hypertension     Lumbosacral disc disease     Macular degeneration     Osteoarthritis     Osteoporosis     Sciatica     Seasonal allergies     Sleep apnea     CPAP HS - TO BRING IN DOS    Swelling of both lower extremities     Teeth missing     back teeth    Thyroid activity decreased     Type 2 diabetes mellitus     Wears glasses     FOR DISTANCE/and reading glasses       Allergies:    Gabapentin and Betadine [povidone iodine]      Past Surgical History:   Procedure Laterality Date    ANKLE ARTHROSCOPY Right     BREAST BIOPSY      BREAST SURGERY Left     CYST REMOVED    CARDIAC CATHETERIZATION      NO STENT    CATARACT EXTRACTION Bilateral     2014 and 2015    COLONOSCOPY  12/10/2012    COLONOSCOPY N/A 02/22/2017    Procedure: COLONOSCOPY with hot and cold snare polypectomies, resolution clip placement, cold biopsy polypectomy;  Surgeon: Mg Awan MD;  Location: [facility] ENDOSCOPY;  Service:     D & C HYSTEROSCOPY MYOSURE N/A 07/24/2020    Procedure: HYSTEROSCOPY, DILATION AND CURETTAGE , MYOSURE;  Surgeon: Fidel Elizalde MD;  Location: [facility] OR;  Service: Obstetrics/Gynecology;  " "Laterality: N/A;    DILATION AND CURETTAGE, DIAGNOSTIC / THERAPEUTIC      X4    ENDOSCOPY N/A 07/20/2017    Procedure: ESOPHAGOGASTRODUODENOSCOPY with biopsies and cold biopsy polypectomy;  Surgeon: Mg Awan MD;  Location: [facility] ENDOSCOPY;  Service:     FRACTURE SURGERY Right     ANKLE    ORIF WRIST FRACTURE Right 02/02/2018    Procedure: WRIST RIGHT OPEN REDUCTION INTERNAL FIXATION WITH VOLAR PLATE;  Surgeon: Phu Meneses MD;  Location: [facility] OR;  Service:     TOTAL LAPAROSCOPIC HYSTERECTOMY SALPINGO OOPHORECTOMY N/A 08/31/2020    Procedure: RADICAL TYPE 1 TOTAL LAPAROSCOPIC HYSTERECTOMY BILATERAL SALPINGOOPHORECTOMY WITH DAVINCI ROBOT, SENTINEL LYMPH NODE DISSECTION;  Surgeon: Wilma Jo MD;  [facility]: [facility];  Service: DaVinci;  Laterality: N/A;    TOTAL SHOULDER ARTHROPLASTY W/ DISTAL CLAVICLE EXCISION Left 02/02/2018    Procedure: SHOULDER TOTAL LEFT  REVERSE ARTHROPLASTY WITH TUBEROSITY RECONSTRUCTION;  Surgeon: Phu Meneses MD;  Location: [facility] OR;  Service:     TUBAL ABDOMINAL LIGATION  1980?    UPPER GASTROINTESTINAL ENDOSCOPY  07/20/2017         Social History     Socioeconomic History    Marital status:    Tobacco Use    Smoking status: Never    Smokeless tobacco: Never   Vaping Use    Vaping Use: Never used   Substance and Sexual Activity    Alcohol use: No    Drug use: No    Sexual activity: Defer         Family History   Problem Relation Age of Onset    Heart disease Other     Cancer Other     Prostate cancer Father     Stroke Father     Colon cancer Neg Hx        Objective  Physical Exam:  /74 (BP Location: Right arm, Patient Position: Lying)   Pulse 74   Temp 98.1 °F (36.7 °C) (Axillary)   Resp 18   Ht 157.5 cm (62.01\")   Wt 94.4 kg (208 lb 1.8 oz)   LMP  (LMP Unknown)   SpO2 94%   BMI 38.05 kg/m²      Physical Exam  Constitutional:       General: She is not in acute distress.     Appearance: Normal appearance. She is obese. She is not " ill-appearing.   HENT:      Head: Normocephalic and atraumatic.      Nose: Nose normal. No congestion or rhinorrhea.      Mouth/Throat:      Mouth: Mucous membranes are dry.      Pharynx: Oropharynx is clear. No oropharyngeal exudate or posterior oropharyngeal erythema.   Eyes:      Extraocular Movements: Extraocular movements intact.      Conjunctiva/sclera: Conjunctivae normal.      Pupils: Pupils are equal, round, and reactive to light.   Cardiovascular:      Rate and Rhythm: Normal rate and regular rhythm.      Pulses: Normal pulses.      Heart sounds: Normal heart sounds.   Pulmonary:      Effort: Pulmonary effort is normal. No respiratory distress.      Breath sounds: Normal breath sounds.   Abdominal:      General: Abdomen is flat. Bowel sounds are normal. There is no distension.      Palpations: Abdomen is soft.      Tenderness: There is no abdominal tenderness. There is no guarding or rebound.   Musculoskeletal:         General: No swelling or tenderness. Normal range of motion.      Cervical back: Normal range of motion and neck supple.   Skin:     General: Skin is warm and dry.      Capillary Refill: Capillary refill takes less than 2 seconds.   Neurological:      General: No focal deficit present.      Mental Status: She is alert and oriented to person, place, and time. Mental status is at baseline.      Cranial Nerves: No cranial nerve deficit.      Sensory: No sensory deficit.      Motor: No weakness.      Coordination: Coordination normal.   Psychiatric:         Mood and Affect: Mood normal.         Behavior: Behavior normal.         Thought Content: Thought content normal.         Judgment: Judgment normal.         Critical Care    Performed by: Jose C Young MD  Authorized by: Kerley, Brian Joseph, DO    Critical care provider statement:     Critical care time (minutes):  60    Critical care time was exclusive of:  Separately billable procedures and treating other patients    Critical care  was necessary to treat or prevent imminent or life-threatening deterioration of the following conditions:  Cardiac failure (NSTEMI)    Critical care was time spent personally by me on the following activities:  Blood draw for specimens, development of treatment plan with patient or surrogate, discussions with consultants, discussions with primary provider, evaluation of patient's response to treatment, examination of patient, obtaining history from patient or surrogate, ordering and performing treatments and interventions, ordering and review of laboratory studies, ordering and review of radiographic studies, pulse oximetry, re-evaluation of patient's condition and review of old charts    Care discussed with: admitting provider        ED Course:    ED Course as of 11/15/23 0459   Tue Nov 14, 2023 1905 EKG interpreted by me, normal sinus rhythm no slurring ST changes noted, rate of 77 [JE]      ED Course User Index  [JE] Jose C Young MD       Lab Results (last 24 hours)       Procedure Component Value Units Date/Time    CBC & Differential [[id number]]  (Abnormal) Collected: 11/14/23 1920    Specimen: Blood Updated: 11/14/23 1928    Narrative:      The following orders were created for panel order CBC & Differential.  Procedure                               Abnormality         Status                     ---------                               -----------         ------                     CBC Auto Differential[[id number]]        Abnormal            Final result                 Please view results for these tests on the individual orders.    Comprehensive Metabolic Panel [[id number]]  (Abnormal) Collected: 11/14/23 1920    Specimen: Blood Updated: 11/14/23 1946     Glucose 263 mg/dL      Comment: Glucose >180, Hemoglobin A1C recommended.        BUN 18 mg/dL      Creatinine 1.17 mg/dL      Sodium 138 mmol/L      Potassium 4.4 mmol/L      Chloride 99 mmol/L      CO2 28.9 mmol/L      Calcium 9.6 mg/dL      Total  Protein 7.6 g/dL      Albumin 3.9 g/dL      ALT (SGPT) 20 U/L      AST (SGOT) 46 U/L      Alkaline Phosphatase 139 U/L      Total Bilirubin 0.5 mg/dL      Globulin 3.7 gm/dL      A/G Ratio 1.1 g/dL      BUN/Creatinine Ratio 15.4     Anion Gap 10.1 mmol/L      eGFR 46.4 mL/min/1.73     Narrative:      GFR Normal >60  Chronic Kidney Disease <60  Kidney Failure <15    The GFR formula is only valid for adults with stable renal function between ages 18 and 70.    Single High Sensitivity Troponin T [[id number]]  (Abnormal) Collected: 11/14/23 1920    Specimen: Blood Updated: 11/14/23 1959     HS Troponin T 763 ng/L     Narrative:      High Sensitive Troponin T Reference Range:  <14.0 ng/L- Negative Female for AMI  <22.0 ng/L- Negative Male for AMI  >=14 - Abnormal Female indicating possible myocardial injury.  >=22 - Abnormal Male indicating possible myocardial injury.   Clinicians would have to utilize clinical acumen, EKG, Troponin, and serial changes to determine if it is an Acute Myocardial Infarction or myocardial injury due to an underlying chronic condition.         Magnesium [[id number]]  (Normal) Collected: 11/14/23 1920    Specimen: Blood Updated: 11/14/23 1946     Magnesium 2.0 mg/dL     CBC Auto Differential [[id number]]  (Abnormal) Collected: 11/14/23 1920    Specimen: Blood Updated: 11/14/23 1928     WBC 9.72 10*3/mm3      RBC 4.79 10*6/mm3      Hemoglobin 13.2 g/dL      Hematocrit 41.0 %      MCV 85.6 fL      MCH 27.6 pg      MCHC 32.2 g/dL      RDW 13.2 %      RDW-SD 41.4 fl      MPV 10.0 fL      Platelets 280 10*3/mm3      Neutrophil % 72.5 %      Lymphocyte % 17.6 %      Monocyte % 8.3 %      Eosinophil % 0.8 %      Basophil % 0.5 %      Immature Grans % 0.3 %      Neutrophils, Absolute 7.04 10*3/mm3      Lymphocytes, Absolute 1.71 10*3/mm3      Monocytes, Absolute 0.81 10*3/mm3      Eosinophils, Absolute 0.08 10*3/mm3      Basophils, Absolute 0.05 10*3/mm3      Immature Grans, Absolute 0.03 10*3/mm3       nRBC 0.0 /100 WBC     Single High Sensitivity Troponin T [[id number]]  (Abnormal) Collected: 11/14/23 2117    Specimen: Blood from Arm, Left Updated: 11/14/23 2154     HS Troponin T 726 ng/L     Narrative:      High Sensitive Troponin T Reference Range:  <14.0 ng/L- Negative Female for AMI  <22.0 ng/L- Negative Male for AMI  >=14 - Abnormal Female indicating possible myocardial injury.  >=22 - Abnormal Male indicating possible myocardial injury.   Clinicians would have to utilize clinical acumen, EKG, Troponin, and serial changes to determine if it is an Acute Myocardial Infarction or myocardial injury due to an underlying chronic condition.         Urinalysis With Microscopic If Indicated (No Culture) - Urine, Clean Catch [[id number]]  (Abnormal) Collected: 11/14/23 2120    Specimen: Urine, Clean Catch Updated: 11/14/23 2129     Color, UA Yellow     Appearance, UA Clear     pH, UA 7.0     Specific Gravity, UA 1.007     Glucose,  mg/dL (Trace)     Ketones, UA Negative     Bilirubin, UA Negative     Blood, UA Negative     Protein, UA Negative     Leuk Esterase, UA Negative     Nitrite, UA Negative     Urobilinogen, UA 0.2 E.U./dL    Narrative:      Urine microscopic not indicated.    High Sensitivity Troponin T 2Hr [[id number]]  (Abnormal) Collected: 11/14/23 2314    Specimen: Blood Updated: 11/14/23 2357     HS Troponin T 753 ng/L      Troponin T Delta 27 ng/L     Narrative:      High Sensitive Troponin T Reference Range:  <14.0 ng/L- Negative Female for AMI  <22.0 ng/L- Negative Male for AMI  >=14 - Abnormal Female indicating possible myocardial injury.  >=22 - Abnormal Male indicating possible myocardial injury.   Clinicians would have to utilize clinical acumen, EKG, Troponin, and serial changes to determine if it is an Acute Myocardial Infarction or myocardial injury due to an underlying chronic condition.                  XR Chest 1 View    Result Date: 11/14/2023  PROCEDURE: XR CHEST 1 VW-  HISTORY:  Weak/Dizzy/AMS triage protocol, hypertension  COMPARISON: 11/18/2021  FINDINGS:  Portable view of the chest demonstrates the lungs to be grossly clear. There is no evidence of effusion, pneumothorax or other significant pleural disease. The mediastinum is unremarkable.  The heart size is normal.      Impression: Unremarkable portable chest.    This report was signed and finalized on 11/14/2023 9:25 PM by Jan Hinojosa MD.          MDM      Initial impression of presenting illness: Chest pain    DDX: includes but is not limited to: ACS, MI, NSTEMI, hypertensive emergency, aortic dissection    Patient arrives stable with vitals interpreted by myself.     Pertinent features from physical exam: Clear to auscultation, regular rate and rhythm, normal neuro exam, 2+ radial pulses bilateral.    Initial diagnostic plan: CBC, CMP, troponin, ECG, chest x-ray    Results from initial plan were reviewed and interpreted by me revealing significant elevation in troponin, Q waves with some ST elevation without reciprocal depressions seen in inferior leads    Diagnostic information from other sources: Discussed with daughter at bedside and reviewed past medical records including records from patient's cardiology visits with [facility]    Interventions / Re-evaluation: Given Lovenox for concern for ACS    Results/clinical rationale were discussed with patient at bedside    Consultations/Discussion of results with other physicians: Given concerns for NSTEMI and likely cardiac event prior to arrival, discussed with cardiology.  Recommend therapeutic Lovenox and will evaluate the patient in the morning.  No reciprocal changes warranting Cath Lab activation at this time.  Discussed with hospitalist admitted to their service for further management    Disposition plan: Admit  -----        Final diagnoses:   NSTEMI (non-ST elevated myocardial infarction)          Jose C Young MD  11/15/23 0459

## 2023-11-15 NOTE — SIGNIFICANT NOTE
NANNETTE Farris informed of critical lab result:  HS Troponin 850.  NANNETTE Farris stated she was aware of critical lab result and new orders will be placed.

## 2023-11-15 NOTE — PLAN OF CARE
Goal Outcome Evaluation:              Outcome Evaluation: New admit.  VSS on room air.  Alert and oriented x4.  NPO as ordered.  No complaints during shift.  No acute events noted during shift.  Cardiology consult ordered.  Will continue to monitor patient.

## 2023-11-15 NOTE — CASE MANAGEMENT/SOCIAL WORK
Discharge Planning Assessment  [facility]     Patient Name: Kathryn Davison  MRN: [id number]  Today's Date: 11/15/2023    Admit Date: 11/14/2023    Plan: Home with family   Discharge Needs Assessment       Row Name 11/15/23 1011       Living Environment    People in Home child(codi), adult    Current Living Arrangements home    Potentially Unsafe Housing Conditions none    In the past 12 months has the electric, gas, oil, or water company threatened to shut off services in your home? No    Primary Care Provided by self    Provides Primary Care For no one    Able to Return to Prior Arrangements yes       Resource/Environmental Concerns    Resource/Environmental Concerns none    Transportation Concerns none       Food Insecurity    Within the past 12 months, you worried that your food would run out before you got the money to buy more. Never true    Within the past 12 months, the food you bought just didn't last and you didn't have money to get more. Never true       Transition Planning    Patient/Family Anticipates Transition to home with family    Patient/Family Anticipated Services at Transition none    Transportation Anticipated family or friend will provide       Discharge Needs Assessment    Readmission Within the Last 30 Days no previous admission in last 30 days    Equipment Currently Used at Home cane, quad;rollator    Concerns to be Addressed no discharge needs identified    Anticipated Changes Related to Illness none    Equipment Needed After Discharge none                   Discharge Plan       Row Name 11/15/23 1011       Plan    Plan Home with family    Patient/Family in Agreement with Plan yes    Plan Comments Met with patient at bedside.Verified patient's address, phone number, contacts, physician and pharmacy. Patient has adequate transportation. Reports no financial or food insecurity. Patient lives with her adult daughter. Uses a rollator and cpap, could not remember which company provided them.  Plans to return home when ready. Family will provide transportation.    Final Discharge Disposition Code 01 - home or self-care                  Continued Care and Services - Admitted Since 11/14/2023    Coordination has not been started for this encounter.       Expected Discharge Date and Time       Expected Discharge Date Expected Discharge Time    Nov 16, 2023            Demographic Summary       Row Name 11/15/23 1009       General Information    Admission Type observation    Arrived From emergency department    Required Notices Provided Observation Status Notice    Referral Source admission list    Reason for Consult discharge planning    Preferred Language English       Contact Information    Permission Granted to Share Info With                    Functional Status       Row Name 11/15/23 1009       Functional Status    Usual Activity Tolerance fair    Current Activity Tolerance fair       Assessment of Health Literacy    How often do you have someone help you read hospital materials? Occasionally    How often do you have problems learning about your medical condition because of difficulty understanding written information? Occasionally    How often do you have a problem understanding what is told to you about your medical condition? Occasionally    How confident are you filling out medical forms by yourself? Quite a bit    Health Literacy Good       Functional Status, IADL    Medications assistive equipment    Meal Preparation assistive equipment    Housekeeping assistive equipment    Laundry assistive equipment    Shopping assistive equipment                   Psychosocial       Row Name 11/15/23 1010       Values/Beliefs    Spiritual, Cultural Beliefs, Mandaen Practices, Values that Affect Care no       Mental Health    Little Interest or Pleasure in Doing Things 0-->not at all    Feeling Down, Depressed or Hopeless 0-->not at all    Do you feel stress - tense, restless, nervous, or anxious,  or unable to sleep at night because your mind is troubled all the time - these days? Not at all       Coping/Stress    Major Change/Loss/Stressor illness    Patient Personal Strengths able to adapt;resilient    Sources of Support adult child(codi)    Techniques to [place] with Loss/Stress/Change diversional activities    Reaction to Health Status accepting    Understanding of Condition and Treatment adequate understanding of medical condition;adequate understanding of treatment       Developmental Stage (Eriksson's)    Developmental Stage Stage 8 (65 years-death/Late Adulthood) Integrity vs. Despair       C-SSRS (Recent)    Q1 Wished to be Dead (Past Month) no    Q2 Suicidal Thoughts (Past Month) no    Q6 Suicide Behavior (Lifetime) no       Violence Risk    Feels Like Hurting Others no    Previous Attempt to Harm Others no                   Abuse/Neglect    No documentation.                  Legal    No documentation.                  Substance Abuse    No documentation.                  Patient Forms    No documentation.                     Enzo Beltran RN

## 2023-11-15 NOTE — THERAPY EVALUATION
PT eval held, Pt awaiting cardiology consult with possible heart cath today. Will f/u tomorrow and proceed as medically appropriate.

## 2023-11-15 NOTE — CONSULTS
"     [facility] Cardiology Consult Note    Kathryn Davison  1940  [id number]  11/15/23    Requesting Physician: Jose C Young MD    Chief Complaint: Abdominal pain and dizziness    History of Present Illness:   Mrs. Kathryn Davison is a 83 y.o. female who is being seen by Cardiology for evaluation of elevated troponin level.  The patient has a past medical history significant for type 2 diabetes mellitus, hyperlipidemia, hypertension, and carotid artery disease.  She does not have any significant past cardiac history.  She presented to the [facility] for evaluation of dizziness and abdominal pain.  The patient reports her abdominal pain and dizziness began approximately 2 weeks ago.  She describes her abdominal pain as a \"gas-like\" sensation.  She denies any associated chest pain or chest discomfort.  In addition to her abdominal pain, the patient has been experiencing frequent episodes of dizziness and lightheadedness, which have occurred primarily when going from a seated to standing position.  No syncopal episodes.  Given her ongoing abdominal pain and dizziness, she presented to the emergency department for evaluation.    Upon initial evaluation in the emergency department, the patient was hemodynamically stable.  An initial ECG revealed mild ST elevation in the inferior and anterolateral leads.  She did have some evidence of Q wave formation in lead III.  Initial labs were significant for a high-sensitivity troponin being elevated at 463.  On recheck, her troponin was downward trending at 726.  On the emergency department, the patient continued to deny chest pain, and reported her abdominal pain had resolved.  Given a suspected missed MI occurring approximately 2 weeks prior to presentation with a downward trending troponin, the patient was given Lovenox and admitted by the hospitalist service.  Overnight, the patient's troponin was trended, and has been " upward trending this morning going to 753, and subsequently 850.  Cardiology has been consulted for further recommendations.    Prior Cardiac Testin. Last Coronary Angio: None  2. Prior Stress Testing: None  3. Last Echo: 2018   1.  Normal left ventricular systolic function   2.  Mild left atrial enlargement   3.  Mild MR   4.  Mild TR  4. Prior Holter Monitor: None    Review of Systems:   Review of Systems   Constitutional:  Negative for activity change, appetite change, chills, diaphoresis, fatigue, fever, unexpected weight gain and unexpected weight loss.   Eyes:  Negative for blurred vision and double vision.   Respiratory:  Negative for cough, chest tightness, shortness of breath and wheezing.    Cardiovascular:  Negative for chest pain, palpitations and leg swelling.   Gastrointestinal:  Positive for abdominal pain. Negative for anal bleeding, blood in stool and GERD.   Endocrine: Negative for cold intolerance and heat intolerance.   Genitourinary:  Negative for hematuria.   Neurological:  Positive for dizziness and light-headedness. Negative for syncope and weakness.   Hematological:  Does not bruise/bleed easily.   Psychiatric/Behavioral:  Negative for depressed mood and stress. The patient is not nervous/anxious.        Past Medical History:   Past Medical History:   Diagnosis Date    Abnormal ECG     Anemia     Back pain     Bleeding nose 2020    Body piercing     BOTH EARS    Bronchitis     Bulging lumbar disc     Carotid artery stenosis     BILATERAL-FOLLOWING WITH DR. WINCHESTER.  SEES EVERY 6 MONTHS.  STATES ABOUT 70% OCCLUSIION    Cataract, bilateral     Colon polyp 12/10/2012    Diabetes     Diabetic retinopathy     Diabetic retinopathy     Disease of thyroid gland     hypothyroid    Diverticulosis     Endometrial cancer 2020    Endometrioid adenocarcinoma of uterus 2020    Fatty liver 2017    Fracture     RIGHT ANKLE AND LEFT ARM     Frequency of urination     GERD  "(gastroesophageal reflux disease)     High cholesterol     History of nuclear stress test 2016    \"IT WAS OK\"    Hyperparathyroidism     Hypertension     Lumbosacral disc disease     Macular degeneration     Osteoarthritis     Osteoporosis     Sciatica     Seasonal allergies     Sleep apnea     CPAP HS - TO BRING IN DOS    Swelling of both lower extremities     Teeth missing     back teeth    Thyroid activity decreased     Type 2 diabetes mellitus     Wears glasses     FOR DISTANCE/and reading glasses       Past Surgical History:   Past Surgical History:   Procedure Laterality Date    ANKLE ARTHROSCOPY Right     BREAST BIOPSY      BREAST SURGERY Left     CYST REMOVED    CARDIAC CATHETERIZATION      NO STENT    CATARACT EXTRACTION Bilateral     2014 and 2015    COLONOSCOPY  12/10/2012    COLONOSCOPY N/A 02/22/2017    Procedure: COLONOSCOPY with hot and cold snare polypectomies, resolution clip placement, cold biopsy polypectomy;  Surgeon: Mg Awan MD;  Location: [facility] ENDOSCOPY;  Service:     D & C HYSTEROSCOPY MYOSURE N/A 07/24/2020    Procedure: HYSTEROSCOPY, DILATION AND CURETTAGE , MYOSURE;  Surgeon: Fidel Elizalde MD;  Location: [facility] OR;  Service: Obstetrics/Gynecology;  Laterality: N/A;    DILATION AND CURETTAGE, DIAGNOSTIC / THERAPEUTIC      X4    ENDOSCOPY N/A 07/20/2017    Procedure: ESOPHAGOGASTRODUODENOSCOPY with biopsies and cold biopsy polypectomy;  Surgeon: Mg Awan MD;  Location: [facility] ENDOSCOPY;  Service:     FRACTURE SURGERY Right     ANKLE    ORIF WRIST FRACTURE Right 02/02/2018    Procedure: WRIST RIGHT OPEN REDUCTION INTERNAL FIXATION WITH VOLAR PLATE;  Surgeon: Phu Meneses MD;  Location: [facility] OR;  Service:     TOTAL LAPAROSCOPIC HYSTERECTOMY SALPINGO OOPHORECTOMY N/A 08/31/2020    Procedure: RADICAL TYPE 1 TOTAL LAPAROSCOPIC HYSTERECTOMY BILATERAL SALPINGOOPHORECTOMY WITH DAVINCI ROBOT, SENTINEL LYMPH NODE DISSECTION;  Surgeon: Wilma Jo MD;  Location: [facility]" [facility];  Service: [facility];  Laterality: N/A;    TOTAL SHOULDER ARTHROPLASTY W/ DISTAL CLAVICLE EXCISION Left 02/02/2018    Procedure: SHOULDER TOTAL LEFT  REVERSE ARTHROPLASTY WITH TUBEROSITY RECONSTRUCTION;  Surgeon: Phu Meneses MD;  Location: [facility] OR;  Service:     TUBAL ABDOMINAL LIGATION  1980?    UPPER GASTROINTESTINAL ENDOSCOPY  07/20/2017       Family History:   Family History   Problem Relation Age of Onset    Heart disease Other     Cancer Other     Prostate cancer Father     Stroke Father     Colon cancer Neg Hx        Social History:   Social History     Socioeconomic History    Marital status:    Tobacco Use    Smoking status: Never    Smokeless tobacco: Never   Vaping Use    Vaping Use: Never used   Substance and Sexual Activity    Alcohol use: No    Drug use: No    Sexual activity: Defer       Medications:     Current Facility-Administered Medications:     acetaminophen (TYLENOL) tablet 650 mg, 650 mg, Oral, Q4H PRN **OR** acetaminophen (TYLENOL) 160 MG/5ML oral solution 650 mg, 650 mg, Oral, Q4H PRN **OR** acetaminophen (TYLENOL) suppository 650 mg, 650 mg, Rectal, Q4H PRN, Kerley, Brian Joseph, DO    sennosides-docusate (PERICOLACE) 8.6-50 MG per tablet 2 tablet, 2 tablet, Oral, BID **AND** polyethylene glycol (MIRALAX) packet 17 g, 17 g, Oral, Daily PRN **AND** bisacodyl (DULCOLAX) EC tablet 5 mg, 5 mg, Oral, Daily PRN **AND** bisacodyl (DULCOLAX) suppository 10 mg, 10 mg, Rectal, Daily PRN, Kerley, Brian Joseph, DO    Calcium Replacement - Follow Nurse / BPA Driven Protocol, , Does not apply, PRN, Kerley, Brian Joseph, DO    dextrose (D50W) (25 g/50 mL) IV injection 25 g, 25 g, Intravenous, Q15 Min PRN, Kerley, Brian Joseph, DO    dextrose (GLUTOSE) oral gel 15 g, 15 g, Oral, Q15 Min PRN, Kerley, Brian Joseph, DO    Enoxaparin Sodium (LOVENOX) syringe 90 mg, 1 mg/kg, Subcutaneous, Q12H, Kerley, Brian Joseph, DO    glucagon (GLUCAGEN) injection 1 mg, 1 mg, Intramuscular, Q15 Min  PRN, Kerley, Brian Joseph,     Insulin Aspart (novoLOG) injection 2-9 Units, 2-9 Units, Subcutaneous, 4x Daily AC & at Bedtime, Kerley, Brian Joseph,     Magnesium Standard Dose Replacement - Follow Nurse / BPA Driven Protocol, , Does not apply, PRN, Kerley, Brian Joseph,     nitroglycerin (NITROSTAT) SL tablet 0.4 mg, 0.4 mg, Sublingual, Q5 Min PRN, Kerley, Brian Joseph,     ondansetron (ZOFRAN) injection 4 mg, 4 mg, Intravenous, Q6H PRN, Kerley, Brian Joseph,     Pharmacy to Dose enoxaparin (LOVENOX), , Does not apply, Continuous PRN, Kerley, Brian Joseph,     Phosphorus Replacement - Follow Nurse / BPA Driven Protocol, , Does not apply, PRN, Kerley, Brian Joseph,     Potassium Replacement - Follow Nurse / BPA Driven Protocol, , Does not apply, PRN, Kerley, Brian Joseph,     sodium chloride 0.9 % flush 10 mL, 10 mL, Intravenous, PRN, Kerley, Brian Joseph,     sodium chloride 0.9 % flush 10 mL, 10 mL, Intravenous, Q12H, Kerley, Brian Joseph, , 10 mL at 11/15/23 0815    sodium chloride 0.9 % flush 10 mL, 10 mL, Intravenous, PRN, Kerley, Brian Joseph,     sodium chloride 0.9 % infusion 40 mL, 40 mL, Intravenous, PRN, Kerley, Brian Joseph, DO    Allergies:   Allergies   Allergen Reactions    Gabapentin Dizziness    Betadine [Povidone Iodine] Rash       Physical Exam:  Vital Signs:   Vitals:    11/15/23 1114 11/15/23 1117 11/15/23 1119 11/15/23 1130   BP: 120/61  Comment: orthostatics- lying 119/64  Comment: ortho's Comment: ortho's 106/60  Comment: ortho's   BP Location:       Patient Position: Lying Sitting Standing Standing   Pulse: 73 78 80 79   Resp:       Temp:       TempSrc:       SpO2:   96% 95%   Weight:       Height:           Physical Exam  Vitals and nursing note reviewed.   Constitutional:       General: She is not in acute distress.     Appearance: She is well-developed. She is obese. She is not diaphoretic.   HENT:      Head: Normocephalic and atraumatic.   Eyes:      General: No  scleral icterus.     Pupils: Pupils are equal, round, and reactive to light.   Neck:      Trachea: No tracheal deviation.   Cardiovascular:      Rate and Rhythm: Normal rate and regular rhythm.      Heart sounds: Normal heart sounds. No murmur heard.     No friction rub. No gallop.      Comments: Normal JVD.  Pulmonary:      Effort: Pulmonary effort is normal. No respiratory distress.      Breath sounds: Normal breath sounds. No stridor. No wheezing or rales.   Chest:      Chest wall: No tenderness.   Abdominal:      General: Bowel sounds are normal. There is no distension.      Palpations: Abdomen is soft.      Tenderness: There is no abdominal tenderness. There is no guarding or rebound.   Musculoskeletal:         General: No swelling. Normal range of motion.      Cervical back: Neck supple. No tenderness.   Lymphadenopathy:      Cervical: No cervical adenopathy.   Skin:     General: Skin is warm and dry.      Findings: No erythema.   Neurological:      General: No focal deficit present.      Mental Status: She is alert and oriented to person, place, and time.   Psychiatric:         Mood and Affect: Mood normal.         Behavior: Behavior normal.         Results Review:   Results from last 7 days   Lab Units 11/15/23  0548 11/14/23 1920   SODIUM mmol/L 137 138   POTASSIUM mmol/L 3.7 4.4   CHLORIDE mmol/L 101 99   CO2 mmol/L 28.1 28.9   BUN mg/dL 16 18   CREATININE mg/dL 1.13* 1.17*   CALCIUM mg/dL 9.1 9.6   BILIRUBIN mg/dL  --  0.5   ALK PHOS U/L  --  139*   ALT (SGPT) U/L  --  20   AST (SGOT) U/L  --  46*   GLUCOSE mg/dL 161* 263*     Results from last 7 days   Lab Units 11/15/23  0548 11/14/23  2314 11/14/23  2117   HSTROP T ng/L 850* 753* 726*     Results from last 7 days   Lab Units 11/15/23  0548 11/14/23 1920   WBC 10*3/mm3 8.22 9.72   HEMOGLOBIN g/dL 12.1 13.2   HEMATOCRIT % 37.9 41.0   PLATELETS 10*3/mm3 260 280         Results from last 7 days   Lab Units 11/14/23 1920   MAGNESIUM mg/dL 2.0           I  personally viewed and interpreted the patient's EKG/Telemetry data     Assessment:  1.  Suspected recent STEMI  2.  Hypertension  3.  Hyperlipidemia  4.  Type 2 diabetes mellitus  5.  Dizziness  6.  Abdominal pain    Plan:  1.  Suspect recent STEMI  --Onset of abdominal pain approximately 2 weeks ago, potentially anginal equivalent  -- ECG on presentation shows evidence of mild ST elevation in the inferior and anterolateral leads with developing Q waves in lead III  -- Initial troponin elevated at 763, downward trending on recheck at 726 again suspicions for MI over the past 1-2 weeks  --Echocardiogram shows akinesis of the LV apex with LVEF approximately 40-45%.  Suspect LAD disease, cannot rule out stress-induced cardiomyopathy  -- Given troponin again upward trending this morning with fluctuating abdominal pain, discussed the risks and benefits of coronary angiography with online PCI as indicated.  After further discussion with her family, the patient has opted to proceed with coronary angiography to definitively evaluate her coronary anatomy  -- Hold additional Lovenox  -- Chronically on Plavix as outpatient, will continue  -- Add aspirin 81 mg daily  -- Continue statin      **The patient has agreed for reversal of her DNR status during coronary angiography**    Thank you for allowing me to participate in the care of your patient. Please do not hesitate to contact me with additional questions or concerns.     DOLLY Abdi MD  Interventional Cardiology   11/15/23  12:14 EST

## 2023-11-16 LAB
ANION GAP SERPL CALCULATED.3IONS-SCNC: 6.1 MMOL/L (ref 5–15)
APTT PPP: 68.4 SECONDS (ref 70–100)
BASOPHILS # BLD AUTO: 0.06 10*3/MM3 (ref 0–0.2)
BASOPHILS NFR BLD AUTO: 0.8 % (ref 0–1.5)
BUN SERPL-MCNC: 14 MG/DL (ref 8–23)
BUN/CREAT SERPL: 13.7 (ref 7–25)
CALCIUM SPEC-SCNC: 8.7 MG/DL (ref 8.6–10.5)
CHLORIDE SERPL-SCNC: 104 MMOL/L (ref 98–107)
CO2 SERPL-SCNC: 26.9 MMOL/L (ref 22–29)
CREAT SERPL-MCNC: 1.02 MG/DL (ref 0.57–1)
DEPRECATED RDW RBC AUTO: 41.8 FL (ref 37–54)
EGFRCR SERPLBLD CKD-EPI 2021: 54.7 ML/MIN/1.73
EOSINOPHIL # BLD AUTO: 0.13 10*3/MM3 (ref 0–0.4)
EOSINOPHIL NFR BLD AUTO: 1.7 % (ref 0.3–6.2)
ERYTHROCYTE [DISTWIDTH] IN BLOOD BY AUTOMATED COUNT: 13.2 % (ref 12.3–15.4)
GLUCOSE BLDC GLUCOMTR-MCNC: 230 MG/DL (ref 70–130)
GLUCOSE BLDC GLUCOMTR-MCNC: 241 MG/DL (ref 70–130)
GLUCOSE BLDC GLUCOMTR-MCNC: 258 MG/DL (ref 70–130)
GLUCOSE SERPL-MCNC: 117 MG/DL (ref 65–99)
HCT VFR BLD AUTO: 36.6 % (ref 34–46.6)
HGB BLD-MCNC: 11.7 G/DL (ref 12–15.9)
IMM GRANULOCYTES # BLD AUTO: 0.04 10*3/MM3 (ref 0–0.05)
IMM GRANULOCYTES NFR BLD AUTO: 0.5 % (ref 0–0.5)
LYMPHOCYTES # BLD AUTO: 1.97 10*3/MM3 (ref 0.7–3.1)
LYMPHOCYTES NFR BLD AUTO: 26.1 % (ref 19.6–45.3)
MCH RBC QN AUTO: 27.6 PG (ref 26.6–33)
MCHC RBC AUTO-ENTMCNC: 32 G/DL (ref 31.5–35.7)
MCV RBC AUTO: 86.3 FL (ref 79–97)
MONOCYTES # BLD AUTO: 0.73 10*3/MM3 (ref 0.1–0.9)
MONOCYTES NFR BLD AUTO: 9.7 % (ref 5–12)
NEUTROPHILS NFR BLD AUTO: 4.62 10*3/MM3 (ref 1.7–7)
NEUTROPHILS NFR BLD AUTO: 61.2 % (ref 42.7–76)
NRBC BLD AUTO-RTO: 0 /100 WBC (ref 0–0.2)
PLATELET # BLD AUTO: 261 10*3/MM3 (ref 140–450)
PMV BLD AUTO: 10.1 FL (ref 6–12)
POTASSIUM SERPL-SCNC: 3.8 MMOL/L (ref 3.5–5.2)
RBC # BLD AUTO: 4.24 10*6/MM3 (ref 3.77–5.28)
SODIUM SERPL-SCNC: 137 MMOL/L (ref 136–145)
UFH PPP CHRO-ACNC: 0.34 IU/ML (ref 0.3–0.7)
UFH PPP CHRO-ACNC: 0.41 IU/ML (ref 0.3–0.7)
UFH PPP CHRO-ACNC: 0.64 IU/ML (ref 0.3–0.7)
WBC NRBC COR # BLD: 7.55 10*3/MM3 (ref 3.4–10.8)

## 2023-11-16 PROCEDURE — 85730 THROMBOPLASTIN TIME PARTIAL: CPT | Performed by: INTERNAL MEDICINE

## 2023-11-16 PROCEDURE — 85520 HEPARIN ASSAY: CPT | Performed by: INTERNAL MEDICINE

## 2023-11-16 PROCEDURE — 82948 REAGENT STRIP/BLOOD GLUCOSE: CPT

## 2023-11-16 PROCEDURE — 63710000001 INSULIN ASPART PER 5 UNITS: Performed by: INTERNAL MEDICINE

## 2023-11-16 PROCEDURE — 85025 COMPLETE CBC W/AUTO DIFF WBC: CPT | Performed by: INTERNAL MEDICINE

## 2023-11-16 PROCEDURE — 80048 BASIC METABOLIC PNL TOTAL CA: CPT | Performed by: INTERNAL MEDICINE

## 2023-11-16 PROCEDURE — 25010000002 HEPARIN (PORCINE) PER 1000 UNITS: Performed by: NURSE PRACTITIONER

## 2023-11-16 PROCEDURE — 85520 HEPARIN ASSAY: CPT | Performed by: NURSE PRACTITIONER

## 2023-11-16 RX ORDER — HEPARIN SODIUM 10000 [USP'U]/100ML
8.5 INJECTION, SOLUTION INTRAVENOUS
Status: DISCONTINUED | OUTPATIENT
Start: 2023-11-16 | End: 2023-11-17

## 2023-11-16 RX ORDER — ASPIRIN 81 MG/1
81 TABLET, CHEWABLE ORAL DAILY
Status: DISCONTINUED | OUTPATIENT
Start: 2023-11-16 | End: 2023-11-19 | Stop reason: HOSPADM

## 2023-11-16 RX ORDER — MECLIZINE HYDROCHLORIDE 25 MG/1
25 TABLET ORAL 3 TIMES DAILY PRN
Status: DISCONTINUED | OUTPATIENT
Start: 2023-11-16 | End: 2023-11-19 | Stop reason: HOSPADM

## 2023-11-16 RX ORDER — ATORVASTATIN CALCIUM 40 MG/1
40 TABLET, FILM COATED ORAL NIGHTLY
Status: DISCONTINUED | OUTPATIENT
Start: 2023-11-16 | End: 2023-11-19 | Stop reason: HOSPADM

## 2023-11-16 RX ADMIN — INSULIN ASPART 4 UNITS: 100 INJECTION, SOLUTION INTRAVENOUS; SUBCUTANEOUS at 17:44

## 2023-11-16 RX ADMIN — INSULIN ASPART 4 UNITS: 100 INJECTION, SOLUTION INTRAVENOUS; SUBCUTANEOUS at 12:27

## 2023-11-16 RX ADMIN — ATORVASTATIN CALCIUM 40 MG: 40 TABLET, FILM COATED ORAL at 21:49

## 2023-11-16 RX ADMIN — Medication 10 ML: at 10:00

## 2023-11-16 RX ADMIN — HEPARIN SODIUM 10.5 UNITS/KG/HR: 10000 INJECTION, SOLUTION INTRAVENOUS at 01:04

## 2023-11-16 RX ADMIN — INSULIN ASPART 6 UNITS: 100 INJECTION, SOLUTION INTRAVENOUS; SUBCUTANEOUS at 21:50

## 2023-11-16 RX ADMIN — ASPIRIN 81 MG CHEWABLE TABLET 81 MG: 81 TABLET CHEWABLE at 13:35

## 2023-11-16 NOTE — ACP (ADVANCE CARE PLANNING)
I received an ACP consult for the patient stating that she did not have a Living Will.  During my conversation with the pt about ACP, she  stated that she already has a Living Will. When reviewing her ACP documents, her Living Will had been scanned into her  Medical Record, and designates her daughters as her surrogate decision makers.

## 2023-11-16 NOTE — THERAPY DISCHARGE NOTE
OT order to be DC'd as patient is awaiting transfer to  for cardiac intervention; patient to mobilize with nursing as appropriate.

## 2023-11-16 NOTE — CASE MANAGEMENT/SOCIAL WORK
Case Management/Social Work    Patient Name:  Kathryn Davison  YOB: 1940  MRN: [id number]  Admit Date:  11/14/2023      Pt is pending transfer to . Sw/CM continuing to follow.        Electronically signed by:  Yue Strong  11/16/23 15:27 EST

## 2023-11-16 NOTE — PHARMACY RECOMMENDATION
HEPARIN INFUSION  Kathryn Davison is a  83 y.o. female receiving heparin infusion.     Therapy for (VTE/Cardiac):   Cardiac  Patient Weight: 93.9 kg  Initial Bolus (Y/N):   No  Any Bolus (Y/N):   Yes        Signs or Symptoms of Bleeding:     Cardiac or Other (Not VTE)   Initial Bolus: 60 units/kg (Max 4,000 units)  Initial rate: 12 units/kg/hr (Max 1,000 units/hr)   Anti Xa Rebolus Infusion Hold time Change infusion Dose (Units/kg/hr) Next Anti Xa or aPTT Level Due   < 0.11 50 Units/kg  (4000 Units Max) None Increase by  3 Units/kg/hr 6 hours   0.11- 0.19 25 Units/kg  (2000 Units Max) None Increase by  2 Units/kg/hr 6 hours   0.2 - 0.29 0 None Increase by  1 Units/kg/hr 6 hours   0.3 - 0.5 0 None No Change 6 hours (after 2 consecutive levels in range check qAM)   0.51 - 0.6 0 None Decrease by  1 Units/kg/hr 6 hours   0.61 - 0.8 0 30 Minutes Decrease by  2 Units/kg/hr 6 hours   0.81 - 1 0 60 Minutes Decrease by  3 Units/kg/hr 6 hours   >1 0 Hold  After Anti Xa less than 0.5 decrease previous rate by  4 Units/kg/hr  Every 2 hours until Anti Xa  less than 0.5 then when infusion restarts in 6 hours       Recommend anti-Xa every 6 hours.     Results from last 7 days   Lab Units 11/16/23  0536 11/15/23  1731 11/15/23  0548 11/14/23  1920   INR   --  1.09  --   --    HEMOGLOBIN g/dL 11.7*  --  12.1 13.2   HEMATOCRIT % 36.6  --  37.9 41.0   PLATELETS 10*3/mm3 261  --  260 280          Date   Time   Anti-Xa Current Rate (Unit/kg/hr) Bolus   (Units) Rate Change   (Unit/kg/hr) New Rate (Unit/kg/hr) Next   Anti-Xa Comments  Pump Check Daily   11/15 1731 >1.1 NEW -- -- hold 2300 Awaiting 2-hrs post TR band removal and antiXa level <0.7 to begin infusion   11/15 2328 0.62 0 0 +10.5 10.5 0530    11/15 0900 0.64 10.5 0 -2  8.5 1600 Hold for 30 minutes                                                                                                                                                                                                            Pharmacy will continue to follow anti-Xa results and monitor for signs and symptoms of bleeding or thrombosis.    Kiera Modi, PharmD, PharmD  11/16/23 09:04 EST

## 2023-11-16 NOTE — THERAPY DISCHARGE NOTE
PT order to be discharged as patient is awaiting transfer to  for higher level of cardiac care. Patient to mobilize with nursing as appropriate.

## 2023-11-16 NOTE — NURSING NOTE
Heparin drip paused for 30 min per lab with a UXA OF 6.4. pAUSED AT 9:11, RESTARTED AT 9:42. Rate: 8.5

## 2023-11-16 NOTE — PROGRESS NOTES
[facility] Cardiology  Progress Note    Kathryn Davison  1940  [id number]  11/16/23    Subjective:   Mrs. Kathryn Daviosn is a 83 y.o. female seen in follow-up for coronary artery disease.  No acute overnight events.  This morning, the patient reports resolution of her abdominal pain.  No dizziness or lightheadedness this morning with sitting up, however she has not yet ambulated.  Continues to await bed availability at  for transfer.    Review of Systems:   Review of Systems   Constitutional:  Negative for activity change, appetite change, chills, diaphoresis, fatigue, fever, unexpected weight gain and unexpected weight loss.   Respiratory:  Negative for cough, chest tightness, shortness of breath and wheezing.    Cardiovascular:  Negative for chest pain, palpitations and leg swelling.   Gastrointestinal:  Negative for abdominal pain, anal bleeding, blood in stool and GERD.   Neurological:  Negative for dizziness, syncope, weakness and light-headedness.   Psychiatric/Behavioral:  Negative for depressed mood and stress. The patient is not nervous/anxious.        I have reviewed and/or updated the patient's past medical, past surgical, family history, social history, problem list and allergies as appropriate in the chart.     Physical Exam:  Vital Signs:   Vitals:    11/16/23 0350 11/16/23 0500 11/16/23 0700 11/16/23 1113   BP: 126/69  126/66    BP Location: Left arm  Right arm    Patient Position: Lying  Lying    Pulse:       Resp: 16 14 16   Temp: 97.5 °F (36.4 °C)  98.2 °F (36.8 °C) 98 °F (36.7 °C)   TempSrc: Oral  Oral Oral   SpO2:       Weight:  96.8 kg (213 lb 5.4 oz)     Height:           Physical Exam  Vitals and nursing note reviewed.   Constitutional:       Appearance: She is well-developed. She is not diaphoretic.      Comments: Elderly female in no acute distress   HENT:      Head: Normocephalic and atraumatic.   Eyes:      General: No scleral icterus.     Pupils:  Pupils are equal, round, and reactive to light.   Neck:      Trachea: No tracheal deviation.   Cardiovascular:      Rate and Rhythm: Normal rate and regular rhythm.      Heart sounds: Normal heart sounds. No murmur heard.     No friction rub. No gallop.      Comments: Normal JVD.  Pulmonary:      Effort: Pulmonary effort is normal. No respiratory distress.      Breath sounds: Normal breath sounds. No stridor. No wheezing or rales.   Chest:      Chest wall: No tenderness.   Abdominal:      General: Bowel sounds are normal. There is no distension.      Palpations: Abdomen is soft.      Tenderness: There is no abdominal tenderness. There is no guarding or rebound.   Musculoskeletal:         General: No swelling. Normal range of motion.      Cervical back: Neck supple. No tenderness.   Lymphadenopathy:      Cervical: No cervical adenopathy.   Skin:     General: Skin is warm and dry.      Findings: No erythema.   Neurological:      General: No focal deficit present.      Mental Status: She is alert and oriented to person, place, and time.   Psychiatric:         Mood and Affect: Mood normal.         Behavior: Behavior normal.         Results Review:   Results from last 7 days   Lab Units 11/16/23  0536 11/15/23  0548 11/14/23  1920   SODIUM mmol/L 137   < > 138   POTASSIUM mmol/L 3.8   < > 4.4   CHLORIDE mmol/L 104   < > 99   CO2 mmol/L 26.9   < > 28.9   BUN mg/dL 14   < > 18   CREATININE mg/dL 1.02*   < > 1.17*   CALCIUM mg/dL 8.7   < > 9.6   BILIRUBIN mg/dL  --   --  0.5   ALK PHOS U/L  --   --  139*   ALT (SGPT) U/L  --   --  20   AST (SGOT) U/L  --   --  46*   GLUCOSE mg/dL 117*   < > 263*    < > = values in this interval not displayed.     Results from last 7 days   Lab Units 11/15/23  0548 11/14/23  2314 11/14/23 2117   HSTROP T ng/L 850* 753* 726*     Results from last 7 days   Lab Units 11/16/23  0536 11/15/23  0548 11/14/23  1920   WBC 10*3/mm3 7.55 8.22 9.72   HEMOGLOBIN g/dL 11.7* 12.1 13.2   HEMATOCRIT % 36.6  37.9 41.0   PLATELETS 10*3/mm3 261 260 280     Results from last 7 days   Lab Units 11/16/23  0536 11/15/23  1731   INR   --  1.09   APTT seconds 68.4* 56.9*     Results from last 7 days   Lab Units 11/14/23  1920   MAGNESIUM mg/dL 2.0           I personally viewed and interpreted the patient's EKG/Telemetry data     Medications:   )  Current Facility-Administered Medications:     acetaminophen (TYLENOL) tablet 650 mg, 650 mg, Oral, Q4H PRN **OR** acetaminophen (TYLENOL) 160 MG/5ML oral solution 650 mg, 650 mg, Oral, Q4H PRN **OR** acetaminophen (TYLENOL) suppository 650 mg, 650 mg, Rectal, Q4H PRN, Favio Abdi MD    sennosides-docusate (PERICOLACE) 8.6-50 MG per tablet 2 tablet, 2 tablet, Oral, BID, 2 tablet at 11/15/23 2115 **AND** polyethylene glycol (MIRALAX) packet 17 g, 17 g, Oral, Daily PRN **AND** bisacodyl (DULCOLAX) EC tablet 5 mg, 5 mg, Oral, Daily PRN **AND** bisacodyl (DULCOLAX) suppository 10 mg, 10 mg, Rectal, Daily PRN, Favio Abdi MD    Calcium Replacement - Follow Nurse / BPA Driven Protocol, , Does not apply, PRN, Favio Abdi MD    dextrose (D50W) (25 g/50 mL) IV injection 25 g, 25 g, Intravenous, Q15 Min PRN, Favio Abdi MD    dextrose (GLUTOSE) oral gel 15 g, 15 g, Oral, Q15 Min PRN, Favio Abdi MD    glucagon (GLUCAGEN) injection 1 mg, 1 mg, Intramuscular, Q15 Min PRN, Favio Abdi MD    heparin 71871 units/250 ml (100 units/ml) in D5W, 8.5 Units/kg/hr, Intravenous, Titrated, Sigrid May APRN, Last Rate: 7.98 mL/hr at 11/16/23 1034, 8.5 Units/kg/hr at 11/16/23 1034    Insulin Aspart (novoLOG) injection 2-9 Units, 2-9 Units, Subcutaneous, 4x Daily AC & at Bedtime, Favio Abdi MD, 4 Units at 11/16/23 1227    Magnesium Standard Dose Replacement - Follow Nurse / BPA Driven Protocol, , Does not apply, PRN, Favio Abdi MD    meclizine (ANTIVERT) tablet 25 mg, 25 mg, Oral, TID PRN, Sigrid May APRN    nitroglycerin (NITROSTAT) SL  tablet 0.4 mg, 0.4 mg, Sublingual, Q5 Min PRN, Favio Abdi MD    ondansetron (ZOFRAN) injection 4 mg, 4 mg, Intravenous, Q6H PRN, Favio Abdi MD    Pharmacy to Dose Heparin, , Does not apply, Continuous PRN, Sigrid May APRN    Phosphorus Replacement - Follow Nurse / BPA Driven Protocol, , Does not apply, PRN, Favio Abdi MD    Potassium Replacement - Follow Nurse / BPA Driven Protocol, , Does not apply, PRN, Favio Abdi MD    sodium chloride 0.9 % flush 10 mL, 10 mL, Intravenous, PRN, Favio Abdi MD    sodium chloride 0.9 % flush 10 mL, 10 mL, Intravenous, Q12H, Favio Abdi MD, 10 mL at 11/16/23 1000    sodium chloride 0.9 % flush 10 mL, 10 mL, Intravenous, PRNJin Bryon Scott, MD    sodium chloride 0.9 % infusion 40 mL, 40 mL, Intravenous, PRN, Favio Abdi MD    sodium chloride 0.9 % infusion, 100 mL/hr, Intravenous, Continuous, Favio Abdi MD, Last Rate: 100 mL/hr at 11/16/23 0107, 100 mL/hr at 11/16/23 0107    Assessment:  1.  Suspected recent STEMI  2.  Hypertension  3.  Hyperlipidemia  4.  Type 2 diabetes mellitus  5.  Dizziness  6.  Abdominal pain     Plan:  1.  Multivessel coronary artery disease  -- 2-week history of abdominal pain, potentially an anginal equivalent  -- Coronary angiogram yesterday shows severe multivessel disease, MARGARITA II flow in the LAD  --Echocardiogram shows LVEF 40-45% with dyskinesis of the LV apex  -- No current anginal symptoms, resolution of abdominal pain  -- Continue heparin drip  -- Restart statin  -- Start aspirin 81 mg daily  -- Pending transfer to [facility] for heart team discussion regarding surgical revascularization versus multivessel PCI likely requiring atherectomy given heavily calcific disease  -- Chronically on Plavix which is currently being held, will continue to hold Plavix until evaluated at [facility]        Thank you for allowing me to participate in the care of your patient. Please  to not hesitate to contact me with additional questions or concerns.     DOLLY Abdi MD  Interventional Cardiology   11/16/23  12:32 EST

## 2023-11-16 NOTE — PROGRESS NOTES
[facility]    Name:  Kathryn Davison   Age:  83 y.o.  Sex:  female  :  1940  MRN:  [id number]   Visit Number:  [id number]  Admission Date:  2023  Date Of Service:  23  Primary Care Physician:  Sunitha Malave MD     LOS: 1 day :    Chief Complaint:      Dizziness    Subjective:    Patient seen and examined at bedside this morning.  There is no family present at time of exam.  She is eating breakfast sitting on the side of the bed.  States she has not had any dizziness since admission.  Discussed plan to transfer to  pending bed availability and she is agreeable.  She has no complaints at time of exam.  Denies chest pain.  Currently on heparin drip.    Hospital Course:    Kathryn Davison is an 83-year-old woman with past medical history of type 2 diabetes, coronary artery disease, history of endometrial cancer, fatty liver, hypothyroidism, dyslipidemia, on Ozempic, TERESA, carotid artery stenosis, osteoporosis, diabetic retinopathy.  Presented to [facility] ED on 2023 with concern for generalized weakness, dizziness and epigastric discomfort with intermittent diaphoresis which started about 2 weeks prior to presentation.  Denied any fevers or chills, chest pain, shortness of air, vomiting or diarrhea.  Says she was given a scopolamine patch by her PCP, but symptoms did not improve.     ED Summary: Afebrile, vital signs stable on room air.  EKG showed ST elevations in inferior leads, no dynamic changes.  Troponin 763, trended down 726.  Creatinine 1.17, blood glucose 263.  CBC unremarkable.  CXR unremarkable.  ED spoke with cardiology-Dr. Abdi, suspect patient had a cardiac event about a week ago her symptoms started, recommended therapeutic Lovenox which she was provided.    Admitted to the hospital and taken for coronary angiography with Dr. Abdi 2023 which revealed severe multivessel coronary artery disease and severe calcific  stenosis to multiple vessels.  Dr. Abdi recommended transfer to  and patient was accepted by Dr. Mullen pending bed availability.    Review of Systems:     All systems were reviewed and negative except as mentioned in subjective, assessment and plan.    Vital Signs:    Temp:  [97.4 °F (36.3 °C)-98.2 °F (36.8 °C)] 98.2 °F (36.8 °C)  Heart Rate:  [70-80] 72  Resp:  [14-16] 14  BP: (106-135)/(57-86) 126/66    Intake and output:    I/O last 3 completed shifts:  In: -   Out: 1450 [Urine:1450]  I/O this shift:  In: 240 [P.O.:240]  Out: -     Physical Examination:  Examined again 11/16/2023    General Appearance:  Alert and cooperative, pleasant elderly female, no acute distress on exam   Head:  Atraumatic and normocephalic.   Eyes: Conjunctivae and sclerae normal, no icterus. No pallor.   Throat: No oral lesions, no thrush, oral mucosa moist.   Neck: Supple, trachea midline   Lungs:   Breath sounds heard bilaterally equally.  No wheezing or crackles. Unlabored on room air   Heart:  Normal S1 and S2, no murmur, no gallop, no rub. No JVD.   Abdomen:   Normal bowel sounds, no masses, no organomegaly. Soft, nontender, nondistended, no rebound tenderness.   Extremities: Supple, no edema, no cyanosis, no clubbing.   Skin: No bleeding or rash.   Neurologic: Alert and oriented x 3. No facial asymmetry. Moves all four limbs. No tremors. Generalized weakness is present.     Laboratory results:    Results from last 7 days   Lab Units 11/16/23  0536 11/15/23  0548 11/14/23  1920   SODIUM mmol/L 137 137 138   POTASSIUM mmol/L 3.8 3.7 4.4   CHLORIDE mmol/L 104 101 99   CO2 mmol/L 26.9 28.1 28.9   BUN mg/dL 14 16 18   CREATININE mg/dL 1.02* 1.13* 1.17*   CALCIUM mg/dL 8.7 9.1 9.6   BILIRUBIN mg/dL  --   --  0.5   ALK PHOS U/L  --   --  139*   ALT (SGPT) U/L  --   --  20   AST (SGOT) U/L  --   --  46*   GLUCOSE mg/dL 117* 161* 263*     Results from last 7 days   Lab Units 11/16/23  0536 11/15/23  0548 11/14/23  1920   WBC 10*3/mm3 7.55  "8.22 9.72   HEMOGLOBIN g/dL 11.7* 12.1 13.2   HEMATOCRIT % 36.6 37.9 41.0   PLATELETS 10*3/mm3 261 260 280     Results from last 7 days   Lab Units 11/15/23  1731   INR  1.09     Results from last 7 days   Lab Units 11/15/23  0548 11/14/23  2314 11/14/23  2117   HSTROP T ng/L 850* 753* 726*         No results for input(s): \"PHART\", \"AJT0KKI\", \"PO2ART\", \"OWP3SNY\", \"BASEEXCESS\" in the last 8760 hours.   I have reviewed the patient's laboratory results.    Radiology results:    Cardiac Catheterization/Vascular Study    Result Date: 11/15/2023    LV pressures (S/D/E) : 76/3/6 mmHg 1.  Severe multivessel coronary artery disease including:      -Severe calcific stenosis in the mid LAD with MARGARITA II flow distally      -Severe calcific stenosis in the mid LCx, with severe stenosis in the proximal portion of the OM1      -Severe calcific stenosis in the mid RCA 2.  LVEDP 6 mmHg     Adult Transthoracic Echo Complete W/ Cont if Necessary Per Protocol    Result Date: 11/15/2023  1.  Normal left ventricular size, mildly reduced LV systolic function, LVEF 40-45%. 2.  Aneurysmal LV apex. 3.  Grade 1 diastolic dysfunction. 4.  Normal right ventricular size and systolic function. 5.  Normal left atrial volume index. 6.  Mild calcification of the aortic valve without significant stenosis.     XR Chest 1 View    Result Date: 11/14/2023  PROCEDURE: XR CHEST 1 VW-  HISTORY: Weak/Dizzy/AMS triage protocol, hypertension  COMPARISON: 11/18/2021  FINDINGS:  Portable view of the chest demonstrates the lungs to be grossly clear. There is no evidence of effusion, pneumothorax or other significant pleural disease. The mediastinum is unremarkable.  The heart size is normal.      Impression: Unremarkable portable chest.    This report was signed and finalized on 11/14/2023 9:25 PM by Jan Hinojosa MD.     I have reviewed the patient's radiology reports.    Medication Review:     I have reviewed the patient's active and prn medications.     Problem " List:      NSTEMI (non-ST elevated myocardial infarction)    Acute myocardial infarction, subendocardial infarction, initial episode of care      Assessment:    NSTEMI  Dizziness  Type 2 Diabetes Mellitus  CAD  Hypothyroidism  Carotid artery stenosis  TERESA    Plan:    NSTEMI/ Dizziness  -Troponins elevated and trended up  -Dr. Abdi (Cardiology) consulted on admission  -Taken for coronary angiography 11/16/2023 which revealed severe multivessel coronary artery disease and severe calcific stenosis to multiple vessels.  Dr. Abdi recommended transfer to  and patient was accepted by Dr. Mullen pending bed availability.  -Started on heparin drip  -More complaints of persistent dizziness over past 2 weeks.    -Orthostatics stable  -Echo noted mildly reduced LV systolic function and EF-40-45% with grade 1 diastolic dysfunction  -PT/OT evaluations ordered  -Will add Meclizine PRN although patient denies current dizziness    Chronic  -Sliding scale coverage for now  -A1c-8  -On Plavix for carotid stenosis    DVT Prophylaxis: Therapeutic Lovenox  Code Status: DNR/ DNI  Diet: Cardiac  Discharge Plan: Pending transfer to     Sigrid May, APRN  11/16/23  11:06 EST    Dictated utilizing Dragon dictation.

## 2023-11-17 LAB
ANION GAP SERPL CALCULATED.3IONS-SCNC: 10.6 MMOL/L (ref 5–15)
BASOPHILS # BLD AUTO: 0.05 10*3/MM3 (ref 0–0.2)
BASOPHILS NFR BLD AUTO: 0.7 % (ref 0–1.5)
BUN SERPL-MCNC: 15 MG/DL (ref 8–23)
BUN/CREAT SERPL: 13.4 (ref 7–25)
CALCIUM SPEC-SCNC: 8.9 MG/DL (ref 8.6–10.5)
CHLORIDE SERPL-SCNC: 104 MMOL/L (ref 98–107)
CO2 SERPL-SCNC: 24.4 MMOL/L (ref 22–29)
CREAT SERPL-MCNC: 1.12 MG/DL (ref 0.57–1)
DEPRECATED RDW RBC AUTO: 41.9 FL (ref 37–54)
EGFRCR SERPLBLD CKD-EPI 2021: 48.9 ML/MIN/1.73
EOSINOPHIL # BLD AUTO: 0.19 10*3/MM3 (ref 0–0.4)
EOSINOPHIL NFR BLD AUTO: 2.6 % (ref 0.3–6.2)
ERYTHROCYTE [DISTWIDTH] IN BLOOD BY AUTOMATED COUNT: 13.4 % (ref 12.3–15.4)
GLUCOSE BLDC GLUCOMTR-MCNC: 166 MG/DL (ref 70–130)
GLUCOSE BLDC GLUCOMTR-MCNC: 238 MG/DL (ref 70–130)
GLUCOSE BLDC GLUCOMTR-MCNC: 253 MG/DL (ref 70–130)
GLUCOSE BLDC GLUCOMTR-MCNC: 268 MG/DL (ref 70–130)
GLUCOSE SERPL-MCNC: 166 MG/DL (ref 65–99)
HCT VFR BLD AUTO: 36.3 % (ref 34–46.6)
HGB BLD-MCNC: 11.8 G/DL (ref 12–15.9)
IMM GRANULOCYTES # BLD AUTO: 0.03 10*3/MM3 (ref 0–0.05)
IMM GRANULOCYTES NFR BLD AUTO: 0.4 % (ref 0–0.5)
LYMPHOCYTES # BLD AUTO: 1.83 10*3/MM3 (ref 0.7–3.1)
LYMPHOCYTES NFR BLD AUTO: 24.9 % (ref 19.6–45.3)
MCH RBC QN AUTO: 28 PG (ref 26.6–33)
MCHC RBC AUTO-ENTMCNC: 32.5 G/DL (ref 31.5–35.7)
MCV RBC AUTO: 86 FL (ref 79–97)
MONOCYTES # BLD AUTO: 0.58 10*3/MM3 (ref 0.1–0.9)
MONOCYTES NFR BLD AUTO: 7.9 % (ref 5–12)
NEUTROPHILS NFR BLD AUTO: 4.66 10*3/MM3 (ref 1.7–7)
NEUTROPHILS NFR BLD AUTO: 63.5 % (ref 42.7–76)
NRBC BLD AUTO-RTO: 0 /100 WBC (ref 0–0.2)
PLATELET # BLD AUTO: 269 10*3/MM3 (ref 140–450)
PMV BLD AUTO: 10.2 FL (ref 6–12)
POTASSIUM SERPL-SCNC: 3.8 MMOL/L (ref 3.5–5.2)
RBC # BLD AUTO: 4.22 10*6/MM3 (ref 3.77–5.28)
SODIUM SERPL-SCNC: 139 MMOL/L (ref 136–145)
UFH PPP CHRO-ACNC: 0.29 IU/ML (ref 0.3–0.7)
UFH PPP CHRO-ACNC: 0.34 IU/ML (ref 0.3–0.7)
WBC NRBC COR # BLD AUTO: 7.34 10*3/MM3 (ref 3.4–10.8)

## 2023-11-17 PROCEDURE — 63710000001 INSULIN ASPART PER 5 UNITS: Performed by: INTERNAL MEDICINE

## 2023-11-17 PROCEDURE — 85520 HEPARIN ASSAY: CPT | Performed by: NURSE PRACTITIONER

## 2023-11-17 PROCEDURE — 85520 HEPARIN ASSAY: CPT | Performed by: INTERNAL MEDICINE

## 2023-11-17 PROCEDURE — 85025 COMPLETE CBC W/AUTO DIFF WBC: CPT | Performed by: INTERNAL MEDICINE

## 2023-11-17 PROCEDURE — 82948 REAGENT STRIP/BLOOD GLUCOSE: CPT

## 2023-11-17 PROCEDURE — 80048 BASIC METABOLIC PNL TOTAL CA: CPT | Performed by: INTERNAL MEDICINE

## 2023-11-17 RX ORDER — HEPARIN SODIUM 10000 [USP'U]/100ML
11 INJECTION, SOLUTION INTRAVENOUS
Status: DISCONTINUED | OUTPATIENT
Start: 2023-11-17 | End: 2023-11-19 | Stop reason: HOSPADM

## 2023-11-17 RX ADMIN — ASPIRIN 81 MG CHEWABLE TABLET 81 MG: 81 TABLET CHEWABLE at 08:55

## 2023-11-17 RX ADMIN — INSULIN ASPART 4 UNITS: 100 INJECTION, SOLUTION INTRAVENOUS; SUBCUTANEOUS at 12:00

## 2023-11-17 RX ADMIN — INSULIN ASPART 6 UNITS: 100 INJECTION, SOLUTION INTRAVENOUS; SUBCUTANEOUS at 18:53

## 2023-11-17 RX ADMIN — INSULIN ASPART 6 UNITS: 100 INJECTION, SOLUTION INTRAVENOUS; SUBCUTANEOUS at 21:02

## 2023-11-17 RX ADMIN — ATORVASTATIN CALCIUM 40 MG: 40 TABLET, FILM COATED ORAL at 21:03

## 2023-11-17 RX ADMIN — INSULIN ASPART 2 UNITS: 100 INJECTION, SOLUTION INTRAVENOUS; SUBCUTANEOUS at 08:30

## 2023-11-17 NOTE — PHARMACY RECOMMENDATION
HEPARIN INFUSION  Kathryn Davison is a  83 y.o. female receiving heparin infusion.     Therapy for (VTE/Cardiac):   Cardiac  Patient Weight: 93.9 kg  Initial Bolus (Y/N):   No  Any Bolus (Y/N):   Yes        Signs or Symptoms of Bleeding: Yes; heparin drip to start 2 hours after TR band removal.     Cardiac or Other (Not VTE)   Initial Bolus: 60 units/kg (Max 4,000 units)  Initial rate: 12 units/kg/hr (Max 1,000 units/hr)   Anti Xa Rebolus Infusion Hold time Change infusion Dose (Units/kg/hr) Next Anti Xa or aPTT Level Due   < 0.11 50 Units/kg  (4000 Units Max) None Increase by  3 Units/kg/hr 6 hours   0.11- 0.19 25 Units/kg  (2000 Units Max) None Increase by  2 Units/kg/hr 6 hours   0.2 - 0.29 0 None Increase by  1 Units/kg/hr 6 hours   0.3 - 0.5 0 None No Change 6 hours (after 2 consecutive levels in range check qAM)   0.51 - 0.6 0 None Decrease by  1 Units/kg/hr 6 hours   0.61 - 0.8 0 30 Minutes Decrease by  2 Units/kg/hr 6 hours   0.81 - 1 0 60 Minutes Decrease by  3 Units/kg/hr 6 hours   >1 0 Hold  After Anti Xa less than 0.5 decrease previous rate by  4 Units/kg/hr  Every 2 hours until Anti Xa  less than 0.5 then when infusion restarts in 6 hours       Recommend anti-Xa every 6 hours.         Lab 11/17/23  1652 11/17/23  0619 11/16/23  2233 11/16/23  1609 11/16/23  0536 11/15/23  2328 11/15/23  1731 11/15/23  0548 11/14/23  1920   HEMOGLOBIN  --  11.8*  --   --  11.7*  --   --  12.1 13.2   HEMATOCRIT  --  36.3  --   --  36.6  --   --  37.9 41.0   PLATELETS  --  269  --   --  261  --   --  260 280   PROTIME  --   --   --   --   --   --  14.6  --   --    APTT  --   --   --   --  68.4*  --  56.9*  --   --    HEPARIN ANTI-XA 0.29* 0.34 0.34 0.41 0.64   < > >1.10*  --   --    CREATININE  --  1.12*  --   --  1.02*  --   --  1.13* 1.17*   EGFR  --  48.9*  --   --  54.7*  --   --  48.4* 46.4*    < > = values in this interval not displayed.           Date   Time   Anti-Xa Current Rate (Unit/kg/hr) Bolus   (Units)  Rate Change   (Unit/kg/hr) New Rate (Unit/kg/hr) Next   Anti-Xa Comments  Pump Check Daily   11/15 1731 >1.1 NEW -- -- hold 2030 Awaiting 2-hrs post TR band removal and antiXa level <0.7 to begin infusion   11/15 2328 0.62 0 0 +10.5 10.5 0530 D/w MATILDE Helton   11/15 0900 0.64 10.5 0 -2  8.5 1600 Hold for 30 minutes; D/W MATILDE Matta   11/15 1642 0.41 8.5 0 0 8.5 2200 D/W MATILDE Matta   11/16 2233 0.34 8.5 0 0 8.5 0600 D/W MATILDE Cochran   11/17 0619 0.34 8.5 0 0 8.5 11/18 0600 D/W MATILDE Cochran   11/17 1652 0.29 9.05* 0 +1 10 11/18 0000 d/w Sigrid Ga RN  * = pump was running at 9.05 units/kg/hr                                                                                                                                                               Pharmacy will continue to follow anti-Xa results and monitor for signs and symptoms of bleeding or thrombosis.      Thank you for the opportunity to consult on this patient.    Guy Hilliard RPH, Pharm.D.  11/17/23  17:51 EST

## 2023-11-17 NOTE — PAYOR COMM NOTE
"To:  [place]   From: Anastasiya Gaviria RN  Phone: [phone]  Fax: [phone]  NPI: [id number]  TIN: [id number]  Member ID: [id number]  MRN: [id number]    Kathryn Davison (83 y.o. Female)       Date of Birth   1940    Social Security Number       Address   [address] [place] [address]    Home Phone   [phone]    MRN   [id number]       Mu-ism   Non-Lutheran    Marital Status                               Admission Date   11/14/23    Admission Type   Emergency    Admitting Provider   Kerley, Brian Joseph, DO    Attending Provider   Douglas Dick MD    Department, Room/Bed   [facility] TELEMETRY 3, 328/1       Discharge Date       Discharge Disposition       Discharge Destination                                 Attending Provider: Douglas Dick MD    Allergies: Gabapentin, Betadine [Povidone Iodine]    Isolation: None   Infection: None   Code Status: No CPR    Ht: 157.5 cm (62\")   Wt: 97.1 kg (214 lb 1.1 oz)    Admission Cmt: None   Principal Problem: NSTEMI (non-ST elevated myocardial infarction) [I21.4]                   Active Insurance as of 11/14/2023       Primary Coverage       Payor Plan Insurance Group Employer/Plan Group    MEDICARE MEDICARE A & B        Payor Plan Address Payor Plan Phone Number Payor Plan Fax Number Effective Dates    [address] [phone]  2/1/2005 - None Entered    [facility] [id number]         Subscriber Name Subscriber Birth Date Member ID       DAVISON,KATHRYN ALBARRAN 1940 [id number]               Secondary Coverage       Payor Plan Insurance Group Employer/Plan Group    [facility] SUPP [id number]       Payor Plan Address Payor Plan Phone Number Payor Plan Fax Number Effective Dates    [address]   2/1/2005 - None Entered    [facility] [id number]         Subscriber Name Subscriber Birth Date Member ID       DAVISON,KATHRYN ALBARRAN 1940 [id number]               Tertiary Coverage       Payor Plan Insurance " Group Employer/Plan Group    [place]        Payor Plan Address Payor Plan Phone Number Payor Plan Fax Number Effective Dates    [address] [phone]  7/19/2017 - None Entered    [facility] [id number]         Subscriber Name Subscriber Birth Date Member ID       JUAN MCCLURE 1940 [id number]                     Emergency Contacts        (Rel.) Home Phone Work Phone Mobile Phone    Ayala Crenshaw (Daughter) [phone] -- --    SaidaAdwoa (Daughter) -- -- [phone]              Vital Signs (last day)       Date/Time Temp Temp src Pulse Resp BP Patient Position SpO2    11/17/23 0723 97.8 (36.6) Oral -- 18 117/53 Lying --    11/17/23 0340 97.6 (36.4) Oral 75 18 136/69 Lying 94    11/16/23 2324 98.6 (37) Oral 81 18 123/61 Lying 97    11/16/23 1934 98 (36.7) Oral 77 18 124/62 Lying 95    11/16/23 1541 97.8 (36.6) Oral -- 18 130/67 Lying --    11/16/23 1113 98 (36.7) Oral -- 16 -- Lying --    11/16/23 0700 98.2 (36.8) Oral -- 14 126/66 Lying --    11/16/23 0350 97.5 (36.4) Oral -- 16 126/69 Lying --          Current Facility-Administered Medications   Medication Dose Route Frequency Provider Last Rate Last Admin    acetaminophen (TYLENOL) tablet 650 mg  650 mg Oral Q4H PRN Favio Abdi MD        Or    acetaminophen (TYLENOL) 160 MG/5ML oral solution 650 mg  650 mg Oral Q4H PRN Favio Abdi MD        Or    acetaminophen (TYLENOL) suppository 650 mg  650 mg Rectal Q4H PRN Favio Abdi MD        aspirin chewable tablet 81 mg  81 mg Oral Daily Favio Abdi MD   81 mg at 11/17/23 0855    atorvastatin (LIPITOR) tablet 40 mg  40 mg Oral Nightly Favio Abdi MD   40 mg at 11/16/23 2149    sennosides-docusate (PERICOLACE) 8.6-50 MG per tablet 2 tablet  2 tablet Oral BID Favio Abdi MD   2 tablet at 11/15/23 2115    And    polyethylene glycol (MIRALAX) packet 17 g  17 g Oral Daily PRN Favio Abdi MD        And    bisacodyl (DULCOLAX)  EC tablet 5 mg  5 mg Oral Daily PRN Favio Abdi MD        And    bisacodyl (DULCOLAX) suppository 10 mg  10 mg Rectal Daily PRN Favio Abdi MD        Calcium Replacement - Follow Nurse / BPA Driven Protocol   Does not apply PRN Favio Adbi MD        dextrose (D50W) (25 g/50 mL) IV injection 25 g  25 g Intravenous Q15 Min PRN Favio Abdi MD        dextrose (GLUTOSE) oral gel 15 g  15 g Oral Q15 Min PRN Favio Abdi MD        glucagon (GLUCAGEN) injection 1 mg  1 mg Intramuscular Q15 Min PRN Favio Abdi MD        heparin 44754 units/250 ml (100 units/ml) in D5W  8.5 Units/kg/hr Intravenous Titrated Sigrid May APRN 7.98 mL/hr at 11/16/23 1034 8.5 Units/kg/hr at 11/16/23 1034    Insulin Aspart (novoLOG) injection 2-9 Units  2-9 Units Subcutaneous 4x Daily AC & at Bedtime Favio Abdi MD   2 Units at 11/17/23 0830    Magnesium Standard Dose Replacement - Follow Nurse / BPA Driven Protocol   Does not apply PRN Favio Abdi MD        meclizine (ANTIVERT) tablet 25 mg  25 mg Oral TID PRN Sigrid May APRN        nitroglycerin (NITROSTAT) SL tablet 0.4 mg  0.4 mg Sublingual Q5 Min PRN Favio Abdi MD        ondansetron (ZOFRAN) injection 4 mg  4 mg Intravenous Q6H PRN Favio Adbi MD        Pharmacy to Dose Heparin   Does not apply Continuous PRN Sigrid May APRN        Phosphorus Replacement - Follow Nurse / BPA Driven Protocol   Does not apply PRN Favio Abdi MD        Potassium Replacement - Follow Nurse / BPA Driven Protocol   Does not apply PRN Favio Abdi MD        sodium chloride 0.9 % flush 10 mL  10 mL Intravenous PRN Favio Abdi MD        sodium chloride 0.9 % flush 10 mL  10 mL Intravenous Q12H Favio Abdi MD   10 mL at 11/16/23 1000    sodium chloride 0.9 % flush 10 mL  10 mL Intravenous PRN Favio Abdi MD        sodium chloride 0.9 % infusion 40 mL  40 mL Intravenous PRN Favio Abdi MD         sodium chloride 0.9 % infusion  100 mL/hr Intravenous Continuous Favio Abdi  mL/hr at 11/16/23 0107 100 mL/hr at 11/16/23 0107     Lab Results (last 24 hours)       Procedure Component Value Units Date/Time    POC Glucose Once [[id number]]  (Abnormal) Collected: 11/17/23 0736    Specimen: Blood Updated: 11/17/23 0742     Glucose 166 mg/dL      Comment: Serial Number: [id number]:  [id number]       Basic Metabolic Panel [[id number]]  (Abnormal) Collected: 11/17/23 0619    Specimen: Blood Updated: 11/17/23 0658     Glucose 166 mg/dL      BUN 15 mg/dL      Creatinine 1.12 mg/dL      Sodium 139 mmol/L      Potassium 3.8 mmol/L      Chloride 104 mmol/L      CO2 24.4 mmol/L      Calcium 8.9 mg/dL      BUN/Creatinine Ratio 13.4     Anion Gap 10.6 mmol/L      eGFR 48.9 mL/min/1.73     Narrative:      GFR Normal >60  Chronic Kidney Disease <60  Kidney Failure <15    The GFR formula is only valid for adults with stable renal function between ages 18 and 70.    Heparin Anti-Xa [[id number]]  (Normal) Collected: 11/17/23 0619    Specimen: Blood Updated: 11/17/23 0648     Heparin Anti-Xa (UFH) 0.34 IU/ml     CBC Auto Differential [[id number]]  (Abnormal) Collected: 11/17/23 0619    Specimen: Blood Updated: 11/17/23 0635     WBC 7.34 10*3/mm3      RBC 4.22 10*6/mm3      Hemoglobin 11.8 g/dL      Hematocrit 36.3 %      MCV 86.0 fL      MCH 28.0 pg      MCHC 32.5 g/dL      RDW 13.4 %      RDW-SD 41.9 fl      MPV 10.2 fL      Platelets 269 10*3/mm3      Neutrophil % 63.5 %      Lymphocyte % 24.9 %      Monocyte % 7.9 %      Eosinophil % 2.6 %      Basophil % 0.7 %      Immature Grans % 0.4 %      Neutrophils, Absolute 4.66 10*3/mm3      Lymphocytes, Absolute 1.83 10*3/mm3      Monocytes, Absolute 0.58 10*3/mm3      Eosinophils, Absolute 0.19 10*3/mm3      Basophils, Absolute 0.05 10*3/mm3      Immature Grans, Absolute 0.03 10*3/mm3      nRBC 0.0 /100 WBC     Heparin Anti-Xa [[id number]]  (Normal) Collected: 11/16/23  2233    Specimen: Blood Updated: 11/16/23 2315     Heparin Anti-Xa (UFH) 0.34 IU/ml     POC Glucose Once [[id number]]  (Abnormal) Collected: 11/16/23 2020    Specimen: Blood Updated: 11/16/23 2023     Glucose 258 mg/dL      Comment: Serial Number: [id number]:  [id number]       Heparin Anti-Xa [[id number]]  (Normal) Collected: 11/16/23 1609    Specimen: Blood Updated: 11/16/23 1640     Heparin Anti-Xa (UFH) 0.41 IU/ml     POC Glucose Once [[id number]]  (Abnormal) Collected: 11/16/23 1637    Specimen: Blood Updated: 11/16/23 1639     Glucose 241 mg/dL      Comment: Serial Number: [id number]:  [id number]       POC Glucose Once [[id number]]  (Abnormal) Collected: 11/16/23 1149    Specimen: Blood Updated: 11/16/23 1153     Glucose 230 mg/dL      Comment: Serial Number: [id number]:  [id number]             Imaging Results (Last 24 Hours)       ** No results found for the last 24 hours. **          Orders (last 24 hrs)        Start     Ordered    11/18/23 0600  Heparin Anti-Xa  Morning Draw         11/17/23 0714    11/17/23 0743  POC Glucose Once  PROCEDURE ONCE        Comments: Complete no more than 45 minutes prior to patient eating      11/17/23 0736    11/17/23 0600  Heparin Anti-Xa  Once         11/17/23 0038    11/16/23 2200  Heparin Anti-Xa  Timed         11/16/23 1644    11/16/23 2100  atorvastatin (LIPITOR) tablet 40 mg  Nightly         11/16/23 1258    11/16/23 2024  POC Glucose Once  PROCEDURE ONCE        Comments: Complete no more than 45 minutes prior to patient eating      11/16/23 2020    11/16/23 1640  POC Glucose Once  PROCEDURE ONCE        Comments: Complete no more than 45 minutes prior to patient eating      11/16/23 1637    11/16/23 1600  Heparin Anti-Xa  Timed         11/16/23 0918    11/16/23 1345  aspirin chewable tablet 81 mg  Daily         11/16/23 1258    11/16/23 1154  POC Glucose Once  PROCEDURE ONCE        Comments: Complete no more than 45 minutes prior to patient eating      11/16/23  1149    11/16/23 1114  meclizine (ANTIVERT) tablet 25 mg  3 Times Daily PRN         11/16/23 1114    11/16/23 1000  heparin 04121 units/250 ml (100 units/ml) in D5W  Titrated         11/16/23 0907    11/16/23 0000  sodium chloride 0.9 % infusion  Continuous         11/15/23 1651    11/15/23 2000  Strict Intake & Output  Every 4 Hours       11/15/23 1651    11/15/23 2000  Pharmacy to Dose Heparin  Continuous PRN         11/15/23 1709    11/15/23 1800  Incentive Spirometry  Every 2 Hours While Awake       11/15/23 1651    11/15/23 0800  Oral Care  2 Times Daily       11/14/23 2354    11/15/23 0730  Insulin Aspart (novoLOG) injection 2-9 Units  4 Times Daily Before Meals & Nightly         11/14/23 2342    11/15/23 0700  POC Glucose 4x Daily Before Meals & at Bedtime  4 Times Daily Before Meals & at Bedtime      Comments: Complete no more than 45 minutes prior to patient eating      11/14/23 2342    11/15/23 0600  Basic Metabolic Panel  Daily       11/14/23 2354    11/15/23 0600  CBC Auto Differential  Daily       11/14/23 2354    11/15/23 0045  sodium chloride 0.9 % flush 10 mL  Every 12 Hours Scheduled         11/14/23 2354    11/15/23 0045  sennosides-docusate (PERICOLACE) 8.6-50 MG per tablet 2 tablet  2 Times Daily        See Hyperspace for full Linked Orders Report.    11/14/23 2354    11/15/23 0000  Vital Signs  Every 4 Hours       11/14/23 2354 11/14/23 2355  Intake & Output  Every Shift       11/14/23 2354 11/14/23 2354  sodium chloride 0.9 % flush 10 mL  As Needed         11/14/23 2354 11/14/23 2354  sodium chloride 0.9 % infusion 40 mL  As Needed         11/14/23 2354 11/14/23 2354  acetaminophen (TYLENOL) tablet 650 mg  Every 4 Hours PRN        See Hyperspace for full Linked Orders Report.    11/14/23 2354 11/14/23 2354  acetaminophen (TYLENOL) 160 MG/5ML oral solution 650 mg  Every 4 Hours PRN        See Hyperspace for full Linked Orders Report.    11/14/23 4153    11/14/23 7149   acetaminophen (TYLENOL) suppository 650 mg  Every 4 Hours PRN        See Hyperspace for full Linked Orders Report.    11/14/23 2354 11/14/23 2354  polyethylene glycol (MIRALAX) packet 17 g  Daily PRN        See Hyperspace for full Linked Orders Report.    11/14/23 2354 11/14/23 2354  bisacodyl (DULCOLAX) EC tablet 5 mg  Daily PRN        See Hyperspace for full Linked Orders Report.    11/14/23 2354 11/14/23 2354  bisacodyl (DULCOLAX) suppository 10 mg  Daily PRN        See Hyperspace for full Linked Orders Report.    11/14/23 2354 11/14/23 2354  ondansetron (ZOFRAN) injection 4 mg  Every 6 Hours PRN         11/14/23 2354 11/14/23 2354  nitroglycerin (NITROSTAT) SL tablet 0.4 mg  Every 5 Minutes PRN         11/14/23 2354    11/14/23 2354  Potassium Replacement - Follow Nurse / BPA Driven Protocol  As Needed         11/14/23 2354 11/14/23 2354  Magnesium Standard Dose Replacement - Follow Nurse / BPA Driven Protocol  As Needed         11/14/23 2354 11/14/23 2354  Phosphorus Replacement - Follow Nurse / BPA Driven Protocol  As Needed         11/14/23 2354 11/14/23 2354  Calcium Replacement - Follow Nurse / BPA Driven Protocol  As Needed         11/14/23 2354    11/14/23 2342  dextrose (GLUTOSE) oral gel 15 g  Every 15 Minutes PRN         11/14/23 2342    11/14/23 2342  dextrose (D50W) (25 g/50 mL) IV injection 25 g  Every 15 Minutes PRN         11/14/23 2342    11/14/23 2342  glucagon (GLUCAGEN) injection 1 mg  Every 15 Minutes PRN         11/14/23 2342    11/14/23 1903  sodium chloride 0.9 % flush 10 mL  As Needed         11/14/23 1903    Unscheduled  Oxygen Therapy- Nasal Cannula; Titrate 1-6 LPM Per SpO2; 90 - 95%  Continuous PRN       11/14/23 1903    Unscheduled  Up With Assistance  As Needed       11/14/23 2354    Unscheduled  Follow Hypoglycemia Standing Orders For Blood Glucose <70 & Notify Provider of Treatment  As Needed      Comments: Follow Hypoglycemia Orders As Outlined in Process  Instructions (Open Order Report to View Full Instructions)  Notify Provider Any Time Hypoglycemia Treatment is Administered    11/14/23 2342    Unscheduled  Change Site Dressing  As Needed       11/15/23 1651    Unscheduled  Oxygen Therapy- Nasal Cannula; Titrate 1-6 LPM Per SpO2; 90 - 95%  Continuous PRN       11/15/23 1651    --  SCANNED - TELEMETRY           11/14/23 0000    --  SCANNED - TELEMETRY           11/14/23 0000    Signed and Held  Obtain Informed Consent  Once,   Status:  Canceled         Signed and Held    Signed and Held  Verify On Day of Procedure: No Heparin or Lovenox Administration 24 Hours Before Cardiac Procedure  Once,   Status:  Canceled        Comments: Verify On Day of Procedure: No Heparin or Lovenox Administration 24 Hours Before Cardiac Procedure    Signed and Held    Signed and Held  nitroglycerin (NITROSTAT) SL tablet 0.4 mg  Every 5 Minutes PRN,   Status:  Canceled         Signed and Held    Signed and Held  Notify MD if platelet count is less than 100,000, is less than 1/2 baseline, or if Hgb drops by more than 3mg/dl.  Until Discontinued,   Status:  Canceled         Signed and Held    Signed and Held  Notify MD of hypotension (SBP less than 95), bleeding, or dysrythmia and follow Sheath Removal Policy if needed.  Continuous,   Status:  Canceled         Signed and Held    Signed and Held  Cardiac Rehab Evaluation and Enrollment  Once,   Status:  Canceled        Provider:  (Not yet assigned)    Signed and Held    Signed and Held  Hold metFORMIN (GLUCOPHAGE) for 48 Hours  Continuous,   Status:  Canceled       Signed and Held    Signed and Held  acetaminophen (TYLENOL) tablet 650 mg  Every 4 Hours PRN,   Status:  Canceled         Signed and Held    --  SCANNED - TELEMETRY           11/14/23 0000    --  SCANNED - TELEMETRY           11/14/23 0000    --  SCANNED - TELEMETRY           11/14/23 0000    --  SCANNED - TELEMETRY           11/14/23 0000                     Physician Progress  Notes (last 24 hours)        Favio Abdi MD at 11/16/23 1232               [facility] Cardiology IP Progress Note    Kathryn Davison  1940  [id number]  11/16/23    Subjective:   Mrs. Kathryn Davison is a 83 y.o. female seen in follow-up for coronary artery disease.  No acute overnight events.  This morning, the patient reports resolution of her abdominal pain.  No dizziness or lightheadedness this morning with sitting up, however she has not yet ambulated.  Continues to await bed availability at  for transfer.    Review of Systems:   Review of Systems   Constitutional:  Negative for activity change, appetite change, chills, diaphoresis, fatigue, fever, unexpected weight gain and unexpected weight loss.   Respiratory:  Negative for cough, chest tightness, shortness of breath and wheezing.    Cardiovascular:  Negative for chest pain, palpitations and leg swelling.   Gastrointestinal:  Negative for abdominal pain, anal bleeding, blood in stool and GERD.   Neurological:  Negative for dizziness, syncope, weakness and light-headedness.   Psychiatric/Behavioral:  Negative for depressed mood and stress. The patient is not nervous/anxious.        I have reviewed and/or updated the patient's past medical, past surgical, family history, social history, problem list and allergies as appropriate in the chart.     Physical Exam:  Vital Signs:   Vitals:    11/16/23 0350 11/16/23 0500 11/16/23 0700 11/16/23 1113   BP: 126/69  126/66    BP Location: Left arm  Right arm    Patient Position: Lying  Lying    Pulse:       Resp: 16  14 16   Temp: 97.5 °F (36.4 °C)  98.2 °F (36.8 °C) 98 °F (36.7 °C)   TempSrc: Oral  Oral Oral   SpO2:       Weight:  96.8 kg (213 lb 5.4 oz)     Height:           Physical Exam  Vitals and nursing note reviewed.   Constitutional:       Appearance: She is well-developed. She is not diaphoretic.      Comments: Elderly female in no acute distress   HENT:      Head:  Normocephalic and atraumatic.   Eyes:      General: No scleral icterus.     Pupils: Pupils are equal, round, and reactive to light.   Neck:      Trachea: No tracheal deviation.   Cardiovascular:      Rate and Rhythm: Normal rate and regular rhythm.      Heart sounds: Normal heart sounds. No murmur heard.     No friction rub. No gallop.      Comments: Normal JVD.  Pulmonary:      Effort: Pulmonary effort is normal. No respiratory distress.      Breath sounds: Normal breath sounds. No stridor. No wheezing or rales.   Chest:      Chest wall: No tenderness.   Abdominal:      General: Bowel sounds are normal. There is no distension.      Palpations: Abdomen is soft.      Tenderness: There is no abdominal tenderness. There is no guarding or rebound.   Musculoskeletal:         General: No swelling. Normal range of motion.      Cervical back: Neck supple. No tenderness.   Lymphadenopathy:      Cervical: No cervical adenopathy.   Skin:     General: Skin is warm and dry.      Findings: No erythema.   Neurological:      General: No focal deficit present.      Mental Status: She is alert and oriented to person, place, and time.   Psychiatric:         Mood and Affect: Mood normal.         Behavior: Behavior normal.         Results Review:   Results from last 7 days   Lab Units 11/16/23  0536 11/15/23  0548 11/14/23  1920   SODIUM mmol/L 137   < > 138   POTASSIUM mmol/L 3.8   < > 4.4   CHLORIDE mmol/L 104   < > 99   CO2 mmol/L 26.9   < > 28.9   BUN mg/dL 14   < > 18   CREATININE mg/dL 1.02*   < > 1.17*   CALCIUM mg/dL 8.7   < > 9.6   BILIRUBIN mg/dL  --   --  0.5   ALK PHOS U/L  --   --  139*   ALT (SGPT) U/L  --   --  20   AST (SGOT) U/L  --   --  46*   GLUCOSE mg/dL 117*   < > 263*    < > = values in this interval not displayed.     Results from last 7 days   Lab Units 11/15/23  0548 11/14/23  2314 11/14/23  2117   HSTROP T ng/L 850* 753* 726*     Results from last 7 days   Lab Units 11/16/23  0536 11/15/23  0548 11/14/23  1920    WBC 10*3/mm3 7.55 8.22 9.72   HEMOGLOBIN g/dL 11.7* 12.1 13.2   HEMATOCRIT % 36.6 37.9 41.0   PLATELETS 10*3/mm3 261 260 280     Results from last 7 days   Lab Units 11/16/23  0536 11/15/23  1731   INR   --  1.09   APTT seconds 68.4* 56.9*     Results from last 7 days   Lab Units 11/14/23  1920   MAGNESIUM mg/dL 2.0           I personally viewed and interpreted the patient's EKG/Telemetry data     Medications:   )  Current Facility-Administered Medications:     acetaminophen (TYLENOL) tablet 650 mg, 650 mg, Oral, Q4H PRN **OR** acetaminophen (TYLENOL) 160 MG/5ML oral solution 650 mg, 650 mg, Oral, Q4H PRN **OR** acetaminophen (TYLENOL) suppository 650 mg, 650 mg, Rectal, Q4H PRN, Favio Abdi MD    sennosides-docusate (PERICOLACE) 8.6-50 MG per tablet 2 tablet, 2 tablet, Oral, BID, 2 tablet at 11/15/23 2115 **AND** polyethylene glycol (MIRALAX) packet 17 g, 17 g, Oral, Daily PRN **AND** bisacodyl (DULCOLAX) EC tablet 5 mg, 5 mg, Oral, Daily PRN **AND** bisacodyl (DULCOLAX) suppository 10 mg, 10 mg, Rectal, Daily PRN, Favio Abdi MD    Calcium Replacement - Follow Nurse / BPA Driven Protocol, , Does not apply, PRN, Favio Abdi MD    dextrose (D50W) (25 g/50 mL) IV injection 25 g, 25 g, Intravenous, Q15 Min PRN, Favio Abdi MD    dextrose (GLUTOSE) oral gel 15 g, 15 g, Oral, Q15 Min PRN, Favio Abdi MD    glucagon (GLUCAGEN) injection 1 mg, 1 mg, Intramuscular, Q15 Min PRN, Favio Abdi MD    heparin 89172 units/250 ml (100 units/ml) in D5W, 8.5 Units/kg/hr, Intravenous, Titrated, Yadira, Sigrid B, APRN, Last Rate: 7.98 mL/hr at 11/16/23 1034, 8.5 Units/kg/hr at 11/16/23 1034    Insulin Aspart (novoLOG) injection 2-9 Units, 2-9 Units, Subcutaneous, 4x Daily AC & at Bedtime, Favio Abdi MD, 4 Units at 11/16/23 1227    Magnesium Standard Dose Replacement - Follow Nurse / BPA Driven Protocol, , Does not apply, PRN, Favio Abdi MD    meclizine (ANTIVERT) tablet 25  mg, 25 mg, Oral, TID PRN, Sigrid May APRN    nitroglycerin (NITROSTAT) SL tablet 0.4 mg, 0.4 mg, Sublingual, Q5 Min PRN, Favio Abdi MD    ondansetron (ZOFRAN) injection 4 mg, 4 mg, Intravenous, Q6H PRN, Favio Abdi MD    Pharmacy to Dose Heparin, , Does not apply, Continuous PRN, Sigrid May APRN    Phosphorus Replacement - Follow Nurse / BPA Driven Protocol, , Does not apply, PRN, Favio Abdi MD    Potassium Replacement - Follow Nurse / BPA Driven Protocol, , Does not apply, PRNJin Bryon Scott, MD    sodium chloride 0.9 % flush 10 mL, 10 mL, Intravenous, PRNJin Bryon Scott, MD    sodium chloride 0.9 % flush 10 mL, 10 mL, Intravenous, Q12H, Favio Abdi MD, 10 mL at 11/16/23 1000    sodium chloride 0.9 % flush 10 mL, 10 mL, Intravenous, PRN, Favio Abdi MD    sodium chloride 0.9 % infusion 40 mL, 40 mL, Intravenous, PRN, Favio Abdi MD    sodium chloride 0.9 % infusion, 100 mL/hr, Intravenous, Continuous, Favio Abdi MD, Last Rate: 100 mL/hr at 11/16/23 0107, 100 mL/hr at 11/16/23 0107    Assessment:  1.  Suspected recent STEMI  2.  Hypertension  3.  Hyperlipidemia  4.  Type 2 diabetes mellitus  5.  Dizziness  6.  Abdominal pain     Plan:  1.  Multivessel coronary artery disease  -- 2-week history of abdominal pain, potentially an anginal equivalent  -- Coronary angiogram yesterday shows severe multivessel disease, MARGARITA II flow in the LAD  --Echocardiogram shows LVEF 40-45% with dyskinesis of the LV apex  -- No current anginal symptoms, resolution of abdominal pain  -- Continue heparin drip  -- Restart statin  -- Start aspirin 81 mg daily  -- Pending transfer to [facility] for heart team discussion regarding surgical revascularization versus multivessel PCI likely requiring atherectomy given heavily calcific disease  -- Chronically on Plavix which is currently being held, will continue to hold Plavix until evaluated at [facility]  [place]        Thank you for allowing me to participate in the care of your patient. Please to not hesitate to contact me with additional questions or concerns.     DOLLY Abdi MD  Interventional Cardiology   23  12:32 EST       Electronically signed by Favio Abdi MD at 23 1257       Sigrid May APRN at 23 1106              [facility]    PROGRESS NOTE    Name:  Kathryn Davison   Age:  83 y.o.  Sex:  female  :  1940  MRN:  [id number]   Visit Number:  [id number]  Admission Date:  2023  Date Of Service:  23  Primary Care Physician:  Sunitha Malave MD     LOS: 1 day :    Chief Complaint:      Dizziness    Subjective:    Patient seen and examined at bedside this morning.  There is no family present at time of exam.  She is eating breakfast sitting on the side of the bed.  States she has not had any dizziness since admission.  Discussed plan to transfer to  pending bed availability and she is agreeable.  She has no complaints at time of exam.  Denies chest pain.  Currently on heparin drip.    Hospital Course:    Kathryn Davison is an 83-year-old woman with past medical history of type 2 diabetes, coronary artery disease, history of endometrial cancer, fatty liver, hypothyroidism, dyslipidemia, on Ozempic, TERESA, carotid artery stenosis, osteoporosis, diabetic retinopathy.  Presented to [facility] ED on 2023 with concern for generalized weakness, dizziness and epigastric discomfort with intermittent diaphoresis which started about 2 weeks prior to presentation.  Denied any fevers or chills, chest pain, shortness of air, vomiting or diarrhea.  Says she was given a scopolamine patch by her PCP, but symptoms did not improve.     ED Summary: Afebrile, vital signs stable on room air.  EKG showed ST elevations in inferior leads, no dynamic changes.  Troponin 763, trended down 726.  Creatinine 1.17, blood glucose 263.  CBC unremarkable.   CXR unremarkable.  ED spoke with cardiology-Dr. Abdi, suspect patient had a cardiac event about a week ago her symptoms started, recommended therapeutic Lovenox which she was provided.    Admitted to the hospital and taken for coronary angiography with Dr. Abdi 11/16/2023 which revealed severe multivessel coronary artery disease and severe calcific stenosis to multiple vessels.  Dr. Abdi recommended transfer to  and patient was accepted by Dr. Mullen pending bed availability.    Review of Systems:     All systems were reviewed and negative except as mentioned in subjective, assessment and plan.    Vital Signs:    Temp:  [97.4 °F (36.3 °C)-98.2 °F (36.8 °C)] 98.2 °F (36.8 °C)  Heart Rate:  [70-80] 72  Resp:  [14-16] 14  BP: (106-135)/(57-86) 126/66    Intake and output:    I/O last 3 completed shifts:  In: -   Out: 1450 [Urine:1450]  I/O this shift:  In: 240 [P.O.:240]  Out: -     Physical Examination:  Examined again 11/16/2023    General Appearance:  Alert and cooperative, pleasant elderly female, no acute distress on exam   Head:  Atraumatic and normocephalic.   Eyes: Conjunctivae and sclerae normal, no icterus. No pallor.   Throat: No oral lesions, no thrush, oral mucosa moist.   Neck: Supple, trachea midline   Lungs:   Breath sounds heard bilaterally equally.  No wheezing or crackles. Unlabored on room air   Heart:  Normal S1 and S2, no murmur, no gallop, no rub. No JVD.   Abdomen:   Normal bowel sounds, no masses, no organomegaly. Soft, nontender, nondistended, no rebound tenderness.   Extremities: Supple, no edema, no cyanosis, no clubbing.   Skin: No bleeding or rash.   Neurologic: Alert and oriented x 3. No facial asymmetry. Moves all four limbs. No tremors. Generalized weakness is present.     Laboratory results:    Results from last 7 days   Lab Units 11/16/23  0536 11/15/23  0548 11/14/23  1920   SODIUM mmol/L 137 137 138   POTASSIUM mmol/L 3.8 3.7 4.4   CHLORIDE mmol/L 104 101 99   CO2 mmol/L 26.9 28.1  "28.9   BUN mg/dL 14 16 18   CREATININE mg/dL 1.02* 1.13* 1.17*   CALCIUM mg/dL 8.7 9.1 9.6   BILIRUBIN mg/dL  --   --  0.5   ALK PHOS U/L  --   --  139*   ALT (SGPT) U/L  --   --  20   AST (SGOT) U/L  --   --  46*   GLUCOSE mg/dL 117* 161* 263*     Results from last 7 days   Lab Units 11/16/23  0536 11/15/23  0548 11/14/23  1920   WBC 10*3/mm3 7.55 8.22 9.72   HEMOGLOBIN g/dL 11.7* 12.1 13.2   HEMATOCRIT % 36.6 37.9 41.0   PLATELETS 10*3/mm3 261 260 280     Results from last 7 days   Lab Units 11/15/23  1731   INR  1.09     Results from last 7 days   Lab Units 11/15/23  0548 11/14/23  2314 11/14/23  2117   HSTROP T ng/L 850* 753* 726*         No results for input(s): \"PHART\", \"RJO7SQU\", \"PO2ART\", \"VJA0TJI\", \"BASEEXCESS\" in the last 8760 hours.   I have reviewed the patient's laboratory results.    Radiology results:    Cardiac Catheterization/Vascular Study    Result Date: 11/15/2023    LV pressures (S/D/E) : 76/3/6 mmHg 1.  Severe multivessel coronary artery disease including:      -Severe calcific stenosis in the mid LAD with MARGARITA II flow distally      -Severe calcific stenosis in the mid LCx, with severe stenosis in the proximal portion of the OM1      -Severe calcific stenosis in the mid RCA 2.  LVEDP 6 mmHg     Adult Transthoracic Echo Complete W/ Cont if Necessary Per Protocol    Result Date: 11/15/2023  1.  Normal left ventricular size, mildly reduced LV systolic function, LVEF 40-45%. 2.  Aneurysmal LV apex. 3.  Grade 1 diastolic dysfunction. 4.  Normal right ventricular size and systolic function. 5.  Normal left atrial volume index. 6.  Mild calcification of the aortic valve without significant stenosis.     XR Chest 1 View    Result Date: 11/14/2023  PROCEDURE: XR CHEST 1 VW-  HISTORY: Weak/Dizzy/AMS triage protocol, hypertension  COMPARISON: 11/18/2021  FINDINGS:  Portable view of the chest demonstrates the lungs to be grossly clear. There is no evidence of effusion, pneumothorax or other significant " pleural disease. The mediastinum is unremarkable.  The heart size is normal.      Impression: Unremarkable portable chest.    This report was signed and finalized on 11/14/2023 9:25 PM by Jan Hinojosa MD.     I have reviewed the patient's radiology reports.    Medication Review:     I have reviewed the patient's active and prn medications.     Problem List:      NSTEMI (non-ST elevated myocardial infarction)    Acute myocardial infarction, subendocardial infarction, initial episode of care      Assessment:    NSTEMI  Dizziness  Type 2 Diabetes Mellitus  CAD  Hypothyroidism  Carotid artery stenosis  TERESA    Plan:    NSTEMI/ Dizziness  -Troponins elevated and trended up  -Dr. Abdi (Cardiology) consulted on admission  -Taken for coronary angiography 11/16/2023 which revealed severe multivessel coronary artery disease and severe calcific stenosis to multiple vessels.  Dr. Abdi recommended transfer to  and patient was accepted by Dr. Mullen pending bed availability.  -Started on heparin drip  -More complaints of persistent dizziness over past 2 weeks.    -Orthostatics stable  -Echo noted mildly reduced LV systolic function and EF-40-45% with grade 1 diastolic dysfunction  -PT/OT evaluations ordered  -Will add Meclizine PRN although patient denies current dizziness    Chronic  -Sliding scale coverage for now  -A1c-8  -On Plavix for carotid stenosis    DVT Prophylaxis: Therapeutic Lovenox  Code Status: DNR/ DNI  Diet: Cardiac  Discharge Plan: Pending transfer to     NANNETTE Liang  11/16/23  11:06 EST    Dictated utilizing Dragon dictation.      Electronically signed by Sigrid May APRN at 11/16/23 1114       Consult Notes (last 24 hours)  Notes from 11/16/23 0959 through 11/17/23 0959   No notes of this type exist for this encounter.

## 2023-11-17 NOTE — PLAN OF CARE
Goal Outcome Evaluation:  Plan of Care Reviewed With: patient        Progress: no change  Outcome Evaluation: VSS,Ox4. Pt denied SOb and Chest pain or any other pain. Patient is still awaiting transfer to  for further cardiac interventions.  called to check up on pt today, but stated they didn't have a bed for her yet. Her daughter have been at bedside all today. Plan of care on going. Pt expresses no further needs or concerns at this time, call light lin vasquez.

## 2023-11-17 NOTE — PROGRESS NOTES
[facility]    PROGRESS NOTE    Name:  Kathryn Davison   Age:  83 y.o.  Sex:  female  :  1940  MRN:  [id number]   Visit Number:  [id number]  Admission Date:  2023  Date Of Service:  23  Primary Care Physician:  Sunitha Malave MD     LOS: 2 days :    Chief Complaint:      Dizziness    Subjective:    Patient seen and examined at bedside this morning.  There is no family present at time of exam.  She is eating breakfast sitting on the side of the bed again.  She states she is feeling frustrated waiting to be transferred to .  Discussed that Dr. Abdi would be by today and they could discuss if another hospital was an option.  Discussed I would let her know if they had a bed and discharge as soon as possible should that happen.  Denies dizziness or chest pain.  Vitals are stable.    Hospital Course:    Kathryn Davison is an 83-year-old woman with past medical history of type 2 diabetes, coronary artery disease, history of endometrial cancer, fatty liver, hypothyroidism, dyslipidemia, on Ozempic, TERESA, carotid artery stenosis, osteoporosis, diabetic retinopathy.  Presented to [facility] ED on 2023 with concern for generalized weakness, dizziness and epigastric discomfort with intermittent diaphoresis which started about 2 weeks prior to presentation.  Denied any fevers or chills, chest pain, shortness of air, vomiting or diarrhea.  Says she was given a scopolamine patch by her PCP, but symptoms did not improve.     ED Summary: Afebrile, vital signs stable on room air.  EKG showed ST elevations in inferior leads, no dynamic changes.  Troponin 763, trended down 726.  Creatinine 1.17, blood glucose 263.  CBC unremarkable.  CXR unremarkable.  ED spoke with cardiology-Dr. Abdi, suspect patient had a cardiac event about a week ago her symptoms started, recommended therapeutic Lovenox which she was provided.    Admitted to the hospital and taken for coronary  angiography with Dr. Abdi 11/16/2023 which revealed severe multivessel coronary artery disease and severe calcific stenosis to multiple vessels.  Dr. Abdi recommended transfer to  and patient was accepted by Dr. Mullen pending bed availability.    Review of Systems:     All systems were reviewed and negative except as mentioned in subjective, assessment and plan.    Vital Signs:    Temp:  [97.6 °F (36.4 °C)-98.6 °F (37 °C)] 97.8 °F (36.6 °C)  Heart Rate:  [75-81] 75  Resp:  [16-18] 18  BP: (117-136)/(53-69) 117/53    Intake and output:    I/O last 3 completed shifts:  In: 360 [P.O.:360]  Out: 1950 [Urine:1950]  I/O this shift:  In: -   Out: 200 [Urine:200]    Physical Examination:  Examined again 11/17/2023    General Appearance:  Alert and cooperative, pleasant elderly female, no acute distress on exam   Head:  Atraumatic and normocephalic.   Eyes: Conjunctivae and sclerae normal, no icterus. No pallor.   Throat: No oral lesions, no thrush, oral mucosa moist.   Neck: Supple, trachea midline   Lungs:   Breath sounds heard bilaterally equally.  No wheezing or crackles. Unlabored on room air   Heart:  Normal S1 and S2, no murmur, no gallop, no rub. No JVD.   Abdomen:   Normal bowel sounds, no masses, no organomegaly. Soft, nontender, nondistended, no rebound tenderness.   Extremities: Supple, no edema, no cyanosis, no clubbing.   Skin: No bleeding or rash.   Neurologic: Alert and oriented x 3. No facial asymmetry. Moves all four limbs. No tremors. Generalized weakness is present.     Laboratory results:    Results from last 7 days   Lab Units 11/17/23  0619 11/16/23  0536 11/15/23  0548 11/14/23  1920   SODIUM mmol/L 139 137 137 138   POTASSIUM mmol/L 3.8 3.8 3.7 4.4   CHLORIDE mmol/L 104 104 101 99   CO2 mmol/L 24.4 26.9 28.1 28.9   BUN mg/dL 15 14 16 18   CREATININE mg/dL 1.12* 1.02* 1.13* 1.17*   CALCIUM mg/dL 8.9 8.7 9.1 9.6   BILIRUBIN mg/dL  --   --   --  0.5   ALK PHOS U/L  --   --   --  139*   ALT (SGPT) U/L  " --   --   --  20   AST (SGOT) U/L  --   --   --  46*   GLUCOSE mg/dL 166* 117* 161* 263*     Results from last 7 days   Lab Units 11/17/23  0619 11/16/23  0536 11/15/23  0548   WBC 10*3/mm3 7.34 7.55 8.22   HEMOGLOBIN g/dL 11.8* 11.7* 12.1   HEMATOCRIT % 36.3 36.6 37.9   PLATELETS 10*3/mm3 269 261 260     Results from last 7 days   Lab Units 11/15/23  1731   INR  1.09     Results from last 7 days   Lab Units 11/15/23  0548 11/14/23  2314 11/14/23  2117   HSTROP T ng/L 850* 753* 726*         No results for input(s): \"PHART\", \"NNJ1AIV\", \"PO2ART\", \"IPW1YZA\", \"BASEEXCESS\" in the last 8760 hours.   I have reviewed the patient's laboratory results.    Radiology results:    Cardiac Catheterization/Vascular Study    Result Date: 11/15/2023    LV pressures (S/D/E) : 76/3/6 mmHg 1.  Severe multivessel coronary artery disease including:      -Severe calcific stenosis in the mid LAD with MARGARITA II flow distally      -Severe calcific stenosis in the mid LCx, with severe stenosis in the proximal portion of the OM1      -Severe calcific stenosis in the mid RCA 2.  LVEDP 6 mmHg    I have reviewed the patient's radiology reports.    Medication Review:     I have reviewed the patient's active and prn medications.     Problem List:      NSTEMI (non-ST elevated myocardial infarction)    Acute myocardial infarction, subendocardial infarction, initial episode of care      Assessment:    NSTEMI  Dizziness  Type 2 Diabetes Mellitus  CAD  Hypothyroidism  Carotid artery stenosis  TERESA    Plan:    NSTEMI/ Dizziness  -Troponins elevated and trended up  -Dr. Abdi (Cardiology) consulted on admission  -Taken for coronary angiography 11/16/2023 which revealed severe multivessel coronary artery disease and severe calcific stenosis to multiple vessels.  Dr. Abdi recommended transfer to  and patient was accepted by Dr. Mullen pending bed availability.  -Continued on heparin drip  -More complaints of persistent dizziness over past 2 weeks.  "   -Orthostatics stable  -Echo noted mildly reduced LV systolic function and EF-40-45% with grade 1 diastolic dysfunction  -PT/OT evaluations ordered  -Will add Meclizine PRN although patient denies current dizziness    Chronic  -Sliding scale coverage for now  -Will consider adding Levemir if needed--blood sugars in 160s today.  Continue to monitor closely.  -A1c-8  -On Plavix for carotid stenosis    DVT Prophylaxis: Therapeutic Lovenox  Code Status: DNR/ DNI  Diet: Cardiac  Discharge Plan: Pending transfer to     Sigrid May, APRN  11/17/23  10:49 EST    Dictated utilizing Dragon dictation.

## 2023-11-17 NOTE — PROGRESS NOTES
HEPARIN INFUSION  Kathryn Davison is a  83 y.o. female receiving heparin infusion.     Therapy for (VTE/Cardiac):   Cardiac  Patient Weight: 93.9 kg  Initial Bolus (Y/N):   No  Any Bolus (Y/N):   Yes        Signs or Symptoms of Bleeding: Yes; heparin drip to start 2 hours after TR band removal.     Cardiac or Other (Not VTE)   Initial Bolus: 60 units/kg (Max 4,000 units)  Initial rate: 12 units/kg/hr (Max 1,000 units/hr)   Anti Xa Rebolus Infusion Hold time Change infusion Dose (Units/kg/hr) Next Anti Xa or aPTT Level Due   < 0.11 50 Units/kg  (4000 Units Max) None Increase by  3 Units/kg/hr 6 hours   0.11- 0.19 25 Units/kg  (2000 Units Max) None Increase by  2 Units/kg/hr 6 hours   0.2 - 0.29 0 None Increase by  1 Units/kg/hr 6 hours   0.3 - 0.5 0 None No Change 6 hours (after 2 consecutive levels in range check qAM)   0.51 - 0.6 0 None Decrease by  1 Units/kg/hr 6 hours   0.61 - 0.8 0 30 Minutes Decrease by  2 Units/kg/hr 6 hours   0.81 - 1 0 60 Minutes Decrease by  3 Units/kg/hr 6 hours   >1 0 Hold  After Anti Xa less than 0.5 decrease previous rate by  4 Units/kg/hr  Every 2 hours until Anti Xa  less than 0.5 then when infusion restarts in 6 hours       Recommend anti-Xa every 6 hours.         Lab 11/17/23  0619 11/16/23  2233 11/16/23  1609 11/16/23  0536 11/15/23  2328 11/15/23  1731 11/15/23  1731 11/15/23  0548 11/14/23  1920   HEMOGLOBIN 11.8*  --   --  11.7*  --   --   --  12.1 13.2   HEMATOCRIT 36.3  --   --  36.6  --   --   --  37.9 41.0   PLATELETS 269  --   --  261  --   --   --  260 280   PROTIME  --   --   --   --   --   --  14.6  --   --    APTT  --   --   --  68.4*  --   --  56.9*  --   --    HEPARIN ANTI-XA 0.34 0.34 0.41 0.64 0.62   < > >1.10*  --   --    CREATININE 1.12*  --   --  1.02*  --   --   --  1.13* 1.17*   EGFR 48.9*  --   --  54.7*  --   --   --  48.4* 46.4*    < > = values in this interval not displayed.         Date   Time   Anti-Xa Current Rate (Unit/kg/hr) Bolus   (Units) Rate  Change   (Unit/kg/hr) New Rate (Unit/kg/hr) Next   Anti-Xa Comments  Pump Check Daily   11/15 1731 >1.1 NEW -- -- hold 2030 Awaiting 2-hrs post TR band removal and antiXa level <0.7 to begin infusion   11/15 2328 0.62 0 0 +10.5 10.5 0530 D/w MATILDE Helton   11/15 0900 0.64 10.5 0 -2  8.5 1600 Hold for 30 minutes; D/W MATILDE Matta   11/15 1642 0.41 8.5 0 0 8.5 2200 D/W MATILDE Matta   11/16 2233 0.34 8.5 0 0 8.5 0600 D/W MATILDE Cochran   11/17 0619 0.34 8.5 0 0 8.5 11/18 @0600 D/W MATILDE Cochran                                                                                                                                                                          Pharmacy will continue to follow anti-Xa results and monitor for signs and symptoms of bleeding or thrombosis.    Thank you for the consult,    Fabian Baron, PharmD, BCPS   11/17/23 07:13 EST

## 2023-11-17 NOTE — CASE MANAGEMENT/SOCIAL WORK
Case Management/Social Work    Patient Name:  Kathryn Davison  YOB: 1940  MRN: [id number]  Admit Date:  11/14/2023      Pt is currently awaiting transfer to [facility].  Sw continuing to follow for discharge needs.        Electronically signed by:  Yue Strong  11/17/23 15:58 EST

## 2023-11-17 NOTE — PLAN OF CARE
Goal Outcome Evaluation:  Plan of Care Reviewed With: patient        Progress: no change  Outcome Evaluation: VSS, OX4. Pt awaiting placement at  for further cardiac interventions. Daughters at bedside all day. Pt denied SOB and pain. Plan of care on going until transfer. Pt expresses no further needs or concerns at this time, call light within reach.

## 2023-11-18 VITALS
DIASTOLIC BLOOD PRESSURE: 61 MMHG | TEMPERATURE: 98.3 F | BODY MASS INDEX: 39.88 KG/M2 | OXYGEN SATURATION: 96 % | WEIGHT: 216.71 LBS | SYSTOLIC BLOOD PRESSURE: 125 MMHG | HEART RATE: 80 BPM | RESPIRATION RATE: 16 BRPM | HEIGHT: 62 IN

## 2023-11-18 LAB
ANION GAP SERPL CALCULATED.3IONS-SCNC: 9.9 MMOL/L (ref 5–15)
BASOPHILS # BLD AUTO: 0.05 10*3/MM3 (ref 0–0.2)
BASOPHILS NFR BLD AUTO: 0.7 % (ref 0–1.5)
BUN SERPL-MCNC: 11 MG/DL (ref 8–23)
BUN/CREAT SERPL: 11.6 (ref 7–25)
CALCIUM SPEC-SCNC: 9 MG/DL (ref 8.6–10.5)
CHLORIDE SERPL-SCNC: 105 MMOL/L (ref 98–107)
CO2 SERPL-SCNC: 25.1 MMOL/L (ref 22–29)
CREAT SERPL-MCNC: 0.95 MG/DL (ref 0.57–1)
DEPRECATED RDW RBC AUTO: 42.1 FL (ref 37–54)
EGFRCR SERPLBLD CKD-EPI 2021: 59.6 ML/MIN/1.73
EOSINOPHIL # BLD AUTO: 0.25 10*3/MM3 (ref 0–0.4)
EOSINOPHIL NFR BLD AUTO: 3.7 % (ref 0.3–6.2)
ERYTHROCYTE [DISTWIDTH] IN BLOOD BY AUTOMATED COUNT: 13.3 % (ref 12.3–15.4)
GLUCOSE BLDC GLUCOMTR-MCNC: 172 MG/DL (ref 70–130)
GLUCOSE BLDC GLUCOMTR-MCNC: 222 MG/DL (ref 70–130)
GLUCOSE BLDC GLUCOMTR-MCNC: 235 MG/DL (ref 70–130)
GLUCOSE BLDC GLUCOMTR-MCNC: 247 MG/DL (ref 70–130)
GLUCOSE SERPL-MCNC: 132 MG/DL (ref 65–99)
HCT VFR BLD AUTO: 36 % (ref 34–46.6)
HGB BLD-MCNC: 11.4 G/DL (ref 12–15.9)
IMM GRANULOCYTES # BLD AUTO: 0.01 10*3/MM3 (ref 0–0.05)
IMM GRANULOCYTES NFR BLD AUTO: 0.1 % (ref 0–0.5)
LYMPHOCYTES # BLD AUTO: 2.13 10*3/MM3 (ref 0.7–3.1)
LYMPHOCYTES NFR BLD AUTO: 31.6 % (ref 19.6–45.3)
MCH RBC QN AUTO: 27.5 PG (ref 26.6–33)
MCHC RBC AUTO-ENTMCNC: 31.7 G/DL (ref 31.5–35.7)
MCV RBC AUTO: 87 FL (ref 79–97)
MONOCYTES # BLD AUTO: 0.58 10*3/MM3 (ref 0.1–0.9)
MONOCYTES NFR BLD AUTO: 8.6 % (ref 5–12)
NEUTROPHILS NFR BLD AUTO: 3.71 10*3/MM3 (ref 1.7–7)
NEUTROPHILS NFR BLD AUTO: 55.3 % (ref 42.7–76)
NRBC BLD AUTO-RTO: 0 /100 WBC (ref 0–0.2)
PLATELET # BLD AUTO: 271 10*3/MM3 (ref 140–450)
PMV BLD AUTO: 10.7 FL (ref 6–12)
POTASSIUM SERPL-SCNC: 3.9 MMOL/L (ref 3.5–5.2)
RBC # BLD AUTO: 4.14 10*6/MM3 (ref 3.77–5.28)
SODIUM SERPL-SCNC: 140 MMOL/L (ref 136–145)
UFH PPP CHRO-ACNC: 0.28 IU/ML (ref 0.3–0.7)
UFH PPP CHRO-ACNC: 0.39 IU/ML (ref 0.3–0.7)
UFH PPP CHRO-ACNC: 0.4 IU/ML (ref 0.3–0.7)
WBC NRBC COR # BLD AUTO: 6.73 10*3/MM3 (ref 3.4–10.8)

## 2023-11-18 PROCEDURE — 25010000002 HEPARIN (PORCINE) PER 1000 UNITS: Performed by: INTERNAL MEDICINE

## 2023-11-18 PROCEDURE — 80048 BASIC METABOLIC PNL TOTAL CA: CPT | Performed by: INTERNAL MEDICINE

## 2023-11-18 PROCEDURE — 85520 HEPARIN ASSAY: CPT | Performed by: NURSE PRACTITIONER

## 2023-11-18 PROCEDURE — 82948 REAGENT STRIP/BLOOD GLUCOSE: CPT

## 2023-11-18 PROCEDURE — 25810000003 SODIUM CHLORIDE 0.9 % SOLUTION: Performed by: INTERNAL MEDICINE

## 2023-11-18 PROCEDURE — 85520 HEPARIN ASSAY: CPT | Performed by: INTERNAL MEDICINE

## 2023-11-18 PROCEDURE — 85025 COMPLETE CBC W/AUTO DIFF WBC: CPT | Performed by: INTERNAL MEDICINE

## 2023-11-18 PROCEDURE — 63710000001 INSULIN ASPART PER 5 UNITS: Performed by: INTERNAL MEDICINE

## 2023-11-18 RX ORDER — ASPIRIN 81 MG/1
81 TABLET, CHEWABLE ORAL DAILY
Start: 2023-11-19

## 2023-11-18 RX ORDER — HEPARIN SODIUM 10000 [USP'U]/100ML
11 INJECTION, SOLUTION INTRAVENOUS
Start: 2023-11-18

## 2023-11-18 RX ORDER — ATORVASTATIN CALCIUM 40 MG/1
40 TABLET, FILM COATED ORAL NIGHTLY
Start: 2023-11-18

## 2023-11-18 RX ADMIN — INSULIN ASPART 4 UNITS: 100 INJECTION, SOLUTION INTRAVENOUS; SUBCUTANEOUS at 12:34

## 2023-11-18 RX ADMIN — ASPIRIN 81 MG CHEWABLE TABLET 81 MG: 81 TABLET CHEWABLE at 09:16

## 2023-11-18 RX ADMIN — INSULIN ASPART 4 UNITS: 100 INJECTION, SOLUTION INTRAVENOUS; SUBCUTANEOUS at 17:50

## 2023-11-18 RX ADMIN — INSULIN ASPART 2 UNITS: 100 INJECTION, SOLUTION INTRAVENOUS; SUBCUTANEOUS at 08:04

## 2023-11-18 RX ADMIN — SODIUM CHLORIDE 100 ML/HR: 9 INJECTION, SOLUTION INTRAVENOUS at 02:56

## 2023-11-18 RX ADMIN — HEPARIN SODIUM 11 UNITS/KG/HR: 10000 INJECTION, SOLUTION INTRAVENOUS at 12:34

## 2023-11-18 RX ADMIN — ATORVASTATIN CALCIUM 40 MG: 40 TABLET, FILM COATED ORAL at 21:34

## 2023-11-18 RX ADMIN — INSULIN ASPART 4 UNITS: 100 INJECTION, SOLUTION INTRAVENOUS; SUBCUTANEOUS at 21:34

## 2023-11-18 NOTE — PROGRESS NOTES
[facility]    Name:  Kathryn Davison   Age:  83 y.o.  Sex:  female  :  1940  MRN:  [id number]   Visit Number:  [id number]  Admission Date:  2023  Date Of Service:  23  Primary Care Physician:  Sunitha Malave MD     LOS: 3 days :    Chief Complaint:      Chest pain    Subjective:    Patient seen and examined.  Prior provider documentation/labs/vitals reviewed.  Patient eating breakfast.  Updated no current bed available at .  Denies any further chest pain otherwise feeling well.    Hospital Course:    Kathryn Davison is an 83-year-old woman with past medical history of type 2 diabetes, coronary artery disease, history of endometrial cancer, fatty liver, hypothyroidism, dyslipidemia, on Ozempic, TERESA, carotid artery stenosis, osteoporosis, diabetic retinopathy.  Presented to [facility] ED on 2023 with concern for generalized weakness, dizziness and epigastric discomfort with intermittent diaphoresis which started about 2 weeks prior to presentation.  Denied any fevers or chills, chest pain, shortness of air, vomiting or diarrhea.  Says she was given a scopolamine patch by her PCP, but symptoms did not improve.     ED Summary: Afebrile, vital signs stable on room air.  EKG showed ST elevations in inferior leads, no dynamic changes.  Troponin 763, trended down 726.  Creatinine 1.17, blood glucose 263.  CBC unremarkable.  CXR unremarkable.  ED spoke with cardiology-Dr. Abdi, suspect patient had a cardiac event about a week ago her symptoms started, recommended therapeutic Lovenox which she was provided.     Admitted to the hospital and taken for coronary angiography with Dr. Abdi 2023 which revealed severe multivessel coronary artery disease and severe calcific stenosis to multiple vessels.  Dr. Abdi recommended transfer to  and patient was accepted by Dr. Mullen pending bed availability.  Otherwise reassuring labs and vitals noted.   Patient continued on heparin infusion/statin/aspirin.    Review of Systems:     All systems were reviewed and negative except as mentioned in subjective, assessment and plan.    Vital Signs:    Temp:  [97.6 °F (36.4 °C)-98.3 °F (36.8 °C)] 97.6 °F (36.4 °C)  Heart Rate:  [73] 73  Resp:  [18] 18  BP: (109-135)/(52-68) 109/56    Intake and output:    I/O last 3 completed shifts:  In: 150 [P.O.:150]  Out: 3200 [Urine:3200]  No intake/output data recorded.    Physical Examination:    General Appearance:  Alert and cooperative.  Chronically ill-appearing elderly female.   Head:  Atraumatic and normocephalic.   Eyes: Conjunctivae and sclerae normal, no icterus. No pallor.   Throat: No oral lesions, no thrush, oral mucosa moist.   Neck: Supple, trachea midline, no thyromegaly.   Lungs:   Breath sounds heard bilaterally equally.  No wheezing or crackles. No Pleural rub or bronchial breathing.  On room air unlabored.   Heart:  Normal S1 and S2, no murmur, no gallop, no rub. No JVD.   Abdomen:   Normal bowel sounds, no masses, no organomegaly. Soft, nontender, nondistended, no rebound tenderness.   Extremities: Supple, trace bilateral lower extremity edema, no cyanosis, no clubbing.   Skin: No bleeding or rash.   Neurologic: Alert and oriented x 3. No facial asymmetry. Moves all four limbs. No tremors.  Generalized weakness.     Laboratory results:    Results from last 7 days   Lab Units 11/18/23  0655 11/17/23  0619 11/16/23  0536 11/15/23  0548 11/14/23  1920   SODIUM mmol/L 140 139 137   < > 138   POTASSIUM mmol/L 3.9 3.8 3.8   < > 4.4   CHLORIDE mmol/L 105 104 104   < > 99   CO2 mmol/L 25.1 24.4 26.9   < > 28.9   BUN mg/dL 11 15 14   < > 18   CREATININE mg/dL 0.95 1.12* 1.02*   < > 1.17*   CALCIUM mg/dL 9.0 8.9 8.7   < > 9.6   BILIRUBIN mg/dL  --   --   --   --  0.5   ALK PHOS U/L  --   --   --   --  139*   ALT (SGPT) U/L  --   --   --   --  20   AST (SGOT) U/L  --   --   --   --  46*   GLUCOSE mg/dL 132* 166* 117*   < >  "263*    < > = values in this interval not displayed.     Results from last 7 days   Lab Units 11/18/23  0655 11/17/23  0619 11/16/23  0536   WBC 10*3/mm3 6.73 7.34 7.55   HEMOGLOBIN g/dL 11.4* 11.8* 11.7*   HEMATOCRIT % 36.0 36.3 36.6   PLATELETS 10*3/mm3 271 269 261     Results from last 7 days   Lab Units 11/15/23  1731   INR  1.09     Results from last 7 days   Lab Units 11/15/23  0548 11/14/23  2314 11/14/23  2117   HSTROP T ng/L 850* 753* 726*         No results for input(s): \"PHART\", \"TOH4SRL\", \"PO2ART\", \"DMV2BTF\", \"BASEEXCESS\" in the last 8760 hours.   I have reviewed the patient's laboratory results.    Radiology results:    No radiology results from the last 24 hrs  I have reviewed the patient's radiology reports.    Medication Review:     I have reviewed the patient's active and prn medications.     Problem List:      NSTEMI (non-ST elevated myocardial infarction)    Acute myocardial infarction, subendocardial infarction, initial episode of care      Assessment:    Suspect recent STEMI  Dizziness  Type 2 Diabetes Mellitus  CAD  Hypothyroidism  Carotid artery stenosis  TERESA    Plan:    STEMI/ Dizziness  -Troponins elevated and trended up  -Dr. Abdi (Cardiology) consulted on admission  -Taken for coronary angiography 11/16/2023 which revealed severe multivessel coronary artery disease and severe calcific stenosis to multiple vessels.  Dr. bAdi recommended transfer to  and patient was accepted by Dr. Mullen pending bed availability.  -Continued on heparin drip/aspirin/statin  -More complaints of persistent dizziness over past 2 weeks.    -Orthostatics stable  -Echo noted mildly reduced LV systolic function and EF-40-45% with grade 1 diastolic dysfunction with dyskinesis of the LV apex  -PT/OT evaluations ordered  -Meclizine as needed.     Chronic  -Sliding scale coverage for now  -Prior Plavix due to carotid stenosis.  Will hold for now.     DVT Prophylaxis: Heparin infusion  Code Status: DNR/ DNI  Diet: " Cardiac  Discharge Plan: Pending transfer to     Rehana Sheikh, APRN  11/18/23  10:37 EST    Dictated utilizing Dragon dictation.

## 2023-11-18 NOTE — PLAN OF CARE
Goal Outcome Evaluation:  Plan of Care Reviewed With: patient        Progress: no change  Outcome Evaluation: VSS, HEPARIN GTT ONGOING,  STILL WAITING FOR BED AT ,  NO ACUTE CHANGES OVERNIGHT.

## 2023-11-18 NOTE — PLAN OF CARE
Goal Outcome Evaluation:              Problem: Adult Inpatient Plan of Care  Goal: Plan of Care Review  Outcome: Ongoing, Progressing  Goal: Patient-Specific Goal (Individualized)  Outcome: Ongoing, Progressing  Goal: Absence of Hospital-Acquired Illness or Injury  Outcome: Ongoing, Progressing  Intervention: Identify and Manage Fall Risk  Description: Perform standard risk assessment on admission using a validated tool or comprehensive approach appropriate to the patient; reassess fall risk frequently, with change in status or transfer to another level of care.  Communicate fall injury risk to interprofessional healthcare team.  Determine need for increased observation, equipment and environmental modification, such as low bed, signage and supportive, nonskid footwear.  Adjust safety measures to individual developmental age, stage and identified risk factors.  Reinforce the importance of safety and physical activity with patient and family.  Perform regular intentional rounding to assess need for position change, pain assessment and personal needs, including assistance with toileting.  Recent Flowsheet Documentation  Taken 11/18/2023 1800 by Deb Castro, RN  Safety Promotion/Fall Prevention:   safety round/check completed   room organization consistent   nonskid shoes/slippers when out of bed   fall prevention program maintained   clutter free environment maintained   assistive device/personal items within reach   activity supervised  Taken 11/18/2023 1600 by Deb Castro, RN  Safety Promotion/Fall Prevention:   safety round/check completed   room organization consistent   nonskid shoes/slippers when out of bed   fall prevention program maintained   clutter free environment maintained   assistive device/personal items within reach   activity supervised  Taken 11/18/2023 1400 by Deb Castro, RN  Safety Promotion/Fall Prevention:   safety round/check completed   room organization consistent    nonskid shoes/slippers when out of bed   fall prevention program maintained   clutter free environment maintained   assistive device/personal items within reach   activity supervised  Taken 11/18/2023 1200 by Deb Castro RN  Safety Promotion/Fall Prevention:   safety round/check completed   room organization consistent   nonskid shoes/slippers when out of bed   fall prevention program maintained   clutter free environment maintained   assistive device/personal items within reach   activity supervised  Taken 11/18/2023 1000 by Deb Castro RN  Safety Promotion/Fall Prevention:   safety round/check completed   room organization consistent   nonskid shoes/slippers when out of bed   fall prevention program maintained   clutter free environment maintained   assistive device/personal items within reach   activity supervised  Taken 11/18/2023 0800 by Deb Castro, RN  Safety Promotion/Fall Prevention:   safety round/check completed   room organization consistent   nonskid shoes/slippers when out of bed   fall prevention program maintained   clutter free environment maintained   assistive device/personal items within reach   activity supervised  Intervention: Prevent Skin Injury  Description: Perform a screening for skin injury risk, such as pressure or moisture associated skin damage on admission and at regular intervals throughout hospital stay.  Keep all areas of skin (especially folds) clean and dry.  Maintain adequate skin hydration.  Relieve and redistribute pressure and protect bony prominences; implement measures based on patient-specific risk factors.  Match turning and repositioning schedule to clinical condition.  Encourage weight shift frequently; assist with reposition if unable to complete independently.  Float heels off bed; avoid pressure on the Achilles tendon.  Keep skin free from extended contact with medical devices.  Encourage functional activity and mobility, as early as  tolerated.  Use aids (e.g., slide boards, mechanical lift) during transfer.  Recent Flowsheet Documentation  Taken 11/18/2023 1800 by Deb Castro RN  Body Position: sitting up in bed  Taken 11/18/2023 1600 by Deb Castro RN  Body Position:   tilted   right  Taken 11/18/2023 1400 by Deb Castro RN  Body Position: supine, legs elevated  Taken 11/18/2023 1200 by Deb Castro RN  Body Position:   tilted   left  Taken 11/18/2023 1000 by Deb Castro RN  Body Position:   tilted   right  Taken 11/18/2023 0800 by Deb Castro RN  Body Position: supine, legs elevated  Intervention: Prevent and Manage VTE (Venous Thromboembolism) Risk  Description: Assess for VTE (venous thromboembolism) risk.  Encourage and assist with early ambulation.  Initiate and maintain compression or other therapy, as indicated, based on identified risk in accordance with organizational protocol and provider order.  Encourage both active and passive leg exercises while in bed, if unable to ambulate.  Recent Flowsheet Documentation  Taken 11/18/2023 1800 by Deb Castro RN  Activity Management: activity minimized  Taken 11/18/2023 1600 by Deb Castro RN  Activity Management: activity minimized  Taken 11/18/2023 1400 by Deb Castro RN  Activity Management: activity minimized  Taken 11/18/2023 1200 by Deb Castro RN  Activity Management: activity minimized  Taken 11/18/2023 1000 by Deb Castro RN  Activity Management: activity minimized  Taken 11/18/2023 0800 by Deb Castro RN  Activity Management: activity minimized  Intervention: Prevent Infection  Description: Maintain skin and mucous membrane integrity; promote hand, oral and pulmonary hygiene.  Optimize fluid balance, nutrition, sleep and glycemic control to maximize infection resistance.  Identify potential sources of infection early to prevent or mitigate progression of infection (e.g., wound,  lines, devices).  Evaluate ongoing need for invasive devices; remove promptly when no longer indicated.  Recent Flowsheet Documentation  Taken 11/18/2023 1800 by Deb Castro RN  Infection Prevention:   environmental surveillance performed   equipment surfaces disinfected   hand hygiene promoted   personal protective equipment utilized   rest/sleep promoted   single patient room provided   visitors restricted/screened  Taken 11/18/2023 1600 by Deb Castro RN  Infection Prevention:   environmental surveillance performed   equipment surfaces disinfected   hand hygiene promoted   personal protective equipment utilized   rest/sleep promoted   single patient room provided   visitors restricted/screened  Taken 11/18/2023 1400 by Deb Castro RN  Infection Prevention:   environmental surveillance performed   equipment surfaces disinfected   hand hygiene promoted   personal protective equipment utilized   rest/sleep promoted   single patient room provided   visitors restricted/screened  Taken 11/18/2023 1200 by Deb Castro RN  Infection Prevention:   environmental surveillance performed   equipment surfaces disinfected   hand hygiene promoted   personal protective equipment utilized   rest/sleep promoted   single patient room provided   visitors restricted/screened  Taken 11/18/2023 1000 by Deb Castro RN  Infection Prevention:   environmental surveillance performed   equipment surfaces disinfected   hand hygiene promoted   personal protective equipment utilized   rest/sleep promoted   single patient room provided   visitors restricted/screened  Taken 11/18/2023 0800 by Deb Castro RN  Infection Prevention:   environmental surveillance performed   equipment surfaces disinfected   hand hygiene promoted   personal protective equipment utilized   rest/sleep promoted   single patient room provided   visitors restricted/screened  Goal: Optimal Comfort and Wellbeing  Outcome:  Ongoing, Progressing  Intervention: Provide Person-Centered Care  Description: Use a family-focused approach to care.  Develop trust and rapport by proactively providing information, encouraging questions, addressing concerns and offering reassurance.  Acknowledge emotional response to hospitalization.  Recognize and utilize personal coping strategies.  Honor spiritual and cultural preferences.  Recent Flowsheet Documentation  Taken 11/18/2023 0800 by Deb Castro RN  Trust Relationship/Rapport:   care explained   choices provided   questions answered   questions encouraged   thoughts/feelings acknowledged  Goal: Readiness for Transition of Care  Outcome: Ongoing, Progressing     Problem: Diabetes Comorbidity  Goal: Blood Glucose Level Within Targeted Range  Outcome: Ongoing, Progressing     Problem: Hypertension Comorbidity  Goal: Blood Pressure in Desired Range  Outcome: Ongoing, Progressing  Intervention: Maintain Blood Pressure Management  Description: Evaluate adherence to home antihypertensive regimen (e.g., exercise and activity, diet modification, medication).  Provide scheduled antihypertensive medication; consider administration time and effects (e.g., avoid giving diuretic prior to bedtime).  Monitor response to antihypertensive medication therapy (e.g., blood pressure, electrolyte levels, medication effects).  Minimize risk of orthostatic hypotension; encourage caution with position changes, particularly if elderly.  Recent Flowsheet Documentation  Taken 11/18/2023 1800 by Deb Castro RN  Medication Review/Management: medications reviewed  Taken 11/18/2023 1600 by Deb Castro RN  Medication Review/Management: medications reviewed  Taken 11/18/2023 1400 by Deb Castro RN  Medication Review/Management: medications reviewed  Taken 11/18/2023 1200 by Deb Castro RN  Medication Review/Management: medications reviewed  Taken 11/18/2023 1000 by Deb Castro  RN  Medication Review/Management: medications reviewed  Taken 11/18/2023 0800 by Deb Castro RN  Medication Review/Management: medications reviewed     Problem: Obstructive Sleep Apnea Risk or Actual Comorbidity Management  Goal: Unobstructed Breathing During Sleep  Outcome: Ongoing, Progressing     Problem: Fall Injury Risk  Goal: Absence of Fall and Fall-Related Injury  Outcome: Ongoing, Progressing  Intervention: Identify and Manage Contributors  Description: Develop a fall prevention plan with the patient and caregiver/family.  Provide reorientation, appropriate sensory stimulation and routines with changes in mental status to decrease risk of fall.  Promote use of personal vision and auditory aids.  Assess assistance level required for safe and effective self-care; provide support as needed, such as toileting, mobilization. For age 65 and older, implement timed toileting with assistance.  Encourage physical activity, such as performance of mobility and self-care at highest level of patient ability, multicomponent exercise program and provision of appropriate assistive devices.  If fall occurs, assess the severity of injury; implement fall injury protocol. Determine the cause and revise fall injury prevention plan.  Regularly review medication contribution to fall risk; adjust medication administration times to minimize risk of falling.  Consider risk related to polypharmacy and age.  Balance adequate pain management with potential for oversedation.  Recent Flowsheet Documentation  Taken 11/18/2023 1800 by Deb Castro RN  Medication Review/Management: medications reviewed  Taken 11/18/2023 1600 by Deb Castro RN  Medication Review/Management: medications reviewed  Taken 11/18/2023 1400 by Deb Castro RN  Medication Review/Management: medications reviewed  Taken 11/18/2023 1200 by Deb Castro RN  Medication Review/Management: medications reviewed  Taken 11/18/2023 1000  by Dbe Castro RN  Medication Review/Management: medications reviewed  Taken 11/18/2023 0800 by Deb Castro RN  Medication Review/Management: medications reviewed  Intervention: Promote Injury-Free Environment  Description: Provide a safe, barrier-free environment that encourages independent activity.  Keep care area uncluttered and well-lighted.  Determine need for increased observation or monitoring.  Avoid use of devices that minimize mobility, such as restraints or indwelling urinary catheter.  Recent Flowsheet Documentation  Taken 11/18/2023 1800 by Deb Castro RN  Safety Promotion/Fall Prevention:   safety round/check completed   room organization consistent   nonskid shoes/slippers when out of bed   fall prevention program maintained   clutter free environment maintained   assistive device/personal items within reach   activity supervised  Taken 11/18/2023 1600 by Deb Castro RN  Safety Promotion/Fall Prevention:   safety round/check completed   room organization consistent   nonskid shoes/slippers when out of bed   fall prevention program maintained   clutter free environment maintained   assistive device/personal items within reach   activity supervised  Taken 11/18/2023 1400 by Deb Castro RN  Safety Promotion/Fall Prevention:   safety round/check completed   room organization consistent   nonskid shoes/slippers when out of bed   fall prevention program maintained   clutter free environment maintained   assistive device/personal items within reach   activity supervised  Taken 11/18/2023 1200 by Deb Castro, RN  Safety Promotion/Fall Prevention:   safety round/check completed   room organization consistent   nonskid shoes/slippers when out of bed   fall prevention program maintained   clutter free environment maintained   assistive device/personal items within reach   activity supervised  Taken 11/18/2023 1000 by Deb Castro, MATILDE  Safety  Promotion/Fall Prevention:   safety round/check completed   room organization consistent   nonskid shoes/slippers when out of bed   fall prevention program maintained   clutter free environment maintained   assistive device/personal items within reach   activity supervised  Taken 11/18/2023 0800 by Deb Castro RN  Safety Promotion/Fall Prevention:   safety round/check completed   room organization consistent   nonskid shoes/slippers when out of bed   fall prevention program maintained   clutter free environment maintained   assistive device/personal items within reach   activity supervised     Problem: Arrhythmia/Dysrhythmia (Cardiac Catheterization)  Goal: Stable Heart Rate and Rhythm  Outcome: Ongoing, Progressing     Problem: Bleeding (Cardiac Catheterization)  Goal: Absence of Bleeding  Outcome: Ongoing, Progressing     Problem: Contrast-Induced Injury Risk (Cardiac Catheterization)  Goal: Absence of Contrast-Induced Injury  Outcome: Ongoing, Progressing     Problem: Embolism (Cardiac Catheterization)  Goal: Absence of Embolism Signs and Symptoms  Outcome: Ongoing, Progressing     Problem: Ongoing Anesthesia/Sedation Effects (Cardiac Catheterization)  Goal: Anesthesia/Sedation Recovery  Outcome: Ongoing, Progressing  Intervention: Optimize Anesthesia Recovery  Description: Maintain patent airway; position to minimize risk of obstruction and aspiration.  Monitor respiratory status for signs of hypoventilation; provide oxygen therapy judiciously if hypoxemia present.  Monitor level of consciousness and response to verbal and physical stimulation; protect areas of decreased sensation (e.g., anatomical positioning, heat and cold avoidance, medical device or object positioning).  Individualize frequency and intensity of monitoring based on sedation or anesthesia administered, identified risk factors, ongoing assessment and organizational protocol.  Optimize fluid balance; anticipate need for fluid  resuscitation.  Prepare for administration of pharmacologic therapy, such as reversal agent, antiemetic or antipruritic medication, to manage sedation or anesthesia effects.  Assess neurocognitive function and risks that may lead to postoperative delirium, such as decreased level of consciousness, pain and agitation; offer reassurance; answer questions.  Adjust environment to maintain safety (e.g., fall precautions, safety equipment).  Recent Flowsheet Documentation  Taken 11/18/2023 1800 by Deb Castro RN  Safety Promotion/Fall Prevention:   safety round/check completed   room organization consistent   nonskid shoes/slippers when out of bed   fall prevention program maintained   clutter free environment maintained   assistive device/personal items within reach   activity supervised  Administration (IS): instruction provided, follow-up  Taken 11/18/2023 1600 by Deb Castro RN  Safety Promotion/Fall Prevention:   safety round/check completed   room organization consistent   nonskid shoes/slippers when out of bed   fall prevention program maintained   clutter free environment maintained   assistive device/personal items within reach   activity supervised  Administration (IS): instruction provided, follow-up  Taken 11/18/2023 1400 by Deb Castro RN  Safety Promotion/Fall Prevention:   safety round/check completed   room organization consistent   nonskid shoes/slippers when out of bed   fall prevention program maintained   clutter free environment maintained   assistive device/personal items within reach   activity supervised  Administration (IS): instruction provided, follow-up  Taken 11/18/2023 1200 by Deb Castro RN  Safety Promotion/Fall Prevention:   safety round/check completed   room organization consistent   nonskid shoes/slippers when out of bed   fall prevention program maintained   clutter free environment maintained   assistive device/personal items within reach   activity  supervised  Administration (IS): instruction provided, follow-up  Taken 11/18/2023 1000 by Deb Castro RN  Safety Promotion/Fall Prevention:   safety round/check completed   room organization consistent   nonskid shoes/slippers when out of bed   fall prevention program maintained   clutter free environment maintained   assistive device/personal items within reach   activity supervised  Taken 11/18/2023 0800 by Deb Castro RN  Safety Promotion/Fall Prevention:   safety round/check completed   room organization consistent   nonskid shoes/slippers when out of bed   fall prevention program maintained   clutter free environment maintained   assistive device/personal items within reach   activity supervised  Administration (IS): instruction provided, follow-up     Problem: Pain (Cardiac Catheterization)  Goal: Acceptable Pain Control  Outcome: Ongoing, Progressing  Intervention: Prevent or Manage Pain  Description: Determine pain management plan with patient and caregiver/family; review plan regularly.  Consider the presence and impact of preexisting chronic pain.  Encourage patient and caregiver involvement in pain assessment, interventions and safety measures.  Evaluate risk for opioid use; individualize pharmacologic pain management plan and titrate medication to patient response.  Combine multimodal analgesia and nonpharmacologic strategies to help potentiate synergistic effects and enhance comfort (e.g., complementary therapy, diversional activity).  Premedicate for anticipated procedure and activity (e.g., precatheterization, prior to sheath removal).  Minimize bedrest following femoral sheath removal (e.g., 2 to 4 hours if no bleeding).  Prevent back pain/discomfort during bedrest (e.g., log roll repositioning, head elevation to 30 degrees, analgesic medication).  Recent Flowsheet Documentation  Taken 11/18/2023 0800 by Deb Castro RN  Diversional Activities:   television   smartphone      Problem: Vascular Access Protection (Cardiac Catheterization)  Goal: Absence of Vascular Access Complication  Outcome: Ongoing, Progressing  Intervention: Prevent Access Site Complications  Description: Gus pulses on appropriate extremity prior to procedure.  Position and adjust activity to minimize insertion site complications.  Minimize radial insertion site complication by assuring adequate collateral blood flow to hand prior to and following procedure.  Avoid head of bed elevation greater than 30 degrees if femoral sheath present.  Anticipate need for procedural intervention (e.g., vascular repair).  Optimize fluid balance, nutrition, sleep, oxygenation, glycemic control and body temperature to maximize resistance.  Reduce skin microbial count prior to procedure.  Discontinue prophylactic antimicrobial agent within 24 hours after procedure, as directed.  Identify potential sources of infection early; evaluate continued need and advocate for early removal (e.g., lines, devices).  Identify and manage signs of infection.  Recent Flowsheet Documentation  Taken 11/18/2023 1800 by Deb Castro RN  Activity Management: activity minimized  Head of Bed (HOB) Positioning: HOB at 20-30 degrees  Taken 11/18/2023 1600 by Deb Castro RN  Activity Management: activity minimized  Head of Bed (HOB) Positioning: HOB at 30-45 degrees  Taken 11/18/2023 1400 by Deb Castro RN  Activity Management: activity minimized  Head of Bed (HOB) Positioning: HOB at 30-45 degrees  Taken 11/18/2023 1200 by Deb Castro RN  Activity Management: activity minimized  Head of Bed (HOB) Positioning: HOB at 20-30 degrees  Taken 11/18/2023 1000 by Deb Castro RN  Activity Management: activity minimized  Head of Bed (HOB) Positioning: HOB at 20-30 degrees  Taken 11/18/2023 0800 by Deb Castro RN  Activity Management: activity minimized  Head of Bed (HOB) Positioning: HOB at 30-45 degrees     Problem:  Adjustment to Illness (Acute Coronary Syndrome)  Goal: Optimal Adaptation to Illness  Outcome: Ongoing, Progressing  Intervention: Support Adjustment to Life-Change Event  Description: Acknowledge, normalize and validate emotional response to situation; encourage verbalization of feelings.  Identify beliefs about cardiac illness; assist with expectations and maintaining realistic hope.  Monitor perspective regarding quality of life.  Provide support around life transitions and loss (e.g., adherence to new medical regimen, change in family dynamics or roles).  Recognize current coping strategies and assist in developing new strategies (e.g., family support, music, relaxation techniques, optimism).  Assess for signs and symptoms of depression, anxiety disorders and posttraumatic stress; if present, refer for a comprehensive assessment.  Recent Flowsheet Documentation  Taken 11/18/2023 0800 by Deb Castro RN  Family/Support System Care:   support provided   self-care encouraged     Problem: Dysrhythmia (Acute Coronary Syndrome)  Goal: Normalized Cardiac Rhythm  Outcome: Ongoing, Progressing     Problem: Cardiac-Related Pain (Acute Coronary Syndrome)  Goal: Absence of Cardiac-Related Pain  Outcome: Ongoing, Progressing  Intervention: Manage Cardiac Pain Symptoms  Description: Identify patient’s unique characteristics of anginal pain or discomfort and provide symptom relief (e.g., nitrate, ranolazine, analgesia).  Provide pharmacologic therapy to treat source of inflammation and facilitate pain management (e.g., analgesic, steroid, colchicine, interleukin-1 antagonist, antimicrobial therapy).  Implement nonpharmacologic measures to promote comfort, such as position change (sitting up, leaning forward) and offering reassurance.  Promote rest and sedentary activity.  Gradually increase activity once symptoms are resolved.  Recent Flowsheet Documentation  Taken 11/18/2023 0800 by Deb Castro RN  Chest Pain  Intervention: (heparin drip) cardiac monitoring continued     Problem: Hemodynamic Instability (Acute Coronary Syndrome)  Goal: Effective Cardiac Pump Function  Outcome: Ongoing, Progressing     Problem: Tissue Perfusion (Acute Coronary Syndrome)  Goal: Adequate Tissue Perfusion  Outcome: Ongoing, Progressing  Intervention: Optimize Cardiac Tissue Perfusion  Description: Monitor electrocardiogram lead tracing specific to ischemic injury type.  Evaluate ST-segment for depression or elevation.  Anticipate or implement nonpharmacologic and pharmacologic therapy to maintain blood pressure and perfusion (e.g., fluids, anticoagulant, antiplatelet, thrombolytic, antihypertensive, inotrope, nitrate).  Provide oxygen therapy judiciously, if hypoxemia present.  Coordinate and cluster care to provide rest periods.  Match activity level to patient ability and tolerance.  Prepare for procedural or surgical interventions to improve and restore blood flow (e.g., percutaneous transluminal coronary intervention/angioplasty, coronary artery bypass graft).  Recent Flowsheet Documentation  Taken 11/18/2023 1800 by Deb Castro, RN  Activity Management: activity minimized  Taken 11/18/2023 1600 by Deb Castro RN  Activity Management: activity minimized  Taken 11/18/2023 1400 by Deb Castro RN  Activity Management: activity minimized  Taken 11/18/2023 1200 by Deb Castro, RN  Activity Management: activity minimized  Taken 11/18/2023 1000 by Deb Castor, RN  Activity Management: activity minimized  Taken 11/18/2023 0800 by Deb Castro, RN  Activity Management: activity minimized

## 2023-11-19 NOTE — NURSING NOTE
2114  received call from  that bed was available for transport.    Pt and family notified.     2120  Dr Henderson notified of transfer.    2210  Report called to donavan Olguin at .

## 2023-11-19 NOTE — NURSING NOTE
2319: [facility] here to transport pt to .  Daughters at bedside.  Pt transferred out of facility via stretcher and  2 [facility] employees.

## 2023-11-19 NOTE — DISCHARGE SUMMARY
[facility]   DISCHARGE SUMMARY      Name:  Kathryn Davison   Age:  83 y.o.  Sex:  female  :  1940  MRN:  [id number]   Visit Number:  [id number]    Admission Date:  2023  Date of Discharge:  2023  Primary Care Physician:  Sunitha Malave MD    Important issues to note:    Patient transferred to  and patient was accepted by Dr. Mullen    Discharge Diagnoses:     Suspect recent STEMI  Dizziness  Type 2 Diabetes Mellitus  CAD  Hypothyroidism  Carotid artery stenosis  TERESA    Problem List:     Active Hospital Problems    Diagnosis  POA    **NSTEMI (non-ST elevated myocardial infarction) [I21.4]  Yes    Acute myocardial infarction, subendocardial infarction, initial episode of care [I21.4]  Yes      Resolved Hospital Problems   No resolved problems to display.     Presenting Problem:    Chief Complaint   Patient presents with    Dizziness      Consults:     Consulting Physician(s)         Provider   Role Specialty     Favio Abdi MD  Consulting Physician Cardiology          Procedures Performed:    Procedure(s):  Coronary angiography    History of presenting illness/Hospital Course:    Kathryn Davison is an 83-year-old woman with past medical history of type 2 diabetes, coronary artery disease, history of endometrial cancer, fatty liver, hypothyroidism, dyslipidemia, on Ozempic, TERESA, carotid artery stenosis, osteoporosis, diabetic retinopathy.  Presented to [facility] ED on 2023 with concern for generalized weakness, dizziness and epigastric discomfort with intermittent diaphoresis which started about 2 weeks prior to presentation.  Denied any fevers or chills, chest pain, shortness of air, vomiting or diarrhea.  Says she was given a scopolamine patch by her PCP, but symptoms did not improve.     ED Summary: Afebrile, vital signs stable on room air.  EKG showed ST elevations in inferior leads, no dynamic changes.  Troponin 763, trended down 726.   Creatinine 1.17, blood glucose 263.  CBC unremarkable.  CXR unremarkable.  ED spoke with cardiology-Dr. Abdi, suspect patient had a cardiac event about a week ago her symptoms started, recommended therapeutic Lovenox which she was provided.     Admitted to the hospital and taken for coronary angiography with Dr. Abdi 11/16/2023 which revealed severe multivessel coronary artery disease and severe calcific stenosis to multiple vessels.  Dr. Abdi recommended transfer to  and patient was accepted by Dr. Mullen.  Otherwise reassuring labs and vitals noted.  Patient continued on heparin infusion/statin/aspirin.    STEMI/ Dizziness  -Troponins elevated and trended up  -Dr. Abdi (Cardiology) consulted on admission  -Taken for coronary angiography 11/16/2023 which revealed severe multivessel coronary artery disease and severe calcific stenosis to multiple vessels.  Dr. Abdi recommended transfer to  and patient was accepted by Dr. Mullen.  -Continued on heparin drip/aspirin/statin  -More complaints of persistent dizziness over past 2 weeks.    -Orthostatics stable  -Echo noted mildly reduced LV systolic function and EF-40-45% with grade 1 diastolic dysfunction with dyskinesis of the LV apex  -PT/OT evaluations ordered  -Meclizine as needed.       Vital Signs:    Temp:  [97.6 °F (36.4 °C)-98.3 °F (36.8 °C)] 98.3 °F (36.8 °C)  Heart Rate:  [73-80] 80  Resp:  [16-18] 16  BP: (109-129)/(51-61) 125/61    Physical Exam Per progress note by Rehana Sheikh on 11/18.      Pertinent Lab Results:     Results from last 7 days   Lab Units 11/18/23  0655 11/17/23  0619 11/16/23  0536 11/15/23  0548 11/14/23  1920   SODIUM mmol/L 140 139 137   < > 138   POTASSIUM mmol/L 3.9 3.8 3.8   < > 4.4   CHLORIDE mmol/L 105 104 104   < > 99   CO2 mmol/L 25.1 24.4 26.9   < > 28.9   BUN mg/dL 11 15 14   < > 18   CREATININE mg/dL 0.95 1.12* 1.02*   < > 1.17*   CALCIUM mg/dL 9.0 8.9 8.7   < > 9.6   BILIRUBIN mg/dL  --   --   --   --  0.5   ALK PHOS U/L   --   --   --   --  139*   ALT (SGPT) U/L  --   --   --   --  20   AST (SGOT) U/L  --   --   --   --  46*   GLUCOSE mg/dL 132* 166* 117*   < > 263*    < > = values in this interval not displayed.     Results from last 7 days   Lab Units 11/18/23  0655 11/17/23  0619 11/16/23  0536   WBC 10*3/mm3 6.73 7.34 7.55   HEMOGLOBIN g/dL 11.4* 11.8* 11.7*   HEMATOCRIT % 36.0 36.3 36.6   PLATELETS 10*3/mm3 271 269 261     Results from last 7 days   Lab Units 11/15/23  1731   INR  1.09     Results from last 7 days   Lab Units 11/15/23  0548 11/14/23  2314 11/14/23 2117   HSTROP T ng/L 850* 753* 726*                           Pertinent Radiology Results:    Imaging Results (All)       Procedure Component Value Units Date/Time    XR Chest 1 View [[id number]] Collected: 11/14/23 2125     Updated: 11/14/23 2127    Narrative:      PROCEDURE: XR CHEST 1 VW-     HISTORY: Weak/Dizzy/AMS triage protocol, hypertension     COMPARISON: 11/18/2021     FINDINGS:  Portable view of the chest demonstrates the lungs to be  grossly clear. There is no evidence of effusion, pneumothorax or other  significant pleural disease. The mediastinum is unremarkable.     The heart size is normal.       Impression:      Unremarkable portable chest.            This report was signed and finalized on 11/14/2023 9:25 PM by Jan Hinojosa MD.               Echo:    Results for orders placed during the hospital encounter of 11/14/23    Adult Transthoracic Echo Complete W/ Cont if Necessary Per Protocol    Interpretation Summary  1.  Normal left ventricular size, mildly reduced LV systolic function, LVEF 40-45%.  2.  Aneurysmal LV apex.  3.  Grade 1 diastolic dysfunction.  4.  Normal right ventricular size and systolic function.  5.  Normal left atrial volume index.  6.  Mild calcification of the aortic valve without significant stenosis.    Condition on Discharge:      Stable.    Code status during the hospital stay:    Code Status and Medical Interventions:    Ordered at: 11/15/23 1651     Medical Intervention Limits:    NO intubation (DNI)    NO cardioversion     Level Of Support Discussed With:    Patient     Code Status (Patient has no pulse and is not breathing):    No CPR (Do Not Attempt to Resuscitate)     Medical Interventions (Patient has pulse or is breathing):    Limited Support     Discharge Disposition:    Short Term Hospital (DC - External)    Discharge Medications:       Discharge Medications        New Medications        Instructions Start Date   aspirin 81 MG chewable tablet   81 mg, Oral, Daily   Start Date: November 19, 2023     atorvastatin 40 MG tablet  Commonly known as: LIPITOR   40 mg, Oral, Nightly      heparin (porcine) 100 UNIT/ML solution in D5W infusion   11 Units/kg/hr (1,032 Units/hr), Intravenous, Titrated      PHARMACY TO DOSE HEPARIN (COR/RODERICK)   Does not apply, Continuous PRN             Continue These Medications        Instructions Start Date   FreeStyle Gretchen 2 Sensor misc   1 each, Does not apply, Every 14 Days             Stop These Medications      albuterol sulfate  (90 Base) MCG/ACT inhaler  Commonly known as: PROVENTIL HFA;VENTOLIN HFA;PROAIR HFA     amLODIPine 10 MG tablet  Commonly known as: NORVASC     CALCIUM + D PO     carbidopa-levodopa  MG per tablet  Commonly known as: SINEMET     carvedilol 12.5 MG tablet  Commonly known as: COREG     clopidogrel 75 MG tablet  Commonly known as: PLAVIX     docusate sodium 100 MG capsule     doxazosin 2 MG tablet  Commonly known as: CARDURA     escitalopram 10 MG tablet  Commonly known as: LEXAPRO     Euthyrox 100 MCG tablet  Generic drug: levothyroxine     Lantus SoloStar 100 UNIT/ML injection pen  Generic drug: Insulin Glargine     LASIX PO     losartan 50 MG tablet  Commonly known as: COZAAR     montelukast 10 MG tablet  Commonly known as: SINGULAIR     multivitamin with minerals tablet tablet     oxybutynin XL 5 MG 24 hr tablet  Commonly known as: DITROPAN-XL     Ozempic  (0.25 or 0.5 MG/DOSE) 2 MG/3ML solution pen-injector  Generic drug: Semaglutide(0.25 or 0.5MG/DOS)     polyethylene glycol packet  Commonly known as: MIRALAX     potassium chloride 10 MEQ CR capsule  Commonly known as: MICRO-K     pravastatin 80 MG tablet  Commonly known as: PRAVACHOL     spironolactone-hydrochlorothiazide 25-25 MG tablet  Commonly known as: ALDACTAZIDE            Discharge Diet:   Cardiac     Activity at Discharge:   As tolerated      Follow-up Appointments:     Follow-up Information       Sunitha Malave MD .    Specialty: Internal Medicine  Contact information:  [address] [place] [address]  [phone]                           Future Appointments   Date Time Provider Department Center   10/10/2024  2:00 PM Itzel Hansen APRN [facility]     Test Results Pending at Discharge:           Brittany Henderson MD  11/18/23  22:19 EST    Time: I spent 10 minutes on this discharge activity which included: face-to-face encounter with the patient, reviewing the data in the system, coordination of the care with the nursing staff as well as consultants, documentation, and entering orders.     Dictated utilizing Dragon dictation.     no

## 2023-11-20 NOTE — PAYOR COMM NOTE
"To:  CAROLE DAVE  From: Anastasiya Gaviria RN  Phone: [phone]  Fax: [phone]  NPI: [id number]  TIN: [id number]  Member ID: [id number]  MRN: [id number]    Kathryn Davison (83 y.o. Female)       Date of Birth   1940    Social Security Number       Address   [address] [place] [address]    Home Phone   [phone]    MRN   [id number]       Islam   Non-Zoroastrian    Marital Status                               Admission Date   11/14/23    Admission Type   Emergency    Admitting Provider   Kerley, Brian Joseph, DO    Attending Provider       Department, Room/Bed   [facility] TELEMETRY 3, 328/1       Discharge Date   11/18/2023    Discharge Disposition   Short Term Hospital (DC - External)    Discharge Destination                                 Attending Provider: (none)   Allergies: Gabapentin, Betadine [Povidone Iodine]    Isolation: None   Infection: None   Code Status: Prior    Ht: 157.5 cm (62\")   Wt: 98.3 kg (216 lb 11.4 oz)    Admission Cmt: None   Principal Problem: NSTEMI (non-ST elevated myocardial infarction) [I21.4]                   Active Insurance as of 11/14/2023       Primary Coverage       Payor Plan Insurance Group Employer/Plan Group    MEDICARE MEDICARE A & B        Payor Plan Address Payor Plan Phone Number Payor Plan Fax Number Effective Dates    [address] [phone]  2/1/2005 - None Entered    formerly [facility] [address]         Subscriber Name Subscriber Birth Date Member ID       KATHRYN DAVISON 1940 [id number]               Secondary Coverage       Payor Plan Insurance Group Employer/Plan Group    [id number]       Payor Plan Address Payor Plan Phone Number Payor Plan Fax Number Effective Dates    [address]   2/1/2005 - None Entered    [facility] [id number]         Subscriber Name Subscriber Birth Date Member ID       KATHRYN DAVISON AVIS 1940 [id number]               Tertiary Coverage       " Payor Plan Insurance Group Employer/Plan Group    [place]        Payor Plan Address Payor Plan Phone Number Payor Plan Fax Number Effective Dates    [address] [phone]  2017 - None Entered    [place] [address]         Subscriber Name Subscriber Birth Date Member ID       KATHRYN DAVISON 1940 [id number]                     Emergency Contacts        (Rel.) Home Phone Work Phone Mobile Phone    Ayala Crenshaw (Daughter) [phone] -- --    [place] Springs[place]woa villalpando (Daughter) -- -- [phone]                 Discharge Summary        Brittany Henderson MD at 23 9271              [facility]   DISCHARGE SUMMARY      Name:  Kathryn Davison   Age:  83 y.o.  Sex:  female  :  1940  MRN:  [id number]   Visit Number:  [id number]    Admission Date:  2023  Date of Discharge:  2023  Primary Care Physician:  Sunitha Malave MD    Important issues to note:    Patient transferred to  and patient was accepted by Dr. Mullen    Discharge Diagnoses:     Suspect recent STEMI  Dizziness  Type 2 Diabetes Mellitus  CAD  Hypothyroidism  Carotid artery stenosis  TERESA    Problem List:     Active Hospital Problems    Diagnosis  POA    **NSTEMI (non-ST elevated myocardial infarction) [I21.4]  Yes    Acute myocardial infarction, subendocardial infarction, initial episode of care [I21.4]  Yes      Resolved Hospital Problems   No resolved problems to display.     Presenting Problem:    Chief Complaint   Patient presents with    Dizziness      Consults:     Consulting Physician(s)         Provider   Role Specialty     Favio Abdi MD  Consulting Physician Cardiology          Procedures Performed:    Procedure(s):  Coronary angiography    History of presenting illness/Hospital Course:    Kathryn Davison is an 83-year-old woman with past medical history of type 2 diabetes, coronary artery disease, history of endometrial cancer, fatty  liver, hypothyroidism, dyslipidemia, on Ozempic, TERESA, carotid artery stenosis, osteoporosis, diabetic retinopathy.  Presented to [facility] ED on 11/14/2023 with concern for generalized weakness, dizziness and epigastric discomfort with intermittent diaphoresis which started about 2 weeks prior to presentation.  Denied any fevers or chills, chest pain, shortness of air, vomiting or diarrhea.  Says she was given a scopolamine patch by her PCP, but symptoms did not improve.     ED Summary: Afebrile, vital signs stable on room air.  EKG showed ST elevations in inferior leads, no dynamic changes.  Troponin 763, trended down 726.  Creatinine 1.17, blood glucose 263.  CBC unremarkable.  CXR unremarkable.  ED spoke with cardiology-Dr. Abdi, suspect patient had a cardiac event about a week ago her symptoms started, recommended therapeutic Lovenox which she was provided.     Admitted to the hospital and taken for coronary angiography with Dr. Abdi 11/16/2023 which revealed severe multivessel coronary artery disease and severe calcific stenosis to multiple vessels.  Dr. Abdi recommended transfer to  and patient was accepted by Dr. Mullen.  Otherwise reassuring labs and vitals noted.  Patient continued on heparin infusion/statin/aspirin.    STEMI/ Dizziness  -Troponins elevated and trended up  -Dr. Abdi (Cardiology) consulted on admission  -Taken for coronary angiography 11/16/2023 which revealed severe multivessel coronary artery disease and severe calcific stenosis to multiple vessels.  Dr. Abdi recommended transfer to  and patient was accepted by Dr. Mullen.  -Continued on heparin drip/aspirin/statin  -More complaints of persistent dizziness over past 2 weeks.    -Orthostatics stable  -Echo noted mildly reduced LV systolic function and EF-40-45% with grade 1 diastolic dysfunction with dyskinesis of the LV apex  -PT/OT evaluations ordered  -Meclizine as needed.       Vital Signs:    Temp:  [97.6 °F (36.4 °C)-98.3 °F (36.8 °C)]  98.3 °F (36.8 °C)  Heart Rate:  [73-80] 80  Resp:  [16-18] 16  BP: (109-129)/(51-61) 125/61    Physical Exam Per progress note by Rehana Sheikh on 11/18.      Pertinent Lab Results:     Results from last 7 days   Lab Units 11/18/23  0655 11/17/23  0619 11/16/23  0536 11/15/23  0548 11/14/23  1920   SODIUM mmol/L 140 139 137   < > 138   POTASSIUM mmol/L 3.9 3.8 3.8   < > 4.4   CHLORIDE mmol/L 105 104 104   < > 99   CO2 mmol/L 25.1 24.4 26.9   < > 28.9   BUN mg/dL 11 15 14   < > 18   CREATININE mg/dL 0.95 1.12* 1.02*   < > 1.17*   CALCIUM mg/dL 9.0 8.9 8.7   < > 9.6   BILIRUBIN mg/dL  --   --   --   --  0.5   ALK PHOS U/L  --   --   --   --  139*   ALT (SGPT) U/L  --   --   --   --  20   AST (SGOT) U/L  --   --   --   --  46*   GLUCOSE mg/dL 132* 166* 117*   < > 263*    < > = values in this interval not displayed.     Results from last 7 days   Lab Units 11/18/23  0655 11/17/23 0619 11/16/23  0536   WBC 10*3/mm3 6.73 7.34 7.55   HEMOGLOBIN g/dL 11.4* 11.8* 11.7*   HEMATOCRIT % 36.0 36.3 36.6   PLATELETS 10*3/mm3 271 269 261     Results from last 7 days   Lab Units 11/15/23  1731   INR  1.09     Results from last 7 days   Lab Units 11/15/23  0548 11/14/23 2314 11/14/23 2117   HSTROP T ng/L 850* 753* 726*                           Pertinent Radiology Results:    Imaging Results (All)       Procedure Component Value Units Date/Time    XR Chest 1 View [[id number]] Collected: 11/14/23 2125     Updated: 11/14/23 2127    Narrative:      PROCEDURE: XR CHEST 1 VW-     HISTORY: Weak/Dizzy/AMS triage protocol, hypertension     COMPARISON: 11/18/2021     FINDINGS:  Portable view of the chest demonstrates the lungs to be  grossly clear. There is no evidence of effusion, pneumothorax or other  significant pleural disease. The mediastinum is unremarkable.     The heart size is normal.       Impression:      Unremarkable portable chest.            This report was signed and finalized on 11/14/2023 9:25 PM by Jan Hinojosa MD.                Echo:    Results for orders placed during the hospital encounter of 11/14/23    Adult Transthoracic Echo Complete W/ Cont if Necessary Per Protocol    Interpretation Summary  1.  Normal left ventricular size, mildly reduced LV systolic function, LVEF 40-45%.  2.  Aneurysmal LV apex.  3.  Grade 1 diastolic dysfunction.  4.  Normal right ventricular size and systolic function.  5.  Normal left atrial volume index.  6.  Mild calcification of the aortic valve without significant stenosis.    Condition on Discharge:      Stable.    Code status during the hospital stay:    Code Status and Medical Interventions:   Ordered at: 11/15/23 5353     Medical Intervention Limits:    NO intubation (DNI)    NO cardioversion     Level Of Support Discussed With:    Patient     Code Status (Patient has no pulse and is not breathing):    No CPR (Do Not Attempt to Resuscitate)     Medical Interventions (Patient has pulse or is breathing):    Limited Support     Discharge Disposition:    Short Term Hospital (DC - External)    Discharge Medications:       Discharge Medications        New Medications        Instructions Start Date   aspirin 81 MG chewable tablet   81 mg, Oral, Daily   Start Date: November 19, 2023     atorvastatin 40 MG tablet  Commonly known as: LIPITOR   40 mg, Oral, Nightly      heparin (porcine) 100 UNIT/ML solution in D5W infusion   11 Units/kg/hr (1,032 Units/hr), Intravenous, Titrated      PHARMACY TO DOSE HEPARIN (COR/RODERICK)   Does not apply, Continuous PRN             Continue These Medications        Instructions Start Date   FreeStyle Gretchen 2 Sensor misc   1 each, Does not apply, Every 14 Days             Stop These Medications      albuterol sulfate  (90 Base) MCG/ACT inhaler  Commonly known as: PROVENTIL HFA;VENTOLIN HFA;PROAIR HFA     amLODIPine 10 MG tablet  Commonly known as: NORVASC     CALCIUM + D PO     carbidopa-levodopa  MG per tablet  Commonly known as: SINEMET     carvedilol 12.5  MG tablet  Commonly known as: COREG     clopidogrel 75 MG tablet  Commonly known as: PLAVIX     docusate sodium 100 MG capsule     doxazosin 2 MG tablet  Commonly known as: CARDURA     escitalopram 10 MG tablet  Commonly known as: LEXAPRO     Euthyrox 100 MCG tablet  Generic drug: levothyroxine     Lantus SoloStar 100 UNIT/ML injection pen  Generic drug: Insulin Glargine     LASIX PO     losartan 50 MG tablet  Commonly known as: COZAAR     montelukast 10 MG tablet  Commonly known as: SINGULAIR     multivitamin with minerals tablet tablet     oxybutynin XL 5 MG 24 hr tablet  Commonly known as: DITROPAN-XL     Ozempic (0.25 or 0.5 MG/DOSE) 2 MG/3ML solution pen-injector  Generic drug: Semaglutide(0.25 or 0.5MG/DOS)     polyethylene glycol packet  Commonly known as: MIRALAX     potassium chloride 10 MEQ CR capsule  Commonly known as: MICRO-K     pravastatin 80 MG tablet  Commonly known as: PRAVACHOL     spironolactone-hydrochlorothiazide 25-25 MG tablet  Commonly known as: ALDACTAZIDE            Discharge Diet:   Cardiac     Activity at Discharge:   As tolerated      Follow-up Appointments:     Follow-up Information       Sunitha Malave MD .    Specialty: Internal Medicine  Contact information:  [address] [place] [address] [phone]                           Future Appointments   Date Time Provider Department Center   10/10/2024  2:00 PM Itzel Hansen APRN [facility]     Test Results Pending at Discharge:           Brittany Henderson MD  11/18/23  22:19 EST    Time: I spent 10 minutes on this discharge activity which included: face-to-face encounter with the patient, reviewing the data in the system, coordination of the care with the nursing staff as well as consultants, documentation, and entering orders.     Dictated utilizing Dragon dictation.      Electronically signed by Brittany Henderson MD at 11/18/23 9312

## 2024-02-21 NOTE — TELEPHONE ENCOUNTER
----- Message from Tiffany Ceron sent at 8/23/2017 12:10 PM EDT -----  PT CALLED WANTING TO SPEAK WITH YOU ABOUT HER MEDICINES. BEST NUMBER TO REACH PATIENT [phone]    Unable to contact patient.    Medication: toprol xl  Last office visit date: 9/11/23  Medication Refill Protocol Failed.  Protocol approved due to: data identified in chart review.

## 2024-05-20 ENCOUNTER — TRANSCRIBE ORDERS (OUTPATIENT)
Dept: CARDIAC REHAB | Facility: HOSPITAL | Age: 84
End: 2024-05-20
Payer: MEDICARE

## 2024-05-20 DIAGNOSIS — Z95.5 STATUS POST PRIMARY ANGIOPLASTY WITH CORONARY STENT: Primary | ICD-10-CM

## 2024-06-18 ENCOUNTER — TREATMENT (OUTPATIENT)
Dept: CARDIAC REHAB | Facility: HOSPITAL | Age: 84
End: 2024-06-18
Payer: MEDICARE

## 2024-06-18 DIAGNOSIS — Z95.5 STATUS POST PRIMARY ANGIOPLASTY WITH CORONARY STENT: Primary | ICD-10-CM

## 2024-06-18 PROCEDURE — 93798 PHYS/QHP OP CAR RHAB W/ECG: CPT

## 2024-06-18 RX ORDER — MONTELUKAST SODIUM 10 MG/1
10 TABLET ORAL NIGHTLY
COMMUNITY

## 2024-06-18 RX ORDER — SPIRONOLACTONE 25 MG/1
12.5 TABLET ORAL DAILY
COMMUNITY

## 2024-06-18 RX ORDER — OXYBUTYNIN CHLORIDE 5 MG/1
5 TABLET, EXTENDED RELEASE ORAL DAILY
COMMUNITY

## 2024-06-18 RX ORDER — FUROSEMIDE 40 MG/1
40 TABLET ORAL AS NEEDED
COMMUNITY

## 2024-06-18 RX ORDER — DAPAGLIFLOZIN 10 MG/1
10 TABLET, FILM COATED ORAL DAILY
COMMUNITY

## 2024-06-18 RX ORDER — METOPROLOL SUCCINATE 50 MG/1
50 TABLET, EXTENDED RELEASE ORAL DAILY
COMMUNITY

## 2024-06-18 RX ORDER — MELATONIN
2000 DAILY
COMMUNITY

## 2024-06-18 RX ORDER — DOCUSATE SODIUM 100 MG/1
100 CAPSULE, LIQUID FILLED ORAL 2 TIMES DAILY PRN
COMMUNITY

## 2024-06-18 RX ORDER — VALSARTAN 40 MG/1
40 TABLET ORAL 2 TIMES DAILY
COMMUNITY

## 2024-06-18 RX ORDER — SEMAGLUTIDE 0.68 MG/ML
0.5 INJECTION, SOLUTION SUBCUTANEOUS WEEKLY
COMMUNITY

## 2024-06-18 RX ORDER — CLOPIDOGREL BISULFATE 75 MG/1
75 TABLET ORAL NIGHTLY
COMMUNITY

## 2024-06-18 RX ORDER — INSULIN GLARGINE 100 [IU]/ML
22 INJECTION, SOLUTION SUBCUTANEOUS NIGHTLY
COMMUNITY

## 2024-06-18 RX ORDER — ESCITALOPRAM OXALATE 10 MG/1
10 TABLET ORAL DAILY
COMMUNITY

## 2024-06-18 RX ORDER — LEVOTHYROXINE SODIUM 0.1 MG/1
100 TABLET ORAL DAILY
COMMUNITY

## 2024-06-18 RX ORDER — NITROGLYCERIN 0.4 MG/1
0.4 TABLET SUBLINGUAL
COMMUNITY

## 2024-06-18 RX ORDER — ONDANSETRON 4 MG/1
4 TABLET, ORALLY DISINTEGRATING ORAL EVERY 8 HOURS PRN
COMMUNITY

## 2024-06-19 ENCOUNTER — TREATMENT (OUTPATIENT)
Dept: CARDIAC REHAB | Facility: HOSPITAL | Age: 84
End: 2024-06-19
Payer: MEDICARE

## 2024-06-19 DIAGNOSIS — Z95.5 STATUS POST PRIMARY ANGIOPLASTY WITH CORONARY STENT: Primary | ICD-10-CM

## 2024-06-19 PROCEDURE — 93798 PHYS/QHP OP CAR RHAB W/ECG: CPT

## 2024-06-19 NOTE — PROGRESS NOTES
Pt was seen today in CR for a Phase 2 visit.  Vital signs and session notes recorded in [employer] and will be scanned into Epic by HIM.

## 2024-06-21 ENCOUNTER — TREATMENT (OUTPATIENT)
Dept: CARDIAC REHAB | Facility: HOSPITAL | Age: 84
End: 2024-06-21
Payer: MEDICARE

## 2024-06-21 DIAGNOSIS — Z95.5 STATUS POST PRIMARY ANGIOPLASTY WITH CORONARY STENT: Primary | ICD-10-CM

## 2024-06-21 PROCEDURE — 93798 PHYS/QHP OP CAR RHAB W/ECG: CPT

## 2024-06-21 NOTE — PROGRESS NOTES
Pt was seen today in CR for a Phase 2 visit.  Vital signs and session notes recorded in [employer] and will be scanned into Epic by HIM.

## 2024-06-24 ENCOUNTER — TREATMENT (OUTPATIENT)
Dept: CARDIAC REHAB | Facility: HOSPITAL | Age: 84
End: 2024-06-24
Payer: MEDICARE

## 2024-06-24 DIAGNOSIS — Z95.5 STATUS POST PRIMARY ANGIOPLASTY WITH CORONARY STENT: Primary | ICD-10-CM

## 2024-06-24 PROCEDURE — 93798 PHYS/QHP OP CAR RHAB W/ECG: CPT

## 2024-06-24 NOTE — PROGRESS NOTES
Pt was seen today in CR for a Phase 2 visit.  Vital signs and session notes recorded in [employer] and will be scanned into Epic by HIM.

## 2024-06-26 ENCOUNTER — TREATMENT (OUTPATIENT)
Dept: CARDIAC REHAB | Facility: HOSPITAL | Age: 84
End: 2024-06-26
Payer: MEDICARE

## 2024-06-26 DIAGNOSIS — Z95.5 STATUS POST PRIMARY ANGIOPLASTY WITH CORONARY STENT: Primary | ICD-10-CM

## 2024-06-26 PROCEDURE — 93798 PHYS/QHP OP CAR RHAB W/ECG: CPT

## 2024-06-26 NOTE — PROGRESS NOTES
Pt was seen today in CR for a Phase 2 visit.  Vital signs and session notes recorded in [employer] and will be scanned into Epic by HIM.

## 2024-06-28 ENCOUNTER — TREATMENT (OUTPATIENT)
Dept: CARDIAC REHAB | Facility: HOSPITAL | Age: 84
End: 2024-06-28
Payer: MEDICARE

## 2024-06-28 DIAGNOSIS — Z95.5 STATUS POST PRIMARY ANGIOPLASTY WITH CORONARY STENT: Primary | ICD-10-CM

## 2024-06-28 PROCEDURE — 93798 PHYS/QHP OP CAR RHAB W/ECG: CPT

## 2024-06-28 NOTE — PROGRESS NOTES
Pt was seen today in CR for a Phase 2 visit.  Vital signs and session notes recorded in [employer] and will be scanned into Epic by HIM.

## 2024-07-01 ENCOUNTER — TREATMENT (OUTPATIENT)
Dept: CARDIAC REHAB | Facility: HOSPITAL | Age: 84
End: 2024-07-01
Payer: MEDICARE

## 2024-07-01 DIAGNOSIS — Z95.5 STATUS POST PRIMARY ANGIOPLASTY WITH CORONARY STENT: Primary | ICD-10-CM

## 2024-07-01 PROCEDURE — 93798 PHYS/QHP OP CAR RHAB W/ECG: CPT

## 2024-07-01 NOTE — PROGRESS NOTES
Pt was seen today in CR for a Phase 2 visit.  Vital signs and session notes recorded in [employer] and will be scanned into Epic by HIM.

## 2024-07-03 ENCOUNTER — TREATMENT (OUTPATIENT)
Dept: CARDIAC REHAB | Facility: HOSPITAL | Age: 84
End: 2024-07-03
Payer: MEDICARE

## 2024-07-03 DIAGNOSIS — Z95.5 STATUS POST PRIMARY ANGIOPLASTY WITH CORONARY STENT: Primary | ICD-10-CM

## 2024-07-03 PROCEDURE — 93798 PHYS/QHP OP CAR RHAB W/ECG: CPT

## 2024-07-03 NOTE — PROGRESS NOTES
Pt was seen today in CR for a Phase 2 visit.  Vital signs and session notes recorded in [employer] and will be scanned into Epic by HIM.

## 2024-07-05 ENCOUNTER — TREATMENT (OUTPATIENT)
Dept: CARDIAC REHAB | Facility: HOSPITAL | Age: 84
End: 2024-07-05
Payer: MEDICARE

## 2024-07-05 DIAGNOSIS — Z95.5 STATUS POST PRIMARY ANGIOPLASTY WITH CORONARY STENT: Primary | ICD-10-CM

## 2024-07-05 PROCEDURE — 93798 PHYS/QHP OP CAR RHAB W/ECG: CPT

## 2024-07-05 NOTE — PROGRESS NOTES
Pt was seen today in CR for a Phase II visit. Vital signs and session notes recorded in [employer] and will be scanned into Epic by HIM.

## 2024-07-08 ENCOUNTER — TREATMENT (OUTPATIENT)
Dept: CARDIAC REHAB | Facility: HOSPITAL | Age: 84
End: 2024-07-08
Payer: MEDICARE

## 2024-07-08 DIAGNOSIS — Z95.5 STATUS POST PRIMARY ANGIOPLASTY WITH CORONARY STENT: Primary | ICD-10-CM

## 2024-07-08 PROCEDURE — 93798 PHYS/QHP OP CAR RHAB W/ECG: CPT

## 2024-07-08 NOTE — PROGRESS NOTES
Pt was seen today in CR for a Phase 2 visit.  Vital signs and session notes recorded in [employer] and will be scanned into Epic by HIM.

## 2024-07-10 ENCOUNTER — TREATMENT (OUTPATIENT)
Dept: CARDIAC REHAB | Facility: HOSPITAL | Age: 84
End: 2024-07-10
Payer: MEDICARE

## 2024-07-10 DIAGNOSIS — Z95.5 STATUS POST PRIMARY ANGIOPLASTY WITH CORONARY STENT: Primary | ICD-10-CM

## 2024-07-10 PROCEDURE — 93798 PHYS/QHP OP CAR RHAB W/ECG: CPT

## 2024-07-10 NOTE — PROGRESS NOTES
Pt was seen today in CR for a Phase II visit. Vital signs and session notes recorded in [employer] and will be scanned into Epic by HIM.

## 2024-07-12 ENCOUNTER — TREATMENT (OUTPATIENT)
Dept: CARDIAC REHAB | Facility: HOSPITAL | Age: 84
End: 2024-07-12
Payer: MEDICARE

## 2024-07-12 DIAGNOSIS — Z95.5 STATUS POST PRIMARY ANGIOPLASTY WITH CORONARY STENT: Primary | ICD-10-CM

## 2024-07-12 PROCEDURE — 93798 PHYS/QHP OP CAR RHAB W/ECG: CPT

## 2024-07-12 NOTE — PROGRESS NOTES
Pt was seen today in CR for a Phase 2 visit.  Vital signs and session notes recorded in [employer] and will be scanned into Epic by HIM.

## 2024-07-15 ENCOUNTER — TREATMENT (OUTPATIENT)
Dept: CARDIAC REHAB | Facility: HOSPITAL | Age: 84
End: 2024-07-15
Payer: MEDICARE

## 2024-07-15 DIAGNOSIS — Z95.5 STATUS POST PRIMARY ANGIOPLASTY WITH CORONARY STENT: Primary | ICD-10-CM

## 2024-07-15 PROCEDURE — 93798 PHYS/QHP OP CAR RHAB W/ECG: CPT

## 2024-07-15 NOTE — PROGRESS NOTES
Pt was seen today in CR for a Phase 2 visit.  Vital signs and session notes recorded in [employer] and will be scanned into Epic by HIM.

## 2024-07-17 ENCOUNTER — TREATMENT (OUTPATIENT)
Dept: CARDIAC REHAB | Facility: HOSPITAL | Age: 84
End: 2024-07-17
Payer: MEDICARE

## 2024-07-17 DIAGNOSIS — Z95.5 STATUS POST PRIMARY ANGIOPLASTY WITH CORONARY STENT: Primary | ICD-10-CM

## 2024-07-17 PROCEDURE — 93798 PHYS/QHP OP CAR RHAB W/ECG: CPT

## 2024-07-17 NOTE — PROGRESS NOTES
Pt was seen today in [facility] for a Phase II visit. Vital signs and session notes recorded in [employer] and will be scanned into Epic by HIM.

## 2024-07-29 ENCOUNTER — TREATMENT (OUTPATIENT)
Dept: CARDIAC REHAB | Facility: HOSPITAL | Age: 84
End: 2024-07-29
Payer: MEDICARE

## 2024-07-29 DIAGNOSIS — Z95.5 STATUS POST PRIMARY ANGIOPLASTY WITH CORONARY STENT: Primary | ICD-10-CM

## 2024-07-29 PROCEDURE — 93798 PHYS/QHP OP CAR RHAB W/ECG: CPT

## 2024-07-29 NOTE — PROGRESS NOTES
Pt was seen today in CR for a Phase 2 visit.  Vital signs and session notes recorded in "[employer] and will be scanned into Epic by HIM.

## 2024-07-31 ENCOUNTER — TREATMENT (OUTPATIENT)
Dept: CARDIAC REHAB | Facility: HOSPITAL | Age: 84
End: 2024-07-31
Payer: MEDICARE

## 2024-07-31 DIAGNOSIS — Z95.5 STATUS POST PRIMARY ANGIOPLASTY WITH CORONARY STENT: Primary | ICD-10-CM

## 2024-07-31 PROCEDURE — 93798 PHYS/QHP OP CAR RHAB W/ECG: CPT

## 2024-07-31 NOTE — PROGRESS NOTES
Pt was seen today in [facility] for a Phase II visit. Vital signs and session notes recorded in [employer] and will be scanned into Epic by HIM.

## 2024-08-02 ENCOUNTER — APPOINTMENT (OUTPATIENT)
Dept: CARDIAC REHAB | Facility: HOSPITAL | Age: 84
End: 2024-08-02
Payer: MEDICARE

## 2024-08-05 ENCOUNTER — TREATMENT (OUTPATIENT)
Dept: CARDIAC REHAB | Facility: HOSPITAL | Age: 84
End: 2024-08-05
Payer: MEDICARE

## 2024-08-05 DIAGNOSIS — Z95.5 STATUS POST PRIMARY ANGIOPLASTY WITH CORONARY STENT: Primary | ICD-10-CM

## 2024-08-05 PROCEDURE — 93798 PHYS/QHP OP CAR RHAB W/ECG: CPT

## 2024-08-05 NOTE — PROGRESS NOTES
Pt was seen today in CR for a Phase 2 visit.  Vital signs and session notes recorded in [employer] and will be scanned into Epic by HIM.

## 2024-08-07 ENCOUNTER — TREATMENT (OUTPATIENT)
Dept: CARDIAC REHAB | Facility: HOSPITAL | Age: 84
End: 2024-08-07
Payer: MEDICARE

## 2024-08-07 DIAGNOSIS — Z95.5 STATUS POST PRIMARY ANGIOPLASTY WITH CORONARY STENT: Primary | ICD-10-CM

## 2024-08-07 PROCEDURE — 93798 PHYS/QHP OP CAR RHAB W/ECG: CPT

## 2024-08-07 NOTE — PROGRESS NOTES
Pt was seen today in [facility] for a Phase II visit. Vital signs and session notes recorded in [employer] and will be scanned into Epic by HIM.

## 2024-08-09 ENCOUNTER — TREATMENT (OUTPATIENT)
Dept: CARDIAC REHAB | Facility: HOSPITAL | Age: 84
End: 2024-08-09
Payer: MEDICARE

## 2024-08-09 DIAGNOSIS — Z95.5 STATUS POST PRIMARY ANGIOPLASTY WITH CORONARY STENT: Primary | ICD-10-CM

## 2024-08-09 PROCEDURE — 93798 PHYS/QHP OP CAR RHAB W/ECG: CPT

## 2024-08-09 NOTE — PROGRESS NOTES
Pt was seen today in [facility] for a Phase II visit. Vital signs and session notes recorded in [employer] and will be scanned into Epic by HIM.

## 2024-08-12 ENCOUNTER — TREATMENT (OUTPATIENT)
Dept: CARDIAC REHAB | Facility: HOSPITAL | Age: 84
End: 2024-08-12
Payer: MEDICARE

## 2024-08-12 DIAGNOSIS — Z95.5 STATUS POST PRIMARY ANGIOPLASTY WITH CORONARY STENT: Primary | ICD-10-CM

## 2024-08-12 PROCEDURE — 93798 PHYS/QHP OP CAR RHAB W/ECG: CPT

## 2024-08-12 NOTE — PROGRESS NOTES
Pt was seen today in [facility] for a Phase II visit. Vital signs and session notes recorded in [employer] and will be scanned into Epic by HIM.

## 2024-08-14 ENCOUNTER — TREATMENT (OUTPATIENT)
Dept: CARDIAC REHAB | Facility: HOSPITAL | Age: 84
End: 2024-08-14
Payer: MEDICARE

## 2024-08-14 DIAGNOSIS — Z95.5 STATUS POST PRIMARY ANGIOPLASTY WITH CORONARY STENT: Primary | ICD-10-CM

## 2024-08-14 PROCEDURE — 93798 PHYS/QHP OP CAR RHAB W/ECG: CPT

## 2024-08-14 NOTE — PROGRESS NOTES
Pt was seen today in CR for a Phase 2 visit.  Vital signs and session notes recorded in [employer] and will be scanned into Epic by HIM.

## 2024-08-16 ENCOUNTER — APPOINTMENT (OUTPATIENT)
Dept: CARDIAC REHAB | Facility: HOSPITAL | Age: 84
End: 2024-08-16
Payer: MEDICARE

## 2024-08-19 ENCOUNTER — TREATMENT (OUTPATIENT)
Dept: CARDIAC REHAB | Facility: HOSPITAL | Age: 84
End: 2024-08-19
Payer: MEDICARE

## 2024-08-19 DIAGNOSIS — Z95.5 STATUS POST PRIMARY ANGIOPLASTY WITH CORONARY STENT: Primary | ICD-10-CM

## 2024-08-19 PROCEDURE — 93798 PHYS/QHP OP CAR RHAB W/ECG: CPT

## 2024-08-19 NOTE — PROGRESS NOTES
Pt was seen today in [facility] for a Phase II visit. Vital signs and session notes recorded in [employer] and will be scanned into Epic by HIM.

## 2024-08-21 ENCOUNTER — TREATMENT (OUTPATIENT)
Dept: CARDIAC REHAB | Facility: HOSPITAL | Age: 84
End: 2024-08-21
Payer: MEDICARE

## 2024-08-21 DIAGNOSIS — Z95.5 STATUS POST PRIMARY ANGIOPLASTY WITH CORONARY STENT: Primary | ICD-10-CM

## 2024-08-21 PROCEDURE — 93798 PHYS/QHP OP CAR RHAB W/ECG: CPT

## 2024-08-21 NOTE — PROGRESS NOTES
Pt was seen today in [facility] for a Phase 2 visit.  Vital signs and session notes recorded in [employer] and will be scanned into Epic by HIM.

## 2024-08-23 ENCOUNTER — TREATMENT (OUTPATIENT)
Dept: CARDIAC REHAB | Facility: HOSPITAL | Age: 84
End: 2024-08-23
Payer: MEDICARE

## 2024-08-23 DIAGNOSIS — Z95.5 STATUS POST PRIMARY ANGIOPLASTY WITH CORONARY STENT: Primary | ICD-10-CM

## 2024-08-23 PROCEDURE — 93798 PHYS/QHP OP CAR RHAB W/ECG: CPT

## 2024-08-23 NOTE — PROGRESS NOTES
Pt was seen today in [facility] for a Phase 2 visit.  Vital signs and session notes recorded in [employer] and will be scanned into Epic by HIM.

## 2024-08-26 ENCOUNTER — TREATMENT (OUTPATIENT)
Dept: CARDIAC REHAB | Facility: HOSPITAL | Age: 84
End: 2024-08-26
Payer: MEDICARE

## 2024-08-26 DIAGNOSIS — Z95.5 STATUS POST PRIMARY ANGIOPLASTY WITH CORONARY STENT: Primary | ICD-10-CM

## 2024-08-26 PROCEDURE — 93798 PHYS/QHP OP CAR RHAB W/ECG: CPT

## 2024-08-26 NOTE — PROGRESS NOTES
Pt was seen today in CR for a Phase II visit. Vital signs and session notes recorded in [employer] and will be scanned into Epic by HIM.

## 2024-08-28 ENCOUNTER — TREATMENT (OUTPATIENT)
Dept: CARDIAC REHAB | Facility: HOSPITAL | Age: 84
End: 2024-08-28
Payer: MEDICARE

## 2024-08-28 DIAGNOSIS — Z95.5 STATUS POST PRIMARY ANGIOPLASTY WITH CORONARY STENT: Primary | ICD-10-CM

## 2024-08-28 PROCEDURE — 93798 PHYS/QHP OP CAR RHAB W/ECG: CPT

## 2024-08-28 NOTE — PROGRESS NOTES
Pt was seen today in CR for a Phase II visit. Vital signs and session notes recorded in [employer] and will be scanned into Epic by HIM.

## 2024-08-30 ENCOUNTER — TREATMENT (OUTPATIENT)
Dept: CARDIAC REHAB | Facility: HOSPITAL | Age: 84
End: 2024-08-30
Payer: MEDICARE

## 2024-08-30 DIAGNOSIS — Z95.5 STATUS POST PRIMARY ANGIOPLASTY WITH CORONARY STENT: Primary | ICD-10-CM

## 2024-08-30 PROCEDURE — 93798 PHYS/QHP OP CAR RHAB W/ECG: CPT

## 2024-08-30 NOTE — PROGRESS NOTES
Pt was seen today in CR for a Phase 2 visit.  Vital signs and session notes recorded in [employer] and will be scanned into Epic by HIM.

## 2024-09-04 ENCOUNTER — TREATMENT (OUTPATIENT)
Dept: CARDIAC REHAB | Facility: HOSPITAL | Age: 84
End: 2024-09-04
Payer: MEDICARE

## 2024-09-04 DIAGNOSIS — Z95.5 STATUS POST PRIMARY ANGIOPLASTY WITH CORONARY STENT: Primary | ICD-10-CM

## 2024-09-04 PROCEDURE — 93798 PHYS/QHP OP CAR RHAB W/ECG: CPT

## 2024-09-04 NOTE — PROGRESS NOTES
Pt was seen today in CR for a Phase 2 visit.  Vital signs and session notes recorded in [email or web address] and will be scanned into Epic by HIM.

## 2024-09-06 ENCOUNTER — TREATMENT (OUTPATIENT)
Dept: CARDIAC REHAB | Facility: HOSPITAL | Age: 84
End: 2024-09-06
Payer: MEDICARE

## 2024-09-06 DIAGNOSIS — Z95.5 STATUS POST PRIMARY ANGIOPLASTY WITH CORONARY STENT: Primary | ICD-10-CM

## 2024-09-06 PROCEDURE — 93798 PHYS/QHP OP CAR RHAB W/ECG: CPT

## 2024-09-06 NOTE — PROGRESS NOTES
Pt was seen today in CR for a Phase 2 visit.  Vital signs and session notes recorded in [employer] and will be scanned into Epic by HIM.

## 2024-09-09 ENCOUNTER — TREATMENT (OUTPATIENT)
Dept: CARDIAC REHAB | Facility: HOSPITAL | Age: 84
End: 2024-09-09
Payer: MEDICARE

## 2024-09-09 DIAGNOSIS — Z95.5 STATUS POST PRIMARY ANGIOPLASTY WITH CORONARY STENT: Primary | ICD-10-CM

## 2024-09-09 PROCEDURE — 93798 PHYS/QHP OP CAR RHAB W/ECG: CPT

## 2024-09-09 NOTE — PROGRESS NOTES
Pt was seen today in CR for a Phase 2 visit.  Vital signs and session notes recorded in [employer] and will be scanned into Epic by HIM.

## 2024-09-11 ENCOUNTER — TREATMENT (OUTPATIENT)
Dept: CARDIAC REHAB | Facility: HOSPITAL | Age: 84
End: 2024-09-11
Payer: MEDICARE

## 2024-09-11 DIAGNOSIS — Z95.5 STATUS POST PRIMARY ANGIOPLASTY WITH CORONARY STENT: Primary | ICD-10-CM

## 2024-09-11 PROCEDURE — 93798 PHYS/QHP OP CAR RHAB W/ECG: CPT

## 2024-09-11 NOTE — PROGRESS NOTES
Pt was seen today in CR for a Phase 2 visit.  Vital signs and session notes recorded in [employer] and will be scanned into Epic by HIM.

## 2024-09-13 ENCOUNTER — APPOINTMENT (OUTPATIENT)
Dept: CARDIAC REHAB | Facility: HOSPITAL | Age: 84
End: 2024-09-13
Payer: MEDICARE

## 2024-09-16 ENCOUNTER — TREATMENT (OUTPATIENT)
Dept: CARDIAC REHAB | Facility: HOSPITAL | Age: 84
End: 2024-09-16
Payer: MEDICARE

## 2024-09-16 DIAGNOSIS — Z95.5 STATUS POST PRIMARY ANGIOPLASTY WITH CORONARY STENT: Primary | ICD-10-CM

## 2024-09-16 PROCEDURE — 93798 PHYS/QHP OP CAR RHAB W/ECG: CPT

## 2024-09-18 ENCOUNTER — TREATMENT (OUTPATIENT)
Dept: CARDIAC REHAB | Facility: HOSPITAL | Age: 84
End: 2024-09-18
Payer: MEDICARE

## 2024-09-18 DIAGNOSIS — Z95.5 STATUS POST PRIMARY ANGIOPLASTY WITH CORONARY STENT: Primary | ICD-10-CM

## 2024-09-18 PROCEDURE — 93798 PHYS/QHP OP CAR RHAB W/ECG: CPT

## 2024-09-20 ENCOUNTER — TREATMENT (OUTPATIENT)
Dept: CARDIAC REHAB | Facility: HOSPITAL | Age: 84
End: 2024-09-20
Payer: MEDICARE

## 2024-09-20 DIAGNOSIS — Z95.5 STATUS POST PRIMARY ANGIOPLASTY WITH CORONARY STENT: Primary | ICD-10-CM

## 2024-09-20 PROCEDURE — 93798 PHYS/QHP OP CAR RHAB W/ECG: CPT

## 2024-09-23 ENCOUNTER — TREATMENT (OUTPATIENT)
Dept: CARDIAC REHAB | Facility: HOSPITAL | Age: 84
End: 2024-09-23
Payer: MEDICARE

## 2024-09-23 DIAGNOSIS — Z95.5 STATUS POST PRIMARY ANGIOPLASTY WITH CORONARY STENT: Primary | ICD-10-CM

## 2024-09-23 PROCEDURE — 93798 PHYS/QHP OP CAR RHAB W/ECG: CPT

## 2024-09-25 ENCOUNTER — TREATMENT (OUTPATIENT)
Dept: CARDIAC REHAB | Facility: HOSPITAL | Age: 84
End: 2024-09-25
Payer: MEDICARE

## 2024-09-25 DIAGNOSIS — Z95.5 STATUS POST PRIMARY ANGIOPLASTY WITH CORONARY STENT: Primary | ICD-10-CM

## 2024-09-25 PROCEDURE — 93798 PHYS/QHP OP CAR RHAB W/ECG: CPT

## 2024-10-17 ENCOUNTER — OFFICE VISIT (OUTPATIENT)
Dept: GYNECOLOGIC ONCOLOGY | Facility: CLINIC | Age: 84
End: 2024-10-17
Payer: MEDICARE

## 2024-10-17 VITALS
HEART RATE: 85 BPM | DIASTOLIC BLOOD PRESSURE: 63 MMHG | OXYGEN SATURATION: 97 % | HEIGHT: 62 IN | WEIGHT: 185.4 LBS | BODY MASS INDEX: 34.12 KG/M2 | TEMPERATURE: 97.1 F | SYSTOLIC BLOOD PRESSURE: 130 MMHG | RESPIRATION RATE: 17 BRPM

## 2024-10-17 DIAGNOSIS — Z85.42 HISTORY OF ENDOMETRIAL CANCER: Primary | ICD-10-CM

## 2024-10-17 NOTE — PROGRESS NOTES
GYN ONCOLOGY CANCER SURVEILLANCE FOLLOW-UP    Kathryn Davison  [id number]  1940    Subjective   Chief Complaint: Follow-up, uterine cancer       History of present illness:   Kathryn Davison is a 84 y.o. year old female who is here today for ongoing surveillance of Endometrial Cancer, see Cancer History. She is now over 4 years out from completion of treatment with surgery alone. She continues oxybutynin XL 5 mg PO daily for management of urge urinary incontinence. She is doing well has no gyn complaints.  Her only complaint at this time is fatigue, much worse in the afternoons.  Overall, she can still do any ADLs she wishes to complete.  She denies vaginal bleeding, pelvic pain, and changes in bowel or bladder function. Shortly after her last visit here she had a MI in November with subsequent heart cath.  Recently completed posttreatment care with [facility] cardiac rehab.  During the session she would often reach 5000 steps per day.  Kathryn is happy to report she is down a total of 50 pounds since her initial visit here, this is intentional weight loss.  She is getting around better and her strength is up since completing cardiac rehab.  She did bring a rolling walker for ambulation assistance today due to long commute between parking and the hospital office.  She is driving, but not in [place].  Patient lives in [place].  Her daughter who is a teacher brings her to appointments.  Her daughter will retire on Halloween.  Inquires when she may be released back to routine care with Dr. Elizalde, her general GYN at [facility].          Cancer History:   Oncology/Hematology History   Endometrial cancer   7/24/2020 Procedure    Hysteroscopy, D&C, and MyoSure by Dr. Elizalde for postmenopausal bleeding. Pathology showed sections with adenocarcinoma, enlarged vascular neclei, prominent mitotic activity, and focal areas of complex hyperplasia with atypia. Referred to Gyn Oncology.      8/31/2020  "Surgery    Radical Type 1 RTLH/BSO and sentinel lymph node dissection.     Final pathology showed residual 0.2 cm grade 2 endometrioid tumor, non-invasive into the myometrium. No lymphvascular invasion. Nodes negative. MSI testing showed loss of MLH1 and PMS2 (per previous biopsy). 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  Copy of Survivorship Care Plan provided to patient and primary care provider.           The current medication list and allergy list were reviewed and reconciled.     Past Medical History, Past Surgical History, Social History, Family History have been reviewed and are without significant changes except as mentioned.      Review of Systems   Constitutional: Negative.    Gastrointestinal: Negative.    Genitourinary: Negative.    Psychiatric/Behavioral: Negative.             Objective   Physical Exam  Vital Signs: /63   Pulse 85   Temp 97.1 °F (36.2 °C) (Temporal)   Resp 17   Ht 157.5 cm (62.01\")   Wt 84.1 kg (185 lb 6.4 oz)   LMP  (LMP Unknown)   SpO2 97%   BMI 33.90 kg/m²   Vitals:    10/17/24 1407   PainSc: 0-No pain           General Appearance:  alert, cooperative, no apparent distress, appears stated age, and obese by BMI criteria   Neurologic/Psych: A&O x 3, gait steady, appropriate affect   HEENT:  Normocephalic, without obvious abnormality, mucous membranes moist   Abdomen:   Soft, non-tender, non-distended, no organomegaly, and Exam limited d/t habitus.   Lymph nodes: No cervical, supraclavicular, inguinal adenopathy noted   Pelvic: External Genitalia  atrophic, without lesions  Vagina  is pale, atrophic.   Vaginal Cuff  Female Vaginal Cuff: smooth, intact and without visible lesions  Uterus  surgically absent, no palpable masses and exam limited d/t adiposity  Ovaries  surgically absent bilaterally  Parametria  smooth  Rectovaginal  Female rectovaginal: " "deferred     ECOG score: 1             Result Review      Tumor marker:  No results found for: \"\"    PHQ-9 Total Score:      Procedure Note:            Assessment and Plan:     There is no evidence of disease upon today's exam. Continue yearly visits until the 5 year rianna. She is understanding to call with any changes in pelvic symptoms or general GYN concerns at any time between regularly scheduled visits.     Diagnoses and all orders for this visit:    1. History of endometrial cancer (Primary)      Pain assessment was performed today as a part of patient’s care.  For patients with pain related to surgery, gynecologic malignancy or cancer treatment, the plan is as noted in the assessment/plan.  For patients with pain not related to these issues, they are to seek any further needed care from a more appropriate provider, such as PCP.    Follow-up:     Return to clinic in 1 year for ongoing cancer surveillance.      Electronically signed by NANNETTE Farah on 10/17/2024        "

## (undated) DEVICE — PK EXTRM UPPR 20

## (undated) DEVICE — SYR LUERLOK 5CC

## (undated) DEVICE — SOL NACL 0.9PCT 1000ML

## (undated) DEVICE — CANNULA SEAL

## (undated) DEVICE — GLV SURG SENSICARE PI LF PF 7.5 GRN STRL

## (undated) DEVICE — VIOLET BRAIDED (POLYGLACTIN 910), SYNTHETIC ABSORBABLE SUTURE: Brand: COATED VICRYL

## (undated) DEVICE — SKIN AFFIX SURG ADHESIVE 72/CS 0.55ML: Brand: MEDLINE

## (undated) DEVICE — 4-PORT MANIFOLD: Brand: NEPTUNE 2

## (undated) DEVICE — Device

## (undated) DEVICE — 2000CC GUARDIAN II: Brand: GUARDIAN

## (undated) DEVICE — STRIP,CLOSURE,WOUND,MEDI-STRIP,1/2X4: Brand: MEDLINE

## (undated) DEVICE — CATH F6 ST JL 3.5 100CM: Brand: SUPERTORQUE

## (undated) DEVICE — PIN STNMAN 3.2MM 9IN
Type: IMPLANTABLE DEVICE | Site: SHOULDER | Status: NON-FUNCTIONAL
Removed: 2018-02-02

## (undated) DEVICE — SYR SLP TP 10ML DISP

## (undated) DEVICE — COLUMN DRAPE

## (undated) DEVICE — MANIP UTER RUMI TP 5.1MM 6CM LAV

## (undated) DEVICE — TIP COVER ACCESSORY

## (undated) DEVICE — DRP ADAPT ALLY UTER POSTN SYS 1P/U

## (undated) DEVICE — SUT VIC 3/0 SH 27IN J416H

## (undated) DEVICE — TOTAL TRAY, URNMTR, SILV, LF, 16FR 10ML: Brand: MEDLINE

## (undated) DEVICE — PAD ARMBRD SURG CONVOL 7.5X20X2IN

## (undated) DEVICE — ENDOCUT SCISSOR TIP, DISPOSABLE: Brand: RENEW

## (undated) DEVICE — GLV SURG TRIUMPH ORTHO W/ALOE PF LTX 8 STRL

## (undated) DEVICE — ADHS LIQ MASTISOL 2/3ML

## (undated) DEVICE — SUT GUT CHRM 2/0 SH 27IN G123H

## (undated) DEVICE — NDL BLNT 18G 1 1/2IN

## (undated) DEVICE — DRSNG SURESITE123 6X8IN

## (undated) DEVICE — CONMED SCOPE SAVER BITE BLOCK, 20X27 MM: Brand: SCOPE SAVER

## (undated) DEVICE — GLIDESHEATH SLENDER STAINLESS STEEL KIT: Brand: GLIDESHEATH SLENDER

## (undated) DEVICE — CUFF SCD HEMOFORCE SEQ CALF STD MD

## (undated) DEVICE — APPL CHLORAPREP TINTED 26ML TEAL

## (undated) DEVICE — MARKR SKIN W/RULR

## (undated) DEVICE — ELASTIC BANDAGE WITH REMOVABLE CLIP: Brand: CURITY

## (undated) DEVICE — BNDG GZ SOF-FORM CONFRM 3X75IN LF STRL

## (undated) DEVICE — ANTIBACTERIAL UNDYED BRAIDED (POLYGLACTIN 910), SYNTHETIC ABSORBABLE SURGICAL SUTURE: Brand: COATED VICRYL

## (undated) DEVICE — BIT DRL DVR CRSLK TRY 2.2MM

## (undated) DEVICE — PK MAJ GYN DAVINCI 10

## (undated) DEVICE — SUT VIC 2/0 CT1 27IN J259H

## (undated) DEVICE — GLV SURG BIOGEL PI ULTRATOUCH G SZ7.5 LF

## (undated) DEVICE — ENDOPOUCH RETRIEVER SPECIMEN RETRIEVAL BAGS: Brand: ENDOPOUCH RETRIEVER

## (undated) DEVICE — SHEET, DRAPE, SPLIT, STERILE: Brand: MEDLINE

## (undated) DEVICE — SNAR POLYP SENSATION STDOVL 27 240 BX40

## (undated) DEVICE — DRP SURG U/BUTT W/PCH BACK STRL

## (undated) DEVICE — SUT ETHIB 5 V37 30IN MB66G

## (undated) DEVICE — OCCLUSIVE GAUZE STRIP,3% BISMUTH TRIBROMOPHENATE IN PETROLATUM BLEND: Brand: XEROFORM

## (undated) DEVICE — TBG CONN OUTFLOW AQUILEX/MYOSURE

## (undated) DEVICE — ENDOGATOR AUXILIARY WATER JET CONNECTOR: Brand: ENDOGATOR

## (undated) DEVICE — [HIGH FLOW INSUFFLATOR,  DO NOT USE IF PACKAGE IS DAMAGED,  KEEP DRY,  KEEP AWAY FROM SUNLIGHT,  PROTECT FROM HEAT AND RADIOACTIVE SOURCES.]: Brand: PNEUMOSURE

## (undated) DEVICE — KT POSTN TRENDELENBURG NBXL PAD WING STRAP TABL HDRST XLG

## (undated) DEVICE — SOL IRR NACL 0.9PCT 3000ML

## (undated) DEVICE — FRCP BIOP COLD ENDOJAW ALLGTR W/NDL 2.8X2300MM BLU

## (undated) DEVICE — KT POSTN SCHLEIN

## (undated) DEVICE — 1000 S-DRAPE TOWEL DRAPE 10/BX: Brand: STERI-DRAPE™

## (undated) DEVICE — NDL SPINE 22G 31/2IN BLK

## (undated) DEVICE — SYR LUERLOK 30CC

## (undated) DEVICE — SPNG GZ WOVN 4X4IN 12PLY 10/BX STRL

## (undated) DEVICE — DEV TISS REMOV MYOSURE REACH

## (undated) DEVICE — NDL FLTR BLNT 18G 1 1/2IN

## (undated) DEVICE — 3M™ TEGADERM™ CHG DRESSING 25/CARTON 4 CARTONS/CASE 1659: Brand: TEGADERM™

## (undated) DEVICE — TRAP,MUCUS SPECIMEN,40CC: Brand: MEDLINE

## (undated) DEVICE — JELLY,LUBE,STERILE,FLIP TOP,TUBE,2-OZ: Brand: MEDLINE

## (undated) DEVICE — GLV SURG SENSICARE PI LF PF 6.0

## (undated) DEVICE — SEAL HYSTERSCOPE/OUTFLOW CHANNEL MYOSURE

## (undated) DEVICE — ENDOPATH PNEUMONEEDLE INSUFFLATION NEEDLES WITH LUER LOCK CONNECTORS 120MM: Brand: ENDOPATH

## (undated) DEVICE — HANDPIECE SET WITH HIGH FLOW TIP AND SUCTION TUBE: Brand: INTERPULSE

## (undated) DEVICE — BIT DRL 2.5MM

## (undated) DEVICE — TUBING, SUCTION, 1/4" X 10', STRAIGHT: Brand: MEDLINE

## (undated) DEVICE — SUT ETHIB 2 CV V37 MS/4 30IN MX69G

## (undated) DEVICE — PAD,ABDOMINAL,5"X9",STERILE,LF,1/PK: Brand: MEDLINE INDUSTRIES, INC.

## (undated) DEVICE — BNDG ELAS MATRX V/CLS 4IN 5YD LF

## (undated) DEVICE — TR BAND RADIAL ARTERY COMPRESSION DEVICE: Brand: TR BAND

## (undated) DEVICE — ANGIOGRAPHIC CATHETER: Brand: EXPO™

## (undated) DEVICE — GW EMR FIX EXCHG J STD .035 3MM 260CM

## (undated) DEVICE — ARM DRAPE

## (undated) DEVICE — INTENDED FOR TISSUE SEPARATION, AND OTHER PROCEDURES THAT REQUIRE A SHARP SURGICAL BLADE TO PUNCTURE OR CUT.: Brand: BARD-PARKER ® STAINLESS STEEL BLADES

## (undated) DEVICE — 2108 SERIES SAGITTAL BLADE, NO OFFSET  (24.8 X 1.24 X 80.1MM)

## (undated) DEVICE — TBG CONN INFLOW AQUILEX/MYOSURE

## (undated) DEVICE — ST IRR CYSTO W/SPK 77IN LF

## (undated) DEVICE — BLD CLIP UNIV SURG GRY

## (undated) DEVICE — CLAVICLE STRAP: Brand: DEROYAL

## (undated) DEVICE — FLEXIBLE YANKAUER,MEDIUM TIP, NO VACUUM CONTROL: Brand: ARGYLE

## (undated) DEVICE — KWIRE SS 1.6MM NS
Type: IMPLANTABLE DEVICE | Site: WRIST | Status: NON-FUNCTIONAL
Removed: 2018-02-02

## (undated) DEVICE — GLV SURG SENSICARE W/ALOE PF LF 8 STRL

## (undated) DEVICE — CATH F6 ST JR 4 100CM: Brand: SUPERTORQUE

## (undated) DEVICE — RICH MINOR LITHOTOMY: Brand: MEDLINE INDUSTRIES, INC.

## (undated) DEVICE — SOL SCRUB STAT ENDURE 420 CIDASTAT 2PCT FM 4OZ

## (undated) DEVICE — GLV SURG SENSICARE ORTHO PF LF 8 STRL

## (undated) DEVICE — PK HIP GEN 20

## (undated) DEVICE — PAD GRND REM POLYHESIVE A/ DISP

## (undated) DEVICE — MANIP UTER RUMI 2 KOH EFFICIENT 3CM BL

## (undated) DEVICE — GLV SURG DERMASSURE GRN LF PF SZ 6.5

## (undated) DEVICE — SUT MNCRYL PLS ANTIB UD 3/0 PS2 27IN

## (undated) DEVICE — BLADELESS OBTURATOR: Brand: WECK VISTA

## (undated) DEVICE — CVR HNDL LIGHT RIGID

## (undated) DEVICE — CAST PADDING 3"X4 YD KIT: Brand: CARDINAL HEALTH

## (undated) DEVICE — VESSEL LOOPS X-RAY DETECTABLE: Brand: DEROYAL